# Patient Record
Sex: FEMALE | Race: BLACK OR AFRICAN AMERICAN | NOT HISPANIC OR LATINO | Employment: OTHER | URBAN - METROPOLITAN AREA
[De-identification: names, ages, dates, MRNs, and addresses within clinical notes are randomized per-mention and may not be internally consistent; named-entity substitution may affect disease eponyms.]

---

## 2017-01-19 DIAGNOSIS — E89.0 HYPOTHYROIDISM ASSOCIATED WITH SURGICAL PROCEDURE: Primary | ICD-10-CM

## 2017-01-19 RX ORDER — LIOTHYRONINE SODIUM 5 UG/1
5 TABLET ORAL 2 TIMES DAILY
Qty: 60 TABLET | Refills: 11 | Status: SHIPPED | OUTPATIENT
Start: 2017-01-19 | End: 2018-03-12 | Stop reason: SDUPTHER

## 2017-02-03 ENCOUNTER — CLINICAL SUPPORT (OUTPATIENT)
Dept: SPEECH THERAPY | Facility: HOSPITAL | Age: 62
End: 2017-02-03
Attending: INTERNAL MEDICINE
Payer: COMMERCIAL

## 2017-02-03 ENCOUNTER — OFFICE VISIT (OUTPATIENT)
Dept: OTOLARYNGOLOGY | Facility: CLINIC | Age: 62
End: 2017-02-03
Payer: COMMERCIAL

## 2017-02-03 VITALS
DIASTOLIC BLOOD PRESSURE: 87 MMHG | TEMPERATURE: 96 F | BODY MASS INDEX: 35.65 KG/M2 | HEART RATE: 66 BPM | WEIGHT: 220.88 LBS | SYSTOLIC BLOOD PRESSURE: 134 MMHG

## 2017-02-03 DIAGNOSIS — F44.4 HYPERFUNCTIONAL DYSPHONIA: ICD-10-CM

## 2017-02-03 DIAGNOSIS — R49.0 DYSPHONIA: ICD-10-CM

## 2017-02-03 DIAGNOSIS — R49.9 VOICE DISTURBANCE: Primary | ICD-10-CM

## 2017-02-03 DIAGNOSIS — R49.0 HOARSENESS: Primary | ICD-10-CM

## 2017-02-03 PROCEDURE — 92520 LARYNGEAL FUNCTION STUDIES: CPT | Mod: GN,,, | Performed by: SPEECH-LANGUAGE PATHOLOGIST

## 2017-02-03 PROCEDURE — 31579 LARYNGOSCOPY TELESCOPIC: CPT | Mod: S$GLB,,, | Performed by: OTOLARYNGOLOGY

## 2017-02-03 PROCEDURE — 92524 BEHAVRAL QUALIT ANALYS VOICE: CPT | Mod: GN | Performed by: SPEECH-LANGUAGE PATHOLOGIST

## 2017-02-03 PROCEDURE — G9173 VOICE D/C STATUS: HCPCS | Mod: GN,CK | Performed by: SPEECH-LANGUAGE PATHOLOGIST

## 2017-02-03 PROCEDURE — G9171 VOICE CURRENT STATUS: HCPCS | Mod: GN,CK | Performed by: SPEECH-LANGUAGE PATHOLOGIST

## 2017-02-03 PROCEDURE — 99999 PR PBB SHADOW E&M-EST. PATIENT-LVL III: CPT | Mod: PBBFAC,,, | Performed by: OTOLARYNGOLOGY

## 2017-02-03 PROCEDURE — G9172 VOICE GOAL STATUS: HCPCS | Mod: GN,CI | Performed by: SPEECH-LANGUAGE PATHOLOGIST

## 2017-02-03 PROCEDURE — 99243 OFF/OP CNSLTJ NEW/EST LOW 30: CPT | Mod: 25,S$GLB,, | Performed by: OTOLARYNGOLOGY

## 2017-02-03 NOTE — PATIENT INSTRUCTIONS
MUSCLE TENSION DYSPHONIA     What is MTD?     Muscle tension dysphonia (MTD) is a condition in which laryngeal muscles are overactive, causing voice fatigue and discomfort with use. Sometimes MTD accompanies another organic condition, but it can also occur on its own. The overactivity of the muscles can result in incomplete closure of the vocal folds. Sometimes, the false vocal folds are overactive as well.     MTD is caused by over-activity in some laryngeal muscles, which is sometimes caused by the underactivity of other muscles. If there is an underlying condition, like nodules or an upper respiratory infection, the overcompensation to produce voice becomes a learned pattern.     How is MTD treated?     Voice therapy is the first line of treatment for MTD. There are a wide variety of treatment approaches, including laryngeal massage.                           WHAT TO EXPECT FROM VOICE THERAPY    Purpose  The purpose of voice therapy is to help you find a better, more efficient way to use your voice or to reduce symptoms such as coughing, throat clearing, or difficulty breathing.  Depending on your symptoms, you may learn how to produce clearer voice quality, how to reduce fatigue or pain associated with speaking, how to take care of your voice, and how to eliminate chronic coughing or throat clearing.      Process: Evaluation  First, you may go through some initial testing.  In most cases, a videostroboscopy will be performed in order to allow your speech pathologist and your physician to look at your vocal cords to aid in deciding if you would benefit from voice therapy.  Next, you may work with the speech pathologist to assess the current capabilities of your voice.  Following evaluation, your speech pathologist will design a therapeutic plan to improve your voice as well as other symptoms that may bother you.  At the time of evaluation, your speech pathologist may provide you with exercises to try at home.       Process: Therapy  Most patients benefit from 2-8 sessions over 1-3 months.  Voice therapy involves changing the behavior of your vocal cords and speaking habits, so it is very important that you attend your appointments and do home practices as instructed by your speech pathologist.  Home practice and participation in therapy are critical to meeting your desired voice goals, so of course, we are able to work with you to schedule appointments that are convenient for you.

## 2017-02-03 NOTE — PATIENT INSTRUCTIONS
Home Voice Exercises   Practice each of these exercises, 3-5 minutes each, 3-5 times per day with:   ?  Cup bubble   ?  /m/ phonation    -Sustained pitch x10    -Pitch glide down (high pitch to low pitch) x10   -Pitch glide up (low pitch to high pitch) x10     -Reading aloud, using exercises if needed

## 2017-02-03 NOTE — MR AVS SNAPSHOT
[facility]  [address]  [facility] [address]  Phone: [phone]                  Dain Phelps   2/3/2017 8:00 AM   Clinical Support    Description:  Female : 1955   Provider:  Louise Craven CCC-SLP   Department:  [facility]           Reason for Visit     SLP Initial Evaluation           Diagnoses this Visit        Comments    Hoarseness    -  Primary     Dysphonia                To Do List           Future Appointments        Provider Department Dept Phone    2/15/2017 9:30 AM [facility], [place] [facility] [phone]      Goals (5 Years of Data)     None      [facility]     [facility] Nurse Care Line -  Assistance  Registered nurses in the [facility] provide clinical advisement, health education, appointment booking, and other advisory services.  Call for this free service at [phone].             Medications           Message regarding Medications     Verify the changes and/or additions to your medication regime listed below are the same as discussed with your clinician today.  If any of these changes or additions are incorrect, please notify your healthcare provider.             Verify that the below list of medications is an accurate representation of the medications you are currently taking.  If none reported, the list may be blank. If incorrect, please contact your healthcare provider. Carry this list with you in case of emergency.           Current Medications     ergocalciferol, vitamin D2, 2,000 unit Tab Take 2,000 Units by mouth 2 (two) times daily.    hydrochlorothiazide (HYDRODIURIL) 25 MG tablet TAKE 1 TABLET BY MOUTH EVERY DAY    hydroquinone 4 % Crea APPLY ON DARK SPOTS AT BEDTIME    levothyroxine (SYNTHROID) 100 MCG tablet Take 1 tablet (100 mcg total) by mouth before breakfast.    liothyronine (CYTOMEL) 5 MCG Tab Take 1 tablet (5 mcg total) by mouth 2 (two) times daily.     metoprolol succinate (TOPROL-XL) 25 MG 24 hr tablet TAKE 1 TABLET BY MOUTH EVERY DAY    verapamil (VERELAN) 360 MG C24P TAKE 1 CAPSULE(360 MG) BY MOUTH EVERY DAY           Clinical Reference Information           Allergies as of 2/3/2017     Ace Inhibitors      Immunizations Administered on Date of Encounter - 2/3/2017     None      Instructions    Home Voice Exercises   Practice each of these exercises, 3-5 minutes each, 3-5 times per day with:   ?  Cup bubble   ?  /m/ phonation    -Sustained pitch x10    -Pitch glide down (high pitch to low pitch) x10   -Pitch glide up (low pitch to high pitch) x10     -Reading aloud, using exercises if needed        Language Assistance Services     ATTENTION: Language assistance services are available, free of charge. Please call [phone].      ATENCIÓN: Si anu bishop, tiene a hand disposición servicios gratuitos de asistencia lingüística. Llame al [phone].     [facility] Ý: N?u b?n nói Ti?ng Vi?t, có các d?ch v? h? tr? ngôn ng? mi?n phí dành cho b?n. G?i s? [phone].         [facility] complies with applicable Federal civil rights laws and does not discriminate on the basis of race, color, national origin, age, disability, or sex.

## 2017-02-03 NOTE — PROGRESS NOTES
Referring provider: Dr. Erika Mcnalyl  Reason for visit:    Behavioral and qualitative analysis of voice and resonance (CPT 95916)  Laryngeal function studies (CPT 21228)    Subjective / History    Mrs. Dain Phelps is a 61 y.o. female referred for voice evaluation (CPT 36379, 84717) by Dr. Mcnally.  She presents with complaints of hoarseness which began in  following thyroid surgery (goiter).  The patient also endorses: strain with use, voice worse in morning, fatigue with use and reduced volume.  She is retired.  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  She is unable to sing since her surgery.  No high range or low range.  She used to sing in [employer] and to her kids/grandkids.    Swallowing: no c/o   Breathing: no c/o    Smokin packs/day   Reflux: yes - does not take meds regularly, hasn't had problems recently  Water: 8-16 oz/day     Stroboscopy findings (per Dr. Hardy on 2/3/17): no lesions of the supraglottic or glottic larynx; vocal fold mobility is normal with complete closure    Past Medical History   Diagnosis Date    Chronic low back pain     Fatigue     GERD (gastroesophageal reflux disease)     Hypertension     Hypothyroidism     Impaired glucose tolerance 1/15/2014    Leukopenia     Obesity (BMI 30-39.9) 1/15/2014    Primary hypothyroidism 2015    Snoring 2015    Vitamin D deficiency 1/15/2014     Current Outpatient Prescriptions on File Prior to Visit   Medication Sig Dispense Refill    ergocalciferol, vitamin D2, 2,000 unit Tab Take 2,000 Units by mouth 2 (two) times daily.      hydrochlorothiazide (HYDRODIURIL) 25 MG tablet TAKE 1 TABLET BY MOUTH EVERY DAY 30 tablet 2    hydroquinone 4 % Crea APPLY ON DARK SPOTS AT BEDTIME 28.35 g 0    levothyroxine (SYNTHROID) 100 MCG tablet Take 1 tablet (100 mcg total) by mouth before breakfast. 30 tablet 11    liothyronine (CYTOMEL) 5 MCG  "Tab Take 1 tablet (5 mcg total) by mouth 2 (two) times daily. 60 tablet 11    metoprolol succinate (TOPROL-XL) 25 MG 24 hr tablet TAKE 1 TABLET BY MOUTH EVERY DAY 30 tablet 4    verapamil (VERELAN) 360 MG C24P TAKE 1 CAPSULE(360 MG) BY MOUTH EVERY DAY 90 capsule 0     No current facility-administered medications on file prior to visit.        Objective    Perceptual/behavioral assessment  -CAPE-V Overall Score: 36  -Quality: rough, strained, low breath support  -Volume: decreased projection  -Pitch: low F0  -Flexibility: diminished  -Habitual respiratory pattern: chest/clavicular.    Acoustic/aerodynamic assessment  Multi-Dimensional Voice Program (MDVP) Analysis (sustained "ah")   Baseline Gender-matched norms (F)   Average fundamental frequency (Fo) 152.907 Hz Norm/SD: 243.97 / 27.46   Relative average perturbation 0.713% Norm/SD: 0.378 / 0.21   Shimmer percent 8.338% Norm/SD: 1.997 / 0.79   Noise to harmonic ratio 0.264 Norm/SD: 0.112 / 0.01   Voice turbulence index 0.054 Norm/SD: 0.046 / 0.012     Phonatory Aerodynamic System (PAS) Analysis (running speech)  Maximum SPL   81.28  dB  Mean Pitch   133.31 Hz  Pitch Range   229.43 Hz  Phonation Time  12.97  Sec  Expiratory Airflow Duration 13.35  Sec  Inspiratory Airflow Duration 7.11  Sec  Peak Expiratory Airflow 0.27  Lit/Sec  Expiratory Volume  0.28  Liters  Peak Inspiratory Airflow -0.66  Lit/Sec  Inspiratory Volume  -0.47  Liters    Patient self-perception scales  -Voice Handicap Index-10 (completed to assess self-perceived handicap associated with dysphonia; >11 considered abnormal): 35  -Reflux Symptom Index (completed to assess the possible presence and/or severity of LPR symptoms and any relationship between this condition and the pt's dysphagia; score of ~15 may indicate LPR): 13    Education / Stimulability Trials   Discussed importance of vocal hygiene including: hydration. Patient was stimulable for improved voice using SOVT exercises and resonant " "targets.  She was able to improve voice quality with cup bubble phonation and training with resonant humming.  She was able to achieve carryover to connected speech in reading tasks with 80% accuracy for improved voice and oral resonance.  Also able to improve consistency with cues to "breathe" as well as cues to "project and talk from the front".  She was able to identify moments of "back" voice with about 75% accuracy.  She was encouraged to continue using these exercises and practice speech tasks daily.  She was amenable to all suggestions.      Functional goals  Length Status Goal   Long term Initiated  Patient and clinician will facilitate changes in vocal function in order to restore functional use of voice for daily occupational, social, and emotional demands.    Long term Initiated  Patient will re-establish phonation with adequate balance of airflow and resonance with decreased muscle tension.    Long term Initiated   Patient will improve coordination of respiration and phonation for efficient vocal production at a conversational level.    Short term Initiated  Patient will complete SOVT exercises and/or resonant-focused exercises 3-5x daily to strengthen and balance the intrinsic laryngeal musculature and maximize glottic closure without medial hyperfunction.   Short term Initiated  Patient will improve the quality, efficiency, and ease of phonation through resonant and/or airflow exercises from the syllable to conversation level with 80% accuracy.   Short term Initiated  Patient will discriminate between easy and strained phonation with 80% accuracy.    Short term Initiated   Patient will identify the sensations associated with muscle relaxation in the abdominal, thoracic, neck and facial areas during efficient phonation with minimal clinician cue.        Assessment     Mrs. 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 Hardy.  Prognosis for " continued improvement is good.     G-Codes for Voice  Current status:   - CK   Projected status:  - CI   Discharge status:  - CK     Recommendations / POC    Recommend 2-4 sessions of voice/speech therapy over 4-6 weeks with a speech-language pathologist to optimize glottal postures for improved vocal function, vocal efficiency, and ease of phonation.  She should continue the exercises as discussed in session and should contact me with any further questions.

## 2017-02-03 NOTE — LETTER
February 6, 2017      Erika Mcnally MD  [address]  [facility] [address]           [facility]  [address]  [facility] [address]  Phone: [phone]  Fax: [phone]          Patient: Dain Phelps   MR Number: [id number]   YOB: 1955   Date of Visit: 2/3/2017       Dear Dr. Erika Mcnally:    Thank you for referring Dain Phelps to me for evaluation. Attached you will find relevant portions of my assessment and plan of care.    If you have questions, please do not hesitate to call me. I look forward to following Dain Phelps along with you.    Sincerely,    Baudilio Hardy MD    Enclosure  CC:  Eliecer Chacon MD    If you would like to receive this communication electronically, please contact [email or web address] or [phone] to request more information on [employer] access.    For providers and/or their staff who would like to refer a patient to [facility], please contact us through our one-stop-shop provider referral line, [facility], at [phone].    If you feel you have received this communication in error or would no longer like to receive these types of communications, please e-mail [email or web address]

## 2017-02-03 NOTE — MR AVS SNAPSHOT
[facility]  [address]  [facility] [address]  Phone: [phone]  Fax: [phone]                  Dain Phelps   2/3/2017 8:20 AM   Office Visit    Description:  Female : 1955   Provider:  Baudilio Hardy MD   Department:  [facility]           Reason for Visit     Hoarse           Diagnoses this Visit        Comments    Voice disturbance    -  Primary     Hyperfunctional dysphonia                To Do List           Future Appointments        Provider Department Dept Phone    2/15/2017 9:30 AM LAB, [place] [facility] [phone]      Goals (5 Years of Data)     None      Follow-Up and Disposition     Return if symptoms worsen or fail to improve.      [facility]     [facility] Nurse Care Line -  Assistance  Registered nurses in the [facility] provide clinical advisement, health education, appointment booking, and other advisory services.  Call for this free service at [phone].             Medications           Message regarding Medications     Verify the changes and/or additions to your medication regime listed below are the same as discussed with your clinician today.  If any of these changes or additions are incorrect, please notify your healthcare provider.             Verify that the below list of medications is an accurate representation of the medications you are currently taking.  If none reported, the list may be blank. If incorrect, please contact your healthcare provider. Carry this list with you in case of emergency.           Current Medications     ergocalciferol, vitamin D2, 2,000 unit Tab Take 2,000 Units by mouth 2 (two) times daily.    hydrochlorothiazide (HYDRODIURIL) 25 MG tablet TAKE 1 TABLET BY MOUTH EVERY DAY    hydroquinone 4 % Crea APPLY ON DARK SPOTS AT BEDTIME    levothyroxine (SYNTHROID) 100 MCG tablet Take 1 tablet (100 mcg total) by mouth before  breakfast.    liothyronine (CYTOMEL) 5 MCG Tab Take 1 tablet (5 mcg total) by mouth 2 (two) times daily.    metoprolol succinate (TOPROL-XL) 25 MG 24 hr tablet TAKE 1 TABLET BY MOUTH EVERY DAY    verapamil (VERELAN) 360 MG C24P TAKE 1 CAPSULE(360 MG) BY MOUTH EVERY DAY           Clinical Reference Information           Your Vitals Were     BP Pulse Temp Weight BMI    134/87 66 96 °F (35.6 °C) 100.2 kg (220 lb 14.4 oz) 35.65 kg/m2      Blood Pressure          Most Recent Value    BP  134/87      Allergies as of 2/3/2017     Ace Inhibitors      Immunizations Administered on Date of Encounter - 2/3/2017     None      Instructions      MUSCLE TENSION DYSPHONIA     What is MTD?     Muscle tension dysphonia (MTD) is a condition in which laryngeal muscles are overactive, causing voice fatigue and discomfort with use. Sometimes MTD accompanies another organic condition, but it can also occur on its own. The overactivity of the muscles can result in incomplete closure of the vocal folds. Sometimes, the false vocal folds are overactive as well.     MTD is caused by over-activity in some laryngeal muscles, which is sometimes caused by the underactivity of other muscles. If there is an underlying condition, like nodules or an upper respiratory infection, the overcompensation to produce voice becomes a learned pattern.     How is MTD treated?     Voice therapy is the first line of treatment for MTD. There are a wide variety of treatment approaches, including laryngeal massage.                           WHAT TO EXPECT FROM VOICE THERAPY    Purpose  The purpose of voice therapy is to help you find a better, more efficient way to use your voice or to reduce symptoms such as coughing, throat clearing, or difficulty breathing.  Depending on your symptoms, you may learn how to produce clearer voice quality, how to reduce fatigue or pain associated with speaking, how to take care of your voice, and how to eliminate chronic coughing or  throat clearing.      Process: Evaluation  First, you may go through some initial testing.  In most cases, a videostroboscopy will be performed in order to allow your speech pathologist and your physician to look at your vocal cords to aid in deciding if you would benefit from voice therapy.  Next, you may work with the speech pathologist to assess the current capabilities of your voice.  Following evaluation, your speech pathologist will design a therapeutic plan to improve your voice as well as other symptoms that may bother you.  At the time of evaluation, your speech pathologist may provide you with exercises to try at home.      Process: Therapy  Most patients benefit from 2-8 sessions over 1-3 months.  Voice therapy involves changing the behavior of your vocal cords and speaking habits, so it is very important that you attend your appointments and do home practices as instructed by your speech pathologist.  Home practice and participation in therapy are critical to meeting your desired voice goals, so of course, we are able to work with you to schedule appointments that are convenient for you.             Language Assistance Services     ATTENTION: Language assistance services are available, free of charge. Please call [phone].      ATENCIÓN: Si habla dario, tiene a hand disposición servicios gratuitos de asistencia lingüística. Llame al [phone].     ABDIFATAH Ý: N?u b?n nói Ti?ng Vi?t, có các d?ch v? h? tr? ngôn ng? mi?n phí dành cho b?n. G?i s? [phone].         [facility] complies with applicable Federal civil rights laws and does not discriminate on the basis of race, color, national origin, age, disability, or sex.

## 2017-02-03 NOTE — PROGRESS NOTES
[facility]  Department of Otorhinolaryngology and Communication Sciences    Dain Phelps is a 61 y.o. female who presents to the [facility] for consultation at the kind request of Dr. Erika Mcnally for further management of hoarseness.     She complains of vocal strain, a lower pitch to her voice, inability to project her voice. Onset was sudden. Duration is about 8 years, beginning soon after undergoing thyroid surgery. I personally reviewed the operative report. Bilateral RLN's were identified and preserved  Time course is constant, with some degree of variability. Symptoms are worsening. Exacerbating factors include trying to project her voice and trying to sing to higher pitches. She denies any alleviating factors. Associated symptoms include vocal fatigue, vocal effort, and vocal strain.     Voice Handicap Index-10 (VHI-10) score is 35.   Reflux Symptom Index (RSI) score is 13.   Eating Assessment Tool-10 (EAT-10) score is 0.   Dyspnea Index (DI) score is 0.  Cough Severity Index (CSI) score is 0.    Past Medical History  She has a past medical history of Chronic low back pain; Fatigue; GERD (gastroesophageal reflux disease); Hypertension; Hypothyroidism; Impaired glucose tolerance; Leukopenia; Obesity (BMI 30-39.9); Primary hypothyroidism; Snoring; and Vitamin D deficiency.    Past Surgical History  She has a past surgical history that includes  section, classic; thyroid surgey (); Hysterectomy; and Cataract extraction.    Family History  Her family history includes Diabetes in her maternal aunt and maternal grandmother; Hypertension in her maternal aunt and mother; Thyroid disease in her maternal aunt.    Social History  She reports that she has never smoked. She has never used smokeless tobacco. She reports that she does not drink alcohol or use illicit drugs.    Allergies  She is allergic to ace inhibitors.    Medications  She has a current medication list which includes the  following prescription(s): ergocalciferol (vitamin d2), hydrochlorothiazide, hydroquinone, levothyroxine, liothyronine, metoprolol succinate, and verapamil.    Review of Systems   Constitutional: Negative for fever.   HENT: Negative for sore throat.    Eyes: Negative for visual disturbance.   Respiratory: Negative for wheezing.    Cardiovascular: Negative for chest pain.   Gastrointestinal: Negative for nausea.   Musculoskeletal: Negative for arthralgias.   Skin: Negative for rash.   Neurological: Negative for tremors.   Hematological: Does not bruise/bleed easily.   Psychiatric/Behavioral: The patient is not nervous/anxious.           Objective:     Visit Vitals    /87    Pulse 66    Temp 96 °F (35.6 °C)    Wt 100.2 kg (220 lb 14.4 oz)    BMI 35.65 kg/m2      Physical Exam    Constitutional: comfortable, well dressed  Psychiatric: appropriate affect  Respiratory: comfortably breathing, symmetric chest rise, no stridor  Voice: low pitch, rough, strained; poor breath support  Cardiovascular: upper extremities non-edematous  Lymphatic: no cervical lymphadenopathy  Neurologic: alert and oriented to time, place, person, and situation; cranial nerves 3-12 grossly intact  Head: normocephalic  Eyes: conjunctivae and sclerae clear  Ears: normal pinnae, normal external auditory canals, tympanic membranes intact  Nose: mucosa pink and noncongested, no masses, no mucopurulence, no polyps  Oral cavity / oropharynx: no mucosal lesions  Neck: soft, full range of motion, laryngotracheal complex palpable with appropriate landmarks, larynx elevates on swallowing  Indirect laryngoscopy: limited due to gag    Procedure  Flexible Laryngeal Videostroboscopy (37314): Laryngeal videostroboscopy is indicated to assess laryngeal vibratory biomechanics and vocal fold oscillation, which cannot be assessed with a plain light examination. This was carried out transnasally with a distal chip videoendoscope. After verbal consent was  obtained, the patient was positioned and the nose was topically decongested with 1% phenylephrine and topically anesthetized with 4% lidocaine. The endoscope was passed through the most patent nasal cavity and positioned to image the nasopharynx, larynx, and hypopharynx in detail. The following featured were examined: nasopharyngeal, laryngeal, hypopharyngeal masses; velopharyngeal strength, closure, and symmetry of motion; vocal fold range and symmetry of motion; laryngeal mucosal edema, erythema, inflammation, and hydration; salivary pooling; and gross laryngeal sensation. During phonation, the vocal folds were assesses for glottal closure; mucosal wave; vocal fold lesions; vibratory periodicity, amplitude, and phase symmetry; and vertical height match. The equipment was removed. The patient tolerated the procedure well without complication. All findings were normal except:  - low breath support; short phonatory bursts  - primarily aperiodic vibration due to pulse-register phonation  - concentric laryngeal hyperfunction    Data Reviewed    see HPI      Assessment:     Dain Phelps is a 61 y.o. female with chronic hyperfunctional dypshonia which is negatively impacting all aspects of her life.       Plan:        I had a discussion with the patient regarding her condition and the further workup and management options.      I educated the patient on the physiology of voice production, as well as the pathophysiology and treatment of muscle tension dysphonia and hyperfunctional voice disorders. SLP voice evaluation and subsequent therapy sessions are medically necessary for restoration of laryngeal function. We will arrange for this to occur here at the [facility] in the coming weeks.    She will follow up with me in the future on an as-needed basis.    All questions were answered, and the patient is in agreement with the above.     Baudilio Hardy M.D.  [facility]  Department of Otorhinolaryngology  and Communication Sciences

## 2017-02-10 ENCOUNTER — CLINICAL SUPPORT (OUTPATIENT)
Dept: SPEECH THERAPY | Facility: HOSPITAL | Age: 62
End: 2017-02-10
Attending: OTOLARYNGOLOGY
Payer: COMMERCIAL

## 2017-02-10 DIAGNOSIS — R49.0 DYSPHONIA: ICD-10-CM

## 2017-02-10 DIAGNOSIS — R49.0 HOARSENESS: ICD-10-CM

## 2017-02-10 PROCEDURE — G9172 VOICE GOAL STATUS: HCPCS | Mod: GN,CI | Performed by: SPEECH-LANGUAGE PATHOLOGIST

## 2017-02-10 PROCEDURE — 92507 TX SP LANG VOICE COMM INDIV: CPT | Mod: GN | Performed by: SPEECH-LANGUAGE PATHOLOGIST

## 2017-02-10 PROCEDURE — G9171 VOICE CURRENT STATUS: HCPCS | Mod: GN,CK | Performed by: SPEECH-LANGUAGE PATHOLOGIST

## 2017-02-10 NOTE — PROGRESS NOTES
"Referring provider: Dr. Baudilio Hardy  Reason for visit:  Voice treatment (CPT 94588)  Session #1    History / Subjective   I had the pleasure of seeing Mrs. Dain Phelps for her first treatment session following complete voice evaluation on 2/3/17.  During that time, improvements were noted on cup bubble phonation and resonant voice exercises.  Since evaluation, she reports she has been doing the exercises and been trying to use her "good voice".  She does report it sounds okay but causes her vocal strain/effort.     Objective   The primary goal of todays session was to re-elicit clearer voice with reduced strain.  This was targeted using SOVT treatment modalities.    Perceptual assessment:    -Quality: rough  -Volume: decreased projection  -Pitch: appropriate for age and gender  -Flexibility: appropriate for age and gender  Habitual respiratory pattern: breath holding    Data  Patient completed the following voice exercises.     Cup bubble with 60% accuracy for reduced strain/improved quality; noted continued breath holding   Tongue trill with 90% accuracy for " "; pt was consistently stimulable in isolation and carryover  Patient also provided further education re: improved voice should not feel effortful or strained.  She acknowledged understanding.  For home practice, clinician reviewed home exercises as practiced during the session with the patient. These are to include tongue trill into carryover phrases and in isolation.  She was amenable to all suggestions.     Acoustic/aerodynamic assessment  Multi-Dimensional Voice Program (MDVP) Analysis (sustained "ah")   Baseline  Tx 1 Gender-matched norms (F)   Average fundamental frequency (Fo) 152.907 Hz 199.853 Hz Norm/SD: 243.97 / 27.46   Relative average perturbation 0.713% 0.192% Norm/SD: 0.378 / 0.21   Shimmer percent 8.338% 3.816% Norm/SD: 1.997 / 0.79   Noise to harmonic ratio 0.264 0.127 Norm/SD: 0.112 / 0.01   Voice turbulence index 0.054 0.059 " Norm/SD: 0.046 / 0.012       Functional goals  Length Status Goal   Long term Ongoing Patient and clinician will facilitate changes in vocal function in order to restore functional use of voice for daily occupational, social, and emotional demands.    Long term Ongoing Patient will re-establish phonation with adequate balance of airflow and resonance with decreased muscle tension.    Long term Ongoing Patient will improve coordination of respiration and phonation for efficient vocal production at a conversational level.    Short term Ongoing Patient will complete SOVT exercises and/or resonant-focused exercises 3-5x daily to strengthen and balance the intrinsic laryngeal musculature and maximize glottic closure without medial hyperfunction.   Short term Ongoing Patient will improve the quality, efficiency, and ease of phonation through resonant and/or airflow exercises from the syllable to conversation level with 80% accuracy.   Short term Ongoing Patient will discriminate between easy and strained phonation with 80% accuracy.    Short term Ongoing Patient will identify the sensations associated with muscle relaxation in the abdominal, thoracic, neck and facial areas during efficient phonation with minimal clinician cue.        Assessment     Mrs. 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 Hardy.  Prognosis for continued improvement is good.     G-Codes for Voice  Current status:   - CK   Projected status:  - CI     Recommendations / POC    Continue POC: 2-4 sessions of voice/speech therapy over 4-6 weeks with a speech-language pathologist to optimize glottal postures for improved vocal function, vocal efficiency, and ease of phonation.  She should continue the exercises as discussed in session and should contact me with any further questions.

## 2017-02-15 ENCOUNTER — LAB VISIT (OUTPATIENT)
Dept: LAB | Facility: HOSPITAL | Age: 62
End: 2017-02-15
Attending: INTERNAL MEDICINE
Payer: COMMERCIAL

## 2017-02-15 DIAGNOSIS — E89.0 POSTOPERATIVE HYPOTHYROIDISM: ICD-10-CM

## 2017-02-15 LAB — TSH SERPL DL<=0.005 MIU/L-ACNC: 0.7 UIU/ML

## 2017-02-15 PROCEDURE — 84443 ASSAY THYROID STIM HORMONE: CPT

## 2017-02-15 PROCEDURE — 36415 COLL VENOUS BLD VENIPUNCTURE: CPT | Mod: PO

## 2017-02-17 ENCOUNTER — PATIENT MESSAGE (OUTPATIENT)
Dept: ENDOCRINOLOGY | Facility: CLINIC | Age: 62
End: 2017-02-17

## 2017-02-17 DIAGNOSIS — E89.0 POSTOPERATIVE HYPOTHYROIDISM: Primary | ICD-10-CM

## 2017-02-20 ENCOUNTER — TELEPHONE (OUTPATIENT)
Dept: ENDOCRINOLOGY | Facility: CLINIC | Age: 62
End: 2017-02-20

## 2017-02-20 DIAGNOSIS — E89.0 POSTOPERATIVE HYPOTHYROIDISM: Primary | ICD-10-CM

## 2017-02-27 ENCOUNTER — TELEPHONE (OUTPATIENT)
Dept: SPEECH THERAPY | Facility: HOSPITAL | Age: 62
End: 2017-02-27

## 2017-04-07 ENCOUNTER — OFFICE VISIT (OUTPATIENT)
Dept: FAMILY MEDICINE | Facility: CLINIC | Age: 62
End: 2017-04-07
Payer: COMMERCIAL

## 2017-04-07 VITALS
WEIGHT: 211.19 LBS | SYSTOLIC BLOOD PRESSURE: 134 MMHG | RESPIRATION RATE: 16 BRPM | BODY MASS INDEX: 33.94 KG/M2 | HEIGHT: 66 IN | DIASTOLIC BLOOD PRESSURE: 82 MMHG | TEMPERATURE: 98 F | HEART RATE: 62 BPM | OXYGEN SATURATION: 99 %

## 2017-04-07 DIAGNOSIS — G47.33 OSA ON CPAP: ICD-10-CM

## 2017-04-07 DIAGNOSIS — I10 ESSENTIAL HYPERTENSION: Primary | ICD-10-CM

## 2017-04-07 PROCEDURE — 3080F DIAST BP >= 90 MM HG: CPT | Mod: S$GLB,,, | Performed by: FAMILY MEDICINE

## 2017-04-07 PROCEDURE — 99999 PR PBB SHADOW E&M-EST. PATIENT-LVL III: CPT | Mod: PBBFAC,,, | Performed by: FAMILY MEDICINE

## 2017-04-07 PROCEDURE — 3077F SYST BP >= 140 MM HG: CPT | Mod: S$GLB,,, | Performed by: FAMILY MEDICINE

## 2017-04-07 PROCEDURE — 99214 OFFICE O/P EST MOD 30 MIN: CPT | Mod: S$GLB,,, | Performed by: FAMILY MEDICINE

## 2017-04-07 PROCEDURE — 1160F RVW MEDS BY RX/DR IN RCRD: CPT | Mod: S$GLB,,, | Performed by: FAMILY MEDICINE

## 2017-04-07 NOTE — PROGRESS NOTES
Chief Complaint   Patient presents with    Hypertension       HPI  Dain Phelps is a 61 y.o. female with multiple medical diagnoses as listed in the medical history and problem list that presents for evaluation for hypertension.     She has noted for the past two weeks her blood pressure has been higher than normal and she has had light headedness but no chest pains or TIA symptoms. She has been taking three medications for 5 years. She is surprised by this because she is doing a low carb, Whole 30 diet and has lost weight, she is avoiding sodium, and she has tried to exercise but even walking for 10 min is difficult for her. She does have sleep apnea and she has not been using her machine for the past 3 months.    PAST MEDICAL HISTORY:  Past Medical History:   Diagnosis Date    Chronic low back pain     Fatigue     GERD (gastroesophageal reflux disease)     History of colon polyps 2015    Hypertension     Hypothyroidism     Impaired glucose tolerance 1/15/2014    Leukopenia     Obesity (BMI 30-39.9) 1/15/2014    Primary hypothyroidism 2015    Snoring 2015    Vitamin D deficiency 1/15/2014       PAST SURGICAL HISTORY:  Past Surgical History:   Procedure Laterality Date    CATARACT EXTRACTION       SECTION, CLASSIC      x3    HYSTERECTOMY      total    thyroid surgey         SOCIAL HISTORY:  Social History     Social History    Marital status:      Spouse name: N/A    Number of children: N/A    Years of education: N/A     Occupational History    Not on file.     Social History Main Topics    Smoking status: Never Smoker    Smokeless tobacco: Never Used    Alcohol use No    Drug use: No    Sexual activity: Yes     Partners: Male     Other Topics Concern    Not on file     Social History Narrative       FAMILY HISTORY:  Family History   Problem Relation Age of Onset    Hypertension Mother     Hypertension Maternal Aunt     Diabetes Maternal Aunt      Thyroid disease Maternal Aunt     Diabetes Maternal Grandmother        ALLERGIES AND MEDICATIONS: updated and reviewed.  Review of patient's allergies indicates:   Allergen Reactions    Ace inhibitors Edema     Angioedema     Current Outpatient Prescriptions   Medication Sig Dispense Refill    ergocalciferol, vitamin D2, 2,000 unit Tab Take 2,000 Units by mouth 2 (two) times daily.      hydrochlorothiazide (HYDRODIURIL) 25 MG tablet TAKE 1 TABLET BY MOUTH EVERY DAY 30 tablet 2    levothyroxine (SYNTHROID) 100 MCG tablet Take 1 tablet (100 mcg total) by mouth before breakfast. 30 tablet 11    liothyronine (CYTOMEL) 5 MCG Tab Take 1 tablet (5 mcg total) by mouth 2 (two) times daily. 60 tablet 11    metoprolol succinate (TOPROL-XL) 25 MG 24 hr tablet TAKE 1 TABLET BY MOUTH EVERY DAY 30 tablet 4    verapamil (VERELAN) 360 MG C24P TAKE 1 CAPSULE(360 MG) BY MOUTH EVERY DAY 90 capsule 0     No current facility-administered medications for this visit.        ROS  Review of Systems   Constitutional: Negative for chills, diaphoresis, fatigue, fever and unexpected weight change.   HENT: Negative for rhinorrhea, sinus pressure, sore throat and tinnitus.    Eyes: Negative for photophobia and visual disturbance.   Respiratory: Negative for cough, shortness of breath and wheezing.    Cardiovascular: Negative for chest pain and palpitations.   Gastrointestinal: Negative for abdominal pain, blood in stool, constipation, diarrhea, nausea and vomiting.   Genitourinary: Negative for dysuria, flank pain, frequency and vaginal discharge.   Musculoskeletal: Negative for arthralgias and joint swelling.   Skin: Negative for rash.   Neurological: Negative for speech difficulty, weakness, light-headedness and headaches.   Psychiatric/Behavioral: Negative for behavioral problems and dysphoric mood.       Physical Exam  Vitals:    04/07/17 1329   BP: (!) 140/90   Pulse: 62   Resp: 16   Temp: 97.7 °F (36.5 °C)   TempSrc: Oral   SpO2: 99%  "  Weight: 95.8 kg (211 lb 3.2 oz)   Height: 5' 6" (1.676 m)    Body mass index is 34.09 kg/(m^2).  Weight: 95.8 kg (211 lb 3.2 oz)   Height: 5' 6" (167.6 cm)     Physical Exam   Constitutional: She is oriented to person, place, and time. She appears well-developed and well-nourished. No distress.   HENT:   Head: Normocephalic and atraumatic.   Eyes: EOM are normal.   Neck: Neck supple.   Cardiovascular: Normal rate and regular rhythm.  Exam reveals no gallop and no friction rub.    No murmur heard.  No lower ext edema   Pulmonary/Chest: Effort normal and breath sounds normal. No respiratory distress. She has no wheezes. She has no rales.   Neurological: She is alert and oriented to person, place, and time.   Skin: Skin is warm and dry. No rash noted.   Psychiatric: She has a normal mood and affect. Her behavior is normal.   Nursing note and vitals reviewed.      Health Maintenance       Date Due Completion Date    Mammogram 10/7/2018 10/7/2016    Colonoscopy 8/4/2020 8/4/2015    Lipid Panel 10/7/2021 10/7/2016    TETANUS VACCINE 4/7/2027 4/7/2017 (Declined)    Override on 4/7/2017: Declined            ASSESSMENT     1. Essential hypertension    2. LORRAINE on CPAP        PLAN:     Essential hypertension    LORRAINE on CPAP    BP recheck, home monitoring for 2 wks  Resume CPAP as this is likely worsening her hypertension  Will add fourth med if we need to, but she should begin exercising and continue salt avoidance to lower her pressure along with wearing her CPAP    Yary Valadez MD  04/07/2017 2:02 PM        Return in about 2 weeks (around 4/21/2017) for Follow up.              "

## 2017-04-07 NOTE — MR AVS SNAPSHOT
[facility]  [address]  [place] [address]  Phone: [phone]  Fax: [phone]                  Dain Phelps   2017 1:20 PM   Office Visit    Description:  Female : 1955   Provider:  Yary Valadez MD   Department:  [facility]           Reason for Visit     Hypertension           Diagnoses this Visit        Comments    Essential hypertension    -  Primary     LORRAINE on CPAP                To Do List           Future Appointments        Provider Department Dept Phone    2017 8:15 AM LAB, [place] [facility] [phone]      Goals (5 Years of Data)     None      Follow-Up and Disposition     Return in about 2 weeks (around 2017) for Follow up.      [facility]     [facility] -  Assistance  Unless otherwise directed by your provider, please contact [facility], our nurse care line that is available for  assistance.     Registered nurses in the [facility] provide: appointment scheduling, clinical advisement, health education, and other advisory services.  Call: [phone] (toll free)               Medications           Message regarding Medications     Verify the changes and/or additions to your medication regime listed below are the same as discussed with your clinician today.  If any of these changes or additions are incorrect, please notify your healthcare provider.        STOP taking these medications     hydroquinone 4 % Crea APPLY ON DARK SPOTS AT BEDTIME           Verify that the below list of medications is an accurate representation of the medications you are currently taking.  If none reported, the list may be blank. If incorrect, please contact your healthcare provider. Carry this list with you in case of emergency.           Current Medications     ergocalciferol, vitamin D2, 2,000 unit Tab Take 2,000 Units by mouth 2 (two) times daily.    hydrochlorothiazide  "(HYDRODIURIL) 25 MG tablet TAKE 1 TABLET BY MOUTH EVERY DAY    levothyroxine (SYNTHROID) 100 MCG tablet Take 1 tablet (100 mcg total) by mouth before breakfast.    liothyronine (CYTOMEL) 5 MCG Tab Take 1 tablet (5 mcg total) by mouth 2 (two) times daily.    metoprolol succinate (TOPROL-XL) 25 MG 24 hr tablet TAKE 1 TABLET BY MOUTH EVERY DAY    verapamil (VERELAN) 360 MG C24P TAKE 1 CAPSULE(360 MG) BY MOUTH EVERY DAY           Clinical Reference Information           Your Vitals Were     BP Pulse Temp Resp Height Weight    140/90 62 97.7 °F (36.5 °C) (Oral) 16 5' 6" (1.676 m) 95.8 kg (211 lb 3.2 oz)    SpO2 BMI             99% 34.09 kg/m2         Blood Pressure          Most Recent Value    BP  (!)  140/90      Allergies as of 4/7/2017     Ace Inhibitors      Immunizations Administered on Date of Encounter - 4/7/2017     None      Language Assistance Services     ATTENTION: Language assistance services are available, free of charge. Please call [phone].      ATENCIÓN: Si habla leahañol, tiene a hand disposición servicios gratuitos de asistencia lingüística. Llame al [phone].     ABDIFATAH Ý: N?u b?n nói Ti?ng Vi?t, có các d?ch v? h? tr? ngôn ng? mi?n phí dành cho b?n. G?i s? [phone].         [facility] complies with applicable Federal civil rights laws and does not discriminate on the basis of race, color, national origin, age, disability, or sex.        "

## 2017-04-14 DIAGNOSIS — I10 ESSENTIAL HYPERTENSION: ICD-10-CM

## 2017-04-16 RX ORDER — VERAPAMIL HYDROCHLORIDE 360 MG/1
CAPSULE, DELAYED RELEASE PELLETS ORAL
Qty: 90 CAPSULE | Refills: 0 | Status: SHIPPED | OUTPATIENT
Start: 2017-04-16 | End: 2017-04-25 | Stop reason: SDUPTHER

## 2017-04-17 RX ORDER — HYDROCHLOROTHIAZIDE 25 MG/1
25 TABLET ORAL DAILY
Qty: 90 TABLET | Refills: 1 | Status: SHIPPED | OUTPATIENT
Start: 2017-04-17 | End: 2017-11-19 | Stop reason: SDUPTHER

## 2017-04-25 ENCOUNTER — OFFICE VISIT (OUTPATIENT)
Dept: FAMILY MEDICINE | Facility: CLINIC | Age: 62
End: 2017-04-25
Payer: COMMERCIAL

## 2017-04-25 VITALS
WEIGHT: 212.31 LBS | OXYGEN SATURATION: 99 % | SYSTOLIC BLOOD PRESSURE: 114 MMHG | BODY MASS INDEX: 34.12 KG/M2 | HEIGHT: 66 IN | RESPIRATION RATE: 14 BRPM | TEMPERATURE: 98 F | DIASTOLIC BLOOD PRESSURE: 70 MMHG | HEART RATE: 60 BPM

## 2017-04-25 DIAGNOSIS — I10 ESSENTIAL HYPERTENSION: Primary | ICD-10-CM

## 2017-04-25 DIAGNOSIS — N95.1 HOT FLASHES DUE TO MENOPAUSE: ICD-10-CM

## 2017-04-25 DIAGNOSIS — G47.33 OSA ON CPAP: ICD-10-CM

## 2017-04-25 PROCEDURE — 3078F DIAST BP <80 MM HG: CPT | Mod: S$GLB,,, | Performed by: FAMILY MEDICINE

## 2017-04-25 PROCEDURE — 99214 OFFICE O/P EST MOD 30 MIN: CPT | Mod: S$GLB,,, | Performed by: FAMILY MEDICINE

## 2017-04-25 PROCEDURE — 1160F RVW MEDS BY RX/DR IN RCRD: CPT | Mod: S$GLB,,, | Performed by: FAMILY MEDICINE

## 2017-04-25 PROCEDURE — 3074F SYST BP LT 130 MM HG: CPT | Mod: S$GLB,,, | Performed by: FAMILY MEDICINE

## 2017-04-25 PROCEDURE — 99999 PR PBB SHADOW E&M-EST. PATIENT-LVL III: CPT | Mod: PBBFAC,,, | Performed by: FAMILY MEDICINE

## 2017-04-25 NOTE — PATIENT INSTRUCTIONS
Take verapamil in the morning and metoprolol at night  Continue checking pressure  Bring cuff to next visit

## 2017-04-25 NOTE — PROGRESS NOTES
Chief Complaint   Patient presents with    Hypertension    Follow-up       HPI  Dain Phelps is a 61 y.o. female with multiple medical diagnoses as listed in the medical history and problem list that presents for follow-up for hypertension. She has been checking her pressure one to two hours after her medication and it is still elevated. She notices that it goes down after she takes her medicine. She has an arm cuff also. She feels well. She has been using her CPAP machine but she has nighttime hot flashes and feels uncomfortable with the mask on her face.    PAST MEDICAL HISTORY:  Past Medical History:   Diagnosis Date    Chronic low back pain     Fatigue     GERD (gastroesophageal reflux disease)     History of colon polyps 2015    Hypertension     Hypothyroidism     Impaired glucose tolerance 1/15/2014    Leukopenia     Obesity (BMI 30-39.9) 1/15/2014    Primary hypothyroidism 2015    Snoring 2015    Vitamin D deficiency 1/15/2014       PAST SURGICAL HISTORY:  Past Surgical History:   Procedure Laterality Date    CATARACT EXTRACTION       SECTION, CLASSIC      x3    HYSTERECTOMY      total    thyroid surgey         SOCIAL HISTORY:  Social History     Social History    Marital status:      Spouse name: N/A    Number of children: N/A    Years of education: N/A     Occupational History    Not on file.     Social History Main Topics    Smoking status: Never Smoker    Smokeless tobacco: Never Used    Alcohol use No    Drug use: No    Sexual activity: Yes     Partners: Male     Other Topics Concern    Not on file     Social History Narrative       FAMILY HISTORY:  Family History   Problem Relation Age of Onset    Hypertension Mother     Hypertension Maternal Aunt     Diabetes Maternal Aunt     Thyroid disease Maternal Aunt     Diabetes Maternal Grandmother        ALLERGIES AND MEDICATIONS: updated and reviewed.  Review of patient's allergies  "indicates:   Allergen Reactions    Ace inhibitors Edema     Angioedema     Current Outpatient Prescriptions   Medication Sig Dispense Refill    ergocalciferol, vitamin D2, 2,000 unit Tab Take 2,000 Units by mouth 2 (two) times daily.      hydrochlorothiazide (HYDRODIURIL) 25 MG tablet Take 1 tablet (25 mg total) by mouth once daily. 90 tablet 1    levothyroxine (SYNTHROID) 100 MCG tablet Take 1 tablet (100 mcg total) by mouth before breakfast. 30 tablet 11    liothyronine (CYTOMEL) 5 MCG Tab Take 1 tablet (5 mcg total) by mouth 2 (two) times daily. 60 tablet 11    metoprolol succinate (TOPROL-XL) 25 MG 24 hr tablet TAKE 1 TABLET BY MOUTH EVERY DAY 30 tablet 4    verapamil (VERELAN) 360 MG C24P TAKE 1 CAPSULE(360 MG) BY MOUTH EVERY DAY 90 capsule 0     No current facility-administered medications for this visit.        ROS  Review of Systems   Constitutional: Negative for chills, diaphoresis, fatigue, fever and unexpected weight change.   HENT: Negative for rhinorrhea, sinus pressure, sore throat and tinnitus.    Eyes: Negative for photophobia and visual disturbance.   Respiratory: Positive for shortness of breath. Negative for cough and wheezing.    Cardiovascular: Negative for chest pain and palpitations.   Gastrointestinal: Negative for abdominal pain, blood in stool, constipation, diarrhea, nausea and vomiting.   Genitourinary: Negative for dysuria, flank pain, frequency and vaginal discharge.   Musculoskeletal: Negative for arthralgias, joint swelling and neck pain.   Skin: Negative for rash.   Neurological: Positive for headaches. Negative for speech difficulty, weakness and light-headedness.   Psychiatric/Behavioral: Negative for behavioral problems and dysphoric mood.       Physical Exam  Vitals:    04/25/17 0850   BP: 114/70   Pulse: 60   Resp: 14   Temp: 97.9 °F (36.6 °C)   TempSrc: Oral   SpO2: 99%   Weight: 96.3 kg (212 lb 4.9 oz)   Height: 5' 6" (1.676 m)    Body mass index is 34.27 " "kg/(m^2).  Weight: 96.3 kg (212 lb 4.9 oz)   Height: 5' 6" (167.6 cm)     Physical Exam   Constitutional: She is oriented to person, place, and time. She appears well-developed and well-nourished.   Eyes: EOM are normal.   Neurological: She is alert and oriented to person, place, and time.   Skin: Skin is warm and dry. No rash noted. No erythema.   Psychiatric: She has a normal mood and affect. Her behavior is normal.   Nursing note and vitals reviewed.      Health Maintenance       Date Due Completion Date    Mammogram 10/7/2018 10/7/2016    Colonoscopy 8/4/2020 8/4/2015    Lipid Panel 10/7/2021 10/7/2016    TETANUS VACCINE 4/7/2027 4/7/2017 (Declined)    Override on 4/7/2017: Declined            ASSESSMENT     1. Essential hypertension    2. LORRAINE on CPAP    3. Hot flashes due to menopause        PLAN:     Essential hypertension    LORRAINE on CPAP    Hot flashes due to menopause    continue BP monitoring, bring cuff to next visit  She has been having shortness of breath, will get PFTs if this does not improve, continue CPAP use, refrigerate her distilled water so that the CPAP air is cool  May begin nature complete menopause supplement for hot flashes  Begin taking verapamil in the AM and metoprolol at night        Yary Valadez MD  04/25/2017 9:51 AM        Return in about 1 month (around 5/25/2017) for Follow up.              "

## 2017-04-25 NOTE — MR AVS SNAPSHOT
[facility]  [address]  [place] [address]  Phone: [phone]  Fax: [phone]                  Dain Phelps   2017 8:40 AM   Office Visit    Description:  Female : 1955   Provider:  Yary Valadez MD   Department:  [facility]           Reason for Visit     Hypertension     Follow-up           Diagnoses this Visit        Comments    Essential hypertension    -  Primary     LORRAINE on CPAP                To Do List           Future Appointments        Provider Department Dept Phone    2017 8:15 AM LAB, [place] [facility] [phone]      Goals (5 Years of Data)     None      Follow-Up and Disposition     Return in about 1 month (around 2017) for Follow up.      [facility]     [facility] Nurse Care Line -  Assistance  Unless otherwise directed by your provider, please contact [facility], our nurse care line that is available for  assistance.     Registered nurses in the [facility] provide: appointment scheduling, clinical advisement, health education, and other advisory services.  Call: [phone] (toll free)               Medications           Message regarding Medications     Verify the changes and/or additions to your medication regime listed below are the same as discussed with your clinician today.  If any of these changes or additions are incorrect, please notify your healthcare provider.             Verify that the below list of medications is an accurate representation of the medications you are currently taking.  If none reported, the list may be blank. If incorrect, please contact your healthcare provider. Carry this list with you in case of emergency.           Current Medications     ergocalciferol, vitamin D2, 2,000 unit Tab Take 2,000 Units by mouth 2 (two) times daily.    hydrochlorothiazide (HYDRODIURIL) 25 MG tablet Take 1 tablet (25 mg total) by mouth once daily.     "levothyroxine (SYNTHROID) 100 MCG tablet Take 1 tablet (100 mcg total) by mouth before breakfast.    liothyronine (CYTOMEL) 5 MCG Tab Take 1 tablet (5 mcg total) by mouth 2 (two) times daily.    metoprolol succinate (TOPROL-XL) 25 MG 24 hr tablet TAKE 1 TABLET BY MOUTH EVERY DAY    verapamil (VERELAN) 360 MG C24P TAKE 1 CAPSULE(360 MG) BY MOUTH EVERY DAY           Clinical Reference Information           Your Vitals Were     BP Pulse Temp Resp Height Weight    114/70 60 97.9 °F (36.6 °C) (Oral) 14 5' 6" (1.676 m) 96.3 kg (212 lb 4.9 oz)    SpO2 BMI             99% 34.27 kg/m2         Blood Pressure          Most Recent Value    BP  114/70      Allergies as of 4/25/2017     Ace Inhibitors      Immunizations Administered on Date of Encounter - 4/25/2017     None      Instructions    Take verapamil in the morning and metoprolol at night  Continue checking pressure  Bring cuff to next visit       Language Assistance Services     ATTENTION: Language assistance services are available, free of charge. Please call [phone].      ATENCIÓN: Si habla dario, tiene a hand disposición servicios gratuitos de asistencia lingüística. Llame al [phone].     ABDIFATAH Ý: N?u b?n nói Ti?ng Vi?t, có các d?ch v? h? tr? ngôn ng? mi?n phí dành cho b?n. G?i s? [phone].         [facility] complies with applicable Federal civil rights laws and does not discriminate on the basis of race, color, national origin, age, disability, or sex.        "

## 2017-05-09 ENCOUNTER — LAB VISIT (OUTPATIENT)
Dept: LAB | Facility: HOSPITAL | Age: 62
End: 2017-05-09
Attending: INTERNAL MEDICINE
Payer: COMMERCIAL

## 2017-05-09 DIAGNOSIS — E89.0 POSTOPERATIVE HYPOTHYROIDISM: ICD-10-CM

## 2017-05-09 LAB
T4 FREE SERPL-MCNC: 1.33 NG/DL
TSH SERPL DL<=0.005 MIU/L-ACNC: 0.28 UIU/ML

## 2017-05-09 PROCEDURE — 84439 ASSAY OF FREE THYROXINE: CPT

## 2017-05-09 PROCEDURE — 36415 COLL VENOUS BLD VENIPUNCTURE: CPT | Mod: PO

## 2017-05-09 PROCEDURE — 84443 ASSAY THYROID STIM HORMONE: CPT

## 2017-05-15 ENCOUNTER — PATIENT MESSAGE (OUTPATIENT)
Dept: ENDOCRINOLOGY | Facility: CLINIC | Age: 62
End: 2017-05-15

## 2017-05-15 DIAGNOSIS — E89.0 POSTOPERATIVE HYPOTHYROIDISM: Primary | ICD-10-CM

## 2017-05-16 ENCOUNTER — PATIENT MESSAGE (OUTPATIENT)
Dept: ENDOCRINOLOGY | Facility: CLINIC | Age: 62
End: 2017-05-16

## 2017-05-17 ENCOUNTER — PATIENT MESSAGE (OUTPATIENT)
Dept: SLEEP MEDICINE | Facility: CLINIC | Age: 62
End: 2017-05-17

## 2017-06-08 ENCOUNTER — OFFICE VISIT (OUTPATIENT)
Dept: SLEEP MEDICINE | Facility: CLINIC | Age: 62
End: 2017-06-08
Payer: COMMERCIAL

## 2017-06-08 VITALS
WEIGHT: 208.56 LBS | HEART RATE: 67 BPM | BODY MASS INDEX: 33.66 KG/M2 | SYSTOLIC BLOOD PRESSURE: 141 MMHG | DIASTOLIC BLOOD PRESSURE: 87 MMHG | OXYGEN SATURATION: 97 %

## 2017-06-08 DIAGNOSIS — G47.33 OSA (OBSTRUCTIVE SLEEP APNEA): Primary | ICD-10-CM

## 2017-06-08 DIAGNOSIS — R09.81 NASAL CONGESTION: ICD-10-CM

## 2017-06-08 PROCEDURE — 99214 OFFICE O/P EST MOD 30 MIN: CPT | Mod: S$GLB,,, | Performed by: INTERNAL MEDICINE

## 2017-06-08 PROCEDURE — 99999 PR PBB SHADOW E&M-EST. PATIENT-LVL III: CPT | Mod: PBBFAC,,, | Performed by: INTERNAL MEDICINE

## 2017-06-08 NOTE — PROGRESS NOTES
Dain Phelps  was seen as a follow up.    CHIEF COMPLAINT:    Chief Complaint   Patient presents with    Sleep Apnea       HISTORY OF PRESENT ILLNESS: Dain Phelps is a 61 y.o. female is here for sleep evaluation.   Our first encounter was 4/28/15.  At that time, patient with loud snoring.  On occasion, patient wake up from sleep due to snoring.  +awakenig with choking sensation a few weeks ago.  +fatigue upon awake 3-4 times per week.  No parasomnia.  No cataplexy.      Occasional restless sensation in legs.  Worse at night with supine position.  Improve with stretching.  Has not recurred since 3/15.     [place] Sleepiness Scale score during initial sleep evaluation was 4.  Patient underwent HST on 5/13/15 with ahi of 20.  Patient was set up with apap 6/15.  Sleep improved with apap.  Snoring resolved.  Sleeping thru the night.  Feeling rested upon awake.  Patient stopped using apap 10/16 until now due to mother's illness.  Mother health improved.  However, patient is having difficulty resuming apap.  +dry mouth upon awake.  +occasional nasal congestion.      SLEEP ROUTINE:  Activity the hour prior to sleep: watch tv in bed  Bed partner:     Time to bed:  9 pm   Lights off:  tv is on  Sleep onset latency:  30 minutes        Disruptions or awakenings:    3-4 times per night    Wakeup time:      8 am   Perceived sleep quality:  Tire on most days       Daytime naps:      Occasional 30 minutes nap (feel refresh upon awake)  Weekend sleep routine:      10 pm till 8 am  Caffeine use: Occasional coffee  exercise habit:   none      PAST MEDICAL HISTORY:    Active Ambulatory Problems     Diagnosis Date Noted    Hypothyroidism 11/24/2012    HTN (hypertension) 11/24/2012    GERD (gastroesophageal reflux disease) 11/24/2012    Proteinuria 11/24/2012    Obesity (BMI 30-39.9) 01/15/2014    Vitamin D deficiency 01/15/2014    LORRAINE on CPAP 09/14/2015     Resolved Ambulatory Problems     Diagnosis Date Noted     Impaired glucose tolerance 01/15/2014    Fatigue 01/15/2014    Snoring 2015    Primary hypothyroidism 2015    Preop examination 07/15/2015    History of colon polyps 2015    Dysuria 2016    Acute cystitis without hematuria 2016     Past Medical History:   Diagnosis Date    Chronic low back pain     Fatigue     GERD (gastroesophageal reflux disease)     History of colon polyps 2015    Hypertension     Hypothyroidism     Impaired glucose tolerance 1/15/2014    Leukopenia     Obesity (BMI 30-39.9) 1/15/2014    Primary hypothyroidism 2015    Snoring 2015    Vitamin D deficiency 1/15/2014                PAST SURGICAL HISTORY:    Past Surgical History:   Procedure Laterality Date    CATARACT EXTRACTION       SECTION, CLASSIC      x3    HYSTERECTOMY      total    thyroid surgey           FAMILY HISTORY:                Family History   Problem Relation Age of Onset    Hypertension Mother     Hypertension Maternal Aunt     Diabetes Maternal Aunt     Thyroid disease Maternal Aunt     Diabetes Maternal Grandmother        SOCIAL HISTORY:          Tobacco:   History   Smoking Status    Never Smoker   Smokeless Tobacco    Never Used       alcohol use:    History   Alcohol Use No                 Occupation:  Retire; former  in [email or web address] system    ALLERGIES:    Allergies   Allergen Reactions    Ace Inhibitors Edema     Angioedema       CURRENT MEDICATIONS:    Current Outpatient Prescriptions   Medication Sig Dispense Refill    ergocalciferol, vitamin D2, 2,000 unit Tab Take 2,000 Units by mouth 2 (two) times daily.      hydrochlorothiazide (HYDRODIURIL) 25 MG tablet Take 1 tablet (25 mg total) by mouth once daily. 90 tablet 1    levothyroxine (SYNTHROID) 100 MCG tablet Take 1 tablet (100 mcg total) by mouth before breakfast. 30 tablet 11    liothyronine (CYTOMEL) 5 MCG Tab Take 1 tablet (5 mcg total) by mouth 2  (two) times daily. 60 tablet 11    metoprolol succinate (TOPROL-XL) 25 MG 24 hr tablet TAKE 1 TABLET BY MOUTH EVERY DAY 30 tablet 4    verapamil (VERELAN) 360 MG C24P TAKE 1 CAPSULE(360 MG) BY MOUTH EVERY DAY 90 capsule 0     No current facility-administered medications for this visit.                   REVIEW OF SYSTEMS:     Sleep related symptoms as per HPI.  CONST:Denies weight gain    HEENT: Denies sinus congestion  PULM: Denies dyspnea  CARD:  + occasional palpitations mainly during the day  GI:  Denies acid reflux  : Denies polyuria  NEURO: Denies headaches  PSYCH: Denies mood disturbance  HEME: Denies anemia   Otherwise, a balance of systems reviewed is negative.          PHYSICAL EXAM:  Vitals:    06/08/17 1337   BP: (!) 141/87   Pulse: 67   SpO2: 97%   Weight: 94.6 kg (208 lb 8.9 oz)   PainSc: 0-No pain     Body mass index is 33.66 kg/m².     GENERAL: Normal development, well groomed  HEENT:  Conjunctivae are non-erythematous; Pupils equal, round, and reactive to light; Nose is symmetrical; Nasal mucosa is pink and moist; Septum is midline; Inferior turbinates are normal; Nasal airflow is mildly congested. ; Posterior pharynx is pink; Modified Mallampati: 4; Posterior palate is normal; Tonsils +1; Uvula is normal and pink;Tongue is normal; Dentition is fair; No TMJ tenderness; Jaw opening and protrusion without click and without discomfort.  NECK: Supple. Neck circumference is 13.5 inches. No thyromegaly. No palpable nodes.     SKIN: On face and neck: No abrasions, no rashes, no lesions.  No subcutaneous nodules are palpable.  RESPIRATORY: Chest is clear to auscultation.  Normal chest expansion and non-labored breathing at rest.  CARDIOVASCULAR: Normal S1, S2.  No murmurs, gallops or rubs. No carotid bruits bilaterally.  EXTREMITIES: No edema. No clubbing. No cyanosis. Station normal. Gait normal.        NEURO/PSYCH: Oriented to time, place and person. Normal attention span and concentration. Affect is  full. Mood is normal.                                              DATA   2/25/14 echo  1 - Normal left ventricular systolic function (EF 55-60%).   2 - Mild mitral regurgitation.   3 - Moderate tricuspid regurgitation.   4 - Trivial pulmonic regurgitation.     HST 5/13/15 ahi of 20    Lab Results   Component Value Date    TSH 0.275 (L) 05/09/2017     ASSESSMENT  No diagnosis found.    PLAN:    Sleep Apnea - interrogation of apap confirmed pressure of 10-15.  apap reset to 7-15 in hope of improving compliance.  Encourage chin strap to prevent dry mouth.  In addition, will also optimize nasal congestion.      Obesity - aware of need for drastic weight loss.  Patient has been exercising with .      Nasal congestion - sinus irrigation and nasal steroid.    Education: During our discussion today, we talked about the etiology of obstructive sleep apnea as well as the potential ramifications of untreated sleep apnea, which could include daytime sleepiness, hypertension, heart disease and/or stroke.      Precautions: The patient was advised to abstain from driving should they feel sleepy or drowsy.       Patient will Return in about 3 months (around 9/8/2017).       This is 25 minutes visit, over 50% of time spent in direct consultation with patient.

## 2017-06-08 NOTE — PATIENT INSTRUCTIONS
1.  NeilMed Sinus Rinse Regular Kit    Recipe 1/4 teaspoon of salt and 1/4 teaspoon of baking soda in distilled water.  Be sure to tilt head forward as shown in picture.            flonase or nasonex over the counter.  2 sprays per nostril daily. Be sure to tilt head forward when dispensing medication.

## 2017-07-10 ENCOUNTER — LAB VISIT (OUTPATIENT)
Dept: LAB | Facility: HOSPITAL | Age: 62
End: 2017-07-10
Attending: INTERNAL MEDICINE
Payer: COMMERCIAL

## 2017-07-10 DIAGNOSIS — E89.0 POSTOPERATIVE HYPOTHYROIDISM: ICD-10-CM

## 2017-07-10 LAB — TSH SERPL DL<=0.005 MIU/L-ACNC: 1.9 UIU/ML

## 2017-07-10 PROCEDURE — 84443 ASSAY THYROID STIM HORMONE: CPT

## 2017-07-10 PROCEDURE — 36415 COLL VENOUS BLD VENIPUNCTURE: CPT | Mod: PO

## 2017-07-10 RX ORDER — METOPROLOL SUCCINATE 25 MG/1
25 TABLET, EXTENDED RELEASE ORAL DAILY
Qty: 30 TABLET | Refills: 0 | Status: SHIPPED | OUTPATIENT
Start: 2017-07-10 | End: 2017-08-24 | Stop reason: SDUPTHER

## 2017-07-10 NOTE — TELEPHONE ENCOUNTER
Last office visit 4/25/17 with blood pressure reading of 114/70. Patient cancel last office visit 5/25/17.  Spoke with patient and she reschedule appointment for blood pressure follow up for 7/18/17. Appointment slip in the mail.

## 2017-07-18 ENCOUNTER — OFFICE VISIT (OUTPATIENT)
Dept: FAMILY MEDICINE | Facility: CLINIC | Age: 62
End: 2017-07-18
Payer: COMMERCIAL

## 2017-07-18 VITALS
BODY MASS INDEX: 33.39 KG/M2 | SYSTOLIC BLOOD PRESSURE: 124 MMHG | TEMPERATURE: 98 F | DIASTOLIC BLOOD PRESSURE: 70 MMHG | RESPIRATION RATE: 16 BRPM | WEIGHT: 212.75 LBS | HEART RATE: 52 BPM | OXYGEN SATURATION: 97 % | HEIGHT: 67 IN

## 2017-07-18 DIAGNOSIS — R06.02 SHORTNESS OF BREATH: Primary | ICD-10-CM

## 2017-07-18 DIAGNOSIS — R07.89 CHEST TIGHTNESS: ICD-10-CM

## 2017-07-18 DIAGNOSIS — G47.33 OSA ON CPAP: ICD-10-CM

## 2017-07-18 PROCEDURE — 99214 OFFICE O/P EST MOD 30 MIN: CPT | Mod: S$GLB,,, | Performed by: FAMILY MEDICINE

## 2017-07-18 PROCEDURE — 99999 PR PBB SHADOW E&M-EST. PATIENT-LVL III: CPT | Mod: PBBFAC,,, | Performed by: FAMILY MEDICINE

## 2017-07-19 ENCOUNTER — PATIENT MESSAGE (OUTPATIENT)
Dept: FAMILY MEDICINE | Facility: CLINIC | Age: 62
End: 2017-07-19

## 2017-07-19 ENCOUNTER — HOSPITAL ENCOUNTER (OUTPATIENT)
Dept: RADIOLOGY | Facility: HOSPITAL | Age: 62
Discharge: HOME | End: 2017-07-19
Attending: FAMILY MEDICINE
Payer: COMMERCIAL

## 2017-07-19 DIAGNOSIS — R07.89 CHEST TIGHTNESS: ICD-10-CM

## 2017-07-19 DIAGNOSIS — R06.02 SHORTNESS OF BREATH: ICD-10-CM

## 2017-07-19 PROCEDURE — 71020 XR CHEST PA AND LATERAL: CPT | Mod: 26,,, | Performed by: RADIOLOGY

## 2017-07-19 PROCEDURE — 71020 XR CHEST PA AND LATERAL: CPT | Mod: TC,PO

## 2017-07-20 NOTE — PROGRESS NOTES
Chief Complaint   Patient presents with    Hypertension    Follow-up       HPI  Dain Phelps is a 61 y.o. female with multiple medical diagnoses as listed in the medical history and problem list that presents for follow-up for hypertension.    She has been doing well, her blood pressure is controlled. She is still having the tightness in her chest and does not describe pain or palpitations. She does not have wheezing or a cough.    PAST MEDICAL HISTORY:  Past Medical History:   Diagnosis Date    Chronic low back pain     Fatigue     GERD (gastroesophageal reflux disease)     History of colon polyps 2015    Hypertension     Hypothyroidism     Impaired glucose tolerance 1/15/2014    Leukopenia     Obesity (BMI 30-39.9) 1/15/2014    Primary hypothyroidism 2015    Snoring 2015    Vitamin D deficiency 1/15/2014       PAST SURGICAL HISTORY:  Past Surgical History:   Procedure Laterality Date    CATARACT EXTRACTION       SECTION, CLASSIC      x3    HYSTERECTOMY      total    thyroid surgey         SOCIAL HISTORY:  Social History     Social History    Marital status:      Spouse name: N/A    Number of children: N/A    Years of education: N/A     Occupational History    Not on file.     Social History Main Topics    Smoking status: Never Smoker    Smokeless tobacco: Never Used    Alcohol use No    Drug use: No    Sexual activity: Yes     Partners: Male     Other Topics Concern    Not on file     Social History Narrative    No narrative on file       FAMILY HISTORY:  Family History   Problem Relation Age of Onset    Hypertension Mother     Hypertension Maternal Aunt     Diabetes Maternal Aunt     Thyroid disease Maternal Aunt     Diabetes Maternal Grandmother        ALLERGIES AND MEDICATIONS: updated and reviewed.  Review of patient's allergies indicates:   Allergen Reactions    Ace inhibitors Edema     Angioedema     Current Outpatient Prescriptions  "  Medication Sig Dispense Refill    ergocalciferol, vitamin D2, 2,000 unit Tab Take 2,000 Units by mouth 2 (two) times daily.      hydrochlorothiazide (HYDRODIURIL) 25 MG tablet Take 1 tablet (25 mg total) by mouth once daily. 90 tablet 1    levothyroxine (SYNTHROID) 100 MCG tablet Take 1 tablet (100 mcg total) by mouth before breakfast. 30 tablet 11    liothyronine (CYTOMEL) 5 MCG Tab Take 1 tablet (5 mcg total) by mouth 2 (two) times daily. 60 tablet 11    metoprolol succinate (TOPROL-XL) 25 MG 24 hr tablet Take 1 tablet (25 mg total) by mouth once daily. 30 tablet 0    verapamil (VERELAN) 360 MG C24P TAKE 1 CAPSULE(360 MG) BY MOUTH EVERY DAY 90 capsule 0     No current facility-administered medications for this visit.        ROS  Review of Systems   Constitutional: Negative for chills, diaphoresis, fatigue, fever and unexpected weight change.   HENT: Negative for rhinorrhea, sinus pressure, sore throat and tinnitus.    Eyes: Negative for photophobia and visual disturbance.   Respiratory: Negative for cough, shortness of breath and wheezing.    Cardiovascular: Negative for chest pain and palpitations.   Gastrointestinal: Negative for abdominal pain, blood in stool, constipation, diarrhea, nausea and vomiting.   Genitourinary: Negative for dysuria, flank pain, frequency and vaginal discharge.   Musculoskeletal: Negative for arthralgias, joint swelling and neck pain.   Skin: Negative for rash.   Neurological: Negative for speech difficulty, weakness, light-headedness and headaches.   Psychiatric/Behavioral: Negative for behavioral problems and dysphoric mood.       Physical Exam  Vitals:    07/18/17 1321   BP: 124/70   Pulse: (!) 52   Resp: 16   Temp: 97.9 °F (36.6 °C)   TempSrc: Oral   SpO2: 97%   Weight: 96.5 kg (212 lb 11.9 oz)   Height: 5' 7" (1.702 m)    Body mass index is 33.32 kg/m².  Weight: 96.5 kg (212 lb 11.9 oz)   Height: 5' 7" (170.2 cm)     Physical Exam   Constitutional: She is oriented to " person, place, and time. She appears well-developed and well-nourished. No distress.   HENT:   Head: Normocephalic and atraumatic.   Eyes: EOM are normal.   Neck: Neck supple.   Cardiovascular: Normal rate and regular rhythm.  Exam reveals no gallop and no friction rub.    No murmur heard.  Pulmonary/Chest: Effort normal and breath sounds normal. No respiratory distress. She has no wheezes. She has no rales.   Lymphadenopathy:     She has no cervical adenopathy.   Neurological: She is alert and oriented to person, place, and time.   Skin: Skin is warm and dry. No rash noted.   Psychiatric: She has a normal mood and affect. Her behavior is normal.   Nursing note and vitals reviewed.      Health Maintenance       Date Due Completion Date    Influenza Vaccine 08/01/2017 9/26/2016    Override on 10/9/2012: Done (pt had it done at work )    Mammogram 10/07/2018 10/7/2016    Colonoscopy 08/04/2020 8/4/2015    Lipid Panel 10/07/2021 10/7/2016    TETANUS VACCINE 04/07/2027 4/7/2017 (Declined)    Override on 4/7/2017: Declined            ASSESSMENT     1. Shortness of breath    2. LORRAINE on CPAP    3. Chest tightness        PLAN:     Shortness of breath  -     Complete PFT with bronchodilator; Future  -     X-Ray Chest PA And Lateral; Future; Expected date: 07/18/2017    LORRAINE on CPAP    Chest tightness  -     Complete PFT with bronchodilator; Future  -     X-Ray Chest PA And Lateral; Future; Expected date: 07/18/2017      X ray and PFTs, she had a negative cardiac workup in the past  Consider anxiety      Yary Valadez MD  07/20/2017 12:00 PM        Return in about 6 months (around 1/18/2018) for Follow up.

## 2017-07-24 ENCOUNTER — PATIENT MESSAGE (OUTPATIENT)
Dept: FAMILY MEDICINE | Facility: CLINIC | Age: 62
End: 2017-07-24

## 2017-08-06 DIAGNOSIS — I10 ESSENTIAL HYPERTENSION: ICD-10-CM

## 2017-08-07 RX ORDER — VERAPAMIL HYDROCHLORIDE 360 MG/1
CAPSULE, DELAYED RELEASE PELLETS ORAL
Qty: 90 CAPSULE | Refills: 0 | Status: SHIPPED | OUTPATIENT
Start: 2017-08-07 | End: 2017-11-20

## 2017-08-24 ENCOUNTER — TELEPHONE (OUTPATIENT)
Dept: FAMILY MEDICINE | Facility: CLINIC | Age: 62
End: 2017-08-24

## 2017-08-24 RX ORDER — METOPROLOL SUCCINATE 25 MG/1
TABLET, EXTENDED RELEASE ORAL
Qty: 30 TABLET | Refills: 5 | Status: SHIPPED | OUTPATIENT
Start: 2017-08-24 | End: 2018-05-02 | Stop reason: SDUPTHER

## 2017-09-11 ENCOUNTER — OFFICE VISIT (OUTPATIENT)
Dept: FAMILY MEDICINE | Facility: CLINIC | Age: 62
End: 2017-09-11
Payer: COMMERCIAL

## 2017-09-11 VITALS
TEMPERATURE: 98 F | HEIGHT: 66 IN | BODY MASS INDEX: 33.78 KG/M2 | SYSTOLIC BLOOD PRESSURE: 108 MMHG | OXYGEN SATURATION: 97 % | DIASTOLIC BLOOD PRESSURE: 70 MMHG | WEIGHT: 210.19 LBS | HEART RATE: 56 BPM | RESPIRATION RATE: 16 BRPM

## 2017-09-11 DIAGNOSIS — S49.92XA INJURY OF LEFT SHOULDER, INITIAL ENCOUNTER: Primary | ICD-10-CM

## 2017-09-11 DIAGNOSIS — M25.361 KNEE INSTABILITY, RIGHT: ICD-10-CM

## 2017-09-11 DIAGNOSIS — M23.52 CHRONIC KNEE INSTABILITY, LEFT: ICD-10-CM

## 2017-09-11 PROCEDURE — 3074F SYST BP LT 130 MM HG: CPT | Mod: S$GLB,,, | Performed by: FAMILY MEDICINE

## 2017-09-11 PROCEDURE — 99214 OFFICE O/P EST MOD 30 MIN: CPT | Mod: S$GLB,,, | Performed by: FAMILY MEDICINE

## 2017-09-11 PROCEDURE — 3078F DIAST BP <80 MM HG: CPT | Mod: S$GLB,,, | Performed by: FAMILY MEDICINE

## 2017-09-11 PROCEDURE — 3008F BODY MASS INDEX DOCD: CPT | Mod: S$GLB,,, | Performed by: FAMILY MEDICINE

## 2017-09-11 PROCEDURE — 99999 PR PBB SHADOW E&M-EST. PATIENT-LVL III: CPT | Mod: PBBFAC,,, | Performed by: FAMILY MEDICINE

## 2017-09-11 NOTE — PROGRESS NOTES
Chief Complaint   Patient presents with    Shoulder Injury     L shoulder    Knee Pain    Fall    Extremity Weakness     knees       HPI  Dain Phelps is a 61 y.o. female with multiple medical diagnoses as listed in the medical history and problem list that presents for evaluation for shoulder injury that occurred two weeks ago when she woke up to respond to her mother having a bad dream and she fell on her knees and hit her shoulder on a piece of furniture. She has been having some swollen firm areas of her left shoulder, but it is non tender. She has had pain in both knees for two years. No known injury but she has an intermittent sense of weakness along with creaking and the feeling that her knees are going to give out on her. At times they do pop. She has worn a brace in the past. She has not seen ortho.    PAST MEDICAL HISTORY:  Past Medical History:   Diagnosis Date    Chronic low back pain     Fatigue     GERD (gastroesophageal reflux disease)     History of colon polyps 2015    Hypertension     Hypothyroidism     Impaired glucose tolerance 1/15/2014    Leukopenia     Obesity (BMI 30-39.9) 1/15/2014    Primary hypothyroidism 2015    Snoring 2015    Vitamin D deficiency 1/15/2014       PAST SURGICAL HISTORY:  Past Surgical History:   Procedure Laterality Date    CATARACT EXTRACTION       SECTION, CLASSIC      x3    HYSTERECTOMY      total    thyroid surgey         SOCIAL HISTORY:  Social History     Social History    Marital status:      Spouse name: N/A    Number of children: N/A    Years of education: N/A     Occupational History    Not on file.     Social History Main Topics    Smoking status: Never Smoker    Smokeless tobacco: Never Used    Alcohol use No    Drug use: No    Sexual activity: Yes     Partners: Male     Other Topics Concern    Not on file     Social History Narrative    No narrative on file       FAMILY HISTORY:  Family  History   Problem Relation Age of Onset    Hypertension Mother     Hypertension Maternal Aunt     Diabetes Maternal Aunt     Thyroid disease Maternal Aunt     Diabetes Maternal Grandmother        ALLERGIES AND MEDICATIONS: updated and reviewed.  Review of patient's allergies indicates:   Allergen Reactions    Ace inhibitors Edema     Angioedema     Current Outpatient Prescriptions   Medication Sig Dispense Refill    ergocalciferol, vitamin D2, 2,000 unit Tab Take 2,000 Units by mouth 2 (two) times daily.      hydrochlorothiazide (HYDRODIURIL) 25 MG tablet Take 1 tablet (25 mg total) by mouth once daily. 90 tablet 1    levothyroxine (SYNTHROID) 100 MCG tablet Take 1 tablet (100 mcg total) by mouth before breakfast. 30 tablet 11    liothyronine (CYTOMEL) 5 MCG Tab Take 1 tablet (5 mcg total) by mouth 2 (two) times daily. 60 tablet 11    metoprolol succinate (TOPROL-XL) 25 MG 24 hr tablet TAKE 1 TABLET BY MOUTH DAILY 30 tablet 5    verapamil (VERELAN) 360 MG C24P TAKE 1 CAPSULE(360 MG) BY MOUTH EVERY DAY 90 capsule 0     No current facility-administered medications for this visit.        ROS  Review of Systems   Constitutional: Negative for chills, diaphoresis, fatigue, fever and unexpected weight change.   HENT: Negative for rhinorrhea, sinus pressure, sore throat and tinnitus.    Eyes: Negative for photophobia and visual disturbance.   Respiratory: Negative for cough, shortness of breath and wheezing.    Cardiovascular: Negative for chest pain and palpitations.   Gastrointestinal: Negative for abdominal pain, blood in stool, constipation, diarrhea, nausea and vomiting.   Genitourinary: Negative for dysuria, flank pain, frequency and vaginal discharge.   Musculoskeletal: Positive for arthralgias and myalgias. Negative for joint swelling.   Skin: Negative for rash.   Neurological: Negative for speech difficulty, weakness, light-headedness and headaches.   Psychiatric/Behavioral: Negative for behavioral  "problems and dysphoric mood.       Physical Exam  Vitals:    09/11/17 1302   BP: 108/70   Pulse: (!) 56   Resp: 16   Temp: 98.1 °F (36.7 °C)   TempSrc: Oral   SpO2: 97%   Weight: 95.4 kg (210 lb 3.3 oz)   Height: 5' 6" (1.676 m)    Body mass index is 33.93 kg/m².  Weight: 95.4 kg (210 lb 3.3 oz)   Height: 5' 6" (167.6 cm)     Physical Exam   Constitutional: She is oriented to person, place, and time. She appears well-developed and well-nourished.   HENT:   Head: Normocephalic and atraumatic.   Eyes: EOM are normal.   Musculoskeletal:   Left shoulder-Bruising below the AC joint with firm, nodular areas of tissue palpated beneath the bruising  Full ROM    Bilateral knees-crepitus with flexion, neg drawer, palpable popliteal pulse   Neurological: She is alert and oriented to person, place, and time.   Skin: Skin is warm and dry. No rash noted. No erythema.   Psychiatric: She has a normal mood and affect. Her behavior is normal.   Nursing note and vitals reviewed.      Health Maintenance       Date Due Completion Date    Influenza Vaccine 08/01/2017 9/26/2016    Override on 10/9/2012: Done (pt had it done at work )    Mammogram 10/07/2018 10/7/2016    Colonoscopy 08/04/2020 8/4/2015    Lipid Panel 10/07/2021 10/7/2016    TETANUS VACCINE 04/07/2027 4/7/2017 (Declined)    Override on 4/7/2017: Declined            ASSESSMENT     1. Injury of left shoulder, initial encounter    2. Chronic knee instability, left    3. Knee instability, right        PLAN:     Injury of left shoulder, initial encounter    Chronic knee instability, left  -     Ambulatory referral to Orthopedics    Knee instability, right  -     Ambulatory referral to Orthopedics      Firm areas likely due to contusion, she will let me know if they do not heal over the coming weeks  Begin exercise program recommend she wear knee brace for stablility, exercise handout given  Will consult ortho    Yary Valadez MD  09/11/2017 1:25 PM        Return if symptoms " worsen or fail to improve.

## 2017-09-18 ENCOUNTER — TELEPHONE (OUTPATIENT)
Dept: FAMILY MEDICINE | Facility: CLINIC | Age: 62
End: 2017-09-18

## 2017-11-19 DIAGNOSIS — I10 ESSENTIAL HYPERTENSION: ICD-10-CM

## 2017-11-20 RX ORDER — VERAPAMIL HYDROCHLORIDE 360 MG/1
CAPSULE, DELAYED RELEASE PELLETS ORAL
Qty: 90 CAPSULE | Refills: 0 | Status: SHIPPED | OUTPATIENT
Start: 2017-11-20 | End: 2017-11-29 | Stop reason: SDUPTHER

## 2017-11-20 RX ORDER — HYDROCHLOROTHIAZIDE 25 MG/1
TABLET ORAL
Qty: 90 TABLET | Refills: 0 | Status: SHIPPED | OUTPATIENT
Start: 2017-11-20 | End: 2018-03-23 | Stop reason: SDUPTHER

## 2017-11-29 DIAGNOSIS — I10 ESSENTIAL HYPERTENSION: ICD-10-CM

## 2017-11-29 RX ORDER — VERAPAMIL HYDROCHLORIDE 360 MG/1
CAPSULE, DELAYED RELEASE PELLETS ORAL
Qty: 90 CAPSULE | Refills: 1 | Status: SHIPPED | OUTPATIENT
Start: 2017-11-29 | End: 2018-10-30 | Stop reason: SDUPTHER

## 2017-11-29 NOTE — TELEPHONE ENCOUNTER
----- Message from Fernie Bernardo sent at 11/29/2017  2:20 PM CST -----  Contact: Self/[phone]  Refill:  verapamil (VERELAN) 360 MG C24P    [id number] - [place], [place] - [address] AT [facility].  She states that she's completely out. Thank you.

## 2017-12-18 ENCOUNTER — OFFICE VISIT (OUTPATIENT)
Dept: FAMILY MEDICINE | Facility: CLINIC | Age: 62
End: 2017-12-18
Payer: COMMERCIAL

## 2017-12-18 VITALS
HEIGHT: 66 IN | HEART RATE: 59 BPM | TEMPERATURE: 99 F | OXYGEN SATURATION: 95 % | WEIGHT: 202.06 LBS | BODY MASS INDEX: 32.47 KG/M2 | DIASTOLIC BLOOD PRESSURE: 80 MMHG | SYSTOLIC BLOOD PRESSURE: 132 MMHG

## 2017-12-18 DIAGNOSIS — J06.9 VIRAL URI WITH COUGH: Primary | ICD-10-CM

## 2017-12-18 PROCEDURE — 99999 PR PBB SHADOW E&M-EST. PATIENT-LVL IV: CPT | Mod: PBBFAC,,, | Performed by: NURSE PRACTITIONER

## 2017-12-18 PROCEDURE — 99214 OFFICE O/P EST MOD 30 MIN: CPT | Mod: S$GLB,,, | Performed by: NURSE PRACTITIONER

## 2017-12-18 RX ORDER — LEVOCETIRIZINE DIHYDROCHLORIDE 5 MG/1
5 TABLET, FILM COATED ORAL NIGHTLY
Qty: 30 TABLET | Refills: 0 | Status: SHIPPED | OUTPATIENT
Start: 2017-12-18 | End: 2021-08-12 | Stop reason: SDUPTHER

## 2017-12-18 RX ORDER — BENZONATATE 200 MG/1
200 CAPSULE ORAL 3 TIMES DAILY PRN
Qty: 30 CAPSULE | Refills: 0 | Status: SHIPPED | OUTPATIENT
Start: 2017-12-18 | End: 2017-12-28

## 2017-12-18 NOTE — PATIENT INSTRUCTIONS
Viral Upper Respiratory Illness (Adult)  You have a viral upper respiratory illness (URI), which is another term for the common cold. This illness is contagious during the first few days. It is spread through the air by coughing and sneezing. It may also be spread by direct contact (touching the sick person and then touching your own eyes, nose, or mouth). Frequent handwashing will decrease risk of spread. Most viral illnesses go away within 7 to 10 days with rest and simple home remedies. Sometimes the illness may last for several weeks. Antibiotics will not kill a virus, and they are generally not prescribed for this condition.    Home care  · If symptoms are severe, rest at home for the first 2 to 3 days. When you resume activity, don't let yourself get too tired.  · Avoid being exposed to cigarette smoke (yours or others).  · You may use acetaminophen or ibuprofen to control pain and fever, unless another medicine was prescribed. (Note: If you have chronic liver or kidney disease, have ever had a stomach ulcer or gastrointestinal bleeding, or are taking blood-thinning medicines, talk with your healthcare provider before using these medicines.) Aspirin should never be given to anyone under 18 years of age who is ill with a viral infection or fever. It may cause severe liver or brain damage.  · Your appetite may be poor, so a light diet is fine. Avoid dehydration by drinking 6 to 8 glasses of fluids per day (water, soft drinks, juices, tea, or soup). Extra fluids will help loosen secretions in the nose and lungs.  · Over-the-counter cold medicines will not shorten the length of time youre sick, but they may be helpful for the following symptoms: cough, sore throat, and nasal and sinus congestion. (Note: Do not use decongestants if you have high blood pressure.)  Follow-up care  Follow up with your healthcare provider, or as advised.  When to seek medical advice  Call your healthcare provider right away if any  of these occur:  · Cough with lots of colored sputum (mucus)  · Severe headache; face, neck, or ear pain  · Difficulty swallowing due to throat pain  · Fever of 100.4°F (38°C)  Call 911, or get immediate medical care  Call emergency services right away if any of these occur:  · Chest pain, shortness of breath, wheezing, or difficulty breathing  · Coughing up blood  · Inability to swallow due to throat pain  Date Last Reviewed: 9/13/2015  © 6805-6659 [employer]. [address] [place] [address]. All rights reserved. This information is not intended as a substitute for professional medical care. Always follow your healthcare professional's instructions.

## 2017-12-18 NOTE — PROGRESS NOTES
History of Present Illness   Dain Phelps is a 62 y.o. woman with medical history as listed below who presents today for evaluation of URI symptoms for about five days. She complains of a productive cough with fatigue, body aches, and low grade fever. She has had a somewhat scratchy throat and headaches. She has had sick contacts. She has tried Robitussin-DM which does seem to be helping. She has no additional complaints and is otherwise healthy on today's visit.    Past Medical History:   Diagnosis Date    Chronic low back pain     Fatigue     GERD (gastroesophageal reflux disease)     History of colon polyps 2015    Hypertension     Hypothyroidism     Impaired glucose tolerance 1/15/2014    Leukopenia     Obesity (BMI 30-39.9) 1/15/2014    Primary hypothyroidism 2015    Snoring 2015    Vitamin D deficiency 1/15/2014       Past Surgical History:   Procedure Laterality Date    CATARACT EXTRACTION       SECTION, CLASSIC      x3    HYSTERECTOMY      total    thyroid surgey         Social History     Social History    Marital status:      Spouse name: N/A    Number of children: N/A    Years of education: N/A     Social History Main Topics    Smoking status: Never Smoker    Smokeless tobacco: Never Used    Alcohol use No    Drug use: No    Sexual activity: Yes     Partners: Male     Other Topics Concern    None     Social History Narrative    None       Family History   Problem Relation Age of Onset    Hypertension Mother     Hypertension Maternal Aunt     Diabetes Maternal Aunt     Thyroid disease Maternal Aunt     Diabetes Maternal Grandmother        Review of Systems  Review of Systems   Constitutional: Positive for chills, fever and malaise/fatigue.   HENT: Positive for congestion and sore throat. Negative for ear discharge, ear pain and sinus pain.    Eyes: Negative for discharge and redness.   Respiratory: Positive for cough and sputum production.  "Negative for shortness of breath and wheezing.    Cardiovascular: Negative for chest pain.   Gastrointestinal: Negative for nausea and vomiting.   Neurological: Positive for headaches.     A complete review of systems was otherwise negative.    Physical Exam  /80 (BP Location: Right arm, Patient Position: Sitting, BP Method: X-Large (Manual))   Pulse (!) 59   Temp 99 °F (37.2 °C) (Oral)   Ht 5' 6" (1.676 m)   Wt 91.7 kg (202 lb 0.8 oz)   SpO2 95%   BMI 32.61 kg/m²   General appearance: alert, appears stated age, cooperative, no distress and ill appearing  Eyes: negative findings: lids and lashes normal and conjunctivae and sclerae normal  Ears: normal TM's and external ear canals both ears  Nose: clear discharge, mild congestion, turbinates red, swollen, no sinus tenderness  Throat: lips, mucosa, and tongue normal; teeth and gums normal and moderate oropharyngeal erythema with clear post nasal drainage  Lungs: clear to auscultation bilaterally  Heart: regular rate and rhythm, S1, S2 normal, no murmur, click, rub or gallop  Lymph nodes: Cervical, supraclavicular, and axillary nodes normal.  Neurologic: Grossly normal    Assessment/Plan  Viral URI with cough  Viral, antibiotics not indicated.  Tessalon for cough. Xyzal for congestion.  Tylenol for fever.  Rest and increase fluid intake.  RTC PRN for persistent or worsening symptoms.  -     benzonatate (TESSALON) 200 MG capsule; Take 1 capsule (200 mg total) by mouth 3 (three) times daily as needed for Cough.  Dispense: 30 capsule; Refill: 0  -     levocetirizine (XYZAL) 5 MG tablet; Take 1 tablet (5 mg total) by mouth every evening.  Dispense: 30 tablet; Refill: 0    She has verbalized understanding and is in agreement with plan of care.    Return if symptoms worsen or fail to improve.  "

## 2017-12-30 RX ORDER — LEVOTHYROXINE SODIUM 100 UG/1
TABLET ORAL
Qty: 30 TABLET | Refills: 0 | Status: SHIPPED | OUTPATIENT
Start: 2017-12-30 | End: 2018-02-01 | Stop reason: SDUPTHER

## 2018-02-01 RX ORDER — LEVOTHYROXINE SODIUM 100 UG/1
TABLET ORAL
Qty: 30 TABLET | Refills: 0 | Status: SHIPPED | OUTPATIENT
Start: 2018-02-01 | End: 2018-03-06 | Stop reason: SDUPTHER

## 2018-02-08 ENCOUNTER — TELEPHONE (OUTPATIENT)
Dept: FAMILY MEDICINE | Facility: CLINIC | Age: 63
End: 2018-02-08

## 2018-02-08 DIAGNOSIS — Z12.31 ENCOUNTER FOR SCREENING MAMMOGRAM FOR BREAST CANCER: Primary | ICD-10-CM

## 2018-02-08 NOTE — TELEPHONE ENCOUNTER
----- Message from Hitesh Strong sent at 2/8/2018  8:26 AM CST -----  Contact: Self  Called to request mammo order. Pt can be reached @ [phone].

## 2018-02-19 ENCOUNTER — HOSPITAL ENCOUNTER (OUTPATIENT)
Dept: RADIOLOGY | Facility: HOSPITAL | Age: 63
Discharge: HOME | End: 2018-02-19
Attending: FAMILY MEDICINE
Payer: COMMERCIAL

## 2018-02-19 DIAGNOSIS — Z12.31 ENCOUNTER FOR SCREENING MAMMOGRAM FOR BREAST CANCER: ICD-10-CM

## 2018-02-19 PROCEDURE — 77063 BREAST TOMOSYNTHESIS BI: CPT | Mod: 26,,, | Performed by: RADIOLOGY

## 2018-02-19 PROCEDURE — 77067 SCR MAMMO BI INCL CAD: CPT | Mod: 26,,, | Performed by: RADIOLOGY

## 2018-02-19 PROCEDURE — 77067 SCR MAMMO BI INCL CAD: CPT | Mod: TC

## 2018-02-20 ENCOUNTER — TELEPHONE (OUTPATIENT)
Dept: ENDOCRINOLOGY | Facility: CLINIC | Age: 63
End: 2018-02-20

## 2018-02-20 NOTE — TELEPHONE ENCOUNTER
----- Message from Leesa Mistry sent at 2/20/2018 10:03 AM CST -----  Contact: Self [phone]  [facility]     Scheduled PT to see Reina on 3/28.     If labs are needed, pls schedule at [facility] on 3/21.

## 2018-03-06 RX ORDER — LEVOTHYROXINE SODIUM 100 UG/1
TABLET ORAL
Qty: 30 TABLET | Refills: 0 | Status: SHIPPED | OUTPATIENT
Start: 2018-03-06 | End: 2018-03-28 | Stop reason: SDUPTHER

## 2018-03-09 ENCOUNTER — TELEPHONE (OUTPATIENT)
Dept: FAMILY MEDICINE | Facility: CLINIC | Age: 63
End: 2018-03-09

## 2018-03-09 NOTE — TELEPHONE ENCOUNTER
----- Message from Shania Wiggins sent at 3/9/2018 10:30 AM CST -----  Contact: Self   Patient says she has hot flashes and heart palpations and would like to see the doctor her appointment is on 3/21. Patient denied wanted to speak with on call because she says she has had this problem going on for awhile  And does not think it is heart related. She also mention she has a Stress test since she had this problem and her results didn't show any issues.  Please call patient to advise [phone].

## 2018-03-12 DIAGNOSIS — E89.0 HYPOTHYROIDISM ASSOCIATED WITH SURGICAL PROCEDURE: ICD-10-CM

## 2018-03-12 RX ORDER — LIOTHYRONINE SODIUM 5 UG/1
TABLET ORAL
Qty: 60 TABLET | Refills: 0 | Status: SHIPPED | OUTPATIENT
Start: 2018-03-12 | End: 2018-03-28 | Stop reason: SDUPTHER

## 2018-03-21 ENCOUNTER — OFFICE VISIT (OUTPATIENT)
Dept: FAMILY MEDICINE | Facility: CLINIC | Age: 63
End: 2018-03-21
Payer: COMMERCIAL

## 2018-03-21 ENCOUNTER — LAB VISIT (OUTPATIENT)
Dept: LAB | Facility: HOSPITAL | Age: 63
End: 2018-03-21
Attending: FAMILY MEDICINE
Payer: COMMERCIAL

## 2018-03-21 VITALS
WEIGHT: 211 LBS | DIASTOLIC BLOOD PRESSURE: 86 MMHG | OXYGEN SATURATION: 96 % | TEMPERATURE: 98 F | SYSTOLIC BLOOD PRESSURE: 120 MMHG | BODY MASS INDEX: 33.12 KG/M2 | HEART RATE: 61 BPM | RESPIRATION RATE: 18 BRPM | HEIGHT: 67 IN

## 2018-03-21 DIAGNOSIS — E55.9 VITAMIN D DEFICIENCY: ICD-10-CM

## 2018-03-21 DIAGNOSIS — F43.21 GRIEF REACTION: ICD-10-CM

## 2018-03-21 DIAGNOSIS — G47.33 OSA ON CPAP: ICD-10-CM

## 2018-03-21 DIAGNOSIS — R00.2 PALPITATIONS: ICD-10-CM

## 2018-03-21 DIAGNOSIS — E89.0 POSTOPERATIVE HYPOTHYROIDISM: ICD-10-CM

## 2018-03-21 DIAGNOSIS — E89.41 HOT FLASHES DUE TO SURGICAL MENOPAUSE: ICD-10-CM

## 2018-03-21 DIAGNOSIS — E89.0 POSTOPERATIVE HYPOTHYROIDISM: Primary | ICD-10-CM

## 2018-03-21 LAB
25(OH)D3+25(OH)D2 SERPL-MCNC: 37 NG/ML
T3 SERPL-MCNC: 126 NG/DL
T4 FREE SERPL-MCNC: 1.29 NG/DL
TSH SERPL DL<=0.005 MIU/L-ACNC: 0.64 UIU/ML

## 2018-03-21 PROCEDURE — 36415 COLL VENOUS BLD VENIPUNCTURE: CPT | Mod: PO

## 2018-03-21 PROCEDURE — 84439 ASSAY OF FREE THYROXINE: CPT

## 2018-03-21 PROCEDURE — 99999 PR PBB SHADOW E&M-EST. PATIENT-LVL III: CPT | Mod: PBBFAC,,, | Performed by: FAMILY MEDICINE

## 2018-03-21 PROCEDURE — 84443 ASSAY THYROID STIM HORMONE: CPT

## 2018-03-21 PROCEDURE — 99214 OFFICE O/P EST MOD 30 MIN: CPT | Mod: S$GLB,,, | Performed by: FAMILY MEDICINE

## 2018-03-21 PROCEDURE — 84480 ASSAY TRIIODOTHYRONINE (T3): CPT

## 2018-03-21 PROCEDURE — 3074F SYST BP LT 130 MM HG: CPT | Mod: CPTII,S$GLB,, | Performed by: FAMILY MEDICINE

## 2018-03-21 PROCEDURE — 82306 VITAMIN D 25 HYDROXY: CPT

## 2018-03-21 PROCEDURE — 3079F DIAST BP 80-89 MM HG: CPT | Mod: CPTII,S$GLB,, | Performed by: FAMILY MEDICINE

## 2018-03-21 NOTE — PROGRESS NOTES
Chief Complaint   Patient presents with    Hot Flashes    Palpitations       HPI  Dain Phelps is a 62 y.o. female with multiple medical diagnoses as listed in the medical history and problem list that presents for evaluation for hot flashes that she began having several years ago but has gotten worse. Occur in the daytime and at night, associated with sweating that at times requires her to change her bed clothes. In the daytime when it occurs she is also moving around. She has not noted any triggers but has not been paying attention.     She does not always have palpitations with hot flashes, but this can occur when she takes a deep breath, or while walking. She thinks they may be stress related since her mother passed. At times it does occur while she is grieving and at others it is normal. She does not have pain in her chest. At times associated with trouble breathing. She is not really doing any activities to deal with her grief.    She has started resuming her CPAP machine. She also drinks one cup of coffee every other day.    PAST MEDICAL HISTORY:  Past Medical History:   Diagnosis Date    Chronic low back pain     Fatigue     GERD (gastroesophageal reflux disease)     History of colon polyps 2015    Hypertension     Hypothyroidism     Impaired glucose tolerance 1/15/2014    Leukopenia     Obesity (BMI 30-39.9) 1/15/2014    Primary hypothyroidism 2015    Snoring 2015    Vitamin D deficiency 1/15/2014       PAST SURGICAL HISTORY:  Past Surgical History:   Procedure Laterality Date    BREAST BIOPSY      CATARACT EXTRACTION       SECTION, CLASSIC      x3    HYSTERECTOMY      total    thyroid surgey         SOCIAL HISTORY:  Social History     Social History    Marital status:      Spouse name: N/A    Number of children: N/A    Years of education: N/A     Occupational History    Not on file.     Social History Main Topics    Smoking status: Never Smoker     Smokeless tobacco: Never Used    Alcohol use No    Drug use: No    Sexual activity: Yes     Partners: Male     Other Topics Concern    Not on file     Social History Narrative    No narrative on file       FAMILY HISTORY:  Family History   Problem Relation Age of Onset    Hypertension Mother     Hypertension Maternal Aunt     Diabetes Maternal Aunt     Thyroid disease Maternal Aunt     Breast cancer Maternal Aunt     Diabetes Maternal Grandmother        ALLERGIES AND MEDICATIONS: updated and reviewed.  Review of patient's allergies indicates:   Allergen Reactions    Ace inhibitors Edema     Angioedema     Current Outpatient Prescriptions   Medication Sig Dispense Refill    ergocalciferol, vitamin D2, 2,000 unit Tab Take 2,000 Units by mouth 2 (two) times daily.      hydroCHLOROthiazide (HYDRODIURIL) 25 MG tablet TAKE 1 TABLET(25 MG) BY MOUTH EVERY DAY 90 tablet 0    levocetirizine (XYZAL) 5 MG tablet Take 1 tablet (5 mg total) by mouth every evening. 30 tablet 0    levothyroxine (SYNTHROID) 100 MCG tablet TAKE 1 TABLET(100 MCG) BY MOUTH BEFORE BREAKFAST 30 tablet 0    liothyronine (CYTOMEL) 5 MCG Tab TAKE 1 TABLET(5 MCG) BY MOUTH TWICE DAILY 60 tablet 0    metoprolol succinate (TOPROL-XL) 25 MG 24 hr tablet TAKE 1 TABLET BY MOUTH DAILY 30 tablet 5    verapamil (VERELAN) 360 MG C24P TAKE 1 CAPSULE(360 MG) BY MOUTH EVERY DAY 90 capsule 1     No current facility-administered medications for this visit.        ROS  Review of Systems   Constitutional: Negative for chills, diaphoresis, fatigue, fever and unexpected weight change.   HENT: Negative for rhinorrhea, sinus pressure, sore throat and tinnitus.    Eyes: Negative for photophobia and visual disturbance.   Respiratory: Negative for cough, shortness of breath and wheezing.    Cardiovascular: Positive for palpitations. Negative for chest pain.   Gastrointestinal: Negative for abdominal pain, blood in stool, constipation, diarrhea, nausea and  "vomiting.   Genitourinary: Negative for dysuria, flank pain, frequency and vaginal discharge.   Musculoskeletal: Negative for arthralgias and joint swelling.   Skin: Negative for rash.   Neurological: Negative for speech difficulty, weakness, light-headedness and headaches.   Psychiatric/Behavioral: Positive for dysphoric mood and sleep disturbance. Negative for behavioral problems.       Physical Exam  Vitals:    03/21/18 0857   BP: 120/86   Pulse: 61   Resp: 18   Temp: 97.9 °F (36.6 °C)   TempSrc: Oral   SpO2: 96%   Weight: 95.7 kg (211 lb)   Height: 5' 7" (1.702 m)    Body mass index is 33.05 kg/m².  Weight: 95.7 kg (211 lb)   Height: 5' 7" (170.2 cm)     Physical Exam   Constitutional: She is oriented to person, place, and time. She appears well-developed and well-nourished. No distress.   HENT:   Head: Normocephalic and atraumatic.   Eyes: EOM are normal.   Neck: Neck supple. No thyromegaly present.   Cardiovascular: Normal rate and regular rhythm.  Exam reveals no gallop and no friction rub.    No murmur heard.  Pulmonary/Chest: Effort normal and breath sounds normal. No respiratory distress. She has no wheezes. She has no rales.   Neurological: She is alert and oriented to person, place, and time.   Skin: Skin is warm and dry. No rash noted.   Psychiatric: She has a normal mood and affect. Her behavior is normal.   Nursing note and vitals reviewed.      Health Maintenance       Date Due Completion Date    Mammogram 02/19/2020 2/19/2018    Colonoscopy 08/04/2020 8/4/2015    Lipid Panel 10/07/2021 10/7/2016    TETANUS VACCINE 04/07/2027 4/7/2017 (Declined)    Override on 4/7/2017: Declined            ASSESSMENT     1. Postoperative hypothyroidism    2. Hot flashes due to surgical menopause    3. Palpitations    4. Grief reaction    5. Vitamin D deficiency    6. LORRAINE on CPAP        PLAN:     Problem List Items Addressed This Visit        Endocrine    Hypothyroidism - Primary    Relevant Orders    T3    TSH    T4, " free    Vitamin D deficiency    Relevant Orders    Vitamin D       Other    LORRAINE on CPAP      Other Visit Diagnoses     Hot flashes due to surgical menopause      -she would like to discuss hormone replacement due to the severity of her hot flahses    Relevant Orders    Ambulatory referral to Obstetrics / Gynecology    Palpitations      -I suspect that this is being exacerbated by her grief, as she is on verapamil and toprol  -resume CPAP, avoid caffeine, if no better will reconsult cards    Grief reaction      -discussed reading books on the stages of grief            Yary Valadez MD  03/21/2018 9:29 AM        Follow-up in about 1 month (around 4/21/2018) for Follow up.

## 2018-03-23 RX ORDER — HYDROCHLOROTHIAZIDE 25 MG/1
TABLET ORAL
Qty: 90 TABLET | Refills: 0 | Status: SHIPPED | OUTPATIENT
Start: 2018-03-23 | End: 2018-07-31 | Stop reason: SDUPTHER

## 2018-03-26 NOTE — PROGRESS NOTES
Subjective:      Patient ID: Dain Phelps is a 62 y.o. female.    Chief Complaint:  Thyroid Nodule      History of Present Illness  here for postsurgical hypothyroidism f/u Previous patient of Dr. Mcnally. Last seen in 12/2016. This is her first visit with me.     initially seen her for MNG-- we did a FNA which was indeterminate and then she had a total thyroidectomy 2009 -- final path was benign    She is on levothyroxine 100 mcg daily  Cytomel 5 qd AM    Takes separately from other meds    Denies weight gain  Denies fatigue   does snore and she was dx with christine and is using the CPAP - feels better in the am when she uses this      no heat or cold intolerance- + hot flashes consulting with her obgyn & pcp   no skin or nail changes  + hair loss   Occasional palpitations    Vit D deficiency  has not been taking d/t running out      IGT/Obesity  Denies polyuria, polydipsia, nocturia, unexplained weight loss or blurred vision    +FH type 2 diabetes    She saw a speech therapist for c/o hoarseness at last visit and she states that it is improving     Review of Systems   Constitutional: Negative for unexpected weight change.   Eyes: Negative for visual disturbance.   Respiratory: Negative for shortness of breath.    Cardiovascular: Negative for chest pain.   Gastrointestinal: Negative for abdominal pain.   Musculoskeletal: Negative for arthralgias.   Skin: Negative for wound.   Neurological: Negative for headaches.   Hematological: Does not bruise/bleed easily.   Psychiatric/Behavioral: Negative for sleep disturbance.     Objective:   Physical Exam   Neck:   No palpable thyroid tissue    Cardiovascular: Normal rate.    Pulmonary/Chest: Effort normal.   Abdominal: Soft.   Musculoskeletal: She exhibits no edema.   Lymphadenopathy:     She has no cervical adenopathy.   Vitals reviewed.    Lab Review:   Results for orders placed or performed in visit on 03/21/18   T3   Result Value Ref Range    T3, Total 126 60 - 180  ng/dL   TSH   Result Value Ref Range    TSH 0.639 0.400 - 4.000 uIU/mL   T4, free   Result Value Ref Range    Free T4 1.29 0.71 - 1.51 ng/dL   Vitamin D   Result Value Ref Range    Vit D, 25-Hydroxy 37 30 - 96 ng/mL       Assessment:     1. Postoperative hypothyroidism  TSH    TSH   2. Essential hypertension     3. LORRAINE on CPAP     4. Vitamin D deficiency  Vitamin D   5. Hypothyroidism associated with surgical procedure  liothyronine (CYTOMEL) 5 MCG Tab   6. Obesity (BMI 30-39.9)       Plan:     postsurgical hypothyroidism   -- Clinically and biochemically euthyroid  -- Goal is a normal TSH  -- Continue lt4 100 mcg & cytomel 5 mg daily  -- Avoid exogenous hyperthyroidism as this can accelerate bone loss and increase risk of CV complications.  -- Advised to take LT4 on an empty stomach with water and to wait 30-45 minutes before eating or taking other medications   -- Reviewed that symptoms of hypothyroidism may not correlate with tsh, and a normal TSH is the goal of therapy.....  symptoms are not a justification for over treatment     HTN   -- slightly elevated today  -- patient attributes this to long fast walk from parking garage.  -- continue current medications.  -- Notify PCP is sustaining >130/80.    LORRAINE  -- continue to use cpap     Vit D deficiency  -- restart otc vit d supplement     Obesity  Periodic HgbA1c levels (pcp monitors)   Recommended 5-7% wt loss or 10-15 lbs  Encouraged healthy eating and wt loss    Follow-up in about 1 year (around 3/28/2019).  tsh in 6 months  tsh & vit d in 1 year prior to appt

## 2018-03-28 ENCOUNTER — OFFICE VISIT (OUTPATIENT)
Dept: ENDOCRINOLOGY | Facility: CLINIC | Age: 63
End: 2018-03-28
Payer: COMMERCIAL

## 2018-03-28 VITALS
BODY MASS INDEX: 33.74 KG/M2 | HEART RATE: 64 BPM | DIASTOLIC BLOOD PRESSURE: 82 MMHG | SYSTOLIC BLOOD PRESSURE: 140 MMHG | WEIGHT: 214.94 LBS | HEIGHT: 67 IN

## 2018-03-28 DIAGNOSIS — E55.9 VITAMIN D DEFICIENCY: ICD-10-CM

## 2018-03-28 DIAGNOSIS — I10 ESSENTIAL HYPERTENSION: ICD-10-CM

## 2018-03-28 DIAGNOSIS — E89.0 HYPOTHYROIDISM ASSOCIATED WITH SURGICAL PROCEDURE: ICD-10-CM

## 2018-03-28 DIAGNOSIS — E89.0 POSTOPERATIVE HYPOTHYROIDISM: Primary | ICD-10-CM

## 2018-03-28 DIAGNOSIS — E66.9 OBESITY (BMI 30-39.9): ICD-10-CM

## 2018-03-28 DIAGNOSIS — G47.33 OSA ON CPAP: ICD-10-CM

## 2018-03-28 PROCEDURE — 99999 PR PBB SHADOW E&M-EST. PATIENT-LVL III: CPT | Mod: PBBFAC,,, | Performed by: NURSE PRACTITIONER

## 2018-03-28 PROCEDURE — 3077F SYST BP >= 140 MM HG: CPT | Mod: CPTII,S$GLB,, | Performed by: NURSE PRACTITIONER

## 2018-03-28 PROCEDURE — 3079F DIAST BP 80-89 MM HG: CPT | Mod: CPTII,S$GLB,, | Performed by: NURSE PRACTITIONER

## 2018-03-28 PROCEDURE — 99214 OFFICE O/P EST MOD 30 MIN: CPT | Mod: S$GLB,,, | Performed by: NURSE PRACTITIONER

## 2018-03-28 RX ORDER — LEVOTHYROXINE SODIUM 100 UG/1
TABLET ORAL
Qty: 30 TABLET | Refills: 11 | Status: SHIPPED | OUTPATIENT
Start: 2018-03-28 | End: 2019-04-10 | Stop reason: SDUPTHER

## 2018-03-28 RX ORDER — LIOTHYRONINE SODIUM 5 UG/1
TABLET ORAL
Qty: 30 TABLET | Refills: 11 | Status: SHIPPED | OUTPATIENT
Start: 2018-03-28 | End: 2019-04-10 | Stop reason: SDUPTHER

## 2018-03-28 RX ORDER — CHOLECALCIFEROL (VITAMIN D3) 125 MCG
2000 TABLET ORAL 2 TIMES DAILY
Status: CANCELLED | COMMUNITY
Start: 2018-03-28

## 2018-04-09 ENCOUNTER — TELEPHONE (OUTPATIENT)
Dept: FAMILY MEDICINE | Facility: CLINIC | Age: 63
End: 2018-04-09

## 2018-04-09 DIAGNOSIS — T75.3XXA MOTION SICKNESS, INITIAL ENCOUNTER: Primary | ICD-10-CM

## 2018-04-09 RX ORDER — SCOLOPAMINE TRANSDERMAL SYSTEM 1 MG/1
1 PATCH, EXTENDED RELEASE TRANSDERMAL
Qty: 4 PATCH | Refills: 0 | Status: SHIPPED | OUTPATIENT
Start: 2018-04-09 | End: 2018-05-08

## 2018-04-09 NOTE — TELEPHONE ENCOUNTER
----- Message from Hitesh Strong sent at 4/9/2018  9:41 AM CDT -----  Contact: Self  Pt called to request Rx for motion sickness. Pt uses [facility]. Pt can be reached # [phone].

## 2018-05-03 RX ORDER — METOPROLOL SUCCINATE 25 MG/1
TABLET, EXTENDED RELEASE ORAL
Qty: 30 TABLET | Refills: 5 | Status: SHIPPED | OUTPATIENT
Start: 2018-05-03 | End: 2018-05-08 | Stop reason: SDUPTHER

## 2018-05-08 ENCOUNTER — OFFICE VISIT (OUTPATIENT)
Dept: FAMILY MEDICINE | Facility: CLINIC | Age: 63
End: 2018-05-08
Payer: COMMERCIAL

## 2018-05-08 VITALS
BODY MASS INDEX: 33.49 KG/M2 | HEIGHT: 67 IN | WEIGHT: 213.38 LBS | OXYGEN SATURATION: 98 % | HEART RATE: 58 BPM | DIASTOLIC BLOOD PRESSURE: 80 MMHG | SYSTOLIC BLOOD PRESSURE: 132 MMHG | TEMPERATURE: 98 F

## 2018-05-08 DIAGNOSIS — E89.0 POSTOPERATIVE HYPOTHYROIDISM: ICD-10-CM

## 2018-05-08 DIAGNOSIS — F43.21 GRIEF REACTION: ICD-10-CM

## 2018-05-08 DIAGNOSIS — R00.2 PALPITATIONS: Primary | ICD-10-CM

## 2018-05-08 DIAGNOSIS — R23.2 HOT FLASHES: ICD-10-CM

## 2018-05-08 DIAGNOSIS — I10 ESSENTIAL HYPERTENSION: ICD-10-CM

## 2018-05-08 PROCEDURE — 3075F SYST BP GE 130 - 139MM HG: CPT | Mod: CPTII,S$GLB,, | Performed by: FAMILY MEDICINE

## 2018-05-08 PROCEDURE — 3008F BODY MASS INDEX DOCD: CPT | Mod: CPTII,S$GLB,, | Performed by: FAMILY MEDICINE

## 2018-05-08 PROCEDURE — 99214 OFFICE O/P EST MOD 30 MIN: CPT | Mod: S$GLB,,, | Performed by: FAMILY MEDICINE

## 2018-05-08 PROCEDURE — 3079F DIAST BP 80-89 MM HG: CPT | Mod: CPTII,S$GLB,, | Performed by: FAMILY MEDICINE

## 2018-05-08 PROCEDURE — 99999 PR PBB SHADOW E&M-EST. PATIENT-LVL III: CPT | Mod: PBBFAC,,, | Performed by: FAMILY MEDICINE

## 2018-05-08 RX ORDER — METOPROLOL SUCCINATE 25 MG/1
25 TABLET, EXTENDED RELEASE ORAL DAILY
Qty: 30 TABLET | Refills: 5 | Status: SHIPPED | OUTPATIENT
Start: 2018-05-08 | End: 2019-06-20 | Stop reason: SDUPTHER

## 2018-05-08 NOTE — PROGRESS NOTES
"Chief Complaint   Patient presents with    Hot Flashes    Follow-up     palpitations       HPI  Dain Phelps is a 62 y.o. female with multiple medical diagnoses as listed in the medical history and problem list that presents for {Blank single:53712::"evaluation","follow-up","annual exam","establishment of care"}***    PAST MEDICAL HISTORY:  Past Medical History:   Diagnosis Date    Chronic low back pain     Fatigue     GERD (gastroesophageal reflux disease)     History of colon polyps 2015    Hypertension     Hypothyroidism     Impaired glucose tolerance 1/15/2014    Leukopenia     Obesity (BMI 30-39.9) 1/15/2014    Primary hypothyroidism 2015    Snoring 2015    Vitamin D deficiency 1/15/2014       PAST SURGICAL HISTORY:  Past Surgical History:   Procedure Laterality Date    BREAST BIOPSY      CATARACT EXTRACTION       SECTION, CLASSIC      x3    HYSTERECTOMY      total    thyroid surgey         SOCIAL HISTORY:  Social History     Social History    Marital status:      Spouse name: N/A    Number of children: N/A    Years of education: N/A     Occupational History    Not on file.     Social History Main Topics    Smoking status: Never Smoker    Smokeless tobacco: Never Used    Alcohol use No    Drug use: No    Sexual activity: Yes     Partners: Male     Other Topics Concern    Not on file     Social History Narrative    No narrative on file       FAMILY HISTORY:  Family History   Problem Relation Age of Onset    Hypertension Mother     Hypertension Maternal Aunt     Diabetes Maternal Aunt     Thyroid disease Maternal Aunt     Breast cancer Maternal Aunt     Diabetes Maternal Grandmother        ALLERGIES AND MEDICATIONS: updated and reviewed.  Review of patient's allergies indicates:   Allergen Reactions    Ace inhibitors Edema     Angioedema     Current Outpatient Prescriptions   Medication Sig Dispense Refill    ergocalciferol, vitamin D2, " "2,000 unit Tab Take 2,000 Units by mouth 2 (two) times daily.      hydroCHLOROthiazide (HYDRODIURIL) 25 MG tablet TAKE 1 TABLET(25 MG) BY MOUTH EVERY DAY 90 tablet 0    levothyroxine (SYNTHROID) 100 MCG tablet TAKE 1 TABLET(100 MCG) BY MOUTH BEFORE BREAKFAST 30 tablet 11    liothyronine (CYTOMEL) 5 MCG Tab TAKE 1 TABLET(5 MCG) BY MOUTH once DAILY 30 tablet 11    metoprolol succinate (TOPROL-XL) 25 MG 24 hr tablet Take 1 tablet (25 mg total) by mouth once daily. 30 tablet 5    verapamil (VERELAN) 360 MG C24P TAKE 1 CAPSULE(360 MG) BY MOUTH EVERY DAY 90 capsule 1    levocetirizine (XYZAL) 5 MG tablet Take 1 tablet (5 mg total) by mouth every evening. 30 tablet 0     No current facility-administered medications for this visit.        ROS  Review of Systems    Physical Exam  Vitals:    05/08/18 1209   BP: 132/80   BP Location: Left arm   Patient Position: Sitting   BP Method: Large (Manual)   Pulse: (!) 58   Temp: 97.9 °F (36.6 °C)   TempSrc: Oral   SpO2: 98%   Weight: 96.8 kg (213 lb 6.4 oz)   Height: 5' 7" (1.702 m)    Body mass index is 33.42 kg/m².  Weight: 96.8 kg (213 lb 6.4 oz)   Height: 5' 7" (170.2 cm)     Physical Exam    Health Maintenance       Date Due Completion Date    Influenza Vaccine 08/01/2018 3/21/2018 (Declined)    Override on 3/21/2018: Declined    Override on 10/9/2012: Done (pt had it done at work )    Mammogram 02/19/2020 2/19/2018    Colonoscopy 08/04/2020 8/4/2015    Lipid Panel 10/07/2021 10/7/2016    TETANUS VACCINE 04/07/2027 4/7/2017 (Declined)    Override on 4/7/2017: Declined            ASSESSMENT     1. Palpitations    2. Postoperative hypothyroidism    3. Essential hypertension    4. Grief reaction    5. Hot flashes        PLAN:     Problem List Items Addressed This Visit        Cardiac/Vascular    HTN (hypertension)    Relevant Medications    metoprolol succinate (TOPROL-XL) 25 MG 24 hr tablet       Endocrine    Hypothyroidism      Other Visit Diagnoses     Palpitations    -  " Primary    Grief reaction        Hot flashes                Yary Valadez MD  05/08/2018 12:31 PM        Follow-up in about 3 months (around 8/8/2018) for Follow up.

## 2018-05-08 NOTE — PROGRESS NOTES
Chief Complaint   Patient presents with    Hot Flashes    Follow-up     palpitations       HPI  Dain Phelps is a 62 y.o. female with multiple medical diagnoses as listed in the medical history and problem list that presents for follow-up for hot flashes and palpitations. She did notice some improvement in palpitations with resuming use of CPAP machine but she is still having them. She does still notice them with taking a deep breath or when she starts walking. She has noted that they are not always associated with shortness of breath. Has not started any activities to address her grief.    PAST MEDICAL HISTORY:  Past Medical History:   Diagnosis Date    Chronic low back pain     Fatigue     GERD (gastroesophageal reflux disease)     History of colon polyps 2015    Hypertension     Hypothyroidism     Impaired glucose tolerance 1/15/2014    Leukopenia     Obesity (BMI 30-39.9) 1/15/2014    Primary hypothyroidism 2015    Snoring 2015    Vitamin D deficiency 1/15/2014       PAST SURGICAL HISTORY:  Past Surgical History:   Procedure Laterality Date    BREAST BIOPSY      CATARACT EXTRACTION       SECTION, CLASSIC      x3    HYSTERECTOMY      total    thyroid surgey         SOCIAL HISTORY:  Social History     Social History    Marital status:      Spouse name: N/A    Number of children: N/A    Years of education: N/A     Occupational History    Not on file.     Social History Main Topics    Smoking status: Never Smoker    Smokeless tobacco: Never Used    Alcohol use No    Drug use: No    Sexual activity: Yes     Partners: Male     Other Topics Concern    Not on file     Social History Narrative    No narrative on file       FAMILY HISTORY:  Family History   Problem Relation Age of Onset    Hypertension Mother     Hypertension Maternal Aunt     Diabetes Maternal Aunt     Thyroid disease Maternal Aunt     Breast cancer Maternal Aunt     Diabetes  Maternal Grandmother        ALLERGIES AND MEDICATIONS: updated and reviewed.  Review of patient's allergies indicates:   Allergen Reactions    Ace inhibitors Edema     Angioedema     Current Outpatient Prescriptions   Medication Sig Dispense Refill    ergocalciferol, vitamin D2, 2,000 unit Tab Take 2,000 Units by mouth 2 (two) times daily.      hydroCHLOROthiazide (HYDRODIURIL) 25 MG tablet TAKE 1 TABLET(25 MG) BY MOUTH EVERY DAY 90 tablet 0    levothyroxine (SYNTHROID) 100 MCG tablet TAKE 1 TABLET(100 MCG) BY MOUTH BEFORE BREAKFAST 30 tablet 11    liothyronine (CYTOMEL) 5 MCG Tab TAKE 1 TABLET(5 MCG) BY MOUTH once DAILY 30 tablet 11    metoprolol succinate (TOPROL-XL) 25 MG 24 hr tablet Take 1 tablet (25 mg total) by mouth once daily. 30 tablet 5    verapamil (VERELAN) 360 MG C24P TAKE 1 CAPSULE(360 MG) BY MOUTH EVERY DAY 90 capsule 1    levocetirizine (XYZAL) 5 MG tablet Take 1 tablet (5 mg total) by mouth every evening. 30 tablet 0     No current facility-administered medications for this visit.        ROS  Review of Systems   Constitutional: Negative for chills, diaphoresis, fatigue, fever and unexpected weight change.   HENT: Negative for rhinorrhea, sinus pressure, sore throat and tinnitus.    Eyes: Negative for photophobia and visual disturbance.   Respiratory: Negative for cough, shortness of breath and wheezing.    Cardiovascular: Positive for palpitations. Negative for chest pain.   Gastrointestinal: Negative for abdominal pain, blood in stool, constipation, diarrhea, nausea and vomiting.   Genitourinary: Negative for dysuria, flank pain, frequency and vaginal discharge.   Musculoskeletal: Negative for arthralgias and joint swelling.   Skin: Negative for rash.   Neurological: Negative for speech difficulty, weakness, light-headedness and headaches.   Psychiatric/Behavioral: Positive for dysphoric mood. Negative for behavioral problems.       Physical Exam  Vitals:    05/08/18 1209   BP: 132/80   BP  "Location: Left arm   Patient Position: Sitting   BP Method: Large (Manual)   Pulse: (!) 58   Temp: 97.9 °F (36.6 °C)   TempSrc: Oral   SpO2: 98%   Weight: 96.8 kg (213 lb 6.4 oz)   Height: 5' 7" (1.702 m)    Body mass index is 33.42 kg/m².  Weight: 96.8 kg (213 lb 6.4 oz)   Height: 5' 7" (170.2 cm)     Physical Exam   Constitutional: She is oriented to person, place, and time. She appears well-developed and well-nourished.   Eyes: EOM are normal.   Neurological: She is alert and oriented to person, place, and time.   Skin: Skin is warm and dry. No rash noted. No erythema.   Psychiatric: She has a normal mood and affect. Her behavior is normal.   Nursing note and vitals reviewed.      Health Maintenance       Date Due Completion Date    Influenza Vaccine 08/01/2018 3/21/2018 (Declined)    Override on 3/21/2018: Declined    Override on 10/9/2012: Done (pt had it done at work )    Mammogram 02/19/2020 2/19/2018    Colonoscopy 08/04/2020 8/4/2015    Lipid Panel 10/07/2021 10/7/2016    TETANUS VACCINE 04/07/2027 4/7/2017 (Declined)    Override on 4/7/2017: Declined            ASSESSMENT     1. Palpitations    2. Postoperative hypothyroidism    3. Essential hypertension    4. Grief reaction    5. Hot flashes        PLAN:     Problem List Items Addressed This Visit        Cardiac/Vascular    HTN (hypertension)  -stable on current dose    Relevant Medications    metoprolol succinate (TOPROL-XL) 25 MG 24 hr tablet       Endocrine    Hypothyroidism  -TSH WNL      Other Visit Diagnoses     Palpitations    -  Primary  -may have an anxiety component, but I recommend she follow up with her cardiologist for evaluation as she is already on a beta blocker    Grief reaction      -discussed her researching grief, beginning counseling or support with her [employer]      Hot flashes      -follow up with GYN             Yary Valadez MD  05/09/2018 12:33 PM        Follow-up in about 3 months (around 8/8/2018) for Follow up.              "

## 2018-05-11 ENCOUNTER — OFFICE VISIT (OUTPATIENT)
Dept: OBSTETRICS AND GYNECOLOGY | Facility: CLINIC | Age: 63
End: 2018-05-11
Payer: COMMERCIAL

## 2018-05-11 VITALS
BODY MASS INDEX: 33.71 KG/M2 | HEIGHT: 67 IN | SYSTOLIC BLOOD PRESSURE: 144 MMHG | DIASTOLIC BLOOD PRESSURE: 81 MMHG | WEIGHT: 214.75 LBS

## 2018-05-11 DIAGNOSIS — N95.1 VASOMOTOR SYMPTOMS DUE TO MENOPAUSE: Primary | ICD-10-CM

## 2018-05-11 PROCEDURE — 3008F BODY MASS INDEX DOCD: CPT | Mod: CPTII,S$GLB,, | Performed by: OBSTETRICS & GYNECOLOGY

## 2018-05-11 PROCEDURE — 3077F SYST BP >= 140 MM HG: CPT | Mod: CPTII,S$GLB,, | Performed by: OBSTETRICS & GYNECOLOGY

## 2018-05-11 PROCEDURE — 99203 OFFICE O/P NEW LOW 30 MIN: CPT | Mod: S$GLB,,, | Performed by: OBSTETRICS & GYNECOLOGY

## 2018-05-11 PROCEDURE — 3079F DIAST BP 80-89 MM HG: CPT | Mod: CPTII,S$GLB,, | Performed by: OBSTETRICS & GYNECOLOGY

## 2018-05-11 PROCEDURE — 99999 PR PBB SHADOW E&M-EST. PATIENT-LVL III: CPT | Mod: PBBFAC,,, | Performed by: OBSTETRICS & GYNECOLOGY

## 2018-05-11 RX ORDER — PAROXETINE 10 MG/1
10 TABLET, FILM COATED ORAL EVERY MORNING
Qty: 30 TABLET | Refills: 3 | Status: SHIPPED | OUTPATIENT
Start: 2018-05-11 | End: 2022-05-11

## 2018-05-11 NOTE — LETTER
May 11, 2018      Yary Valadez MD  [address] [facility] [address]           [facility] - OB/ GYN  [address] [facility] [address]  [facility] [address]  Phone: [phone]          Patient: Dain Phelps   MR Number: [id number]   YOB: 1955   Date of Visit: 5/11/2018       Dear Dr. Yary Valadez:    Thank you for referring Dain Phelps to me for evaluation. Attached you will find relevant portions of my assessment and plan of care.    If you have questions, please do not hesitate to call me. I look forward to following Dain Phelps along with you.    Sincerely,    Rebecca Rosas MD    Enclosure  CC:  No Recipients    If you would like to receive this communication electronically, please contact [email or web address] or [phone] to request more information on [facility] access.    For providers and/or their staff who would like to refer a patient to [facility], please contact us through our one-stop-shop provider referral line, [facility] , at [phone].    If you feel you have received this communication in error or would no longer like to receive these types of communications, please e-mail [email or web address]

## 2018-05-11 NOTE — PROGRESS NOTES
"SUBJECTIVE:   62 y.o. female   for hot flashes    No LMP recorded. Patient has had a hysterectomy..    Pt s/p GENOVEVA/BSO for AUB/fibroids appx 10 years ago     Pt started to have vasomotor symptoms onset 3 years ago - it was not bothersome  However starting 3 years ago, hot flashes worsen. As if "someone stood in front of me with a heat lamp.  + vaginal dryness, making intercourse difficult  Denies h/o HRT.     OB History    Para Term  AB Living   3   0     3   SAB TAB Ectopic Multiple Live Births           3      # Outcome Date GA Lbr Silvestre/2nd Weight Sex Delivery Anes PTL Lv   3      M CS-Classical  N PING   2      F CS-Classical  N PING   1      M CS-Classical  N PING      Obstetric Comments   S/p GENOVEVA/BSO in  for AUB/Fibroids   Denies abnl pap     Past Medical History:   Diagnosis Date    Chronic low back pain     Fatigue     GERD (gastroesophageal reflux disease)     History of colon polyps 2015    Hypertension     Hypothyroidism     Impaired glucose tolerance 1/15/2014    Leukopenia     Obesity (BMI 30-39.9) 1/15/2014    Primary hypothyroidism 2015    Snoring 2015    Vitamin D deficiency 1/15/2014     Past Surgical History:   Procedure Laterality Date    BREAST BIOPSY      CATARACT EXTRACTION       SECTION, CLASSIC      x3    HYSTERECTOMY      total    thyroid surgey       Social History     Social History    Marital status:      Spouse name: N/A    Number of children: N/A    Years of education: N/A     Occupational History    Not on file.     Social History Main Topics    Smoking status: Never Smoker    Smokeless tobacco: Never Used    Alcohol use No    Drug use: No    Sexual activity: Yes     Partners: Male     Other Topics Concern    Not on file     Social History Narrative    No narrative on file     Family History   Problem Relation Age of Onset    Hypertension Mother     Hypertension Maternal Aunt     " "Diabetes Maternal Aunt     Thyroid disease Maternal Aunt     Breast cancer Maternal Aunt 70    Diabetes Maternal Grandmother          Current Outpatient Prescriptions   Medication Sig Dispense Refill    ergocalciferol, vitamin D2, 2,000 unit Tab Take 2,000 Units by mouth 2 (two) times daily.      hydroCHLOROthiazide (HYDRODIURIL) 25 MG tablet TAKE 1 TABLET(25 MG) BY MOUTH EVERY DAY 90 tablet 0    levothyroxine (SYNTHROID) 100 MCG tablet TAKE 1 TABLET(100 MCG) BY MOUTH BEFORE BREAKFAST 30 tablet 11    liothyronine (CYTOMEL) 5 MCG Tab TAKE 1 TABLET(5 MCG) BY MOUTH once DAILY 30 tablet 11    metoprolol succinate (TOPROL-XL) 25 MG 24 hr tablet Take 1 tablet (25 mg total) by mouth once daily. 30 tablet 5    verapamil (VERELAN) 360 MG C24P TAKE 1 CAPSULE(360 MG) BY MOUTH EVERY DAY 90 capsule 1    levocetirizine (XYZAL) 5 MG tablet Take 1 tablet (5 mg total) by mouth every evening. 30 tablet 0     No current facility-administered medications for this visit.      Allergies: Ace inhibitors       ROS:  GENERAL: Denies weight gain or weight loss. Feeling well overall.  ++ hot flashes  SKIN: Denies rash or lesions.   HEAD: Denies head injury or headache.   NODES: Denies enlarged lymph nodes.   CHEST: Denies chest pain or shortness of breath.   CARDIOVASCULAR: Denies palpitations or left sided chest pain.   ABDOMEN: No abdominal pain, constipation, diarrhea, nausea, vomiting or rectal bleeding.   URINARY: No frequency, dysuria, hematuria, or burning on urination.  REPRODUCTIVE: vaginal dryness  BREASTS: The patient performs breast self-examination and denies pain, lumps, or nipple discharge.   HEMATOLOGIC: No easy bruisability or excessive bleeding.  MUSCULOSKELETAL: Denies joint pain or swelling.   NEUROLOGIC: Denies syncope or weakness.   PSYCHIATRIC: Denies depression, anxiety or mood swings.    OBJECTIVE:   BP (!) 144/81   Ht 5' 7" (1.702 m)   Wt 97.4 kg (214 lb 11.7 oz)   BMI 33.63 kg/m²   The patient appears " well, alert, oriented x 3, in no distress.  SKIN: normal, good color, good turgor and no acne, striae, hirsutism  Pelvic exam: pt declined    Counseling appt only    ASSESSMENT:   1.  Vasomotor symptoms:   Discussed lifestyle modifications, HRT vs. Non-HRT.  Pt elects non-hormonal option. rx for paxil. Discussed side effects  2.  Vaginal dryness:  Recommend vaginal lubrication  3. S/p hysterectomy: pt stated she has not had exam in a while, declined exam today.  4.  RTC in 3 months for wwe and medication f/u.    At least 30 minutes were spent today with the patient in the office, which more than half the time was spent in counseling regarding vasomotor symptoms and medications. Pt voices understanding of what was discussed and has no other questions at this time.

## 2018-05-31 ENCOUNTER — OFFICE VISIT (OUTPATIENT)
Dept: CARDIOLOGY | Facility: CLINIC | Age: 63
End: 2018-05-31
Payer: COMMERCIAL

## 2018-05-31 VITALS
BODY MASS INDEX: 33.49 KG/M2 | HEART RATE: 68 BPM | WEIGHT: 213.88 LBS | SYSTOLIC BLOOD PRESSURE: 126 MMHG | RESPIRATION RATE: 20 BRPM | OXYGEN SATURATION: 95 % | DIASTOLIC BLOOD PRESSURE: 78 MMHG

## 2018-05-31 DIAGNOSIS — I10 ESSENTIAL HYPERTENSION: ICD-10-CM

## 2018-05-31 DIAGNOSIS — R07.9 CHEST PAIN, UNSPECIFIED TYPE: Primary | ICD-10-CM

## 2018-05-31 DIAGNOSIS — R94.31 ABNORMAL EKG: ICD-10-CM

## 2018-05-31 DIAGNOSIS — R00.2 PALPITATIONS: ICD-10-CM

## 2018-05-31 DIAGNOSIS — R06.02 SHORTNESS OF BREATH: ICD-10-CM

## 2018-05-31 DIAGNOSIS — E66.9 OBESITY (BMI 30-39.9): ICD-10-CM

## 2018-05-31 DIAGNOSIS — R07.9 ACUTE CHEST PAIN: ICD-10-CM

## 2018-05-31 PROCEDURE — 3078F DIAST BP <80 MM HG: CPT | Mod: CPTII,S$GLB,, | Performed by: INTERNAL MEDICINE

## 2018-05-31 PROCEDURE — 3008F BODY MASS INDEX DOCD: CPT | Mod: CPTII,S$GLB,, | Performed by: INTERNAL MEDICINE

## 2018-05-31 PROCEDURE — 99999 PR PBB SHADOW E&M-EST. PATIENT-LVL III: CPT | Mod: PBBFAC,,, | Performed by: INTERNAL MEDICINE

## 2018-05-31 PROCEDURE — 93000 ELECTROCARDIOGRAM COMPLETE: CPT | Mod: S$GLB,,, | Performed by: INTERNAL MEDICINE

## 2018-05-31 PROCEDURE — 99204 OFFICE O/P NEW MOD 45 MIN: CPT | Mod: S$GLB,,, | Performed by: INTERNAL MEDICINE

## 2018-05-31 PROCEDURE — 3074F SYST BP LT 130 MM HG: CPT | Mod: CPTII,S$GLB,, | Performed by: INTERNAL MEDICINE

## 2018-05-31 NOTE — PROGRESS NOTES
Subjective:    Patient ID:  Dain Phelps is a 62 y.o. female who presents for follow-up of Palpitations      HPI  Patient is here for follow-up of chest pain and palpitations.  She was last seen in 2015 underwent testing which was somewhat equivocal in terms of ischemic workup at that time but overall normal EF.  Her symptoms did improve intermittently at that time.  She says they've recurred with worsening exertional symptoms recently.  She's been having palpitations on and off which was started a few months back.  She says these of worsened and now she says exertional chest pain or shortness of breath walking from the parking lot to the grocery store.  She denies any PND, orthopnea or lower edema.  She's not expressing dizziness, presyncope or syncope.    Review of Systems   Constitution: Negative.   HENT: Negative.    Eyes: Negative.    Cardiovascular: Positive for chest pain, dyspnea on exertion, irregular heartbeat and palpitations. Negative for leg swelling, near-syncope, orthopnea, paroxysmal nocturnal dyspnea and syncope.   Respiratory: Negative for shortness of breath.    Skin: Negative.    Musculoskeletal: Negative.    Gastrointestinal: Negative for abdominal pain, constipation and diarrhea.   Genitourinary: Negative for dysuria.   Neurological: Negative.    Psychiatric/Behavioral: Negative.         Objective:    Physical Exam   Constitutional: She is oriented to person, place, and time. She appears well-developed and well-nourished.   HENT:   Head: Normocephalic and atraumatic.   Eyes: Conjunctivae and EOM are normal. Pupils are equal, round, and reactive to light.   Neck: Normal range of motion. Neck supple. No thyromegaly present.   Cardiovascular: Normal rate and regular rhythm.    No murmur heard.  Pulmonary/Chest: Effort normal and breath sounds normal. No respiratory distress.   Abdominal: Soft. Bowel sounds are normal.   Musculoskeletal: She exhibits no edema.   Neurological: She is alert and  oriented to person, place, and time.   Skin: Skin is warm and dry.   Psychiatric: She has a normal mood and affect. Her behavior is normal.        Ekg NSR nonspecific ST-T changes    Assessment:       1. Chest pain, unspecified type    2. Palpitations    3. Shortness of breath    4. Abnormal EKG    5. Essential hypertension    6. Obesity (BMI 30-39.9)    7. Acute chest pain         Plan:       -Multiple risk factors and exertional symptoms with baseline abnormal EKG, plan for testing including stress and echo  *Difficulty exercise secondary back pain and knee pain    Return to clinic in one month with testing ASAP

## 2018-06-14 ENCOUNTER — HOSPITAL ENCOUNTER (OUTPATIENT)
Dept: RADIOLOGY | Facility: HOSPITAL | Age: 63
Discharge: HOME | End: 2018-06-14
Attending: INTERNAL MEDICINE
Payer: COMMERCIAL

## 2018-06-14 ENCOUNTER — HOSPITAL ENCOUNTER (OUTPATIENT)
Dept: CARDIOLOGY | Facility: HOSPITAL | Age: 63
Discharge: HOME | End: 2018-06-14
Attending: INTERNAL MEDICINE
Payer: COMMERCIAL

## 2018-06-14 DIAGNOSIS — R07.9 ACUTE CHEST PAIN: ICD-10-CM

## 2018-06-14 DIAGNOSIS — R00.2 PALPITATIONS: ICD-10-CM

## 2018-06-14 DIAGNOSIS — R94.31 ABNORMAL EKG: ICD-10-CM

## 2018-06-14 DIAGNOSIS — R07.9 CHEST PAIN, UNSPECIFIED TYPE: ICD-10-CM

## 2018-06-14 LAB
DIASTOLIC DYSFUNCTION: NO
DIASTOLIC DYSFUNCTION: NO
ESTIMATED PA SYSTOLIC PRESSURE: 34.81
GLOBAL PERICARDIAL EFFUSION: NORMAL
RETIRED EF AND QEF - SEE NOTES: 55 (ref 55–65)
TRICUSPID VALVE REGURGITATION: NORMAL

## 2018-06-14 PROCEDURE — 63600175 PHARM REV CODE 636 W HCPCS

## 2018-06-14 PROCEDURE — 93017 CV STRESS TEST TRACING ONLY: CPT

## 2018-06-14 PROCEDURE — A9502 TC99M TETROFOSMIN: HCPCS

## 2018-06-14 PROCEDURE — 93018 CV STRESS TEST I&R ONLY: CPT | Mod: ,,, | Performed by: INTERNAL MEDICINE

## 2018-06-14 PROCEDURE — 93306 TTE W/DOPPLER COMPLETE: CPT | Mod: 26,,, | Performed by: INTERNAL MEDICINE

## 2018-06-14 PROCEDURE — 78452 HT MUSCLE IMAGE SPECT MULT: CPT | Mod: 26,,, | Performed by: INTERNAL MEDICINE

## 2018-06-14 PROCEDURE — 93306 TTE W/DOPPLER COMPLETE: CPT

## 2018-06-14 PROCEDURE — 93016 CV STRESS TEST SUPVJ ONLY: CPT | Mod: ,,, | Performed by: INTERNAL MEDICINE

## 2018-06-14 RX ORDER — REGADENOSON 0.08 MG/ML
INJECTION, SOLUTION INTRAVENOUS
Status: DISPENSED
Start: 2018-06-14 | End: 2018-06-14

## 2018-06-27 ENCOUNTER — OFFICE VISIT (OUTPATIENT)
Dept: CARDIOLOGY | Facility: CLINIC | Age: 63
End: 2018-06-27
Payer: COMMERCIAL

## 2018-06-27 VITALS
OXYGEN SATURATION: 96 % | BODY MASS INDEX: 33.37 KG/M2 | HEART RATE: 80 BPM | RESPIRATION RATE: 20 BRPM | DIASTOLIC BLOOD PRESSURE: 68 MMHG | SYSTOLIC BLOOD PRESSURE: 124 MMHG | WEIGHT: 213.06 LBS

## 2018-06-27 DIAGNOSIS — R94.31 ABNORMAL EKG: ICD-10-CM

## 2018-06-27 DIAGNOSIS — R07.9 CHEST PAIN, UNSPECIFIED TYPE: Primary | ICD-10-CM

## 2018-06-27 DIAGNOSIS — E66.9 OBESITY (BMI 30-39.9): ICD-10-CM

## 2018-06-27 DIAGNOSIS — I10 ESSENTIAL HYPERTENSION: ICD-10-CM

## 2018-06-27 DIAGNOSIS — R06.02 SHORTNESS OF BREATH: ICD-10-CM

## 2018-06-27 DIAGNOSIS — R00.2 PALPITATIONS: ICD-10-CM

## 2018-06-27 PROCEDURE — 99999 PR PBB SHADOW E&M-EST. PATIENT-LVL III: CPT | Mod: PBBFAC,,, | Performed by: INTERNAL MEDICINE

## 2018-06-27 PROCEDURE — 99214 OFFICE O/P EST MOD 30 MIN: CPT | Mod: S$GLB,,, | Performed by: INTERNAL MEDICINE

## 2018-06-27 PROCEDURE — 3008F BODY MASS INDEX DOCD: CPT | Mod: CPTII,S$GLB,, | Performed by: INTERNAL MEDICINE

## 2018-06-27 PROCEDURE — 3078F DIAST BP <80 MM HG: CPT | Mod: CPTII,S$GLB,, | Performed by: INTERNAL MEDICINE

## 2018-06-27 PROCEDURE — 3074F SYST BP LT 130 MM HG: CPT | Mod: CPTII,S$GLB,, | Performed by: INTERNAL MEDICINE

## 2018-06-27 NOTE — PROGRESS NOTES
Subjective:    Patient ID:  Dain Phelps is a 62 y.o. female who presents for follow-up of Results      HPI  Here for follow-up of chest pain and palpitations.  She still has occasional palpitations but no chest pain or shortness of breath.  She denies any PND, orthopnea or lower edema.  She has expressing dizziness, presyncope or syncope.  She underwent diagnostic testing as below did not get her Holter monitor done.  Otherwise echo and stress were within normal limits.  He says the palpitations are not lifestyle limiting.  We mainly emphasized diet and exercise currently.      Review of Systems   Constitution: Negative.   HENT: Negative.    Eyes: Negative.    Cardiovascular: Positive for palpitations. Negative for chest pain, dyspnea on exertion, irregular heartbeat, leg swelling, near-syncope, orthopnea, paroxysmal nocturnal dyspnea and syncope.   Respiratory: Negative for shortness of breath.    Skin: Negative.    Musculoskeletal: Negative.    Gastrointestinal: Negative for abdominal pain, constipation and diarrhea.   Genitourinary: Negative for dysuria.   Neurological: Negative.    Psychiatric/Behavioral: Negative.         Objective:    Physical Exam   Constitutional: She is oriented to person, place, and time. She appears well-developed and well-nourished.   HENT:   Head: Normocephalic and atraumatic.   Eyes: Conjunctivae and EOM are normal. Pupils are equal, round, and reactive to light.   Neck: Normal range of motion. Neck supple. No thyromegaly present.   Cardiovascular: Normal rate and regular rhythm.    No murmur heard.  Pulmonary/Chest: Effort normal and breath sounds normal. No respiratory distress.   Abdominal: Soft. Bowel sounds are normal.   Musculoskeletal: She exhibits no edema.   Neurological: She is alert and oriented to person, place, and time.   Skin: Skin is warm and dry.   Psychiatric: She has a normal mood and affect. Her behavior is normal.       NST:  6-18  Impression: PROBABLY NORMAL  MYOCARDIAL PERFUSION  1. There is a mild to moderate mostly reversible anterior wall defect of uncertain significance.   2. The perfusion scan is free of evidence for myocardial ischemia.   3. Resting wall motion is physiologic.   4. Resting LV function is normal.   5. The ventricular volumes are normal at rest and stress.   6. The extracardiac distribution of radioactivity is normal.   7. When compared to the previous study from 05/19/2015, no significant change    Echo:  CONCLUSIONS     1 - Normal left ventricular systolic function (EF 55-60%).     Assessment:       1. Chest pain, unspecified type    2. Palpitations    3. Abnormal EKG    4. Shortness of breath    5. Essential hypertension    6. Obesity (BMI 30-39.9)         Plan:       -Mainly reassurance from CV standpoint  -Reschedule Holter monitor  -Encouraged diet and exercise as tolerated    Return to clinic in 6 months

## 2018-06-28 ENCOUNTER — HOSPITAL ENCOUNTER (OUTPATIENT)
Dept: CARDIOLOGY | Facility: HOSPITAL | Age: 63
Discharge: HOME | End: 2018-06-28
Attending: INTERNAL MEDICINE
Payer: COMMERCIAL

## 2018-06-28 PROCEDURE — 93226 XTRNL ECG REC<48 HR SCAN A/R: CPT

## 2018-06-28 PROCEDURE — 93227 XTRNL ECG REC<48 HR R&I: CPT | Mod: ,,, | Performed by: INTERNAL MEDICINE

## 2018-07-31 RX ORDER — HYDROCHLOROTHIAZIDE 25 MG/1
TABLET ORAL
Qty: 90 TABLET | Refills: 0 | Status: SHIPPED | OUTPATIENT
Start: 2018-07-31 | End: 2018-11-20 | Stop reason: SDUPTHER

## 2018-07-31 NOTE — TELEPHONE ENCOUNTER
----- Message from Denisse Pope sent at 7/31/2018  8:56 AM CDT -----  Contact: self  Refill: hydroCHLOROthiazide (HYDRODIURIL) 25 MG tablet. [facility].

## 2018-09-28 ENCOUNTER — LAB VISIT (OUTPATIENT)
Dept: LAB | Facility: HOSPITAL | Age: 63
End: 2018-09-28
Attending: NURSE PRACTITIONER
Payer: COMMERCIAL

## 2018-09-28 DIAGNOSIS — E89.0 POSTOPERATIVE HYPOTHYROIDISM: ICD-10-CM

## 2018-09-28 LAB
T4 FREE SERPL-MCNC: 1.25 NG/DL
TSH SERPL DL<=0.005 MIU/L-ACNC: 0.29 UIU/ML

## 2018-09-28 PROCEDURE — 84439 ASSAY OF FREE THYROXINE: CPT

## 2018-09-28 PROCEDURE — 36415 COLL VENOUS BLD VENIPUNCTURE: CPT | Mod: PO

## 2018-09-28 PROCEDURE — 84443 ASSAY THYROID STIM HORMONE: CPT

## 2018-10-01 ENCOUNTER — PATIENT MESSAGE (OUTPATIENT)
Dept: ENDOCRINOLOGY | Facility: CLINIC | Age: 63
End: 2018-10-01

## 2018-10-01 DIAGNOSIS — E89.0 POSTOPERATIVE HYPOTHYROIDISM: Primary | ICD-10-CM

## 2018-10-30 DIAGNOSIS — I10 ESSENTIAL HYPERTENSION: ICD-10-CM

## 2018-10-30 RX ORDER — VERAPAMIL HYDROCHLORIDE 360 MG/1
CAPSULE, DELAYED RELEASE PELLETS ORAL
Qty: 90 CAPSULE | Refills: 1 | Status: SHIPPED | OUTPATIENT
Start: 2018-10-30 | End: 2019-06-12 | Stop reason: SDUPTHER

## 2018-11-12 ENCOUNTER — LAB VISIT (OUTPATIENT)
Dept: LAB | Facility: HOSPITAL | Age: 63
End: 2018-11-12
Attending: NURSE PRACTITIONER
Payer: COMMERCIAL

## 2018-11-12 DIAGNOSIS — E89.0 POSTOPERATIVE HYPOTHYROIDISM: ICD-10-CM

## 2018-11-12 LAB — TSH SERPL DL<=0.005 MIU/L-ACNC: 0.77 UIU/ML

## 2018-11-12 PROCEDURE — 84443 ASSAY THYROID STIM HORMONE: CPT

## 2018-11-12 PROCEDURE — 36415 COLL VENOUS BLD VENIPUNCTURE: CPT | Mod: PO

## 2018-11-20 RX ORDER — HYDROCHLOROTHIAZIDE 25 MG/1
TABLET ORAL
Qty: 90 TABLET | Refills: 0 | Status: SHIPPED | OUTPATIENT
Start: 2018-11-20 | End: 2019-03-29 | Stop reason: SDUPTHER

## 2018-11-21 NOTE — TELEPHONE ENCOUNTER
Returned call to patient at her mobile number. Patient states that she is feeling healthy and well and has not experienced any symptoms of food poisoning at this time. Informed patient that if she starts to experience any food poisoning symptoms (vomitting/diarrhea) to call our office or seek evaluation. Patient verbalized understanding. No further questions or concerns.

## 2018-11-21 NOTE — TELEPHONE ENCOUNTER
----- Message from Sarah Beth Billingsley sent at 11/21/2018  8:36 AM CST -----  Contact: Self/[phone]  Patient ate tc manriquez yesterday and would like to know if there's anything she should be looking for.  Thank you

## 2018-12-14 ENCOUNTER — CLINICAL SUPPORT (OUTPATIENT)
Dept: FAMILY MEDICINE | Facility: CLINIC | Age: 63
End: 2018-12-14
Payer: COMMERCIAL

## 2018-12-14 DIAGNOSIS — Z23 NEED FOR PROPHYLACTIC VACCINATION AND INOCULATION AGAINST INFLUENZA: Primary | ICD-10-CM

## 2018-12-14 PROCEDURE — 99499 UNLISTED E&M SERVICE: CPT | Mod: S$GLB,,, | Performed by: FAMILY MEDICINE

## 2018-12-14 PROCEDURE — 90471 IMMUNIZATION ADMIN: CPT | Mod: S$GLB,,, | Performed by: FAMILY MEDICINE

## 2018-12-14 PROCEDURE — 90686 IIV4 VACC NO PRSV 0.5 ML IM: CPT | Mod: S$GLB,,, | Performed by: FAMILY MEDICINE

## 2019-03-15 ENCOUNTER — TELEPHONE (OUTPATIENT)
Dept: FAMILY MEDICINE | Facility: CLINIC | Age: 64
End: 2019-03-15

## 2019-03-15 DIAGNOSIS — Z12.31 ENCOUNTER FOR SCREENING MAMMOGRAM FOR BREAST CANCER: Primary | ICD-10-CM

## 2019-03-15 NOTE — TELEPHONE ENCOUNTER
----- Message from Silvina Buckley sent at 3/15/2019 12:05 PM CDT -----  Contact: self  ..Type:  Mammogram    Caller is requesting to schedule their annual mammogram appointment.  Order is not listed in EPIC.  Please enter order and contact patient to schedule.      Where would they like the mammogram performed? [facility]    Best Call Back Number: [phone]

## 2019-03-28 ENCOUNTER — HOSPITAL ENCOUNTER (OUTPATIENT)
Dept: RADIOLOGY | Facility: HOSPITAL | Age: 64
Discharge: HOME | End: 2019-03-28
Attending: FAMILY MEDICINE
Payer: COMMERCIAL

## 2019-03-28 ENCOUNTER — TELEPHONE (OUTPATIENT)
Dept: FAMILY MEDICINE | Facility: CLINIC | Age: 64
End: 2019-03-28

## 2019-03-28 DIAGNOSIS — Z12.31 ENCOUNTER FOR SCREENING MAMMOGRAM FOR BREAST CANCER: ICD-10-CM

## 2019-03-28 PROCEDURE — 77067 MAMMO DIGITAL SCREENING BILAT WITH TOMOSYNTHESIS_CAD: ICD-10-PCS | Mod: 26,,, | Performed by: RADIOLOGY

## 2019-03-28 PROCEDURE — 77067 SCR MAMMO BI INCL CAD: CPT | Mod: TC,PO

## 2019-03-28 PROCEDURE — 77063 MAMMO DIGITAL SCREENING BILAT WITH TOMOSYNTHESIS_CAD: ICD-10-PCS | Mod: 26,,, | Performed by: RADIOLOGY

## 2019-03-28 PROCEDURE — 77063 BREAST TOMOSYNTHESIS BI: CPT | Mod: 26,,, | Performed by: RADIOLOGY

## 2019-03-28 PROCEDURE — 77067 SCR MAMMO BI INCL CAD: CPT | Mod: 26,,, | Performed by: RADIOLOGY

## 2019-03-28 NOTE — TELEPHONE ENCOUNTER
----- Message from Chelsie González sent at 3/28/2019  9:28 AM CDT -----  Contact: pt  Name of Who is Calling: pt      What is the request in detail: pt needs to discuss prescription called [facility] she states. Call pt      Can the clinic reply by [facility]: no      [phone]

## 2019-03-29 RX ORDER — HYDROCHLOROTHIAZIDE 25 MG/1
25 TABLET ORAL DAILY
Qty: 90 TABLET | Refills: 0 | Status: SHIPPED | OUTPATIENT
Start: 2019-03-29 | End: 2019-07-28 | Stop reason: SDUPTHER

## 2019-04-10 DIAGNOSIS — E89.0 HYPOTHYROIDISM ASSOCIATED WITH SURGICAL PROCEDURE: ICD-10-CM

## 2019-04-10 RX ORDER — LEVOTHYROXINE SODIUM 100 UG/1
TABLET ORAL
Qty: 30 TABLET | Refills: 0 | Status: SHIPPED | OUTPATIENT
Start: 2019-04-10 | End: 2019-05-15 | Stop reason: SDUPTHER

## 2019-04-10 RX ORDER — LIOTHYRONINE SODIUM 5 UG/1
TABLET ORAL
Qty: 30 TABLET | Refills: 0 | Status: SHIPPED | OUTPATIENT
Start: 2019-04-10 | End: 2019-05-15 | Stop reason: SDUPTHER

## 2019-04-12 ENCOUNTER — OFFICE VISIT (OUTPATIENT)
Dept: OBSTETRICS AND GYNECOLOGY | Facility: CLINIC | Age: 64
End: 2019-04-12
Payer: COMMERCIAL

## 2019-04-12 VITALS
HEIGHT: 67 IN | WEIGHT: 221.13 LBS | BODY MASS INDEX: 34.71 KG/M2 | DIASTOLIC BLOOD PRESSURE: 94 MMHG | SYSTOLIC BLOOD PRESSURE: 132 MMHG

## 2019-04-12 DIAGNOSIS — N89.8 VAGINAL DISCHARGE: ICD-10-CM

## 2019-04-12 DIAGNOSIS — Z01.419 ENCOUNTER FOR WELL WOMAN EXAM WITH ROUTINE GYNECOLOGICAL EXAM: Primary | ICD-10-CM

## 2019-04-12 DIAGNOSIS — Z13.820 ENCOUNTER FOR SCREENING FOR OSTEOPOROSIS: ICD-10-CM

## 2019-04-12 DIAGNOSIS — N95.2 VAGINAL ATROPHY: ICD-10-CM

## 2019-04-12 DIAGNOSIS — N95.2 ATROPHIC VAGINITIS: ICD-10-CM

## 2019-04-12 PROCEDURE — 87510 GARDNER VAG DNA DIR PROBE: CPT

## 2019-04-12 PROCEDURE — 99999 PR PBB SHADOW E&M-EST. PATIENT-LVL III: ICD-10-PCS | Mod: PBBFAC,,, | Performed by: OBSTETRICS & GYNECOLOGY

## 2019-04-12 PROCEDURE — 99396 PR PREVENTIVE VISIT,EST,40-64: ICD-10-PCS | Mod: S$GLB,,, | Performed by: OBSTETRICS & GYNECOLOGY

## 2019-04-12 PROCEDURE — 99396 PREV VISIT EST AGE 40-64: CPT | Mod: S$GLB,,, | Performed by: OBSTETRICS & GYNECOLOGY

## 2019-04-12 PROCEDURE — 87480 CANDIDA DNA DIR PROBE: CPT

## 2019-04-12 PROCEDURE — 99999 PR PBB SHADOW E&M-EST. PATIENT-LVL III: CPT | Mod: PBBFAC,,, | Performed by: OBSTETRICS & GYNECOLOGY

## 2019-04-12 RX ORDER — ESTRADIOL 0.1 MG/G
CREAM VAGINAL
Qty: 42.5 G | Refills: 5 | Status: SHIPPED | OUTPATIENT
Start: 2019-04-12 | End: 2021-06-10 | Stop reason: SDUPTHER

## 2019-04-12 NOTE — PROGRESS NOTES
SUBJECTIVE:   Dain Phelps is a 63 y.o. female   for annual well woman exam. No LMP recorded. Patient has had a hysterectomy..  S/p hayden/bso in  for aub/fibroids  Denies vaginal bleeding  Was on paroxetine for vasomotor symptoms, with relief  No longer taking meds, vasomotor symptoms are rare now  Still has  Vaginal dryness, with and without intercourse  C/o vaginal discharge x 2 months, - clear with mild odor.  Denies itching or burning  No new partners    Past Medical History:   Diagnosis Date    Chronic low back pain     Fatigue     GERD (gastroesophageal reflux disease)     History of colon polyps 2015    Hypertension     Hypothyroidism     Impaired glucose tolerance 1/15/2014    Leukopenia     Obesity (BMI 30-39.9) 1/15/2014    Primary hypothyroidism 2015    Snoring 2015    Vitamin D deficiency 1/15/2014     Past Surgical History:   Procedure Laterality Date    BREAST BIOPSY Left     CATARACT EXTRACTION       SECTION, CLASSIC      x3    COLONOSCOPY N/A 2015    Performed by [facility]ges [facility] at [facility] ([facility] F[facility])    HYSTERECTOMY      total    OOPHORECTOMY      thyroid surgey       Social History     Socioeconomic History    Marital status:      Spouse name: Not on file    Number of children: Not on file    Years of education: Not on file    Highest education level: Not on file   Occupational History    Not on file   Social Needs    Financial resource strain: Not on file    Food insecurity:     Worry: Not on file     Inability: Not on file    Transportation needs:     Medical: Not on file     Non-medical: Not on file   Tobacco Use    Smoking status: Never Smoker    Smokeless tobacco: Never Used   Substance and Sexual Activity    Alcohol use: No    Drug use: No    Sexual activity: Yes     Partners: Male   Lifestyle    Physical activity:     Days per week: Not on file     Minutes per session: Not on file    Stress: Not on file    Relationships    Social connections:     Talks on phone: Not on file     Gets together: Not on file     Attends Bahai service: Not on file     Active member of club or organization: Not on file     Attends meetings of clubs or organizations: Not on file     Relationship status: Not on file   Other Topics Concern    Not on file   Social History Narrative    Not on file     Family History   Problem Relation Age of Onset    Hypertension Mother     Hypertension Maternal Aunt     Diabetes Maternal Aunt     Thyroid disease Maternal Aunt     Breast cancer Maternal Aunt 70    Diabetes Maternal Grandmother      OB History    Para Term  AB Living   6 3 3     3   SAB TAB Ectopic Multiple Live Births           3      # Outcome Date GA Lbr Silvestre/2nd Weight Sex Delivery Anes PTL Lv   6 Term            5 Term            4 Term            3      M CS-Classical  N PING   2      F CS-Classical  N PING   1      M CS-Classical  N PING      Obstetric Comments   S/p GENOVEVA/BSO in  for AUB/Fibroids   Denies abnl pap   MMG 2019 neg   cscope , repeat in 5 years         Current Outpatient Medications   Medication Sig Dispense Refill    ergocalciferol, vitamin D2, 2,000 unit Tab Take 2,000 Units by mouth 2 (two) times daily.      hydroCHLOROthiazide (HYDRODIURIL) 25 MG tablet Take 1 tablet (25 mg total) by mouth once daily. 90 tablet 0    levothyroxine (SYNTHROID) 100 MCG tablet TAKE 1 TABLET(100 MCG) BY MOUTH BEFORE BREAKFAST 30 tablet 0    liothyronine (CYTOMEL) 5 MCG Tab TAKE 1 TABLET(5 MCG) BY MOUTH EVERY DAY 30 tablet 0    metoprolol succinate (TOPROL-XL) 25 MG 24 hr tablet Take 1 tablet (25 mg total) by mouth once daily. 30 tablet 5    verapamil (VERELAN) 360 MG C24P TAKE 1 CAPSULE(360 MG) BY MOUTH EVERY DAY 90 capsule 1    estradiol (ESTRACE) 0.01 % (0.1 mg/gram) vaginal cream Insert 2 g daily intravaginally for 2 weeks, then 1 g twice weekly 42.5 g 5    levocetirizine (XYZAL) 5  "MG tablet Take 1 tablet (5 mg total) by mouth every evening. 30 tablet 0    paroxetine (PAXIL) 10 MG tablet Take 1 tablet (10 mg total) by mouth every morning. For hotflashes 30 tablet 3     No current facility-administered medications for this visit.      Allergies: Ace inhibitors       ROS:  GENERAL: Denies weight gain or weight loss. Feeling well overall.   SKIN: Denies rash or lesions.   HEAD: Denies head injury or headache.   NODES: Denies enlarged lymph nodes.   CHEST: Denies chest pain or shortness of breath.   CARDIOVASCULAR: Denies palpitations or left sided chest pain.   ABDOMEN: No abdominal pain, constipation, diarrhea, nausea, vomiting or rectal bleeding.   URINARY: No frequency, dysuria, hematuria, or burning on urination.  REPRODUCTIVE: + vaginal discharge  BREASTS: The patient performs breast self-examination and denies pain, lumps, or nipple discharge.   HEMATOLOGIC: No easy bruisability or excessive bleeding.  MUSCULOSKELETAL: Denies joint pain or swelling.   NEUROLOGIC: Denies syncope or weakness.   PSYCHIATRIC: Denies depression, anxiety or mood swings.      OBJECTIVE:   BP (!) 132/94 (BP Location: Left arm, Patient Position: Sitting, BP Method: Medium (Manual))   Ht 5' 7" (1.702 m)   Wt 100.3 kg (221 lb 1.9 oz)   BMI 34.63 kg/m²   The patient appears well, alert, oriented x 3, in no distress.  PSYCH:  Normal, full range of affect  NECK: negative, no thyromegaly, trachea midline  SKIN: normal, good color, good turgor and no acne, striae, hirsutism  BREAST EXAM: breasts appear normal, no suspicious masses, no skin or nipple changes or axillary nodes  ABDOMEN: soft, non-tender; bowel sounds normal; no masses,  no organomegaly and no hernias, masses, or hepatosplenomegaly  GENITALIA: normal external genitalia, no erythema, no discharge  BLADDER: soft  URETHRA: normal appearing urethra with no masses, tenderness or lesions and normal urethra, normal urethral meatus  VAGINA: mucosal atrophy, light " yellow scant discharge noted  CERVIX: absent  UTERUS: uterus absent  ADNEXA: no mass, fullness, tenderness      ASSESSMENT:   1. Health maintenance  -discontinue pap due to h/o hysterectomy  -screening MMG UTD  -colonoscopy UTD, due next year  -counseled on exercise and healthy diet, weight loss  -bone health:  Discussed Vitamin D and Calcium supplementation, weight bearing exercises, dexa ordered  2.  Discussed safety at home/school/work, healthy and balanced diet, sleep hygiene, as well as violence/weapons exposure.   3.  Vaginal atrophy:  rx for estrace.  Discussed side effects.  Pt may use lubrication prn intercourse  4.  Vaginal discharge:  Check affirm, likely atrophic vaginitis.

## 2019-04-22 LAB
BACTERIAL VAGINOSIS DNA: NEGATIVE
CANDIDA GLABRATA DNA: NEGATIVE
CANDIDA KRUSEI DNA: NEGATIVE
CANDIDA RRNA VAG QL PROBE: NEGATIVE
T VAGINALIS RRNA GENITAL QL PROBE: NEGATIVE

## 2019-05-13 ENCOUNTER — HOSPITAL ENCOUNTER (OUTPATIENT)
Dept: RADIOLOGY | Facility: HOSPITAL | Age: 64
Discharge: HOME | End: 2019-05-13
Attending: OBSTETRICS & GYNECOLOGY
Payer: COMMERCIAL

## 2019-05-13 DIAGNOSIS — Z13.820 ENCOUNTER FOR SCREENING FOR OSTEOPOROSIS: ICD-10-CM

## 2019-05-13 PROCEDURE — 77080 DXA BONE DENSITY AXIAL: CPT | Mod: TC

## 2019-05-13 PROCEDURE — 77080 DEXA BONE DENSITY SPINE HIP: ICD-10-PCS | Mod: 26,,, | Performed by: RADIOLOGY

## 2019-05-13 PROCEDURE — 77080 DXA BONE DENSITY AXIAL: CPT | Mod: 26,,, | Performed by: RADIOLOGY

## 2019-05-15 DIAGNOSIS — E89.0 HYPOTHYROIDISM ASSOCIATED WITH SURGICAL PROCEDURE: ICD-10-CM

## 2019-05-15 RX ORDER — LIOTHYRONINE SODIUM 5 UG/1
TABLET ORAL
Qty: 30 TABLET | Refills: 0 | Status: SHIPPED | OUTPATIENT
Start: 2019-05-15 | End: 2019-06-20 | Stop reason: SDUPTHER

## 2019-05-15 RX ORDER — LEVOTHYROXINE SODIUM 100 UG/1
TABLET ORAL
Qty: 30 TABLET | Refills: 0 | Status: SHIPPED | OUTPATIENT
Start: 2019-05-15 | End: 2019-06-20 | Stop reason: SDUPTHER

## 2019-05-20 NOTE — PROGRESS NOTES
Subjective:      Patient ID: Dain Phelps is a 63 y.o. female.    Chief Complaint:  Thyroid Problem and Follow-up    History of Present Illness  Dain Phelps presents today for follow up of postoperative hypothyroidism . Last seen 3/28/18.     With regards to her hypothyroidism:  initially seen her for MNG-- we did a FNA which was indeterminate and then she had a total thyroidectomy 2009 -- final path was benign      Ref. Range 11/12/2018 10:15   TSH Latest Ref Range: 0.400 - 4.000 uIU/mL 0.7749     Current medication:  generic lt4 100 mcg daily   Cytomel 5 mcg daily    Takes thyroid medication properly without food first thing in the morning     current symptoms:   + weight gain  - Fatigue   + Constipation- occ   + Hair loss  - Brittle nails  - Mental fog   No cp, palpations or sob  Denies heat/cold intolerance    With regards to the vitamin d deficiency:     Ref. Range 3/21/2018 10:00   Vit D, 25-Hydroxy Latest Ref Range: 30 - 96 ng/mL 37     Takes otc vitamin d3 2000 iu daily     BMD 5/13/19  Normal bone mineral density.    Review of Systems   Constitutional: Positive for unexpected weight change (gain).   Eyes: Negative for visual disturbance.   Respiratory: Negative for shortness of breath.    Cardiovascular: Negative for chest pain.   Gastrointestinal: Positive for constipation. Negative for abdominal pain.   Endocrine: Negative for cold intolerance, heat intolerance, polydipsia, polyphagia and polyuria.   Musculoskeletal: Negative for arthralgias.   Skin: Negative for wound.   Neurological: Negative for headaches.   Hematological: Does not bruise/bleed easily.   Psychiatric/Behavioral: Negative for sleep disturbance.     Objective:   Physical Exam   Neck: No thyromegaly present.   Cardiovascular: Normal rate.   No edema present   Pulmonary/Chest: Effort normal.   Abdominal: Soft.   Vitals reviewed.    Body mass index is 36.42 kg/m².    Lab Review:   Lab Results   Component Value Date    HGBA1C 5.5  12/19/2016     Lab Results   Component Value Date    CHOL 174 10/07/2016    HDL 52 10/07/2016    LDLCALC 109.2 10/07/2016    TRIG 64 10/07/2016    CHOLHDL 29.9 10/07/2016     Lab Results   Component Value Date     12/19/2016    K 3.9 12/19/2016     12/19/2016    CO2 24 12/19/2016    GLU 84 12/19/2016    BUN 16 12/19/2016    CREATININE 0.8 12/19/2016    CALCIUM 8.8 12/19/2016    PROT 7.1 12/19/2016    ALBUMIN 3.1 (L) 12/19/2016    BILITOT 0.2 12/19/2016    ALKPHOS 84 12/19/2016    AST 15 12/19/2016    ALT 13 12/19/2016    ANIONGAP 10 12/19/2016    ESTGFRAFRICA >60.0 12/19/2016    EGFRNONAA >60.0 12/19/2016    TSH 0.774 11/12/2018     Assessment and Plan     1. Postoperative hypothyroidism  Comprehensive metabolic panel    TSH   2. Vitamin D deficiency  Vitamin D   3. Obesity (BMI 30-39.9)     4. LORRAINE on CPAP     5. Essential hypertension       Hypothyroidism  -- Clinically euthyroid  -- Goal is a normal TSH  -- Check TFTs today and adjust dosage accordingly   -- Avoid exogenous hyperthyroidism as this can accelerate bone loss and increase risk of CV complications.  -- Advised to take LT4 on an empty stomach with water and to wait 30-45 minutes before eating or taking other medications   -- Reviewed that symptoms of hypothyroidism may not correlate with tsh, and a normal TSH is the goal of therapy.....  symptoms are not a justification for over treatment     Vitamin D deficiency  Continue Vit D supplement daily  Check Vit D today    Obesity (BMI 30-39.9)  -- alerted as to the increased risk of T2DM   -- encouraged dietary and lifestyle modifications- she walks daily with her    -- emphasized weight loss goals  -- recommend periodic A1c     LORRAINE on CPAP  She has a Cpap machine that she does not use.  She states that she will make an appt with sleep medicine to receive a new Cpap machine     HTN (hypertension)  -- Controlled on current medications   -- Blood pressure goals discussed with  patient    Follow up in about 1 year (around 5/22/2020).  Labs today

## 2019-05-22 ENCOUNTER — OFFICE VISIT (OUTPATIENT)
Dept: ENDOCRINOLOGY | Facility: CLINIC | Age: 64
End: 2019-05-22
Payer: COMMERCIAL

## 2019-05-22 VITALS
BODY MASS INDEX: 36.26 KG/M2 | HEART RATE: 66 BPM | WEIGHT: 225.63 LBS | SYSTOLIC BLOOD PRESSURE: 130 MMHG | HEIGHT: 66 IN | DIASTOLIC BLOOD PRESSURE: 84 MMHG

## 2019-05-22 DIAGNOSIS — G47.33 OSA ON CPAP: ICD-10-CM

## 2019-05-22 DIAGNOSIS — E89.0 POSTOPERATIVE HYPOTHYROIDISM: Primary | ICD-10-CM

## 2019-05-22 DIAGNOSIS — E55.9 VITAMIN D DEFICIENCY: ICD-10-CM

## 2019-05-22 DIAGNOSIS — I10 ESSENTIAL HYPERTENSION: ICD-10-CM

## 2019-05-22 DIAGNOSIS — E66.9 OBESITY (BMI 30-39.9): ICD-10-CM

## 2019-05-22 PROCEDURE — 3075F PR MOST RECENT SYSTOLIC BLOOD PRESS GE 130-139MM HG: ICD-10-PCS | Mod: CPTII,S$GLB,, | Performed by: NURSE PRACTITIONER

## 2019-05-22 PROCEDURE — 99214 PR OFFICE/OUTPT VISIT, EST, LEVL IV, 30-39 MIN: ICD-10-PCS | Mod: S$GLB,,, | Performed by: NURSE PRACTITIONER

## 2019-05-22 PROCEDURE — 3079F PR MOST RECENT DIASTOLIC BLOOD PRESSURE 80-89 MM HG: ICD-10-PCS | Mod: CPTII,S$GLB,, | Performed by: NURSE PRACTITIONER

## 2019-05-22 PROCEDURE — 99999 PR PBB SHADOW E&M-EST. PATIENT-LVL III: CPT | Mod: PBBFAC,,, | Performed by: NURSE PRACTITIONER

## 2019-05-22 PROCEDURE — 3079F DIAST BP 80-89 MM HG: CPT | Mod: CPTII,S$GLB,, | Performed by: NURSE PRACTITIONER

## 2019-05-22 PROCEDURE — 3008F BODY MASS INDEX DOCD: CPT | Mod: CPTII,S$GLB,, | Performed by: NURSE PRACTITIONER

## 2019-05-22 PROCEDURE — 3008F PR BODY MASS INDEX (BMI) DOCUMENTED: ICD-10-PCS | Mod: CPTII,S$GLB,, | Performed by: NURSE PRACTITIONER

## 2019-05-22 PROCEDURE — 99214 OFFICE O/P EST MOD 30 MIN: CPT | Mod: S$GLB,,, | Performed by: NURSE PRACTITIONER

## 2019-05-22 PROCEDURE — 99999 PR PBB SHADOW E&M-EST. PATIENT-LVL III: ICD-10-PCS | Mod: PBBFAC,,, | Performed by: NURSE PRACTITIONER

## 2019-05-22 PROCEDURE — 3075F SYST BP GE 130 - 139MM HG: CPT | Mod: CPTII,S$GLB,, | Performed by: NURSE PRACTITIONER

## 2019-05-22 NOTE — ASSESSMENT & PLAN NOTE
-- alerted as to the increased risk of T2DM   -- encouraged dietary and lifestyle modifications- she walks daily with her    -- emphasized weight loss goals  -- recommend periodic A1c

## 2019-05-22 NOTE — ASSESSMENT & PLAN NOTE
She has a Cpap machine that she does not use.  She states that she will make an appt with sleep medicine to receive a new Cpap machine

## 2019-05-22 NOTE — ASSESSMENT & PLAN NOTE
-- Clinically euthyroid  -- Goal is a normal TSH  -- Check TFTs today and adjust dosage accordingly   -- Avoid exogenous hyperthyroidism as this can accelerate bone loss and increase risk of CV complications.  -- Advised to take LT4 on an empty stomach with water and to wait 30-45 minutes before eating or taking other medications   -- Reviewed that symptoms of hypothyroidism may not correlate with tsh, and a normal TSH is the goal of therapy.....  symptoms are not a justification for over treatment

## 2019-05-23 ENCOUNTER — PATIENT MESSAGE (OUTPATIENT)
Dept: ENDOCRINOLOGY | Facility: CLINIC | Age: 64
End: 2019-05-23

## 2019-06-05 ENCOUNTER — TELEPHONE (OUTPATIENT)
Dept: FAMILY MEDICINE | Facility: CLINIC | Age: 64
End: 2019-06-05

## 2019-06-11 ENCOUNTER — LAB VISIT (OUTPATIENT)
Dept: LAB | Facility: HOSPITAL | Age: 64
End: 2019-06-11
Attending: FAMILY MEDICINE
Payer: COMMERCIAL

## 2019-06-11 ENCOUNTER — OFFICE VISIT (OUTPATIENT)
Dept: FAMILY MEDICINE | Facility: CLINIC | Age: 64
End: 2019-06-11
Payer: COMMERCIAL

## 2019-06-11 VITALS
RESPIRATION RATE: 18 BRPM | TEMPERATURE: 98 F | BODY MASS INDEX: 35.59 KG/M2 | HEIGHT: 66 IN | OXYGEN SATURATION: 95 % | HEART RATE: 62 BPM | WEIGHT: 221.44 LBS | DIASTOLIC BLOOD PRESSURE: 80 MMHG | SYSTOLIC BLOOD PRESSURE: 135 MMHG

## 2019-06-11 DIAGNOSIS — N30.00 ACUTE CYSTITIS WITHOUT HEMATURIA: ICD-10-CM

## 2019-06-11 DIAGNOSIS — I10 ESSENTIAL HYPERTENSION: ICD-10-CM

## 2019-06-11 DIAGNOSIS — G47.33 OSA ON CPAP: ICD-10-CM

## 2019-06-11 DIAGNOSIS — N30.00 ACUTE CYSTITIS WITHOUT HEMATURIA: Primary | ICD-10-CM

## 2019-06-11 LAB
BACTERIA #/AREA URNS AUTO: ABNORMAL /HPF
BILIRUB UR QL STRIP: NEGATIVE
CLARITY UR REFRACT.AUTO: CLEAR
COLOR UR AUTO: YELLOW
GLUCOSE UR QL STRIP: NEGATIVE
HGB UR QL STRIP: ABNORMAL
KETONES UR QL STRIP: NEGATIVE
LEUKOCYTE ESTERASE UR QL STRIP: ABNORMAL
MICROSCOPIC COMMENT: ABNORMAL
NITRITE UR QL STRIP: NEGATIVE
PH UR STRIP: 6 [PH] (ref 5–8)
PROT UR QL STRIP: NEGATIVE
RBC #/AREA URNS AUTO: 3 /HPF (ref 0–4)
SP GR UR STRIP: 1.01 (ref 1–1.03)
SQUAMOUS #/AREA URNS AUTO: 4 /HPF
URN SPEC COLLECT METH UR: ABNORMAL
WBC #/AREA URNS AUTO: 29 /HPF (ref 0–5)

## 2019-06-11 PROCEDURE — 99999 PR PBB SHADOW E&M-EST. PATIENT-LVL III: ICD-10-PCS | Mod: PBBFAC,,, | Performed by: FAMILY MEDICINE

## 2019-06-11 PROCEDURE — 3079F PR MOST RECENT DIASTOLIC BLOOD PRESSURE 80-89 MM HG: ICD-10-PCS | Mod: CPTII,S$GLB,, | Performed by: FAMILY MEDICINE

## 2019-06-11 PROCEDURE — 81001 URINALYSIS AUTO W/SCOPE: CPT

## 2019-06-11 PROCEDURE — 87147 CULTURE TYPE IMMUNOLOGIC: CPT

## 2019-06-11 PROCEDURE — 3075F SYST BP GE 130 - 139MM HG: CPT | Mod: CPTII,S$GLB,, | Performed by: FAMILY MEDICINE

## 2019-06-11 PROCEDURE — 3075F PR MOST RECENT SYSTOLIC BLOOD PRESS GE 130-139MM HG: ICD-10-PCS | Mod: CPTII,S$GLB,, | Performed by: FAMILY MEDICINE

## 2019-06-11 PROCEDURE — 3079F DIAST BP 80-89 MM HG: CPT | Mod: CPTII,S$GLB,, | Performed by: FAMILY MEDICINE

## 2019-06-11 PROCEDURE — 87186 SC STD MICRODIL/AGAR DIL: CPT

## 2019-06-11 PROCEDURE — 3008F PR BODY MASS INDEX (BMI) DOCUMENTED: ICD-10-PCS | Mod: CPTII,S$GLB,, | Performed by: FAMILY MEDICINE

## 2019-06-11 PROCEDURE — 87077 CULTURE AEROBIC IDENTIFY: CPT

## 2019-06-11 PROCEDURE — 3008F BODY MASS INDEX DOCD: CPT | Mod: CPTII,S$GLB,, | Performed by: FAMILY MEDICINE

## 2019-06-11 PROCEDURE — 99999 PR PBB SHADOW E&M-EST. PATIENT-LVL III: CPT | Mod: PBBFAC,,, | Performed by: FAMILY MEDICINE

## 2019-06-11 PROCEDURE — 99214 OFFICE O/P EST MOD 30 MIN: CPT | Mod: S$GLB,,, | Performed by: FAMILY MEDICINE

## 2019-06-11 PROCEDURE — 87088 URINE BACTERIA CULTURE: CPT

## 2019-06-11 PROCEDURE — 87086 URINE CULTURE/COLONY COUNT: CPT

## 2019-06-11 PROCEDURE — 99214 PR OFFICE/OUTPT VISIT, EST, LEVL IV, 30-39 MIN: ICD-10-PCS | Mod: S$GLB,,, | Performed by: FAMILY MEDICINE

## 2019-06-11 RX ORDER — CEPHALEXIN 250 MG/1
250 CAPSULE ORAL EVERY 12 HOURS
Qty: 14 CAPSULE | Refills: 0 | Status: SHIPPED | OUTPATIENT
Start: 2019-06-11 | End: 2019-06-18

## 2019-06-11 NOTE — PROGRESS NOTES
Chief Complaint   Patient presents with    Urinary Tract Infection    Foot Swelling       HPI  Dain Phelps is a 63 y.o. female with multiple medical diagnoses as listed in the medical history and problem list that presents for evaluation for UTI sx and foot swelling    Foot swelling- this occurred while she was on a cruise, has improved since after remaining for several days    UTI- she has been having some discomfort at the end of urination and a change in the odor of her urine. Sx started two days ago. No fever or blood in her urine.    PAST MEDICAL HISTORY:  Past Medical History:   Diagnosis Date    Chronic low back pain     Fatigue     GERD (gastroesophageal reflux disease)     History of colon polyps 2015    Hypertension     Hypothyroidism     Impaired glucose tolerance 1/15/2014    Leukopenia     Obesity (BMI 30-39.9) 1/15/2014    Primary hypothyroidism 2015    Snoring 2015    Vitamin D deficiency 1/15/2014       PAST SURGICAL HISTORY:  Past Surgical History:   Procedure Laterality Date    BREAST BIOPSY Left     CATARACT EXTRACTION       SECTION, CLASSIC      x3    COLONOSCOPY N/A 2015    [facility] ([facility])    HYSTERECTOMY      total    OOPHORECTOMY      thyroid surgey         SOCIAL HISTORY:  Social History     Socioeconomic History    Marital status:      Spouse name: Not on file    Number of children: Not on file    Years of education: Not on file    Highest education level: Not on file   Occupational History    Not on file   Social Needs    Financial resource strain: Not on file    Food insecurity:     Worry: Not on file     Inability: Not on file    Transportation needs:     Medical: Not on file     Non-medical: Not on file   Tobacco Use    Smoking status: Never Smoker    Smokeless tobacco: Never Used   Substance and Sexual Activity    Alcohol use: No    Drug use: No    Sexual activity: Yes      Partners: Male   Lifestyle    Physical activity:     Days per week: Not on file     Minutes per session: Not on file    Stress: Not on file   Relationships    Social connections:     Talks on phone: Not on file     Gets together: Not on file     Attends Synagogue service: Not on file     Active member of club or organization: Not on file     Attends meetings of clubs or organizations: Not on file     Relationship status: Not on file   Other Topics Concern    Not on file   Social History Narrative    Not on file       FAMILY HISTORY:  Family History   Problem Relation Age of Onset    Hypertension Mother     Hypertension Maternal Aunt     Diabetes Maternal Aunt     Thyroid disease Maternal Aunt     Breast cancer Maternal Aunt 70    Diabetes Maternal Grandmother        ALLERGIES AND MEDICATIONS: updated and reviewed.  Review of patient's allergies indicates:   Allergen Reactions    Ace inhibitors Edema     Angioedema     Current Outpatient Medications   Medication Sig Dispense Refill    ergocalciferol, vitamin D2, 2,000 unit Tab Take 2,000 Units by mouth 2 (two) times daily.      estradiol (ESTRACE) 0.01 % (0.1 mg/gram) vaginal cream Insert 2 g daily intravaginally for 2 weeks, then 1 g twice weekly 42.5 g 5    hydroCHLOROthiazide (HYDRODIURIL) 25 MG tablet Take 1 tablet (25 mg total) by mouth once daily. 90 tablet 0    levocetirizine (XYZAL) 5 MG tablet Take 1 tablet (5 mg total) by mouth every evening. 30 tablet 0    levothyroxine (SYNTHROID) 100 MCG tablet TAKE 1 TABLET(100 MCG) BY MOUTH BEFORE BREAKFAST 30 tablet 0    liothyronine (CYTOMEL) 5 MCG Tab TAKE 1 TABLET(5 MCG) BY MOUTH EVERY DAY 30 tablet 0    metoprolol succinate (TOPROL-XL) 25 MG 24 hr tablet Take 1 tablet (25 mg total) by mouth once daily. 30 tablet 5    paroxetine (PAXIL) 10 MG tablet Take 1 tablet (10 mg total) by mouth every morning. For hotflashes 30 tablet 3    verapamil (VERELAN) 360 MG C24P TAKE 1 CAPSULE(360 MG) BY MOUTH  "EVERY DAY 90 capsule 1    cephALEXin (KEFLEX) 250 MG capsule Take 1 capsule (250 mg total) by mouth every 12 (twelve) hours. for 7 days 14 capsule 0     No current facility-administered medications for this visit.        ROS  Review of Systems   Constitutional: Negative for chills, diaphoresis, fatigue, fever and unexpected weight change.   HENT: Negative for rhinorrhea, sinus pressure, sore throat and tinnitus.    Eyes: Negative for photophobia and visual disturbance.   Respiratory: Negative for cough, shortness of breath and wheezing.    Cardiovascular: Positive for leg swelling. Negative for chest pain and palpitations.   Gastrointestinal: Negative for abdominal pain, blood in stool, constipation, diarrhea, nausea and vomiting.   Genitourinary: Positive for dysuria and frequency. Negative for flank pain and vaginal discharge.   Musculoskeletal: Negative for arthralgias and joint swelling.   Skin: Negative for rash.   Neurological: Negative for speech difficulty, weakness, light-headedness and headaches.   Psychiatric/Behavioral: Negative for behavioral problems and dysphoric mood.       Physical Exam  Vitals:    06/11/19 1052   BP: 135/80   Pulse: 62   Resp: 18   Temp: 97.8 °F (36.6 °C)   TempSrc: Oral   SpO2: 95%   Weight: 100.4 kg (221 lb 7.2 oz)   Height: 5' 6" (1.676 m)    Body mass index is 35.74 kg/m².  Weight: 100.4 kg (221 lb 7.2 oz)   Height: 5' 6" (167.6 cm)     Physical Exam   Constitutional: She is oriented to person, place, and time. She appears well-developed and well-nourished. No distress.   HENT:   Head: Normocephalic and atraumatic.   Eyes: EOM are normal.   Neck: Neck supple.   Cardiovascular: Normal rate and regular rhythm. Exam reveals no gallop and no friction rub.   No murmur heard.  Minimal LE ankle swelling   Pulmonary/Chest: Effort normal and breath sounds normal. No respiratory distress. She has no wheezes. She has no rales.   Lymphadenopathy:     She has no cervical adenopathy. "   Neurological: She is alert and oriented to person, place, and time.   Skin: Skin is warm and dry. No rash noted.   Psychiatric: She has a normal mood and affect. Her behavior is normal.   Nursing note and vitals reviewed.      Health Maintenance       Date Due Completion Date    Shingles Vaccine (1 of 2) 09/28/2005 ---    Influenza Vaccine 08/01/2019 12/14/2018    Override on 3/21/2018: Declined    Override on 10/9/2012: Done (pt had it done at work )    Colonoscopy 08/04/2020 8/4/2015    Mammogram 03/28/2021 3/28/2019    Lipid Panel 10/07/2021 10/7/2016    TETANUS VACCINE 04/07/2027 4/7/2017 (Declined)    Override on 4/7/2017: Declined          Health maintenance reviewed and addressed as ordered      ASSESSMENT     1. Acute cystitis without hematuria    2. Essential hypertension    3. LORRAINE on CPAP        PLAN:     Problem List Items Addressed This Visit        Cardiac/Vascular    HTN (hypertension)  -controlled current regimen       Other    LORRAINE on CPAP  -she will f/u w/ sleep medicine to begin using her CPAP machine      Other Visit Diagnoses     Acute cystitis without hematuria    -  Primary  -will order UA and culture to r/o UTI    Relevant Medications    cephALEXin (KEFLEX) 250 MG capsule    Other Relevant Orders    Urinalysis    Urine culture            Yary Valadez MD  06/11/2019 10:59 AM        Follow up in about 6 months (around 12/11/2019) for Follow up.

## 2019-06-12 DIAGNOSIS — I10 ESSENTIAL HYPERTENSION: ICD-10-CM

## 2019-06-13 RX ORDER — VERAPAMIL HYDROCHLORIDE 360 MG/1
CAPSULE, DELAYED RELEASE PELLETS ORAL
Qty: 90 CAPSULE | Refills: 1 | Status: SHIPPED | OUTPATIENT
Start: 2019-06-13 | End: 2020-01-13

## 2019-06-14 LAB
BACTERIA UR CULT: NORMAL
BACTERIA UR CULT: NORMAL

## 2019-06-20 DIAGNOSIS — I10 ESSENTIAL HYPERTENSION: ICD-10-CM

## 2019-06-20 DIAGNOSIS — E89.0 HYPOTHYROIDISM ASSOCIATED WITH SURGICAL PROCEDURE: ICD-10-CM

## 2019-06-21 RX ORDER — LEVOTHYROXINE SODIUM 100 UG/1
TABLET ORAL
Qty: 30 TABLET | Refills: 0 | Status: SHIPPED | OUTPATIENT
Start: 2019-06-21 | End: 2019-07-28 | Stop reason: SDUPTHER

## 2019-06-21 RX ORDER — LIOTHYRONINE SODIUM 5 UG/1
TABLET ORAL
Qty: 30 TABLET | Refills: 0 | Status: SHIPPED | OUTPATIENT
Start: 2019-06-21 | End: 2019-07-28 | Stop reason: SDUPTHER

## 2019-06-21 RX ORDER — METOPROLOL SUCCINATE 25 MG/1
TABLET, EXTENDED RELEASE ORAL
Qty: 30 TABLET | Refills: 0 | Status: SHIPPED | OUTPATIENT
Start: 2019-06-21 | End: 2019-08-05 | Stop reason: SDUPTHER

## 2019-07-28 ENCOUNTER — PATIENT MESSAGE (OUTPATIENT)
Dept: FAMILY MEDICINE | Facility: CLINIC | Age: 64
End: 2019-07-28

## 2019-07-28 DIAGNOSIS — E89.0 HYPOTHYROIDISM ASSOCIATED WITH SURGICAL PROCEDURE: ICD-10-CM

## 2019-07-29 RX ORDER — HYDROCHLOROTHIAZIDE 25 MG/1
TABLET ORAL
Qty: 90 TABLET | Refills: 1 | Status: SHIPPED | OUTPATIENT
Start: 2019-07-29 | End: 2020-03-13

## 2019-07-29 RX ORDER — LIOTHYRONINE SODIUM 5 UG/1
TABLET ORAL
Qty: 30 TABLET | Refills: 0 | Status: SHIPPED | OUTPATIENT
Start: 2019-07-29 | End: 2019-08-28 | Stop reason: SDUPTHER

## 2019-07-29 RX ORDER — LEVOTHYROXINE SODIUM 100 UG/1
TABLET ORAL
Qty: 30 TABLET | Refills: 0 | Status: SHIPPED | OUTPATIENT
Start: 2019-07-29 | End: 2019-08-28 | Stop reason: SDUPTHER

## 2019-08-05 DIAGNOSIS — I10 ESSENTIAL HYPERTENSION: ICD-10-CM

## 2019-08-06 RX ORDER — METOPROLOL SUCCINATE 25 MG/1
TABLET, EXTENDED RELEASE ORAL
Qty: 30 TABLET | Refills: 0 | Status: SHIPPED | OUTPATIENT
Start: 2019-08-06 | End: 2019-09-12 | Stop reason: SDUPTHER

## 2019-08-28 DIAGNOSIS — E89.0 HYPOTHYROIDISM ASSOCIATED WITH SURGICAL PROCEDURE: ICD-10-CM

## 2019-08-28 RX ORDER — LEVOTHYROXINE SODIUM 100 UG/1
TABLET ORAL
Qty: 30 TABLET | Refills: 0 | Status: SHIPPED | OUTPATIENT
Start: 2019-08-28 | End: 2019-09-27 | Stop reason: SDUPTHER

## 2019-08-28 RX ORDER — LIOTHYRONINE SODIUM 5 UG/1
TABLET ORAL
Qty: 30 TABLET | Refills: 0 | Status: SHIPPED | OUTPATIENT
Start: 2019-08-28 | End: 2019-09-27 | Stop reason: SDUPTHER

## 2019-09-12 DIAGNOSIS — I10 ESSENTIAL HYPERTENSION: ICD-10-CM

## 2019-09-12 RX ORDER — METOPROLOL SUCCINATE 25 MG/1
TABLET, EXTENDED RELEASE ORAL
Qty: 30 TABLET | Refills: 0 | Status: SHIPPED | OUTPATIENT
Start: 2019-09-12 | End: 2019-10-21 | Stop reason: SDUPTHER

## 2019-09-27 DIAGNOSIS — E89.0 HYPOTHYROIDISM ASSOCIATED WITH SURGICAL PROCEDURE: ICD-10-CM

## 2019-09-27 RX ORDER — LIOTHYRONINE SODIUM 5 UG/1
TABLET ORAL
Qty: 30 TABLET | Refills: 0 | Status: SHIPPED | OUTPATIENT
Start: 2019-09-27 | End: 2019-11-01 | Stop reason: SDUPTHER

## 2019-09-27 RX ORDER — LEVOTHYROXINE SODIUM 100 UG/1
TABLET ORAL
Qty: 30 TABLET | Refills: 0 | Status: SHIPPED | OUTPATIENT
Start: 2019-09-27 | End: 2019-11-01 | Stop reason: SDUPTHER

## 2019-10-21 DIAGNOSIS — I10 ESSENTIAL HYPERTENSION: ICD-10-CM

## 2019-10-22 RX ORDER — METOPROLOL SUCCINATE 25 MG/1
TABLET, EXTENDED RELEASE ORAL
Qty: 30 TABLET | Refills: 0 | Status: SHIPPED | OUTPATIENT
Start: 2019-10-22 | End: 2019-12-17 | Stop reason: SDUPTHER

## 2019-11-01 DIAGNOSIS — E89.0 HYPOTHYROIDISM ASSOCIATED WITH SURGICAL PROCEDURE: ICD-10-CM

## 2019-11-01 RX ORDER — LIOTHYRONINE SODIUM 5 UG/1
TABLET ORAL
Qty: 30 TABLET | Refills: 8 | Status: SHIPPED | OUTPATIENT
Start: 2019-11-01 | End: 2020-07-14 | Stop reason: SDUPTHER

## 2019-11-01 RX ORDER — LEVOTHYROXINE SODIUM 100 UG/1
TABLET ORAL
Qty: 30 TABLET | Refills: 8 | Status: SHIPPED | OUTPATIENT
Start: 2019-11-01 | End: 2020-07-14 | Stop reason: SDUPTHER

## 2019-12-11 ENCOUNTER — OFFICE VISIT (OUTPATIENT)
Dept: FAMILY MEDICINE | Facility: CLINIC | Age: 64
End: 2019-12-11
Payer: COMMERCIAL

## 2019-12-11 VITALS
SYSTOLIC BLOOD PRESSURE: 130 MMHG | HEART RATE: 53 BPM | DIASTOLIC BLOOD PRESSURE: 70 MMHG | HEIGHT: 67 IN | OXYGEN SATURATION: 98 % | TEMPERATURE: 98 F | WEIGHT: 226.31 LBS | BODY MASS INDEX: 35.52 KG/M2
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
## 2019-12-11 DIAGNOSIS — M25.561 ACUTE PAIN OF RIGHT KNEE: ICD-10-CM

## 2019-12-11 DIAGNOSIS — I10 ESSENTIAL HYPERTENSION: ICD-10-CM

## 2019-12-11 DIAGNOSIS — Z23 NEED FOR DIPHTHERIA, TETANUS, PERTUSSIS, AND HIB VACCINATION: ICD-10-CM

## 2019-12-11 DIAGNOSIS — Z23 NEED FOR IMMUNIZATION AGAINST INFLUENZA: ICD-10-CM

## 2019-12-11 PROCEDURE — 90471 FLU VACCINE (QUAD) GREATER THAN OR EQUAL TO 3YO PRESERVATIVE FREE IM: ICD-10-PCS | Mod: S$GLB,,, | Performed by: FAMILY MEDICINE

## 2019-12-11 PROCEDURE — 99999 PR PBB SHADOW E&M-EST. PATIENT-LVL III: CPT | Mod: PBBFAC,,, | Performed by: FAMILY MEDICINE

## 2019-12-11 PROCEDURE — 99214 PR OFFICE/OUTPT VISIT, EST, LEVL IV, 30-39 MIN: ICD-10-PCS | Mod: 25,S$GLB,, | Performed by: FAMILY MEDICINE

## 2019-12-11 PROCEDURE — 90715 TDAP VACCINE 7 YRS/> IM: CPT | Mod: S$GLB,,, | Performed by: FAMILY MEDICINE

## 2019-12-11 PROCEDURE — 3008F PR BODY MASS INDEX (BMI) DOCUMENTED: ICD-10-PCS | Mod: CPTII,S$GLB,, | Performed by: FAMILY MEDICINE

## 2019-12-11 PROCEDURE — 3078F PR MOST RECENT DIASTOLIC BLOOD PRESSURE < 80 MM HG: ICD-10-PCS | Mod: CPTII,S$GLB,, | Performed by: FAMILY MEDICINE

## 2019-12-11 PROCEDURE — 90471 IMMUNIZATION ADMIN: CPT | Mod: S$GLB,,, | Performed by: FAMILY MEDICINE

## 2019-12-11 PROCEDURE — 90686 FLU VACCINE (QUAD) GREATER THAN OR EQUAL TO 3YO PRESERVATIVE FREE IM: ICD-10-PCS | Mod: S$GLB,,, | Performed by: FAMILY MEDICINE

## 2019-12-11 PROCEDURE — 3075F SYST BP GE 130 - 139MM HG: CPT | Mod: CPTII,S$GLB,, | Performed by: FAMILY MEDICINE

## 2019-12-11 PROCEDURE — 3075F PR MOST RECENT SYSTOLIC BLOOD PRESS GE 130-139MM HG: ICD-10-PCS | Mod: CPTII,S$GLB,, | Performed by: FAMILY MEDICINE

## 2019-12-11 PROCEDURE — 90715 TDAP VACCINE GREATER THAN OR EQUAL TO 7YO IM: ICD-10-PCS | Mod: S$GLB,,, | Performed by: FAMILY MEDICINE

## 2019-12-11 PROCEDURE — 90472 IMMUNIZATION ADMIN EACH ADD: CPT | Mod: S$GLB,,, | Performed by: FAMILY MEDICINE

## 2019-12-11 PROCEDURE — 90472 TDAP VACCINE GREATER THAN OR EQUAL TO 7YO IM: ICD-10-PCS | Mod: S$GLB,,, | Performed by: FAMILY MEDICINE

## 2019-12-11 PROCEDURE — 99214 OFFICE O/P EST MOD 30 MIN: CPT | Mod: 25,S$GLB,, | Performed by: FAMILY MEDICINE

## 2019-12-11 PROCEDURE — 99999 PR PBB SHADOW E&M-EST. PATIENT-LVL III: ICD-10-PCS | Mod: PBBFAC,,, | Performed by: FAMILY MEDICINE

## 2019-12-11 PROCEDURE — 90686 IIV4 VACC NO PRSV 0.5 ML IM: CPT | Mod: S$GLB,,, | Performed by: FAMILY MEDICINE

## 2019-12-11 PROCEDURE — 3008F BODY MASS INDEX DOCD: CPT | Mod: CPTII,S$GLB,, | Performed by: FAMILY MEDICINE

## 2019-12-11 PROCEDURE — 3078F DIAST BP <80 MM HG: CPT | Mod: CPTII,S$GLB,, | Performed by: FAMILY MEDICINE

## 2019-12-11 RX ORDER — METOPROLOL SUCCINATE 25 MG/1
TABLET, EXTENDED RELEASE ORAL
Refills: 0 | COMMUNITY
Start: 2019-10-22 | End: 2020-11-24

## 2019-12-11 NOTE — PROGRESS NOTES
Chief Complaint   Patient presents with    Pain     right heel and joint pain       HPI  Dain Phelps is a 64 y.o. female with multiple medical diagnoses as listed in the medical history and problem list that presents for evaluation for right heel pain    Right heel pain- she has had this to the point that she was limping, has also had pain in her wrists and her right knee; heel pain started in October and has been worsening, pain is worse with a lot of walking in the afternoon, however it is worse after periods of sitting and getting up during the night; wrist pain started around the same time as her heel pain    She reports a fall that occurred one week ago when she slipped forward while walking in high heeled shoes    PAST MEDICAL HISTORY:  Past Medical History:   Diagnosis Date    Chronic low back pain     Fatigue     GERD (gastroesophageal reflux disease)     History of colon polyps 2015    Hypertension     Hypothyroidism     Impaired glucose tolerance 1/15/2014    Leukopenia     Obesity (BMI 30-39.9) 1/15/2014    Primary hypothyroidism 2015    Snoring 2015    Vitamin D deficiency 1/15/2014       PAST SURGICAL HISTORY:  Past Surgical History:   Procedure Laterality Date    BREAST BIOPSY Left     CATARACT EXTRACTION       SECTION, CLASSIC      x3    HYSTERECTOMY      total    OOPHORECTOMY      thyroid surgey         SOCIAL HISTORY:  Social History     Socioeconomic History    Marital status:      Spouse name: Not on file    Number of children: Not on file    Years of education: Not on file    Highest education level: Not on file   Occupational History     Comment: retired   Social Needs    Financial resource strain: Not on file    Food insecurity:     Worry: Not on file     Inability: Not on file    Transportation needs:     Medical: Not on file     Non-medical: Not on file   Tobacco Use    Smoking status: Never Smoker    Smokeless tobacco: Never  Used   Substance and Sexual Activity    Alcohol use: No    Drug use: No    Sexual activity: Yes     Partners: Male   Lifestyle    Physical activity:     Days per week: Not on file     Minutes per session: Not on file    Stress: Not on file   Relationships    Social connections:     Talks on phone: Not on file     Gets together: Not on file     Attends Orthodoxy service: Not on file     Active member of club or organization: Not on file     Attends meetings of clubs or organizations: Not on file     Relationship status: Not on file   Other Topics Concern    Not on file   Social History Narrative    Not on file       FAMILY HISTORY:  Family History   Problem Relation Age of Onset    Hypertension Mother     Hypertension Maternal Aunt     Diabetes Maternal Aunt     Thyroid disease Maternal Aunt     Breast cancer Maternal Aunt 70    Diabetes Maternal Grandmother        ALLERGIES AND MEDICATIONS: updated and reviewed.  Review of patient's allergies indicates:   Allergen Reactions    Ace inhibitors Edema     Angioedema     Current Outpatient Medications   Medication Sig Dispense Refill    ergocalciferol, vitamin D2, 2,000 unit Tab Take 2,000 Units by mouth 2 (two) times daily.      estradiol (ESTRACE) 0.01 % (0.1 mg/gram) vaginal cream Insert 2 g daily intravaginally for 2 weeks, then 1 g twice weekly 42.5 g 5    hydroCHLOROthiazide (HYDRODIURIL) 25 MG tablet TAKE 1 TABLET(25 MG) BY MOUTH EVERY DAY 90 tablet 1    levothyroxine (SYNTHROID) 100 MCG tablet TAKE 1 TABLET(100 MCG) BY MOUTH BEFORE BREAKFAST 30 tablet 8    liothyronine (CYTOMEL) 5 MCG Tab TAKE 1 TABLET(5 MCG) BY MOUTH EVERY DAY 30 tablet 8    metoprolol succinate (TOPROL-XL) 25 MG 24 hr tablet TAKE 1 TABLET(25 MG) BY MOUTH EVERY DAY 30 tablet 0    verapamil (VERELAN) 360 MG C24P TAKE 1 CAPSULE(360 MG) BY MOUTH EVERY DAY 90 capsule 1    levocetirizine (XYZAL) 5 MG tablet Take 1 tablet (5 mg total) by mouth every evening. 30 tablet 0     "metoprolol succinate (TOPROL-XL) 25 MG 24 hr tablet   0    paroxetine (PAXIL) 10 MG tablet Take 1 tablet (10 mg total) by mouth every morning. For hotflashes 30 tablet 3     No current facility-administered medications for this visit.        ROS  Review of Systems   Constitutional: Negative for chills, diaphoresis, fatigue, fever and unexpected weight change.   HENT: Negative for rhinorrhea, sinus pressure, sore throat and tinnitus.    Eyes: Negative for photophobia and visual disturbance.   Respiratory: Negative for cough, shortness of breath and wheezing.    Cardiovascular: Negative for chest pain and palpitations.   Gastrointestinal: Negative for abdominal pain, blood in stool, constipation, diarrhea, nausea and vomiting.   Genitourinary: Negative for dysuria, flank pain, frequency and vaginal discharge.   Musculoskeletal: Positive for arthralgias. Negative for joint swelling.   Skin: Negative for rash.   Neurological: Negative for speech difficulty, weakness, light-headedness and headaches.   Psychiatric/Behavioral: Negative for behavioral problems and dysphoric mood.       Physical Exam  Vitals:    12/11/19 1025   BP: 130/70   BP Location: Left arm   Patient Position: Sitting   BP Method: Large (Manual)   Pulse: (!) 53   Temp: 97.6 °F (36.4 °C)   TempSrc: Oral   SpO2: 98%   Weight: 102.7 kg (226 lb 4.8 oz)   Height: 5' 7" (1.702 m)    Body mass index is 35.44 kg/m².  Weight: 102.7 kg (226 lb 4.8 oz)   Height: 5' 7" (170.2 cm)     Physical Exam   Constitutional: She is oriented to person, place, and time. She appears well-developed and well-nourished.   Eyes: EOM are normal.   Musculoskeletal:   Both wrists w/o swelling  Right knee- TTP below anterior patella, no crepitus with flexion, neg lachmann    Left knee- no ligamentous laxity or TTP    Right foot- no TTP over heel or plantar fascia   Neurological: She is alert and oriented to person, place, and time.   Skin: Skin is warm and dry. No rash noted. No " erythema.   Psychiatric: She has a normal mood and affect. Her behavior is normal.   Nursing note and vitals reviewed.      Health Maintenance       Date Due Completion Date    Shingles Vaccine (1 of 2) 09/28/2005 ---    Influenza Vaccine (1) 09/01/2019 12/14/2018    Colonoscopy 08/04/2020 8/4/2015    Mammogram 03/28/2021 3/28/2019    Lipid Panel 10/07/2021 10/7/2016    TETANUS VACCINE 04/07/2027 4/7/2017 (Declined)    Override on 4/7/2017: Declined          Health maintenance reviewed and addressed as ordered      ASSESSMENT     1. Fall, initial encounter    2. Acute pain of right knee    3. Need for immunization against influenza    4. Essential hypertension    5. Need for diphtheria, tetanus, pertussis, and Hib vaccination        PLAN:     Problem List Items Addressed This Visit        Cardiac/Vascular    HTN (hypertension)  -stable      Other Visit Diagnoses     Fall, initial encounter    -  Primary  -eval for trauma  -may need ortho consult    Relevant Orders    X-Ray Knee 1 or 2 View Right    Acute pain of right knee      -x ray due to trauma, may need ortho consult     Relevant Orders    X-Ray Knee 1 or 2 View Right    Need for immunization against influenza      -as ordered       Relevant Orders    Influenza - Quadrivalent (6 months+) (PF) (Completed)    Need for diphtheria, tetanus, pertussis, and Hib vaccination      -as ordered       Relevant Orders    Tdap Vaccine Greater than or Eqaul to 7 y.o. (Completed)            Yary Valadez MD  12/11/2019 11:14 AM        Follow up if symptoms worsen or fail to improve.

## 2019-12-11 NOTE — PROGRESS NOTES
Pt tolerated injections well, pt instructed to wait inside this facility for 15 min prior to departing to monitor for allergic/adverse reaction. Pt verbalized understanding.

## 2019-12-13 ENCOUNTER — HOSPITAL ENCOUNTER (OUTPATIENT)
Dept: RADIOLOGY | Facility: HOSPITAL | Age: 64
Discharge: HOME | End: 2019-12-13
Attending: FAMILY MEDICINE
Payer: COMMERCIAL
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
## 2019-12-13 DIAGNOSIS — M25.561 ACUTE PAIN OF RIGHT KNEE: ICD-10-CM

## 2019-12-13 DIAGNOSIS — M25.461 EFFUSION OF RIGHT KNEE: Primary | ICD-10-CM

## 2019-12-13 PROCEDURE — 73560 XR KNEE 1 OR 2 VIEW RIGHT: ICD-10-PCS | Mod: 26,RT,, | Performed by: RADIOLOGY

## 2019-12-13 PROCEDURE — 73560 X-RAY EXAM OF KNEE 1 OR 2: CPT | Mod: TC,FY,PO,RT

## 2019-12-13 PROCEDURE — 73560 X-RAY EXAM OF KNEE 1 OR 2: CPT | Mod: 26,RT,, | Performed by: RADIOLOGY

## 2019-12-13 NOTE — PROGRESS NOTES
There is some arthritis in your knee but you also have fluid on the knee, I am going to refer you to orthopedics as they can remove the fluid if needed    Yary Valadez MD

## 2019-12-17 DIAGNOSIS — I10 ESSENTIAL HYPERTENSION: ICD-10-CM

## 2019-12-18 RX ORDER — METOPROLOL SUCCINATE 25 MG/1
TABLET, EXTENDED RELEASE ORAL
Qty: 30 TABLET | Refills: 0 | Status: SHIPPED | OUTPATIENT
Start: 2019-12-18 | End: 2020-02-03

## 2020-01-06 ENCOUNTER — TELEPHONE (OUTPATIENT)
Dept: FAMILY MEDICINE | Facility: CLINIC | Age: 65
End: 2020-01-06

## 2020-01-06 NOTE — TELEPHONE ENCOUNTER
----- Message from Silvina Buckley sent at 1/6/2020  9:46 AM CST -----  Type: Patient Call Back    Who called: pt     What is the request in detail: pt would like to know when she is due for next colonoscopy     Can the clinic reply by [facility]? No     Would the patient rather a call back or a response via [facility]? Call back     Best call back number: [phone]    Additional Information:

## 2020-01-10 DIAGNOSIS — I10 ESSENTIAL HYPERTENSION: ICD-10-CM

## 2020-01-13 RX ORDER — VERAPAMIL HYDROCHLORIDE 360 MG/1
CAPSULE, DELAYED RELEASE PELLETS ORAL
Qty: 90 CAPSULE | Refills: 1 | Status: SHIPPED | OUTPATIENT
Start: 2020-01-13 | End: 2020-08-03

## 2020-02-02 DIAGNOSIS — I10 ESSENTIAL HYPERTENSION: ICD-10-CM

## 2020-02-03 RX ORDER — METOPROLOL SUCCINATE 25 MG/1
TABLET, EXTENDED RELEASE ORAL
Qty: 30 TABLET | Refills: 0 | Status: SHIPPED | OUTPATIENT
Start: 2020-02-03 | End: 2020-03-13

## 2020-03-12 DIAGNOSIS — I10 ESSENTIAL HYPERTENSION: ICD-10-CM

## 2020-03-13 RX ORDER — METOPROLOL SUCCINATE 25 MG/1
TABLET, EXTENDED RELEASE ORAL
Qty: 30 TABLET | Refills: 0 | Status: SHIPPED | OUTPATIENT
Start: 2020-03-13 | End: 2020-04-15

## 2020-03-13 RX ORDER — HYDROCHLOROTHIAZIDE 25 MG/1
TABLET ORAL
Qty: 90 TABLET | Refills: 1 | Status: SHIPPED | OUTPATIENT
Start: 2020-03-13 | End: 2020-10-23

## 2020-04-15 DIAGNOSIS — I10 ESSENTIAL HYPERTENSION: ICD-10-CM

## 2020-04-15 RX ORDER — METOPROLOL SUCCINATE 25 MG/1
TABLET, EXTENDED RELEASE ORAL
Qty: 30 TABLET | Refills: 0 | Status: SHIPPED | OUTPATIENT
Start: 2020-04-15 | End: 2020-05-26

## 2020-05-21 DIAGNOSIS — I10 ESSENTIAL HYPERTENSION: ICD-10-CM

## 2020-05-26 RX ORDER — METOPROLOL SUCCINATE 25 MG/1
TABLET, EXTENDED RELEASE ORAL
Qty: 30 TABLET | Refills: 0 | Status: SHIPPED | OUTPATIENT
Start: 2020-05-26 | End: 2020-06-25

## 2020-05-29 DIAGNOSIS — I10 HTN (HYPERTENSION): ICD-10-CM

## 2020-06-17 ENCOUNTER — TELEPHONE (OUTPATIENT)
Dept: ENDOCRINOLOGY | Facility: CLINIC | Age: 65
End: 2020-06-17

## 2020-06-17 ENCOUNTER — TELEPHONE (OUTPATIENT)
Dept: FAMILY MEDICINE | Facility: CLINIC | Age: 65
End: 2020-06-17

## 2020-06-17 DIAGNOSIS — E89.0 POSTOPERATIVE HYPOTHYROIDISM: ICD-10-CM

## 2020-06-17 DIAGNOSIS — E55.9 VITAMIN D DEFICIENCY: Primary | ICD-10-CM

## 2020-06-17 DIAGNOSIS — Z12.31 BREAST CANCER SCREENING BY MAMMOGRAM: Primary | ICD-10-CM

## 2020-06-17 NOTE — TELEPHONE ENCOUNTER
----- Message from Lilly Jerry sent at 6/17/2020  8:39 AM CDT -----  Contact: Self  [phone]  Type:  Mammogram    Caller is requesting to schedule their annual mammogram appointment.  Order is not listed in EPIC.  Please enter order and contact patient to schedule.  Name of Caller: Self    Where would they like the mammogram performed? [place]    Would the patient rather a call back or a response via [facility]? Call back    Best Call Back Number: [phone]

## 2020-06-17 NOTE — TELEPHONE ENCOUNTER
----- Message from Lilly Jerry sent at 6/17/2020  8:39 AM CDT -----  Contact: Self  [phone]  Type:  Mammogram    Caller is requesting to schedule their annual mammogram appointment.  Order is not listed in EPIC.  Please enter order and contact patient to schedule.  Name of Caller: Self    Where would they like the mammogram performed? [place]    Would the patient rather a call back or a response via [facility]? Call back    Best Call Back Number: [phone]

## 2020-06-17 NOTE — TELEPHONE ENCOUNTER
----- Message from Hillary Berman sent at 6/17/2020  8:50 AM CDT -----  Regarding: LABS  Contact: Self  Pt need her labs schedule prior to her appointment which is on 7/14/2020 Pt ask for a call    Contact info  [phone]

## 2020-06-17 NOTE — TELEPHONE ENCOUNTER
----- Message from Reina Ovalle NP sent at 6/17/2020  9:33 AM CDT -----  DoneBarb guajardo, Reina

## 2020-06-23 ENCOUNTER — HOSPITAL ENCOUNTER (OUTPATIENT)
Dept: RADIOLOGY | Facility: HOSPITAL | Age: 65
Discharge: HOME | End: 2020-06-23
Attending: FAMILY MEDICINE
Payer: COMMERCIAL

## 2020-06-23 DIAGNOSIS — Z12.31 BREAST CANCER SCREENING BY MAMMOGRAM: ICD-10-CM

## 2020-06-23 PROCEDURE — 77063 BREAST TOMOSYNTHESIS BI: CPT | Mod: 26,,, | Performed by: RADIOLOGY

## 2020-06-23 PROCEDURE — 77063 MAMMO DIGITAL SCREENING BILAT WITH TOMOSYNTHESIS_CAD: ICD-10-PCS | Mod: 26,,, | Performed by: RADIOLOGY

## 2020-06-23 PROCEDURE — 77067 SCR MAMMO BI INCL CAD: CPT | Mod: TC,PO

## 2020-06-23 PROCEDURE — 77067 SCR MAMMO BI INCL CAD: CPT | Mod: 26,,, | Performed by: RADIOLOGY

## 2020-06-23 PROCEDURE — 77067 MAMMO DIGITAL SCREENING BILAT WITH TOMOSYNTHESIS_CAD: ICD-10-PCS | Mod: 26,,, | Performed by: RADIOLOGY

## 2020-07-07 ENCOUNTER — LAB VISIT (OUTPATIENT)
Dept: LAB | Facility: HOSPITAL | Age: 65
End: 2020-07-07
Attending: NURSE PRACTITIONER
Payer: COMMERCIAL

## 2020-07-07 DIAGNOSIS — E89.0 POSTOPERATIVE HYPOTHYROIDISM: ICD-10-CM

## 2020-07-07 DIAGNOSIS — E55.9 VITAMIN D DEFICIENCY: ICD-10-CM

## 2020-07-07 PROCEDURE — 36415 COLL VENOUS BLD VENIPUNCTURE: CPT | Mod: PO

## 2020-07-07 PROCEDURE — 84443 ASSAY THYROID STIM HORMONE: CPT

## 2020-07-07 PROCEDURE — 82306 VITAMIN D 25 HYDROXY: CPT

## 2020-07-08 LAB
25(OH)D3+25(OH)D2 SERPL-MCNC: 33 NG/ML (ref 30–96)
TSH SERPL DL<=0.005 MIU/L-ACNC: 2.05 UIU/ML (ref 0.4–4)

## 2020-07-10 NOTE — PROGRESS NOTES
Subjective:      Patient ID: Dain Phelps is a 64 y.o. female.    Chief Complaint:  No chief complaint on file.    History of Present Illness  Dain Phelps presents today for follow up of postoperative hypothyroidism . Last seen 3/28/18.     With regards to her hypothyroidism:  initially seen her for MNG-- we did a FNA which was indeterminate and then she had a total thyroidectomy 2009 -- final path was benign      Ref. Range 11/12/2018 10:15   TSH Latest Ref Range: 0.400 - 4.000 uIU/mL 0.7749     Current medication:  generic lt4 100 mcg daily   Cytomel 5 mcg daily    Takes thyroid medication properly without food first thing in the morning     current symptoms:   - weight gain  - Fatigue   - Constipation- occ   - Hair loss  - Brittle nails  - Mental fog   No cp, palpations or sob  Denies heat/cold intolerance    With regards to the vitamin d deficiency:     Ref. Range 3/21/2018 10:00   Vit D, 25-Hydroxy Latest Ref Range: 30 - 96 ng/mL 37     Takes otc vitamin d3 2000 iu daily     BMD 5/13/19  Normal bone mineral density.    Review of Systems   Constitutional: Negative for unexpected weight change.   Eyes: Negative for visual disturbance.   Respiratory: Negative for shortness of breath.    Cardiovascular: Negative for chest pain.   Gastrointestinal: Negative for abdominal pain and constipation.   Endocrine: Negative for cold intolerance, heat intolerance, polydipsia, polyphagia and polyuria.   Musculoskeletal: Negative for arthralgias.   Skin: Negative for wound.   Neurological: Negative for headaches.   Hematological: Does not bruise/bleed easily.   Psychiatric/Behavioral: Negative for sleep disturbance.     Lab Review:   Lab Results   Component Value Date    HGBA1C 5.5 12/19/2016     Lab Results   Component Value Date    CHOL 174 10/07/2016    HDL 52 10/07/2016    LDLCALC 109.2 10/07/2016    TRIG 64 10/07/2016    CHOLHDL 29.9 10/07/2016     Lab Results   Component Value Date     05/22/2019    K 3.2  (L) 05/22/2019     05/22/2019    CO2 27 05/22/2019    GLU 91 05/22/2019    BUN 12 05/22/2019    CREATININE 0.8 05/22/2019    CALCIUM 9.0 05/22/2019    PROT 6.7 05/22/2019    ALBUMIN 2.9 (L) 05/22/2019    BILITOT 0.3 05/22/2019    ALKPHOS 100 05/22/2019    AST 12 05/22/2019    ALT 14 05/22/2019    ANIONGAP 7 (L) 05/22/2019    ESTGFRAFRICA >60.0 05/22/2019    EGFRNONAA >60.0 05/22/2019    TSH 2.049 07/07/2020     Assessment and Plan     1. Postoperative hypothyroidism  TSH    levothyroxine (SYNTHROID) 100 MCG tablet    liothyronine (CYTOMEL) 5 MCG Tab   2. Vitamin D deficiency     3. Obesity (BMI 30-39.9)  Hemoglobin A1C   4. LORRAINE on CPAP     5. Essential hypertension     6. Hypothyroidism associated with surgical procedure       Hypothyroidism  -- Clinically & biochemically euthyroid  -- Goal is a normal TSH  -- Continue lt4 & cytomel  -- Avoid exogenous hyperthyroidism as this can accelerate bone loss and increase risk of CV complications.  -- Advised to take LT4 on an empty stomach with water and to wait 30-45 minutes before eating or taking other medications   -- Reviewed that symptoms of hypothyroidism may not correlate with tsh, and a normal TSH is the goal of therapy.....  symptoms are not a justification for over treatment     Vitamin D deficiency  Continue Vit D supplement daily    Obesity (BMI 30-39.9)  -- alerted as to the increased risk of T2DM   -- encouraged dietary and lifestyle modifications- she walks daily with her    -- emphasized weight loss goals  -- recommend periodic A1c     LORRAINE on CPAP  She has a Cpap machine that she does not use.  She states that she will make an appt with sleep medicine to receive a new Cpap machine     HTN (hypertension)  -- Controlled on current medications   -- Blood pressure goals discussed with patient    Follow up in about 1 year (around 7/14/2021).  Labs prior to next appointment

## 2020-07-14 ENCOUNTER — OFFICE VISIT (OUTPATIENT)
Dept: ENDOCRINOLOGY | Facility: CLINIC | Age: 65
End: 2020-07-14
Payer: COMMERCIAL

## 2020-07-14 DIAGNOSIS — I10 ESSENTIAL HYPERTENSION: ICD-10-CM

## 2020-07-14 DIAGNOSIS — G47.33 OSA ON CPAP: ICD-10-CM

## 2020-07-14 DIAGNOSIS — E89.0 HYPOTHYROIDISM ASSOCIATED WITH SURGICAL PROCEDURE: ICD-10-CM

## 2020-07-14 DIAGNOSIS — E55.9 VITAMIN D DEFICIENCY: ICD-10-CM

## 2020-07-14 DIAGNOSIS — E89.0 POSTOPERATIVE HYPOTHYROIDISM: ICD-10-CM

## 2020-07-14 DIAGNOSIS — E66.9 OBESITY (BMI 30-39.9): ICD-10-CM

## 2020-07-14 PROCEDURE — 99214 OFFICE O/P EST MOD 30 MIN: CPT | Mod: 95,,, | Performed by: NURSE PRACTITIONER

## 2020-07-14 PROCEDURE — 99214 PR OFFICE/OUTPT VISIT, EST, LEVL IV, 30-39 MIN: ICD-10-PCS | Mod: 95,,, | Performed by: NURSE PRACTITIONER

## 2020-07-14 RX ORDER — LEVOTHYROXINE SODIUM 100 UG/1
100 TABLET ORAL
Qty: 30 TABLET | Refills: 15 | Status: SHIPPED | OUTPATIENT
Start: 2020-07-14 | End: 2021-08-27 | Stop reason: SDUPTHER

## 2020-07-14 RX ORDER — LIOTHYRONINE SODIUM 5 UG/1
5 TABLET ORAL DAILY
Qty: 30 TABLET | Refills: 15 | Status: SHIPPED | OUTPATIENT
Start: 2020-07-14 | End: 2021-09-09 | Stop reason: SDUPTHER

## 2020-07-14 NOTE — ASSESSMENT & PLAN NOTE
-- Clinically & biochemically euthyroid  -- Goal is a normal TSH  -- Continue lt4 & cytomel  -- Avoid exogenous hyperthyroidism as this can accelerate bone loss and increase risk of CV complications.  -- Advised to take LT4 on an empty stomach with water and to wait 30-45 minutes before eating or taking other medications   -- Reviewed that symptoms of hypothyroidism may not correlate with tsh, and a normal TSH is the goal of therapy.....  symptoms are not a justification for over treatment

## 2020-08-01 DIAGNOSIS — I10 ESSENTIAL HYPERTENSION: ICD-10-CM

## 2020-08-03 RX ORDER — METOPROLOL SUCCINATE 25 MG/1
25 TABLET, EXTENDED RELEASE ORAL DAILY
Qty: 90 TABLET | Refills: 0 | Status: SHIPPED | OUTPATIENT
Start: 2020-08-03 | End: 2020-11-01

## 2020-09-30 ENCOUNTER — TELEPHONE (OUTPATIENT)
Dept: FAMILY MEDICINE | Facility: CLINIC | Age: 65
End: 2020-09-30

## 2020-09-30 NOTE — TELEPHONE ENCOUNTER
----- Message from Oliver Pan sent at 9/30/2020  2:08 PM CDT -----  Regarding: Orders  Contact: NEY SHETH [[id number]]  Name of Who is Calling: NEY SHETH [[id number]]      What is the request in detail: Would like to speak with staff in regards to scheduling a pneumonia and flu vaccine. Please advise      Can the clinic reply by [facility]: yes      What Number to Call Back if not in [facility]: [phone]

## 2020-11-02 ENCOUNTER — CLINICAL SUPPORT (OUTPATIENT)
Dept: FAMILY MEDICINE | Facility: CLINIC | Age: 65
End: 2020-11-02
Payer: MEDICARE

## 2020-11-02 DIAGNOSIS — Z23 NEED FOR IMMUNIZATION AGAINST INFLUENZA: Primary | ICD-10-CM

## 2020-11-02 PROCEDURE — 90694 VACC AIIV4 NO PRSRV 0.5ML IM: CPT | Mod: PBBFAC,PO

## 2020-11-02 PROCEDURE — G0008 ADMIN INFLUENZA VIRUS VAC: HCPCS | Mod: PBBFAC,PO

## 2020-11-02 NOTE — PROGRESS NOTES
FLU VACCINE GIVEN  IM IN LEFT DELTOID,pt. Tolerated well.,instructed to wait 15 mins.VIS form was given.

## 2020-12-07 ENCOUNTER — LAB VISIT (OUTPATIENT)
Dept: LAB | Facility: HOSPITAL | Age: 65
End: 2020-12-07
Attending: FAMILY MEDICINE
Payer: MEDICARE

## 2020-12-07 ENCOUNTER — OFFICE VISIT (OUTPATIENT)
Dept: FAMILY MEDICINE | Facility: CLINIC | Age: 65
End: 2020-12-07
Payer: MEDICARE

## 2020-12-07 VITALS
OXYGEN SATURATION: 98 % | WEIGHT: 229.63 LBS | HEIGHT: 67 IN | HEART RATE: 56 BPM | BODY MASS INDEX: 36.04 KG/M2 | TEMPERATURE: 98 F | SYSTOLIC BLOOD PRESSURE: 116 MMHG | DIASTOLIC BLOOD PRESSURE: 72 MMHG

## 2020-12-07 DIAGNOSIS — Z12.11 SCREENING FOR COLON CANCER: ICD-10-CM

## 2020-12-07 DIAGNOSIS — Z00.00 ROUTINE GENERAL MEDICAL EXAMINATION AT A HEALTH CARE FACILITY: ICD-10-CM

## 2020-12-07 DIAGNOSIS — Z23 NEED FOR PNEUMOCOCCAL VACCINATION: ICD-10-CM

## 2020-12-07 DIAGNOSIS — E89.0 POSTOPERATIVE HYPOTHYROIDISM: ICD-10-CM

## 2020-12-07 DIAGNOSIS — Z00.00 ROUTINE GENERAL MEDICAL EXAMINATION AT A HEALTH CARE FACILITY: Primary | ICD-10-CM

## 2020-12-07 DIAGNOSIS — I10 ESSENTIAL HYPERTENSION: ICD-10-CM

## 2020-12-07 DIAGNOSIS — L60.9 NAIL PROBLEM: ICD-10-CM

## 2020-12-07 DIAGNOSIS — R09.82 POST-NASAL DRIP: ICD-10-CM

## 2020-12-07 LAB
ALBUMIN SERPL BCP-MCNC: 3.4 G/DL (ref 3.5–5.2)
ALP SERPL-CCNC: 102 U/L (ref 55–135)
ALT SERPL W/O P-5'-P-CCNC: 15 U/L (ref 10–44)
ANION GAP SERPL CALC-SCNC: 10 MMOL/L (ref 8–16)
AST SERPL-CCNC: 15 U/L (ref 10–40)
BASOPHILS # BLD AUTO: 0.01 K/UL (ref 0–0.2)
BASOPHILS NFR BLD: 0.2 % (ref 0–1.9)
BILIRUB SERPL-MCNC: 0.5 MG/DL (ref 0.1–1)
BUN SERPL-MCNC: 18 MG/DL (ref 8–23)
CALCIUM SERPL-MCNC: 8.9 MG/DL (ref 8.7–10.5)
CHLORIDE SERPL-SCNC: 107 MMOL/L (ref 95–110)
CHOLEST SERPL-MCNC: 165 MG/DL (ref 120–199)
CHOLEST/HDLC SERPL: 3.4 {RATIO} (ref 2–5)
CO2 SERPL-SCNC: 24 MMOL/L (ref 23–29)
CREAT SERPL-MCNC: 0.8 MG/DL (ref 0.5–1.4)
DIFFERENTIAL METHOD: ABNORMAL
EOSINOPHIL # BLD AUTO: 0.1 K/UL (ref 0–0.5)
EOSINOPHIL NFR BLD: 1.9 % (ref 0–8)
ERYTHROCYTE [DISTWIDTH] IN BLOOD BY AUTOMATED COUNT: 13.9 % (ref 11.5–14.5)
EST. GFR  (AFRICAN AMERICAN): >60 ML/MIN/1.73 M^2
EST. GFR  (NON AFRICAN AMERICAN): >60 ML/MIN/1.73 M^2
ESTIMATED AVG GLUCOSE: 114 MG/DL (ref 68–131)
GLUCOSE SERPL-MCNC: 90 MG/DL (ref 70–110)
HBA1C MFR BLD HPLC: 5.6 % (ref 4–5.6)
HCT VFR BLD AUTO: 41.2 % (ref 37–48.5)
HDLC SERPL-MCNC: 49 MG/DL (ref 40–75)
HDLC SERPL: 29.7 % (ref 20–50)
HGB BLD-MCNC: 13 G/DL (ref 12–16)
IMM GRANULOCYTES # BLD AUTO: 0 K/UL (ref 0–0.04)
IMM GRANULOCYTES NFR BLD AUTO: 0 % (ref 0–0.5)
LDLC SERPL CALC-MCNC: 105.4 MG/DL (ref 63–159)
LYMPHOCYTES # BLD AUTO: 1.6 K/UL (ref 1–4.8)
LYMPHOCYTES NFR BLD: 36.4 % (ref 18–48)
MCH RBC QN AUTO: 28.7 PG (ref 27–31)
MCHC RBC AUTO-ENTMCNC: 31.6 G/DL (ref 32–36)
MCV RBC AUTO: 91 FL (ref 82–98)
MONOCYTES # BLD AUTO: 0.3 K/UL (ref 0.3–1)
MONOCYTES NFR BLD: 7.7 % (ref 4–15)
NEUTROPHILS # BLD AUTO: 2.3 K/UL (ref 1.8–7.7)
NEUTROPHILS NFR BLD: 53.8 % (ref 38–73)
NONHDLC SERPL-MCNC: 116 MG/DL
NRBC BLD-RTO: 0 /100 WBC
PLATELET # BLD AUTO: 222 K/UL (ref 150–350)
PMV BLD AUTO: 11.4 FL (ref 9.2–12.9)
POTASSIUM SERPL-SCNC: 3.6 MMOL/L (ref 3.5–5.1)
PROT SERPL-MCNC: 7.3 G/DL (ref 6–8.4)
RBC # BLD AUTO: 4.53 M/UL (ref 4–5.4)
SODIUM SERPL-SCNC: 141 MMOL/L (ref 136–145)
TRIGL SERPL-MCNC: 53 MG/DL (ref 30–150)
TSH SERPL DL<=0.005 MIU/L-ACNC: 2.6 UIU/ML (ref 0.4–4)
WBC # BLD AUTO: 4.31 K/UL (ref 3.9–12.7)

## 2020-12-07 PROCEDURE — 99397 PR PREVENTIVE VISIT,EST,65 & OVER: ICD-10-PCS | Mod: S$PBB,,, | Performed by: FAMILY MEDICINE

## 2020-12-07 PROCEDURE — 99999 PR PBB SHADOW E&M-EST. PATIENT-LVL IV: CPT | Mod: PBBFAC,,, | Performed by: FAMILY MEDICINE

## 2020-12-07 PROCEDURE — 84443 ASSAY THYROID STIM HORMONE: CPT

## 2020-12-07 PROCEDURE — 83036 HEMOGLOBIN GLYCOSYLATED A1C: CPT

## 2020-12-07 PROCEDURE — 80061 LIPID PANEL: CPT

## 2020-12-07 PROCEDURE — 36415 COLL VENOUS BLD VENIPUNCTURE: CPT | Mod: PO

## 2020-12-07 PROCEDURE — 99397 PER PM REEVAL EST PAT 65+ YR: CPT | Mod: S$PBB,,, | Performed by: FAMILY MEDICINE

## 2020-12-07 PROCEDURE — 86703 HIV-1/HIV-2 1 RESULT ANTBDY: CPT

## 2020-12-07 PROCEDURE — 80053 COMPREHEN METABOLIC PANEL: CPT

## 2020-12-07 PROCEDURE — 99999 PR PBB SHADOW E&M-EST. PATIENT-LVL IV: ICD-10-PCS | Mod: PBBFAC,,, | Performed by: FAMILY MEDICINE

## 2020-12-07 PROCEDURE — 99214 OFFICE O/P EST MOD 30 MIN: CPT | Mod: PBBFAC,PO | Performed by: FAMILY MEDICINE

## 2020-12-07 PROCEDURE — 85025 COMPLETE CBC W/AUTO DIFF WBC: CPT

## 2020-12-07 PROCEDURE — G0009 ADMIN PNEUMOCOCCAL VACCINE: HCPCS | Mod: PBBFAC,PO

## 2020-12-07 NOTE — PROGRESS NOTES
"Chief Complaint   Patient presents with    Annual Exam       HPI  Dain Phelps is a 65 y.o. female with multiple medical diagnoses as listed in the medical history and problem list that presents for annual exam .    She has been waking up in the AM with phlegm that at times is yellow or green. She has had congestion, no fever or chills. No sinus pressure    She has also had a problem with her right great toenail as she has had nail thickening and discoloration since March when she removed a false nail from it. She then has not had any growth form it    HPI    Patient Active Problem List   Diagnosis    Hypothyroidism    HTN (hypertension)    GERD (gastroesophageal reflux disease)    Proteinuria    Obesity (BMI 30-39.9)    Vitamin D deficiency    LORRAINE on CPAP    Vaginal atrophy         ROS  Review of Systems   Constitutional: Negative for chills, diaphoresis, fatigue, fever and unexpected weight change.   HENT: Positive for postnasal drip. Negative for rhinorrhea, sinus pressure, sore throat and tinnitus.    Eyes: Negative for photophobia and visual disturbance.   Respiratory: Negative for cough, shortness of breath and wheezing.    Cardiovascular: Negative for chest pain and palpitations.   Gastrointestinal: Negative for abdominal pain, blood in stool, constipation, diarrhea, nausea and vomiting.   Genitourinary: Negative for dysuria, flank pain, frequency and vaginal discharge.   Musculoskeletal: Negative for arthralgias and joint swelling.   Skin: Negative for rash.   Neurological: Negative for speech difficulty, weakness, light-headedness and headaches.   Psychiatric/Behavioral: Negative for behavioral problems and dysphoric mood.       Physical Exam  Vitals:    12/07/20 0942   BP: 116/72   BP Location: Right arm   Patient Position: Sitting   BP Method: Large (Manual)   Pulse: (!) 56   Temp: 98.3 °F (36.8 °C)   TempSrc: Oral   SpO2: 98%   Weight: 104.2 kg (229 lb 9.8 oz)   Height: 5' 7" (1.702 m)    " "Body mass index is 35.96 kg/m².  Weight: 104.2 kg (229 lb 9.8 oz)   Height: 5' 7" (170.2 cm)     Physical Exam  Vitals signs and nursing note reviewed.   Constitutional:       General: She is not in acute distress.     Appearance: She is well-developed.   HENT:      Head: Normocephalic and atraumatic.      Right Ear: Tympanic membrane normal.      Left Ear: Tympanic membrane normal.      Nose: Nose normal.      Right Turbinates: Enlarged and swollen.      Left Turbinates: Enlarged and swollen.      Mouth/Throat:      Pharynx: No oropharyngeal exudate.   Neck:      Musculoskeletal: Neck supple.      Thyroid: No thyromegaly.   Cardiovascular:      Rate and Rhythm: Normal rate and regular rhythm.      Heart sounds: No murmur. No friction rub. No gallop.    Pulmonary:      Effort: Pulmonary effort is normal. No respiratory distress.      Breath sounds: Normal breath sounds. No wheezing or rales.   Abdominal:      General: Bowel sounds are normal. There is no distension.      Palpations: Abdomen is soft. There is no mass.      Tenderness: There is no abdominal tenderness. There is no guarding or rebound.   Musculoskeletal:        Feet:    Lymphadenopathy:      Cervical: No cervical adenopathy.   Skin:     General: Skin is warm and dry.      Findings: No rash.   Neurological:      Mental Status: She is alert and oriented to person, place, and time.   Psychiatric:         Behavior: Behavior normal.         ALLERGIES AND MEDICATIONS: updated and reviewed.  Review of patient's allergies indicates:   Allergen Reactions    Ace inhibitors Edema     Angioedema     Medication List with Changes/Refills   Current Medications    ERGOCALCIFEROL, VITAMIN D2, 2,000 UNIT TAB    Take 2,000 Units by mouth 2 (two) times daily.    ESTRADIOL (ESTRACE) 0.01 % (0.1 MG/GRAM) VAGINAL CREAM    Insert 2 g daily intravaginally for 2 weeks, then 1 g twice weekly    HYDROCHLOROTHIAZIDE (HYDRODIURIL) 25 MG TABLET    TAKE 1 TABLET(25 MG) BY MOUTH " EVERY DAY    LEVOCETIRIZINE (XYZAL) 5 MG TABLET    Take 1 tablet (5 mg total) by mouth every evening.    LEVOTHYROXINE (SYNTHROID) 100 MCG TABLET    Take 1 tablet (100 mcg total) by mouth before breakfast.    LIOTHYRONINE (CYTOMEL) 5 MCG TAB    Take 1 tablet (5 mcg total) by mouth once daily.    METOPROLOL SUCCINATE (TOPROL-XL) 25 MG 24 HR TABLET    TAKE 1 TABLET(25 MG) BY MOUTH EVERY DAY    PAROXETINE (PAXIL) 10 MG TABLET    Take 1 tablet (10 mg total) by mouth every morning. For hotflashes    VERAPAMIL (VERELAN) 360 MG C24P    TAKE 1 CAPSULE(360 MG) BY MOUTH EVERY DAY       Assessment & Plan  1. Essential hypertension    2. Postoperative hypothyroidism    3. Routine general medical examination at a health care facility    4. Post-nasal drip    5. Nail problem    6. Screening for colon cancer    7. Need for pneumococcal vaccination        Problem List Items Addressed This Visit        Cardiac/Vascular    HTN (hypertension)   -controlled on current regimen       Endocrine    Hypothyroidism  -continue to monitor with routine lab checks      Other Visit Diagnoses     Routine general medical examination at a health care facility      --discussed healthy lifestyle modification with exercise and healthy diet, reviewed age appropriate screening and healthy maintenance      Relevant Orders    CBC Auto Differential    Comprehensive Metabolic Panel    Lipid Panel    Hemoglobin A1C    TSH    HIV 1/2 Ag/Ab (4th Gen)    Case Request Endoscopy: COLONOSCOPY (Completed)    Post-nasal drip  -begin nasal spray for nighttime use    Nail problem      -will start oral medication once LFTs return; podiatry consult if no improvement    Screening for colon cancer        Relevant Orders    Case Request Endoscopy: COLONOSCOPY (Completed)    Need for pneumococcal vaccination        Relevant Orders    Pneumococcal Conjugate Vaccine (13 Valent) (IM)          Follow up in about 6 months (around 6/7/2021) for Follow up.    Other Orders Placed  This Visit:  Orders Placed This Encounter   Procedures    Pneumococcal Conjugate Vaccine (13 Valent) (IM)    CBC Auto Differential    Comprehensive Metabolic Panel    Lipid Panel    Hemoglobin A1C    TSH    HIV 1/2 Ag/Ab (4th Gen)

## 2020-12-08 LAB — HIV 1+2 AB+HIV1 P24 AG SERPL QL IA: NEGATIVE

## 2020-12-15 ENCOUNTER — TELEPHONE (OUTPATIENT)
Dept: FAMILY MEDICINE | Facility: CLINIC | Age: 65
End: 2020-12-15

## 2020-12-15 DIAGNOSIS — B35.1 NAIL FUNGUS: Primary | ICD-10-CM

## 2020-12-15 RX ORDER — TERBINAFINE HYDROCHLORIDE 250 MG/1
250 TABLET ORAL DAILY
Qty: 30 TABLET | Refills: 2 | Status: SHIPPED | OUTPATIENT
Start: 2020-12-15 | End: 2021-01-14

## 2020-12-15 NOTE — PROGRESS NOTES
Your blood work looks normal. And your diabetes test is negative for diabetes or prediabetes    Yary Valadez MD

## 2021-01-02 ENCOUNTER — PATIENT MESSAGE (OUTPATIENT)
Dept: FAMILY MEDICINE | Facility: CLINIC | Age: 66
End: 2021-01-02

## 2021-01-27 ENCOUNTER — PATIENT OUTREACH (OUTPATIENT)
Dept: ADMINISTRATIVE | Facility: HOSPITAL | Age: 66
End: 2021-01-27

## 2021-01-27 ENCOUNTER — PATIENT MESSAGE (OUTPATIENT)
Dept: ENDOSCOPY | Facility: HOSPITAL | Age: 66
End: 2021-01-27

## 2021-01-27 DIAGNOSIS — Z12.11 SPECIAL SCREENING FOR MALIGNANT NEOPLASMS, COLON: Primary | ICD-10-CM

## 2021-01-27 RX ORDER — SODIUM, POTASSIUM,MAG SULFATES 17.5-3.13G
1 SOLUTION, RECONSTITUTED, ORAL ORAL DAILY
Qty: 1 KIT | Refills: 0 | Status: SHIPPED | OUTPATIENT
Start: 2021-01-27 | End: 2021-01-29

## 2021-02-22 ENCOUNTER — PATIENT OUTREACH (OUTPATIENT)
Dept: ADMINISTRATIVE | Facility: HOSPITAL | Age: 66
End: 2021-02-22

## 2021-02-22 ENCOUNTER — PATIENT MESSAGE (OUTPATIENT)
Dept: FAMILY MEDICINE | Facility: CLINIC | Age: 66
End: 2021-02-22

## 2021-02-23 ENCOUNTER — LAB VISIT (OUTPATIENT)
Dept: FAMILY MEDICINE | Facility: CLINIC | Age: 66
End: 2021-02-23
Payer: MEDICARE

## 2021-02-23 DIAGNOSIS — Z01.818 PRE-OP TESTING: ICD-10-CM

## 2021-02-23 PROCEDURE — U0005 INFEC AGEN DETEC AMPLI PROBE: HCPCS

## 2021-02-23 PROCEDURE — U0003 INFECTIOUS AGENT DETECTION BY NUCLEIC ACID (DNA OR RNA); SEVERE ACUTE RESPIRATORY SYNDROME CORONAVIRUS 2 (SARS-COV-2) (CORONAVIRUS DISEASE [COVID-19]), AMPLIFIED PROBE TECHNIQUE, MAKING USE OF HIGH THROUGHPUT TECHNOLOGIES AS DESCRIBED BY CMS-2020-01-R: HCPCS

## 2021-02-24 LAB — SARS-COV-2 RNA RESP QL NAA+PROBE: NOT DETECTED

## 2021-02-25 ENCOUNTER — ANESTHESIA EVENT (OUTPATIENT)
Dept: ENDOSCOPY | Facility: HOSPITAL | Age: 66
End: 2021-02-25
Payer: MEDICARE

## 2021-02-26 ENCOUNTER — ANESTHESIA (OUTPATIENT)
Dept: ENDOSCOPY | Facility: HOSPITAL | Age: 66
End: 2021-02-26
Payer: MEDICARE

## 2021-02-26 ENCOUNTER — HOSPITAL ENCOUNTER (OUTPATIENT)
Facility: HOSPITAL | Age: 66
Discharge: HOME | End: 2021-02-26
Attending: STUDENT IN AN ORGANIZED HEALTH CARE EDUCATION/TRAINING PROGRAM | Admitting: STUDENT IN AN ORGANIZED HEALTH CARE EDUCATION/TRAINING PROGRAM
Payer: MEDICARE

## 2021-02-26 VITALS
SYSTOLIC BLOOD PRESSURE: 147 MMHG | HEART RATE: 60 BPM | RESPIRATION RATE: 18 BRPM | DIASTOLIC BLOOD PRESSURE: 76 MMHG | OXYGEN SATURATION: 97 % | TEMPERATURE: 98 F

## 2021-02-26 DIAGNOSIS — Z12.11 COLON CANCER SCREENING: ICD-10-CM

## 2021-02-26 PROCEDURE — D9220A PRA ANESTHESIA: Mod: PT,ANES,, | Performed by: ANESTHESIOLOGY

## 2021-02-26 PROCEDURE — 88305 TISSUE EXAM BY PATHOLOGIST: ICD-10-PCS | Mod: 26,,, | Performed by: PATHOLOGY

## 2021-02-26 PROCEDURE — D9220A PRA ANESTHESIA: Mod: PT,CRNA,, | Performed by: STUDENT IN AN ORGANIZED HEALTH CARE EDUCATION/TRAINING PROGRAM

## 2021-02-26 PROCEDURE — 25000003 PHARM REV CODE 250: Performed by: ANESTHESIOLOGY

## 2021-02-26 PROCEDURE — 45380 COLONOSCOPY AND BIOPSY: CPT | Mod: PT,,, | Performed by: STUDENT IN AN ORGANIZED HEALTH CARE EDUCATION/TRAINING PROGRAM

## 2021-02-26 PROCEDURE — 88305 TISSUE EXAM BY PATHOLOGIST: CPT | Mod: 26,,, | Performed by: PATHOLOGY

## 2021-02-26 PROCEDURE — D9220A PRA ANESTHESIA: ICD-10-PCS | Mod: PT,ANES,, | Performed by: ANESTHESIOLOGY

## 2021-02-26 PROCEDURE — 45380 COLONOSCOPY AND BIOPSY: CPT | Performed by: STUDENT IN AN ORGANIZED HEALTH CARE EDUCATION/TRAINING PROGRAM

## 2021-02-26 PROCEDURE — 27201012 HC FORCEPS, HOT/COLD, DISP: Performed by: STUDENT IN AN ORGANIZED HEALTH CARE EDUCATION/TRAINING PROGRAM

## 2021-02-26 PROCEDURE — 37000008 HC ANESTHESIA 1ST 15 MINUTES: Performed by: STUDENT IN AN ORGANIZED HEALTH CARE EDUCATION/TRAINING PROGRAM

## 2021-02-26 PROCEDURE — 37000009 HC ANESTHESIA EA ADD 15 MINS: Performed by: STUDENT IN AN ORGANIZED HEALTH CARE EDUCATION/TRAINING PROGRAM

## 2021-02-26 PROCEDURE — D9220A PRA ANESTHESIA: ICD-10-PCS | Mod: PT,CRNA,, | Performed by: STUDENT IN AN ORGANIZED HEALTH CARE EDUCATION/TRAINING PROGRAM

## 2021-02-26 PROCEDURE — 88305 TISSUE EXAM BY PATHOLOGIST: CPT | Performed by: PATHOLOGY

## 2021-02-26 PROCEDURE — 25000003 PHARM REV CODE 250: Performed by: STUDENT IN AN ORGANIZED HEALTH CARE EDUCATION/TRAINING PROGRAM

## 2021-02-26 PROCEDURE — 63600175 PHARM REV CODE 636 W HCPCS: Performed by: STUDENT IN AN ORGANIZED HEALTH CARE EDUCATION/TRAINING PROGRAM

## 2021-02-26 PROCEDURE — 45380 PR COLONOSCOPY,BIOPSY: ICD-10-PCS | Mod: PT,,, | Performed by: STUDENT IN AN ORGANIZED HEALTH CARE EDUCATION/TRAINING PROGRAM

## 2021-02-26 RX ORDER — LIDOCAINE HYDROCHLORIDE 10 MG/ML
1 INJECTION, SOLUTION EPIDURAL; INFILTRATION; INTRACAUDAL; PERINEURAL ONCE
Status: DISCONTINUED | OUTPATIENT
Start: 2021-02-26 | End: 2021-02-26 | Stop reason: HOSPADM

## 2021-02-26 RX ORDER — SODIUM CHLORIDE 9 MG/ML
INJECTION, SOLUTION INTRAVENOUS CONTINUOUS
Status: DISCONTINUED | OUTPATIENT
Start: 2021-02-26 | End: 2021-02-26 | Stop reason: HOSPADM

## 2021-02-26 RX ORDER — PROPOFOL 10 MG/ML
VIAL (ML) INTRAVENOUS
Status: DISCONTINUED | OUTPATIENT
Start: 2021-02-26 | End: 2021-02-26

## 2021-02-26 RX ORDER — LIDOCAINE HYDROCHLORIDE 20 MG/ML
INJECTION INTRAVENOUS
Status: DISCONTINUED | OUTPATIENT
Start: 2021-02-26 | End: 2021-02-26

## 2021-02-26 RX ORDER — PROPOFOL 10 MG/ML
INJECTION, EMULSION INTRAVENOUS
Status: DISCONTINUED
Start: 2021-02-26 | End: 2021-02-26 | Stop reason: HOSPADM

## 2021-02-26 RX ORDER — LIDOCAINE HYDROCHLORIDE 20 MG/ML
INJECTION, SOLUTION EPIDURAL; INFILTRATION; INTRACAUDAL; PERINEURAL
Status: DISCONTINUED
Start: 2021-02-26 | End: 2021-02-26 | Stop reason: HOSPADM

## 2021-02-26 RX ORDER — SODIUM CHLORIDE 0.9 % (FLUSH) 0.9 %
10 SYRINGE (ML) INJECTION
Status: DISCONTINUED | OUTPATIENT
Start: 2021-02-26 | End: 2021-02-26 | Stop reason: HOSPADM

## 2021-02-26 RX ADMIN — PROPOFOL 30 MG: 10 INJECTION, EMULSION INTRAVENOUS at 12:02

## 2021-02-26 RX ADMIN — PROPOFOL 80 MG: 10 INJECTION, EMULSION INTRAVENOUS at 11:02

## 2021-02-26 RX ADMIN — SODIUM CHLORIDE: 0.9 INJECTION, SOLUTION INTRAVENOUS at 11:02

## 2021-02-26 RX ADMIN — PROPOFOL 30 MG: 10 INJECTION, EMULSION INTRAVENOUS at 11:02

## 2021-02-26 RX ADMIN — Medication 100 MG: at 11:02

## 2021-03-02 LAB
FINAL PATHOLOGIC DIAGNOSIS: NORMAL
GROSS: NORMAL
Lab: NORMAL

## 2021-03-22 RX ORDER — METOPROLOL SUCCINATE 25 MG/1
TABLET, EXTENDED RELEASE ORAL
Qty: 90 TABLET | Refills: 1 | Status: SHIPPED | OUTPATIENT
Start: 2021-03-22 | End: 2022-01-19

## 2021-05-13 NOTE — TELEPHONE ENCOUNTER
----- Message from Mauro Riley sent at 12/15/2020  9:06 AM CST -----  Type: Patient Call Back    Who called :   Pt     What is the request in detail: Pt calling to remind the Dr to put in a prescription for fungus on her toe, she is also asking for a referral for a coloscopy.     Can the clinic reply by [facility]? Yes     Would the patient rather a call back or a response via [facility]? [facility]     Best call back number: [phone]    Additional Information:      Thank You        
Colonoscopy can be ordered through [facility]    Nail fungus medication ordered    Yary Valadez MD    
Informed Pt. Verbal understanding  
L hip, mid spine, neck

## 2021-05-21 ENCOUNTER — OFFICE VISIT (OUTPATIENT)
Dept: FAMILY MEDICINE | Facility: CLINIC | Age: 66
End: 2021-05-21
Payer: MEDICARE

## 2021-05-21 VITALS
BODY MASS INDEX: 36.2 KG/M2 | HEIGHT: 67 IN | DIASTOLIC BLOOD PRESSURE: 82 MMHG | HEART RATE: 64 BPM | WEIGHT: 230.63 LBS | TEMPERATURE: 99 F | SYSTOLIC BLOOD PRESSURE: 126 MMHG | OXYGEN SATURATION: 96 %

## 2021-05-21 DIAGNOSIS — Z13.220 ENCOUNTER FOR LIPID SCREENING FOR CARDIOVASCULAR DISEASE: ICD-10-CM

## 2021-05-21 DIAGNOSIS — Z13.6 ENCOUNTER FOR LIPID SCREENING FOR CARDIOVASCULAR DISEASE: ICD-10-CM

## 2021-05-21 DIAGNOSIS — E89.0 POSTOPERATIVE HYPOTHYROIDISM: ICD-10-CM

## 2021-05-21 DIAGNOSIS — R52 GENERALIZED BODY ACHES: Primary | ICD-10-CM

## 2021-05-21 DIAGNOSIS — I10 ESSENTIAL HYPERTENSION: ICD-10-CM

## 2021-05-21 DIAGNOSIS — R42 DIZZINESS: ICD-10-CM

## 2021-05-21 PROCEDURE — 99999 PR PBB SHADOW E&M-EST. PATIENT-LVL III: ICD-10-PCS | Mod: PBBFAC,,, | Performed by: FAMILY MEDICINE

## 2021-05-21 PROCEDURE — 99999 PR PBB SHADOW E&M-EST. PATIENT-LVL III: CPT | Mod: PBBFAC,,, | Performed by: FAMILY MEDICINE

## 2021-05-21 PROCEDURE — 99214 PR OFFICE/OUTPT VISIT, EST, LEVL IV, 30-39 MIN: ICD-10-PCS | Mod: S$PBB,,, | Performed by: FAMILY MEDICINE

## 2021-05-21 PROCEDURE — 99214 OFFICE O/P EST MOD 30 MIN: CPT | Mod: S$PBB,,, | Performed by: FAMILY MEDICINE

## 2021-05-21 PROCEDURE — 99213 OFFICE O/P EST LOW 20 MIN: CPT | Mod: PBBFAC,PO | Performed by: FAMILY MEDICINE

## 2021-05-21 RX ORDER — HYDROCHLOROTHIAZIDE 12.5 MG/1
12.5 TABLET ORAL DAILY
Qty: 30 TABLET | Refills: 5 | Status: SHIPPED | OUTPATIENT
Start: 2021-05-21 | End: 2022-04-05

## 2021-05-25 ENCOUNTER — LAB VISIT (OUTPATIENT)
Dept: LAB | Facility: HOSPITAL | Age: 66
End: 2021-05-25
Attending: FAMILY MEDICINE
Payer: MEDICARE

## 2021-05-25 DIAGNOSIS — R52 GENERALIZED BODY ACHES: ICD-10-CM

## 2021-05-25 DIAGNOSIS — Z13.220 ENCOUNTER FOR LIPID SCREENING FOR CARDIOVASCULAR DISEASE: ICD-10-CM

## 2021-05-25 DIAGNOSIS — I10 ESSENTIAL HYPERTENSION: ICD-10-CM

## 2021-05-25 DIAGNOSIS — E89.0 POSTOPERATIVE HYPOTHYROIDISM: ICD-10-CM

## 2021-05-25 DIAGNOSIS — Z13.6 ENCOUNTER FOR LIPID SCREENING FOR CARDIOVASCULAR DISEASE: ICD-10-CM

## 2021-05-25 LAB
ALBUMIN SERPL BCP-MCNC: 3.1 G/DL (ref 3.5–5.2)
ALP SERPL-CCNC: 95 U/L (ref 55–135)
ALT SERPL W/O P-5'-P-CCNC: 15 U/L (ref 10–44)
ANION GAP SERPL CALC-SCNC: 6 MMOL/L (ref 8–16)
AST SERPL-CCNC: 14 U/L (ref 10–40)
BASOPHILS # BLD AUTO: 0.01 K/UL (ref 0–0.2)
BASOPHILS NFR BLD: 0.3 % (ref 0–1.9)
BILIRUB SERPL-MCNC: 0.3 MG/DL (ref 0.1–1)
BUN SERPL-MCNC: 18 MG/DL (ref 8–23)
CALCIUM SERPL-MCNC: 9.1 MG/DL (ref 8.7–10.5)
CCP AB SER IA-ACNC: <0.5 U/ML
CHLORIDE SERPL-SCNC: 107 MMOL/L (ref 95–110)
CHOLEST SERPL-MCNC: 155 MG/DL (ref 120–199)
CHOLEST/HDLC SERPL: 3.6 {RATIO} (ref 2–5)
CO2 SERPL-SCNC: 26 MMOL/L (ref 23–29)
CREAT SERPL-MCNC: 0.9 MG/DL (ref 0.5–1.4)
CRP SERPL-MCNC: 26.3 MG/L (ref 0–8.2)
DIFFERENTIAL METHOD: ABNORMAL
EOSINOPHIL # BLD AUTO: 0.1 K/UL (ref 0–0.5)
EOSINOPHIL NFR BLD: 2.4 % (ref 0–8)
ERYTHROCYTE [DISTWIDTH] IN BLOOD BY AUTOMATED COUNT: 13.8 % (ref 11.5–14.5)
ERYTHROCYTE [SEDIMENTATION RATE] IN BLOOD BY WESTERGREN METHOD: 50 MM/HR (ref 0–36)
EST. GFR  (AFRICAN AMERICAN): >60 ML/MIN/1.73 M^2
EST. GFR  (NON AFRICAN AMERICAN): >60 ML/MIN/1.73 M^2
GLUCOSE SERPL-MCNC: 93 MG/DL (ref 70–110)
HCT VFR BLD AUTO: 38.3 % (ref 37–48.5)
HDLC SERPL-MCNC: 43 MG/DL (ref 40–75)
HDLC SERPL: 27.7 % (ref 20–50)
HGB BLD-MCNC: 12.2 G/DL (ref 12–16)
IMM GRANULOCYTES # BLD AUTO: 0.01 K/UL (ref 0–0.04)
IMM GRANULOCYTES NFR BLD AUTO: 0.3 % (ref 0–0.5)
LDLC SERPL CALC-MCNC: 95.2 MG/DL (ref 63–159)
LYMPHOCYTES # BLD AUTO: 1.3 K/UL (ref 1–4.8)
LYMPHOCYTES NFR BLD: 34.7 % (ref 18–48)
MCH RBC QN AUTO: 28.2 PG (ref 27–31)
MCHC RBC AUTO-ENTMCNC: 31.9 G/DL (ref 32–36)
MCV RBC AUTO: 89 FL (ref 82–98)
MONOCYTES # BLD AUTO: 0.3 K/UL (ref 0.3–1)
MONOCYTES NFR BLD: 7.7 % (ref 4–15)
NEUTROPHILS # BLD AUTO: 2.1 K/UL (ref 1.8–7.7)
NEUTROPHILS NFR BLD: 54.6 % (ref 38–73)
NONHDLC SERPL-MCNC: 112 MG/DL
NRBC BLD-RTO: 0 /100 WBC
PLATELET # BLD AUTO: 199 K/UL (ref 150–450)
PMV BLD AUTO: 11.3 FL (ref 9.2–12.9)
POTASSIUM SERPL-SCNC: 4.1 MMOL/L (ref 3.5–5.1)
PROT SERPL-MCNC: 6.9 G/DL (ref 6–8.4)
RBC # BLD AUTO: 4.33 M/UL (ref 4–5.4)
RHEUMATOID FACT SERPL-ACNC: <10 IU/ML (ref 0–15)
SODIUM SERPL-SCNC: 139 MMOL/L (ref 136–145)
TRIGL SERPL-MCNC: 84 MG/DL (ref 30–150)
TSH SERPL DL<=0.005 MIU/L-ACNC: 1.16 UIU/ML (ref 0.4–4)
WBC # BLD AUTO: 3.78 K/UL (ref 3.9–12.7)

## 2021-05-25 PROCEDURE — 84443 ASSAY THYROID STIM HORMONE: CPT | Performed by: FAMILY MEDICINE

## 2021-05-25 PROCEDURE — 80053 COMPREHEN METABOLIC PANEL: CPT | Performed by: FAMILY MEDICINE

## 2021-05-25 PROCEDURE — 86200 CCP ANTIBODY: CPT | Performed by: FAMILY MEDICINE

## 2021-05-25 PROCEDURE — 86140 C-REACTIVE PROTEIN: CPT | Performed by: FAMILY MEDICINE

## 2021-05-25 PROCEDURE — 85025 COMPLETE CBC W/AUTO DIFF WBC: CPT | Performed by: FAMILY MEDICINE

## 2021-05-25 PROCEDURE — 86235 NUCLEAR ANTIGEN ANTIBODY: CPT | Mod: 59 | Performed by: FAMILY MEDICINE

## 2021-05-25 PROCEDURE — 80061 LIPID PANEL: CPT | Performed by: FAMILY MEDICINE

## 2021-05-25 PROCEDURE — 86431 RHEUMATOID FACTOR QUANT: CPT | Performed by: FAMILY MEDICINE

## 2021-05-25 PROCEDURE — 85652 RBC SED RATE AUTOMATED: CPT | Performed by: FAMILY MEDICINE

## 2021-05-25 PROCEDURE — 86039 ANTINUCLEAR ANTIBODIES (ANA): CPT | Performed by: FAMILY MEDICINE

## 2021-05-25 PROCEDURE — 86038 ANTINUCLEAR ANTIBODIES: CPT | Performed by: FAMILY MEDICINE

## 2021-05-25 PROCEDURE — 36415 COLL VENOUS BLD VENIPUNCTURE: CPT | Mod: PO | Performed by: FAMILY MEDICINE

## 2021-05-26 LAB
ANA PATTERN 1: NORMAL
ANA SER QL IF: POSITIVE
ANA TITR SER IF: NORMAL {TITER}

## 2021-05-27 LAB
ANTI SM ANTIBODY: 0.1 RATIO (ref 0–0.99)
ANTI SM/RNP ANTIBODY: 0.93 RATIO (ref 0–0.99)
ANTI-SM INTERPRETATION: NEGATIVE
ANTI-SM/RNP INTERPRETATION: NEGATIVE
ANTI-SSA ANTIBODY: 0.07 RATIO (ref 0–0.99)
ANTI-SSA INTERPRETATION: NEGATIVE
ANTI-SSB ANTIBODY: 0.09 RATIO (ref 0–0.99)
ANTI-SSB INTERPRETATION: NEGATIVE
DSDNA AB SER-ACNC: NORMAL [IU]/ML

## 2021-06-07 ENCOUNTER — PATIENT MESSAGE (OUTPATIENT)
Dept: FAMILY MEDICINE | Facility: CLINIC | Age: 66
End: 2021-06-07

## 2021-06-07 ENCOUNTER — PATIENT MESSAGE (OUTPATIENT)
Dept: OBSTETRICS AND GYNECOLOGY | Facility: CLINIC | Age: 66
End: 2021-06-07

## 2021-06-07 DIAGNOSIS — R52 GENERALIZED BODY ACHES: Primary | ICD-10-CM

## 2021-06-09 ENCOUNTER — PATIENT MESSAGE (OUTPATIENT)
Dept: FAMILY MEDICINE | Facility: CLINIC | Age: 66
End: 2021-06-09

## 2021-06-10 ENCOUNTER — OFFICE VISIT (OUTPATIENT)
Dept: OBSTETRICS AND GYNECOLOGY | Facility: CLINIC | Age: 66
End: 2021-06-10
Payer: MEDICARE

## 2021-06-10 VITALS
SYSTOLIC BLOOD PRESSURE: 134 MMHG | BODY MASS INDEX: 36.43 KG/M2 | DIASTOLIC BLOOD PRESSURE: 80 MMHG | WEIGHT: 232.56 LBS

## 2021-06-10 DIAGNOSIS — Z01.419 WELL WOMAN EXAM WITH ROUTINE GYNECOLOGICAL EXAM: Primary | ICD-10-CM

## 2021-06-10 DIAGNOSIS — Z12.31 BREAST CANCER SCREENING BY MAMMOGRAM: ICD-10-CM

## 2021-06-10 DIAGNOSIS — N75.0 CYST OF LEFT BARTHOLIN'S GLAND: ICD-10-CM

## 2021-06-10 PROCEDURE — 99999 PR PBB SHADOW E&M-EST. PATIENT-LVL III: ICD-10-PCS | Mod: PBBFAC,,, | Performed by: OBSTETRICS & GYNECOLOGY

## 2021-06-10 PROCEDURE — 99213 OFFICE O/P EST LOW 20 MIN: CPT | Mod: PBBFAC,25 | Performed by: OBSTETRICS & GYNECOLOGY

## 2021-06-10 PROCEDURE — G0101 CA SCREEN;PELVIC/BREAST EXAM: HCPCS | Mod: S$PBB,,, | Performed by: OBSTETRICS & GYNECOLOGY

## 2021-06-10 PROCEDURE — G0101 CA SCREEN;PELVIC/BREAST EXAM: HCPCS | Mod: 25,PBBFAC | Performed by: OBSTETRICS & GYNECOLOGY

## 2021-06-10 PROCEDURE — 99212 OFFICE O/P EST SF 10 MIN: CPT | Mod: S$PBB,25,, | Performed by: OBSTETRICS & GYNECOLOGY

## 2021-06-10 PROCEDURE — 99999 PR PBB SHADOW E&M-EST. PATIENT-LVL III: CPT | Mod: PBBFAC,,, | Performed by: OBSTETRICS & GYNECOLOGY

## 2021-06-10 PROCEDURE — G0101 PR CA SCREEN;PELVIC/BREAST EXAM: ICD-10-PCS | Mod: S$PBB,,, | Performed by: OBSTETRICS & GYNECOLOGY

## 2021-06-10 PROCEDURE — 99212 PR OFFICE/OUTPT VISIT, EST, LEVL II, 10-19 MIN: ICD-10-PCS | Mod: S$PBB,25,, | Performed by: OBSTETRICS & GYNECOLOGY

## 2021-06-10 RX ORDER — ESTRADIOL 0.1 MG/G
CREAM VAGINAL
Qty: 42.5 G | Refills: 5 | Status: SHIPPED | OUTPATIENT
Start: 2021-06-10 | End: 2023-05-09

## 2021-06-11 ENCOUNTER — NURSE TRIAGE (OUTPATIENT)
Dept: ADMINISTRATIVE | Facility: CLINIC | Age: 66
End: 2021-06-11

## 2021-06-14 ENCOUNTER — TELEPHONE (OUTPATIENT)
Dept: OBSTETRICS AND GYNECOLOGY | Facility: CLINIC | Age: 66
End: 2021-06-14

## 2021-06-18 ENCOUNTER — OFFICE VISIT (OUTPATIENT)
Dept: OBSTETRICS AND GYNECOLOGY | Facility: CLINIC | Age: 66
End: 2021-06-18
Payer: MEDICARE

## 2021-06-18 VITALS
WEIGHT: 231.06 LBS | DIASTOLIC BLOOD PRESSURE: 68 MMHG | SYSTOLIC BLOOD PRESSURE: 130 MMHG | BODY MASS INDEX: 36.19 KG/M2

## 2021-06-18 DIAGNOSIS — N75.0 CYST OF LEFT BARTHOLIN'S GLAND: Primary | ICD-10-CM

## 2021-06-18 PROCEDURE — 99212 OFFICE O/P EST SF 10 MIN: CPT | Mod: S$PBB,,, | Performed by: OBSTETRICS & GYNECOLOGY

## 2021-06-18 PROCEDURE — 99212 OFFICE O/P EST SF 10 MIN: CPT | Mod: PBBFAC | Performed by: OBSTETRICS & GYNECOLOGY

## 2021-06-18 PROCEDURE — 99999 PR PBB SHADOW E&M-EST. PATIENT-LVL II: ICD-10-PCS | Mod: PBBFAC,,, | Performed by: OBSTETRICS & GYNECOLOGY

## 2021-06-18 PROCEDURE — 99212 PR OFFICE/OUTPT VISIT, EST, LEVL II, 10-19 MIN: ICD-10-PCS | Mod: S$PBB,,, | Performed by: OBSTETRICS & GYNECOLOGY

## 2021-06-18 PROCEDURE — 99999 PR PBB SHADOW E&M-EST. PATIENT-LVL II: CPT | Mod: PBBFAC,,, | Performed by: OBSTETRICS & GYNECOLOGY

## 2021-07-06 ENCOUNTER — PATIENT MESSAGE (OUTPATIENT)
Dept: ADMINISTRATIVE | Facility: HOSPITAL | Age: 66
End: 2021-07-06

## 2021-07-12 ENCOUNTER — PATIENT MESSAGE (OUTPATIENT)
Dept: OBSTETRICS AND GYNECOLOGY | Facility: CLINIC | Age: 66
End: 2021-07-12

## 2021-07-14 ENCOUNTER — HOSPITAL ENCOUNTER (OUTPATIENT)
Dept: RADIOLOGY | Facility: HOSPITAL | Age: 66
Discharge: HOME | End: 2021-07-14
Attending: OBSTETRICS & GYNECOLOGY
Payer: MEDICARE

## 2021-07-14 DIAGNOSIS — Z12.31 BREAST CANCER SCREENING BY MAMMOGRAM: ICD-10-CM

## 2021-07-14 PROCEDURE — 77067 SCR MAMMO BI INCL CAD: CPT | Mod: TC,PO

## 2021-07-14 PROCEDURE — 77067 SCR MAMMO BI INCL CAD: CPT | Mod: 26,,, | Performed by: RADIOLOGY

## 2021-07-14 PROCEDURE — 77063 MAMMO DIGITAL SCREENING BILAT WITH TOMO: ICD-10-PCS | Mod: 26,,, | Performed by: RADIOLOGY

## 2021-07-14 PROCEDURE — 77067 MAMMO DIGITAL SCREENING BILAT WITH TOMO: ICD-10-PCS | Mod: 26,,, | Performed by: RADIOLOGY

## 2021-07-14 PROCEDURE — 77063 BREAST TOMOSYNTHESIS BI: CPT | Mod: 26,,, | Performed by: RADIOLOGY

## 2021-07-20 ENCOUNTER — PATIENT MESSAGE (OUTPATIENT)
Dept: FAMILY MEDICINE | Facility: CLINIC | Age: 66
End: 2021-07-20

## 2021-07-20 DIAGNOSIS — E89.0 POSTOPERATIVE HYPOTHYROIDISM: Primary | ICD-10-CM

## 2021-08-09 ENCOUNTER — OFFICE VISIT (OUTPATIENT)
Dept: URGENT CARE | Facility: CLINIC | Age: 66
End: 2021-08-09
Payer: MEDICARE

## 2021-08-09 VITALS
HEART RATE: 78 BPM | BODY MASS INDEX: 36.1 KG/M2 | WEIGHT: 230 LBS | TEMPERATURE: 99 F | OXYGEN SATURATION: 97 % | RESPIRATION RATE: 18 BRPM | HEIGHT: 67 IN | DIASTOLIC BLOOD PRESSURE: 94 MMHG | SYSTOLIC BLOOD PRESSURE: 138 MMHG

## 2021-08-09 DIAGNOSIS — J06.9 UPPER RESPIRATORY TRACT INFECTION, UNSPECIFIED TYPE: Primary | ICD-10-CM

## 2021-08-09 LAB
CTP QC/QA: YES
SARS-COV-2 RDRP RESP QL NAA+PROBE: NEGATIVE

## 2021-08-09 PROCEDURE — U0002: ICD-10-PCS | Mod: QW,CR,S$GLB, | Performed by: NURSE PRACTITIONER

## 2021-08-09 PROCEDURE — 99213 PR OFFICE/OUTPT VISIT, EST, LEVL III, 20-29 MIN: ICD-10-PCS | Mod: S$GLB,CS,, | Performed by: NURSE PRACTITIONER

## 2021-08-09 PROCEDURE — U0002 COVID-19 LAB TEST NON-CDC: HCPCS | Mod: QW,CR,S$GLB, | Performed by: NURSE PRACTITIONER

## 2021-08-09 PROCEDURE — 99213 OFFICE O/P EST LOW 20 MIN: CPT | Mod: S$GLB,CS,, | Performed by: NURSE PRACTITIONER

## 2021-08-12 DIAGNOSIS — J06.9 VIRAL URI WITH COUGH: ICD-10-CM

## 2021-08-12 RX ORDER — LEVOCETIRIZINE DIHYDROCHLORIDE 5 MG/1
5 TABLET, FILM COATED ORAL NIGHTLY
Qty: 90 TABLET | Refills: 0 | Status: SHIPPED | OUTPATIENT
Start: 2021-08-12 | End: 2021-08-27 | Stop reason: SDUPTHER

## 2021-08-14 ENCOUNTER — CLINICAL SUPPORT (OUTPATIENT)
Dept: URGENT CARE | Facility: CLINIC | Age: 66
End: 2021-08-14
Payer: MEDICARE

## 2021-08-14 DIAGNOSIS — Z20.822 ENCOUNTER FOR LABORATORY TESTING FOR COVID-19 VIRUS: ICD-10-CM

## 2021-08-14 PROCEDURE — 99211 OFF/OP EST MAY X REQ PHY/QHP: CPT | Mod: S$GLB,CS,, | Performed by: PHYSICIAN ASSISTANT

## 2021-08-14 PROCEDURE — 99211 PR OFFICE/OUTPT VISIT, EST, LEVL I: ICD-10-PCS | Mod: S$GLB,CS,, | Performed by: PHYSICIAN ASSISTANT

## 2021-08-14 PROCEDURE — U0005 INFEC AGEN DETEC AMPLI PROBE: HCPCS | Performed by: PHYSICIAN ASSISTANT

## 2021-08-14 PROCEDURE — U0003 INFECTIOUS AGENT DETECTION BY NUCLEIC ACID (DNA OR RNA); SEVERE ACUTE RESPIRATORY SYNDROME CORONAVIRUS 2 (SARS-COV-2) (CORONAVIRUS DISEASE [COVID-19]), AMPLIFIED PROBE TECHNIQUE, MAKING USE OF HIGH THROUGHPUT TECHNOLOGIES AS DESCRIBED BY CMS-2020-01-R: HCPCS | Performed by: PHYSICIAN ASSISTANT

## 2021-08-16 LAB
SARS-COV-2 RNA RESP QL NAA+PROBE: NOT DETECTED
SARS-COV-2- CYCLE NUMBER: -1

## 2021-08-27 ENCOUNTER — PATIENT MESSAGE (OUTPATIENT)
Dept: FAMILY MEDICINE | Facility: CLINIC | Age: 66
End: 2021-08-27

## 2021-08-27 DIAGNOSIS — E89.0 POSTOPERATIVE HYPOTHYROIDISM: ICD-10-CM

## 2021-08-27 DIAGNOSIS — J06.9 VIRAL URI WITH COUGH: ICD-10-CM

## 2021-08-27 RX ORDER — LEVOTHYROXINE SODIUM 100 UG/1
100 TABLET ORAL
Qty: 30 TABLET | Refills: 15 | Status: CANCELLED | OUTPATIENT
Start: 2021-08-27

## 2021-08-27 RX ORDER — LEVOCETIRIZINE DIHYDROCHLORIDE 5 MG/1
5 TABLET, FILM COATED ORAL NIGHTLY
Qty: 90 TABLET | Refills: 0 | Status: SHIPPED | OUTPATIENT
Start: 2021-08-27 | End: 2021-11-17

## 2021-08-27 RX ORDER — LEVOTHYROXINE SODIUM 100 UG/1
100 TABLET ORAL
Qty: 30 TABLET | Refills: 5 | Status: SHIPPED | OUTPATIENT
Start: 2021-08-27 | End: 2021-12-20

## 2021-09-09 ENCOUNTER — PATIENT MESSAGE (OUTPATIENT)
Dept: FAMILY MEDICINE | Facility: CLINIC | Age: 66
End: 2021-09-09

## 2021-09-09 DIAGNOSIS — E89.0 POSTOPERATIVE HYPOTHYROIDISM: ICD-10-CM

## 2021-09-09 RX ORDER — LIOTHYRONINE SODIUM 5 UG/1
5 TABLET ORAL DAILY
Qty: 30 TABLET | Refills: 0 | Status: SHIPPED | OUTPATIENT
Start: 2021-09-09 | End: 2021-10-29

## 2021-11-17 ENCOUNTER — OFFICE VISIT (OUTPATIENT)
Dept: ENDOCRINOLOGY | Facility: CLINIC | Age: 66
End: 2021-11-17
Payer: MEDICARE

## 2021-11-17 VITALS
SYSTOLIC BLOOD PRESSURE: 148 MMHG | HEIGHT: 67 IN | WEIGHT: 237.56 LBS | BODY MASS INDEX: 37.29 KG/M2 | DIASTOLIC BLOOD PRESSURE: 82 MMHG

## 2021-11-17 DIAGNOSIS — E66.9 OBESITY (BMI 30-39.9): ICD-10-CM

## 2021-11-17 DIAGNOSIS — G47.33 OSA ON CPAP: ICD-10-CM

## 2021-11-17 DIAGNOSIS — E89.0 POSTOPERATIVE HYPOTHYROIDISM: Primary | ICD-10-CM

## 2021-11-17 DIAGNOSIS — E55.9 VITAMIN D DEFICIENCY: ICD-10-CM

## 2021-11-17 DIAGNOSIS — R79.9 ABNORMAL FINDING OF BLOOD CHEMISTRY, UNSPECIFIED: ICD-10-CM

## 2021-11-17 PROCEDURE — 99213 OFFICE O/P EST LOW 20 MIN: CPT | Mod: PBBFAC | Performed by: INTERNAL MEDICINE

## 2021-11-17 PROCEDURE — 99999 PR PBB SHADOW E&M-EST. PATIENT-LVL III: ICD-10-PCS | Mod: PBBFAC,,, | Performed by: INTERNAL MEDICINE

## 2021-11-17 PROCEDURE — 99999 PR PBB SHADOW E&M-EST. PATIENT-LVL III: CPT | Mod: PBBFAC,,, | Performed by: INTERNAL MEDICINE

## 2021-11-17 PROCEDURE — 99214 PR OFFICE/OUTPT VISIT, EST, LEVL IV, 30-39 MIN: ICD-10-PCS | Mod: S$PBB,,, | Performed by: INTERNAL MEDICINE

## 2021-11-17 PROCEDURE — 99214 OFFICE O/P EST MOD 30 MIN: CPT | Mod: S$PBB,,, | Performed by: INTERNAL MEDICINE

## 2021-12-15 ENCOUNTER — PATIENT MESSAGE (OUTPATIENT)
Dept: ADMINISTRATIVE | Facility: HOSPITAL | Age: 66
End: 2021-12-15
Payer: MEDICARE

## 2021-12-17 ENCOUNTER — LAB VISIT (OUTPATIENT)
Dept: LAB | Facility: HOSPITAL | Age: 66
End: 2021-12-17
Attending: INTERNAL MEDICINE
Payer: MEDICARE

## 2021-12-17 DIAGNOSIS — E89.0 POSTOPERATIVE HYPOTHYROIDISM: ICD-10-CM

## 2021-12-17 DIAGNOSIS — E66.9 OBESITY (BMI 30-39.9): ICD-10-CM

## 2021-12-17 DIAGNOSIS — R79.9 ABNORMAL FINDING OF BLOOD CHEMISTRY, UNSPECIFIED: ICD-10-CM

## 2021-12-17 LAB
ESTIMATED AVG GLUCOSE: 111 MG/DL (ref 68–131)
HBA1C MFR BLD: 5.5 % (ref 4–5.6)
TSH SERPL DL<=0.005 MIU/L-ACNC: 23.01 UIU/ML (ref 0.4–4)

## 2021-12-17 PROCEDURE — 83036 HEMOGLOBIN GLYCOSYLATED A1C: CPT | Performed by: INTERNAL MEDICINE

## 2021-12-17 PROCEDURE — 84439 ASSAY OF FREE THYROXINE: CPT | Performed by: INTERNAL MEDICINE

## 2021-12-17 PROCEDURE — 36415 COLL VENOUS BLD VENIPUNCTURE: CPT | Mod: PO | Performed by: INTERNAL MEDICINE

## 2021-12-17 PROCEDURE — 84443 ASSAY THYROID STIM HORMONE: CPT | Performed by: INTERNAL MEDICINE

## 2021-12-20 ENCOUNTER — PATIENT MESSAGE (OUTPATIENT)
Dept: ENDOCRINOLOGY | Facility: CLINIC | Age: 66
End: 2021-12-20
Payer: MEDICARE

## 2021-12-20 DIAGNOSIS — E89.0 POSTOPERATIVE HYPOTHYROIDISM: ICD-10-CM

## 2021-12-20 RX ORDER — LEVOTHYROXINE SODIUM 125 UG/1
125 TABLET ORAL
Qty: 90 TABLET | Refills: 3 | Status: SHIPPED | OUTPATIENT
Start: 2021-12-20 | End: 2023-01-23 | Stop reason: SDUPTHER

## 2021-12-21 ENCOUNTER — TELEPHONE (OUTPATIENT)
Dept: ENDOCRINOLOGY | Facility: CLINIC | Age: 66
End: 2021-12-21
Payer: MEDICARE

## 2022-01-07 ENCOUNTER — CLINICAL SUPPORT (OUTPATIENT)
Dept: FAMILY MEDICINE | Facility: CLINIC | Age: 67
End: 2022-01-07
Payer: MEDICARE

## 2022-01-07 DIAGNOSIS — Z23 FLU VACCINE NEED: Primary | ICD-10-CM

## 2022-01-07 PROCEDURE — G0008 ADMIN INFLUENZA VIRUS VAC: HCPCS | Mod: PBBFAC,PO

## 2022-01-19 RX ORDER — METOPROLOL SUCCINATE 25 MG/1
TABLET, EXTENDED RELEASE ORAL
Qty: 90 TABLET | Refills: 1 | Status: SHIPPED | OUTPATIENT
Start: 2022-01-19 | End: 2022-12-09

## 2022-01-19 NOTE — TELEPHONE ENCOUNTER
No new care gaps identified.  Powered by [employer]. Reference number: [id number].   1/19/2022 9:04:48 AM CST

## 2022-01-27 ENCOUNTER — PATIENT OUTREACH (OUTPATIENT)
Dept: ADMINISTRATIVE | Facility: OTHER | Age: 67
End: 2022-01-27
Payer: MEDICARE

## 2022-01-27 NOTE — PROGRESS NOTES
Health Maintenance Due   Topic Date Due    Shingles Vaccine (1 of 2) Never done    Pneumococcal Vaccines (Age 65+) (2 of 2 - PPSV23) 12/07/2021     Updates were requested from care everywhere.  Chart was reviewed for overdue [facility]active [facility] Encounters ([facility]) topics (CRS, Breast Cancer Screening, Eye exam)  Health Maintenance has been updated.  LINKS immunization registry triggered.  Immunizations were reconciled.

## 2022-02-01 ENCOUNTER — OFFICE VISIT (OUTPATIENT)
Dept: PULMONOLOGY | Facility: CLINIC | Age: 67
End: 2022-02-01
Payer: MEDICARE

## 2022-02-01 DIAGNOSIS — G47.33 OSA ON CPAP: ICD-10-CM

## 2022-02-01 DIAGNOSIS — R09.81 NASAL CONGESTION: ICD-10-CM

## 2022-02-01 PROCEDURE — 99202 PR OFFICE/OUTPT VISIT, NEW, LEVL II, 15-29 MIN: ICD-10-PCS | Mod: 95,,, | Performed by: INTERNAL MEDICINE

## 2022-02-01 PROCEDURE — 99202 OFFICE O/P NEW SF 15 MIN: CPT | Mod: 95,,, | Performed by: INTERNAL MEDICINE

## 2022-02-01 NOTE — PROGRESS NOTES
Dain Phelps  was seen as a follow up.  Our last encounter was 2017  The patient location is: home  The chief complaint leading to consultation is: christine    Visit type: audiovisual    Face to Face time with patient: 25  minutes of total time spent on the encounter, which includes face to face time and non-face to face time preparing to see the patient (eg, review of tests), Obtaining and/or reviewing separately obtained history, Documenting clinical information in the electronic or other health record, Independently interpreting results (not separately reported) and communicating results to the patient/family/caregiver, or Care coordination (not separately reported).         Each patient to whom he or she provides medical services by telemedicine is:  (1) informed of the relationship between the physician and patient and the respective role of any other health care provider with respect to management of the patient; and (2) notified that he or she may decline to receive medical services by telemedicine and may withdraw from such care at any time.    Notes:      CHIEF COMPLAINT:    No chief complaint on file.      HISTORY OF PRESENT ILLNESS: Dain Phelps is a 66 y.o. female is here for sleep evaluation.   Our first encounter was 4/28/15.  At that time, patient with loud snoring.  On occasion, patient wake up from sleep due to snoring.  +awakenig with choking sensation a few weeks ago.  +fatigue upon awake 3-4 times per week.  No parasomnia.  No cataplexy.      Occasional restless sensation in legs.  Worse at night with supine position.  Improve with stretching.  Has not recurred since 3/15.     [place] Sleepiness Scale score during initial sleep evaluation was 4.    Patient underwent HST on 5/13/15 with ahi of 20.  Patient was set up with apap 6/15.  Sleep improved with apap.  Snoring resolved.  Sleeping thru the night.  Feeling rested upon awake.  Patient stopped using apap 10/16 until now due to mother's  illness.  Mother moved in with patient.  Patient stopped using apap due to concern of not being able to hear her mom in the middle of the night.  Her mother's health has improved and patient resumed apap in 2018.  However, patient was unable to tolerate apap with resumption.  Unable to breath out with cpap.  +nasal congestion that is worse at night.   +dry mouth upon awake.      SLEEP ROUTINE:  Activity the hour prior to sleep: watch tv in bed  Bed partner:     Time to bed:  9 pm   Lights off:  tv is on  Sleep onset latency:  30 minutes        Disruptions or awakenings:    3-4 times per night    Wakeup time:      8 am   Perceived sleep quality:  Tire on most days       Daytime naps:      Occasional 30 minutes nap (feel refresh upon awake)  Weekend sleep routine:      10 pm till 8 am  Caffeine use: Occasional coffee  exercise habit:   none      PAST MEDICAL HISTORY:    Active Ambulatory Problems     Diagnosis Date Noted    Hypothyroidism 2012    HTN (hypertension) 2012    GERD (gastroesophageal reflux disease) 2012    Proteinuria 2012    Obesity (BMI 30-39.9) 01/15/2014    Vitamin D deficiency 01/15/2014    LORRAINE on CPAP 2015    Vaginal atrophy 2019    Colon cancer screening 2021    Nasal congestion 2022     Resolved Ambulatory Problems     Diagnosis Date Noted    Impaired glucose tolerance 01/15/2014    Fatigue 01/15/2014    Snoring 2015    Primary hypothyroidism 2015    Preop examination 07/15/2015    History of colon polyps 2015    Dysuria 2016    Acute cystitis without hematuria 2016     Past Medical History:   Diagnosis Date    Chronic low back pain     Hypertension     Leukopenia                 PAST SURGICAL HISTORY:    Past Surgical History:   Procedure Laterality Date    BREAST BIOPSY Left     CATARACT EXTRACTION       SECTION, CLASSIC      x3    COLONOSCOPY N/A 2021    Procedure:  COLONOSCOPY;  Surgeon: Parvez Mazariegos MD;  Location: [facility];  Service: Endoscopy;  Laterality: N/A;  covid test 2/23 lapalco, instructions through [facility]    HYSTERECTOMY      total    OOPHORECTOMY      thyroid surgey  2009         FAMILY HISTORY:                Family History   Problem Relation Age of Onset    Hypertension Mother     Hypertension Maternal Aunt     Diabetes Maternal Aunt     Thyroid disease Maternal Aunt     Breast cancer Maternal Aunt 70    Diabetes Maternal Grandmother        SOCIAL HISTORY:          Tobacco:   Social History     Tobacco Use   Smoking Status Never Smoker   Smokeless Tobacco Never Used       alcohol use:    Social History     Substance and Sexual Activity   Alcohol Use No                 Occupation:  Retire; former  in [employer]    ALLERGIES:    Allergies   Allergen Reactions    Ace Inhibitors Edema     Angioedema       CURRENT MEDICATIONS:    Current Outpatient Medications   Medication Sig Dispense Refill    ergocalciferol, vitamin D2, 2,000 unit Tab Take 2,000 Units by mouth 2 (two) times daily.      estradioL (ESTRACE) 0.01 % (0.1 mg/gram) vaginal cream Insert 2 g daily intravaginally for 2 weeks, then 1 g twice weekly 42.5 g 5    hydroCHLOROthiazide (HYDRODIURIL) 12.5 MG Tab Take 1 tablet (12.5 mg total) by mouth once daily. 30 tablet 5    levothyroxine (SYNTHROID) 125 MCG tablet Take 1 tablet (125 mcg total) by mouth before breakfast. 90 tablet 3    metoprolol succinate (TOPROL-XL) 25 MG 24 hr tablet TAKE 1 TABLET(25 MG) BY MOUTH EVERY DAY 90 tablet 1    paroxetine (PAXIL) 10 MG tablet Take 1 tablet (10 mg total) by mouth every morning. For hotflashes 30 tablet 3    verapamiL (VERELAN) 360 MG C24P TAKE 1 CAPSULE(360 MG) BY MOUTH EVERY DAY 90 capsule 1     No current facility-administered medications for this visit.                  REVIEW OF SYSTEMS:     Sleep related symptoms as per HPI.  CONST:Denies weight  gain    HEENT: Denies sinus congestion  PULM: Denies dyspnea  CARD:  + occasional palpitations mainly during the day  GI:  Denies acid reflux  : Denies polyuria  NEURO: Denies headaches  PSYCH: Denies mood disturbance  HEME: Denies anemia   Otherwise, a balance of systems reviewed is negative.          PHYSICAL EXAM:  There were no vitals filed for this visit.  There is no height or weight on file to calculate BMI.     GENERAL: Normal development, well groomed.  No apparent distress.    HEENT:   extra oculomotor is intact  NECK: N/A.  SKIN: On face and neck: No abrasions, no rashes, no lesions.    RESPIRATORY:   Normal chest expansion and non-labored breathing at rest.  CARDIOVASCULAR: n/a    EXTREMITIES: n/a  NEURO/PSYCH: Oriented to time, place and person. Normal attention span and concentration. Affect is full. Mood is normal.                                               DATA   2/25/14 echo  1 - Normal left ventricular systolic function (EF 55-60%).   2 - Mild mitral regurgitation.   3 - Moderate tricuspid regurgitation.   4 - Trivial pulmonic regurgitation.     HST 5/13/15 ahi of 20    Lab Results   Component Value Date    TSH 23.009 (H) 12/17/2021     ASSESSMENT  Problem List Items Addressed This Visit     Nasal congestion    Current Assessment & Plan     Sinus irrigation and nasal steroid         LORRAINE on CPAP    Overview     - ahi of 20  - tolerate apap well initially but having difficulty with machine since 2018.  +nasal congestion per history.  Will optimize nasal congestion and bring back for titration.  - new cpap after titration.    -we discussed at length about Respironics recall.  Patient will register her apap.  Risk and benefits discussed.           Relevant Orders    COVID-19 Routine Screening    CPAP Titration (Must have dx of LORRAINE from previous sleep study.)             Obesity - aware of need for drastic weight loss.  Patient has been exercising with .        Education: During our  discussion today, we talked about the etiology of obstructive sleep apnea as well as the potential ramifications of untreated sleep apnea, which could include daytime sleepiness, hypertension, heart disease and/or stroke.      Precautions: The patient was advised to abstain from driving should they feel sleepy or drowsy.       Patient will No follow-ups on file.       This is 25 minutes visit, over 50% of time spent in direct consultation with patient.

## 2022-02-01 NOTE — PATIENT INSTRUCTIONS
1.  NeilMed Sinus Rinse Regular Kit    Recipe 1/4 teaspoon of salt and 1/4 teaspoon of baking soda in distilled water.  Be sure to tilt head forward as shown in picture.            flonase or nasonex over the counter.  2 sprays per nostril daily. Be sure to tilt head forward when dispensing medication.       Step 1:  Wear the CPAP mask at home while awake for one hour each day. Practice breathing through the mask while watching television, reading, or performing another sedentary activity that keeps your mind off your anxiety.     Step 2: Use the CPAP mask  during scheduled one hour naps at home.     Step 3: Use CPAP mask  during the initial 3-4 hours of nocturnal sleep.     Step 4: Use CPAP mask  through the entire night of sleep.       Call [phone] or [email or web address] to register cpap

## 2022-02-08 ENCOUNTER — TELEPHONE (OUTPATIENT)
Dept: SLEEP MEDICINE | Facility: HOSPITAL | Age: 67
End: 2022-02-08
Payer: MEDICARE

## 2022-02-15 ENCOUNTER — TELEPHONE (OUTPATIENT)
Dept: FAMILY MEDICINE | Facility: CLINIC | Age: 67
End: 2022-02-15
Payer: MEDICARE

## 2022-02-15 NOTE — TELEPHONE ENCOUNTER
Patient refused next available visit, patient refused to see another provider, patient said she was not waiting and disconnected the phone call.

## 2022-02-21 ENCOUNTER — LAB VISIT (OUTPATIENT)
Dept: LAB | Facility: HOSPITAL | Age: 67
End: 2022-02-21
Attending: INTERNAL MEDICINE
Payer: MEDICARE

## 2022-02-21 DIAGNOSIS — E89.0 POSTOPERATIVE HYPOTHYROIDISM: ICD-10-CM

## 2022-02-21 LAB
T4 FREE SERPL-MCNC: 1.3 NG/DL (ref 0.71–1.51)
TSH SERPL DL<=0.005 MIU/L-ACNC: 0.28 UIU/ML (ref 0.4–4)

## 2022-02-21 PROCEDURE — 84439 ASSAY OF FREE THYROXINE: CPT | Performed by: INTERNAL MEDICINE

## 2022-02-21 PROCEDURE — 36415 COLL VENOUS BLD VENIPUNCTURE: CPT | Mod: PO | Performed by: INTERNAL MEDICINE

## 2022-02-21 PROCEDURE — 84443 ASSAY THYROID STIM HORMONE: CPT | Performed by: INTERNAL MEDICINE

## 2022-02-28 ENCOUNTER — PATIENT MESSAGE (OUTPATIENT)
Dept: ENDOCRINOLOGY | Facility: HOSPITAL | Age: 67
End: 2022-02-28
Payer: MEDICARE

## 2022-02-28 DIAGNOSIS — E89.0 POSTOPERATIVE HYPOTHYROIDISM: Primary | ICD-10-CM

## 2022-03-28 ENCOUNTER — PES CALL (OUTPATIENT)
Dept: ADMINISTRATIVE | Facility: CLINIC | Age: 67
End: 2022-03-28
Payer: MEDICARE

## 2022-03-31 ENCOUNTER — PATIENT MESSAGE (OUTPATIENT)
Dept: ADMINISTRATIVE | Facility: OTHER | Age: 67
End: 2022-03-31
Payer: MEDICARE

## 2022-04-03 DIAGNOSIS — I10 ESSENTIAL HYPERTENSION: ICD-10-CM

## 2022-04-03 DIAGNOSIS — R42 DIZZINESS: ICD-10-CM

## 2022-04-03 NOTE — TELEPHONE ENCOUNTER
Care Due:                  Date            Visit Type   Department     Provider  --------------------------------------------------------------------------------                                EP [facility] FAM[facility]                              PRIMARY      MED/ INTERNAL  Last Visit: 05-      CARE ([facility])   MED/ SINA Valadez  Next Visit: None Scheduled  None         None Found                                                            Last  Test          Frequency    Reason                     Performed    Due Date  --------------------------------------------------------------------------------    Office Visit  12 months..  hydroCHLOROthiazide......  05- 05-    CMP.........  12 months..  hydroCHLOROthiazide......  05- 05-    Powered by [employer]. Reference number: [id number].   4/03/2022 3:57:07 PM CDT

## 2022-04-05 RX ORDER — HYDROCHLOROTHIAZIDE 12.5 MG/1
TABLET ORAL
Qty: 30 TABLET | Refills: 5 | Status: SHIPPED | OUTPATIENT
Start: 2022-04-05 | End: 2023-01-09

## 2022-04-05 NOTE — TELEPHONE ENCOUNTER
Refill Routing Note   Medication(s) are not appropriate for processing by [facility] for the following reason(s):      - Required laboratory values are abnormal    [facility] action(s):  Defer Medication-related problems identified:   Requires labs  Requires appointment        Medication reconciliation completed: No     Appointments  past 12m or future 3m with PCP    Date Provider   Last Visit   5/21/2021 Yary Valadez MD   Next Visit   Visit date not found Yary Valadez MD   ED visits in past 90 days: 0        Note composed:10:52 AM 04/05/2022

## 2022-04-06 ENCOUNTER — PATIENT MESSAGE (OUTPATIENT)
Dept: FAMILY MEDICINE | Facility: CLINIC | Age: 67
End: 2022-04-06
Payer: MEDICARE

## 2022-04-20 ENCOUNTER — PATIENT OUTREACH (OUTPATIENT)
Dept: ADMINISTRATIVE | Facility: HOSPITAL | Age: 67
End: 2022-04-20
Payer: MEDICARE

## 2022-04-21 ENCOUNTER — PATIENT MESSAGE (OUTPATIENT)
Dept: PULMONOLOGY | Facility: CLINIC | Age: 67
End: 2022-04-21
Payer: MEDICARE

## 2022-04-28 ENCOUNTER — LAB VISIT (OUTPATIENT)
Dept: LAB | Facility: HOSPITAL | Age: 67
End: 2022-04-28
Attending: INTERNAL MEDICINE
Payer: MEDICARE

## 2022-04-28 DIAGNOSIS — E89.0 POSTOPERATIVE HYPOTHYROIDISM: ICD-10-CM

## 2022-04-28 PROCEDURE — 36415 COLL VENOUS BLD VENIPUNCTURE: CPT | Mod: PO | Performed by: INTERNAL MEDICINE

## 2022-04-28 PROCEDURE — 84443 ASSAY THYROID STIM HORMONE: CPT | Performed by: INTERNAL MEDICINE

## 2022-04-29 LAB — TSH SERPL DL<=0.005 MIU/L-ACNC: 0.86 UIU/ML (ref 0.4–4)

## 2022-05-02 ENCOUNTER — TELEPHONE (OUTPATIENT)
Dept: SLEEP MEDICINE | Facility: HOSPITAL | Age: 67
End: 2022-05-02
Payer: MEDICARE

## 2022-05-02 ENCOUNTER — HOSPITAL ENCOUNTER (OUTPATIENT)
Dept: SLEEP MEDICINE | Facility: HOSPITAL | Age: 67
Discharge: HOME | End: 2022-05-02
Attending: INTERNAL MEDICINE
Payer: MEDICARE

## 2022-05-02 DIAGNOSIS — G47.33 OSA ON CPAP: ICD-10-CM

## 2022-05-02 PROCEDURE — 95811 PR POLYSOMNOGRAPHY W/CPAP: ICD-10-PCS | Mod: 26,,, | Performed by: INTERNAL MEDICINE

## 2022-05-02 PROCEDURE — 95811 POLYSOM 6/>YRS CPAP 4/> PARM: CPT

## 2022-05-02 PROCEDURE — 95811 POLYSOM 6/>YRS CPAP 4/> PARM: CPT | Mod: 26,,, | Performed by: INTERNAL MEDICINE

## 2022-05-03 NOTE — PROGRESS NOTES
End of the night summary    Type of study performed on (Dain Phelps-)  CPAP  ?  Patient education/cpap information prior to study/setup    Pt was informed, of emergency cord in bathroom and given spectra link phone#    EKG Appears to be- NSR    Low spo2 - 93%    Any difficulties recording:  NONE    Optimal pressure# 8 or 9     MASK: nasal pillows MED    Pt reaction to CPAP: pt reports she has use cpap before at home comfortable helps her improve sleep okay to using one for the study    Tech summary Comments:    pt observed titrated on cpap , pt tolerate cpap pressures well, optimal pressure observed, pt slept well no reports of discomfort

## 2022-05-06 ENCOUNTER — PES CALL (OUTPATIENT)
Dept: ADMINISTRATIVE | Facility: CLINIC | Age: 67
End: 2022-05-06
Payer: MEDICARE

## 2022-05-09 ENCOUNTER — PATIENT MESSAGE (OUTPATIENT)
Dept: FAMILY MEDICINE | Facility: CLINIC | Age: 67
End: 2022-05-09
Payer: MEDICARE

## 2022-05-11 ENCOUNTER — OFFICE VISIT (OUTPATIENT)
Dept: FAMILY MEDICINE | Facility: CLINIC | Age: 67
End: 2022-05-11
Payer: MEDICARE

## 2022-05-11 VITALS
RESPIRATION RATE: 16 BRPM | OXYGEN SATURATION: 97 % | WEIGHT: 234.38 LBS | BODY MASS INDEX: 36.79 KG/M2 | TEMPERATURE: 98 F | HEIGHT: 67 IN | SYSTOLIC BLOOD PRESSURE: 136 MMHG | DIASTOLIC BLOOD PRESSURE: 80 MMHG | HEART RATE: 63 BPM

## 2022-05-11 DIAGNOSIS — R06.02 SHORTNESS OF BREATH: ICD-10-CM

## 2022-05-11 DIAGNOSIS — I10 PRIMARY HYPERTENSION: Primary | ICD-10-CM

## 2022-05-11 DIAGNOSIS — E66.01 SEVERE OBESITY (BMI 35.0-39.9) WITH COMORBIDITY: ICD-10-CM

## 2022-05-11 DIAGNOSIS — M25.512 ACUTE PAIN OF LEFT SHOULDER: ICD-10-CM

## 2022-05-11 DIAGNOSIS — Z11.52 ENCOUNTER FOR PREOPERATIVE SCREENING LABORATORY TESTING FOR COVID-19 VIRUS: ICD-10-CM

## 2022-05-11 DIAGNOSIS — Z01.812 ENCOUNTER FOR PREOPERATIVE SCREENING LABORATORY TESTING FOR COVID-19 VIRUS: ICD-10-CM

## 2022-05-11 PROCEDURE — 99214 PR OFFICE/OUTPT VISIT, EST, LEVL IV, 30-39 MIN: ICD-10-PCS | Mod: S$PBB,,, | Performed by: FAMILY MEDICINE

## 2022-05-11 PROCEDURE — 99999 PR PBB SHADOW E&M-EST. PATIENT-LVL III: CPT | Mod: PBBFAC,,, | Performed by: FAMILY MEDICINE

## 2022-05-11 PROCEDURE — 99213 OFFICE O/P EST LOW 20 MIN: CPT | Mod: PBBFAC,PN | Performed by: FAMILY MEDICINE

## 2022-05-11 PROCEDURE — 99214 OFFICE O/P EST MOD 30 MIN: CPT | Mod: S$PBB,,, | Performed by: FAMILY MEDICINE

## 2022-05-11 PROCEDURE — 99999 PR PBB SHADOW E&M-EST. PATIENT-LVL III: ICD-10-PCS | Mod: PBBFAC,,, | Performed by: FAMILY MEDICINE

## 2022-05-11 RX ORDER — ALBUTEROL SULFATE 90 UG/1
2 AEROSOL, METERED RESPIRATORY (INHALATION) EVERY 6 HOURS PRN
Qty: 18 G | Refills: 0 | Status: SHIPPED | OUTPATIENT
Start: 2022-05-11 | End: 2023-05-09

## 2022-05-11 RX ORDER — MELOXICAM 15 MG/1
15 TABLET ORAL DAILY
Qty: 30 EACH | Refills: 0 | Status: SHIPPED | OUTPATIENT
Start: 2022-05-11 | End: 2022-06-10

## 2022-05-11 NOTE — PROGRESS NOTES
Chief Complaint   Patient presents with    Hypertension    Shoulder Pain    Shortness of Breath       HPI  Dain Phelps is a 66 y.o. female with a past medical history in the problem list  below     Patient Active Problem List   Diagnosis    Hypothyroidism    HTN (hypertension)    GERD (gastroesophageal reflux disease)    Proteinuria    Obesity (BMI 30-39.9)    Vitamin D deficiency    LORRAINE on CPAP    Vaginal atrophy    Colon cancer screening    Nasal congestion    Severe obesity (BMI 35.0-39.9) with comorbidity       Presenting for evaluation for elevated blood pressure, shortness of breath and shoulder pain    Shortness of breath  She feels her breathing gets faster when she is walking   She has some dizziness in the morning  She had a negative stress test in 2018  No hx of asthma but it does run in her family  She is walking on the treadmill for 15 min but has just started this  No recent extended air/car travel     Left shoulder pain  She has pain worse with lifting her shoulder  No known injury    ROS  Review of Systems   Constitutional: Negative for chills, diaphoresis, fatigue, fever and unexpected weight change.   HENT: Negative for rhinorrhea, sinus pressure, sore throat and tinnitus.    Eyes: Negative for photophobia and visual disturbance.   Respiratory: Positive for shortness of breath. Negative for cough and wheezing.    Cardiovascular: Negative for chest pain and palpitations.   Gastrointestinal: Negative for abdominal pain, blood in stool, constipation, diarrhea, nausea and vomiting.   Genitourinary: Negative for dysuria, flank pain, frequency and vaginal discharge.   Musculoskeletal: Positive for arthralgias. Negative for joint swelling.   Skin: Negative for rash.   Neurological: Negative for speech difficulty, weakness, light-headedness and headaches.   Psychiatric/Behavioral: Negative for behavioral problems and dysphoric mood.       Physical Exam  Vitals:    05/11/22 1024   BP:  Chief Complaint   Patient presents with   • Establish Care     abnormal EKG, accidental finding at regular checkup, Echo ordered, in chart   • Chest Pain     chest tightness, with exertion, worse with walking, SOB, symptoms resolve after resting. started within the past 6 months.    • Shortness of Breath     with exertion   • Labs     most recent in chart   • Aspirin     does not take a daily aspirin   • Cardiac history     only the EKG and Echo that is in the chart   • Palpitations     2-3 times a week, lasts a few seconds, no other symptoms associated        CARDIAC COMPLAINTS  chest pressure/discomfort, dyspnea, palpitations and Abnormal EKG      Subjective   Jaki Camacho is a 74 y.o. female came in today for her initial cardiac evaluation.  She has history of hypertension, hypercholesterolemia who has been having some problems in the form of chest tightness which occurs mostly with exertion and also recently started noticing some chest tightness at night.  She has to get up and walk around for a few minutes before the symptoms subsides.  She also has been noticing increasing shortness of breath for the last 6 months.  She has been having trouble walking uphill and has to stop in between.  She also has been having some palpitation which occurs 2 or 3 times a week lasting only for few seconds and subsides on its own.  She denies having any dizziness or loss of consciousness.  She denies having any orthopnea.  She was seen at your office recently and the EKG done showed sinus bradycardia.  She is not sure when was the last time she had an EKG done.  She did undergo surgery on her thyroid many years ago.  She also had lab work done at your office which showed blood sugar of 113 normal renal function, normal TSH, cholesterol of 159 and LDL of 84.  She also underwent an echocardiogram which showed normal LV function with questionable septal wall motion abnormality.  She is not a smoker.  Her brother had cancer but  "136/80   Pulse: 63   Resp: 16   Temp: 98.3 °F (36.8 °C)   TempSrc: Oral   SpO2: 97%   Weight: 106.3 kg (234 lb 5.6 oz)   Height: 5' 7" (1.702 m)    Body mass index is 36.7 kg/m².  Weight: 106.3 kg (234 lb 5.6 oz)   Height: 5' 7" (170.2 cm)     Physical Exam  Vitals reviewed.   Constitutional:       General: She is not in acute distress.     Appearance: She is well-developed.   HENT:      Head: Normocephalic and atraumatic.   Cardiovascular:      Rate and Rhythm: Normal rate and regular rhythm.      Heart sounds: No murmur heard.    No friction rub. No gallop.   Pulmonary:      Effort: Pulmonary effort is normal. No respiratory distress.      Breath sounds: Normal breath sounds. No wheezing or rales.   Musculoskeletal:      Left shoulder: Decreased range of motion.      Comments: Left shoulder: no TTP over AC joint  Pain with abduction past 110 degrees, pain with apley maneuver, neg fernandes    Upper ext strength 5/5   Skin:     General: Skin is warm and dry.      Findings: No rash.   Neurological:      Mental Status: She is alert. Mental status is at baseline.   Psychiatric:         Behavior: Behavior normal.         Thought Content: Thought content normal.         ALLERGIES AND MEDICATIONS: updated and reviewed.  Review of patient's allergies indicates:   Allergen Reactions    Ace inhibitors Edema     Angioedema     Medication List with Changes/Refills   New Medications    ALBUTEROL (PROVENTIL/VENTOLIN HFA) 90 MCG/ACTUATION INHALER    Inhale 2 puffs into the lungs every 6 (six) hours as needed for Wheezing.    MELOXICAM (MOBIC) 15 MG TABLET    Take 1 tablet (15 mg total) by mouth once daily.   Current Medications    ERGOCALCIFEROL, VITAMIN D2, 2,000 UNIT TAB    Take 2,000 Units by mouth 2 (two) times daily.    ESTRADIOL (ESTRACE) 0.01 % (0.1 MG/GRAM) VAGINAL CREAM    Insert 2 g daily intravaginally for 2 weeks, then 1 g twice weekly    HYDROCHLOROTHIAZIDE (HYDRODIURIL) 12.5 MG TAB    TAKE 1 TABLET(12.5 MG) BY MOUTH " he also had some kind of heart disease and both her parents  of heart disease and heart failure.    Past Surgical History:   Procedure Laterality Date   • ECHO - CONVERTED  2019    @ [facility]. EF 65%. ? Septal WMA. AO- 3.7 Cm. RVSP- 38 mmHg.       Current Outpatient Medications   Medication Sig Dispense Refill   • esomeprazole (nexIUM) 40 MG capsule Take 40 mg by mouth Every Morning Before Breakfast.     • hydroxychloroquine (PLAQUENIL) 200 MG tablet Take  by mouth 2 (Two) Times a Day.     • lisinopril-hydrochlorothiazide (PRINZIDE,ZESTORETIC) 20-25 MG per tablet Take 1 tablet by mouth Daily.     • meloxicam (MOBIC) 15 MG tablet Take 15 mg by mouth Daily.     • PARoxetine (PAXIL) 20 MG tablet Take 20 mg by mouth Every Morning.     • pravastatin (PRAVACHOL) 10 MG tablet Take 10 mg by mouth Daily.     • vitamin D (ERGOCALCIFEROL) 43472 units capsule capsule Take 50,000 Units by mouth 1 (One) Time Per Week.     • amLODIPine (NORVASC) 5 MG tablet Take 1 tablet by mouth Daily. 30 tablet 11   • dicyclomine (BENTYL) 10 MG capsule Take 1 capsule by mouth every night at bedtime. 30 capsule 8     No current facility-administered medications for this visit.            ALLERGIES:  Patient has no known allergies.    Past Medical History:   Diagnosis Date   • Abnormal ECG    • GERD (gastroesophageal reflux disease)    • History of partial thyroidectomy    • Hyperlipidemia    • Hypertension    • Osteoarthritis    • Vitamin D deficiency        Social History     Tobacco Use   Smoking Status Never Smoker   Smokeless Tobacco Never Used          Family History   Problem Relation Age of Onset   • Heart attack Mother          at 80 with MI   • Heart failure Mother    • Heart attack Father          at 82 with MI   • Heart failure Father    • Valvular heart disease Brother         valve replacement at 67 years old   • Heart disease Brother         CABG    • Cancer Brother    • No Known Problems Daughter        Review of  "Systems   Constitution: Positive for malaise/fatigue. Negative for decreased appetite.   HENT: Negative for congestion and sore throat.    Eyes: Negative for blurred vision.   Cardiovascular: Positive for chest pain, dyspnea on exertion and palpitations.   Respiratory: Positive for shortness of breath. Negative for snoring.    Endocrine: Negative for cold intolerance and heat intolerance.   Hematologic/Lymphatic: Negative for adenopathy. Does not bruise/bleed easily.   Skin: Negative for itching, nail changes and skin cancer.   Musculoskeletal: Negative for arthritis and myalgias.   Gastrointestinal: Negative for abdominal pain, dysphagia and heartburn.   Genitourinary: Negative for bladder incontinence and frequency.   Neurological: Negative for dizziness, light-headedness, seizures and vertigo.   Psychiatric/Behavioral: Negative for altered mental status.   Allergic/Immunologic: Negative for environmental allergies and hives.       Diabetes- No  Thyroid- normal    Objective     /80 (BP Location: Right arm)   Pulse 70   Ht 162.6 cm (64\")   Wt 82.1 kg (181 lb)   BMI 31.07 kg/m²     Physical Exam   Constitutional: She is oriented to person, place, and time. She appears well-developed and well-nourished.   HENT:   Head: Normocephalic.   Nose: Nose normal.   Eyes: EOM are normal. Pupils are equal, round, and reactive to light.   Neck: Normal range of motion. Neck supple.   Cardiovascular: Normal rate, regular rhythm, S1 normal and S2 normal.   Murmur heard.  Pulmonary/Chest: Effort normal and breath sounds normal.   Abdominal: Soft. Bowel sounds are normal.   Musculoskeletal: Normal range of motion. She exhibits no edema.   Neurological: She is alert and oriented to person, place, and time.   Skin: Skin is warm and dry.   Psychiatric: She has a normal mood and affect.         ECG 12 Lead  Date/Time: 5/22/2019 11:50 AM  Performed by: Damari Goyal MD  Authorized by: Damari Goyal MD   Comparison: " EVERY DAY    LEVOTHYROXINE (SYNTHROID) 125 MCG TABLET    Take 1 tablet (125 mcg total) by mouth before breakfast.    METOPROLOL SUCCINATE (TOPROL-XL) 25 MG 24 HR TABLET    TAKE 1 TABLET(25 MG) BY MOUTH EVERY DAY    VERAPAMIL (VERELAN) 360 MG C24P    TAKE 1 CAPSULE(360 MG) BY MOUTH EVERY DAY   Discontinued Medications    PAROXETINE (PAXIL) 10 MG TABLET    Take 1 tablet (10 mg total) by mouth every morning. For hotflashes       Assessment & Plan  1. Primary hypertension    2. Shortness of breath    3. Encounter for preoperative screening laboratory testing for COVID-19 virus    4. Severe obesity (BMI 35.0-39.9) with comorbidity    5. Acute pain of left shoulder        Problem List Items Addressed This Visit        Cardiac/Vascular    HTN (hypertension) - Primary  Stable on current regimen       Endocrine    Severe obesity (BMI 35.0-39.9) with comorbidity  Encourage exercise as tolerated      Other Visit Diagnoses     Shortness of breath      Consider poor exercise tolerance vs reactive airway dz  If workup negative will consider cardiac workup    Relevant Medications    albuterol (PROVENTIL/VENTOLIN HFA) 90 mcg/actuation inhaler    Other Relevant Orders    Complete PFT w/ bronchodilator    Encounter for preoperative screening laboratory testing for COVID-19 virus      For testing pre pft    Relevant Orders    Complete PFT w/ bronchodilator    COVID-19 Routine Screening    Acute pain of left shoulder      Home exercises given  Consider imaging vs PT if no improvement    Relevant Medications    meloxicam (MOBIC) 15 MG tablet          Follow up in about 6 weeks (around 6/22/2022) for management of chronic conditions.    Other Orders Placed This Visit:  Orders Placed This Encounter   Procedures    COVID-19 Routine Screening    Complete PFT w/ bronchodilator                    compared with previous ECG from 4/22/2019  Similar to previous ECG  Rhythm: sinus rhythm  Rate: normal  Conduction: left bundle branch block  QRS axis: normal    Clinical impression: abnormal EKG              Assessment/Plan   Patient's Body mass index is 31.07 kg/m². BMI is above normal parameters. Recommendations include: educational material, exercise counseling and nutrition counseling.     Jaki was seen today for establish care, chest pain, shortness of breath, labs, aspirin, cardiac history and palpitations.    Diagnoses and all orders for this visit:    LBBB (left bundle branch block)  -     Stress Test With Myocardial Perfusion One Day; Future    Chest tightness  -     Stress Test With Myocardial Perfusion One Day; Future    Essential hypertension  -     amLODIPine (NORVASC) 5 MG tablet; Take 1 tablet by mouth Daily.    Hypercholesteremia    Metabolic syndrome    Palpitations    Chest pain due to GERD  -     dicyclomine (BENTYL) 10 MG capsule; Take 1 capsule by mouth every night at bedtime.       At baseline her heart rate is stable but the blood pressure is mildly elevated.  EKG showed sinus rhythm with left bundle branch block, normal [place] interval and normal QTC.  Her clinical examination reveals slightly elevated BMI of 31.  She does have short systolic murmur at the mitral area.  I had a long talk with her and with her daughter.  I explained to them in detail about left bundle branch block.  I explained to them about the possibility of abnormal echo could be related to that.  At this time I cannot rule out ischemic heart disease.  Since her blood pressure is elevated, I started her on amlodipine 5 mg once a day.  Though ideally I would have started her on a beta-blockers, given her underlying left bundle branch block I like to avoid it.  Regarding the chest tightness it could be from the GERD so I started her on Bentyl 10 mg nightly.  I did schedule her to undergo a stress test in the form of Lexiscan to  rule out ischemic heart disease.  Her cholesterol seems to be well controlled on low-dose of Pravachol so we will continue the same.  Regarding the palpitation if it gets worse, we can put a Holter monitor.  Regarding the metabolic syndrome I talked to her about cutting down on the carbohydrate intake and I gave her papers on Mediterranean diet.  Based on the stress test, further recommendations will be made.             Electronically signed by Damari Goyal MD May 22, 2019 11:42 AM

## 2022-05-12 ENCOUNTER — TELEPHONE (OUTPATIENT)
Dept: PULMONOLOGY | Facility: CLINIC | Age: 67
End: 2022-05-12
Payer: MEDICARE

## 2022-05-12 DIAGNOSIS — G47.33 OSA (OBSTRUCTIVE SLEEP APNEA): Primary | ICD-10-CM

## 2022-05-12 NOTE — PROCEDURES
"Dear Provider,     You have ordered sleep LAB services to perform the sleep study for Dain Phelps.  The sleep study that you ordered is complete.      Please find Sleep Study result in "Chart Review" under the "Media tab."      As the ordering provider, you are responsible for reviewing the results and implementing a treatment plan with your patient.    If you need a Sleep Medicine provider to explain the sleep study findings and arrange treatment for the patient, please refer patient for consultation to our Sleep Clinic via [facility] with Ambulatory Consult Sleep.    To do that please place an order for an  "Ambulatory Consult Sleep" - it will go to our clinic work queue for our Medical Assistant to contact the patient for an appointment.     For any questions, please contact our clinic staff at [phone] to talk to clinical staff.   "

## 2022-05-13 ENCOUNTER — TELEPHONE (OUTPATIENT)
Dept: PULMONOLOGY | Facility: CLINIC | Age: 67
End: 2022-05-13
Payer: MEDICARE

## 2022-05-13 NOTE — TELEPHONE ENCOUNTER
Sent message to patients portal.            ----- Message from Ruy Stone MA sent at 5/12/2022  8:14 AM CDT -----  Please let patient know that the titration went well and I would like to set patient up with cpap via dme of choice.  Due nationwide CPAP shortage, advise patient to expect a call from in 2 months.  Clinic follow up with md/np in approximately 8 weeks after cpap usage.

## 2022-05-24 ENCOUNTER — TELEPHONE (OUTPATIENT)
Dept: FAMILY MEDICINE | Facility: CLINIC | Age: 67
End: 2022-05-24
Payer: MEDICARE

## 2022-05-24 NOTE — TELEPHONE ENCOUNTER
----- Message from India Mai sent at 5/24/2022  3:58 PM CDT -----  Regarding: self  .Type: Patient Call Back    Who called self     What is the request in detail:  Pt is requesting to r/s PFT to 06-06    Can the clinic reply by [facility]? Call back     Would the patient rather a call back or a response via [facility]?  Call back     Best call back number: [phone]

## 2022-05-30 ENCOUNTER — PES CALL (OUTPATIENT)
Dept: ADMINISTRATIVE | Facility: CLINIC | Age: 67
End: 2022-05-30
Payer: MEDICARE

## 2022-05-31 ENCOUNTER — NURSE TRIAGE (OUTPATIENT)
Dept: ADMINISTRATIVE | Facility: CLINIC | Age: 67
End: 2022-05-31
Payer: MEDICARE

## 2022-05-31 NOTE — TELEPHONE ENCOUNTER
Patient says on yesterday, 5/30/22, she felt onset of  dizziness, lightheadedness  and nausea. Patient states her blood pressure reading at time of onset of symptoms was 180/98. Patient states that currently her blood pressure reading is now WNL and is reading 127/77, and  pulse 46 (?). Patient c/o lightheadedness and nausea at this time and states she has discussed her bouts of lightheadedness with her PCP, Dr. Valadez approximately 1 week ago. Patient states her PCP has referred her for a Breathing Test (PST) for evaluation for lightheadedness.     Care Advice given per Dizziness (Adult) Guideline. Patient advised to see PCP within 24 hours or visit Urgent Care Center for assessment and treatment. Patient stated understanding of Care Advice provided and cited no additional concerns at this time.    Reason for Disposition   Taking a medicine that could cause dizziness (e.g., blood pressure medications, diuretics)    Additional Information   Negative: SEVERE difficulty breathing (e.g., struggling for each breath, speaks in single words)   Negative: [1] Difficulty breathing or swallowing AND [2] started suddenly after medicine, an allergic food or bee sting   Negative: Shock suspected (e.g., cold/pale/clammy skin, too weak to stand, low BP, rapid pulse)   Negative: Difficult to awaken or acting confused (e.g., disoriented, slurred speech)   Negative: [1] Weakness (i.e., paralysis, loss of muscle strength) of the face, arm or leg on one side of the body AND [2] sudden onset AND [3] present now   Negative: [1] Numbness (i.e., loss of sensation) of the face, arm or leg on one side of the body AND [2] sudden onset AND [3] present now   Negative: [1] Loss of speech or garbled speech AND [2] sudden onset AND [3] present now   Negative: Overdose (accidental or intentional) of medications   Negative: [1] Fainted > 15 minutes ago AND [2] still feels too weak or dizzy to stand   Negative: Heart beating < 50 beats per  "minute OR > 140 beats per minute   Negative: Sounds like a life-threatening emergency to the triager   Negative: Chest pain   Negative: Rectal bleeding, bloody stool, or tarry-black stool   Negative: [1] Vomiting AND [2] contains red blood or black ("coffee ground") material   Negative: Vomiting is main symptom   Negative: Diarrhea is main symptom   Negative: Headache is main symptom   Negative: Patient states that they are having an anxiety or panic attack   Negative: Dizziness from low blood sugar (i.e., < 60 mg/dl or 3.5 mmol/l)   Negative: Dizziness is described as a spinning sensation (i.e., vertigo)   Negative: Heat exhaustion suspected (i.e., dehydration from heat exposure)   Negative: Difficulty breathing   Negative: SEVERE dizziness (e.g., unable to stand, requires support to walk, feels like passing out now)   Negative: Extra heart beats OR irregular heart beating  (i.e., "palpitations")   Negative: [1] Drinking very little AND [2] dehydration suspected (e.g., no urine > 12 hours, very dry mouth, very lightheaded)   Negative: [1] Weakness (i.e., paralysis, loss of muscle strength) of the face, arm / hand, or leg / foot on one side of the body AND [2] sudden onset AND [3] brief (now gone)   Negative: [1] Numbness (i.e., loss of sensation) of the face, arm / hand, or leg / foot on one side of the body AND [2] sudden onset AND [3] brief (now gone)   Negative: [1] Loss of speech or garbled speech AND [2] sudden onset AND [3] brief (now gone)   Negative: Loss of vision or double vision (Exception: similar to previous migraines)   Negative: Patient sounds very sick or weak to the triager   Negative: [1] Dizziness caused by heat exposure, sudden standing, or poor fluid intake AND [2] no improvement after 2 hours of rest and fluids   Negative: [1] Fever > 100.0 F (37.8 C) AND [2] diabetes mellitus or weak immune system (e.g., HIV positive, cancer chemo, splenectomy, organ transplant, chronic " steroids)   Negative: [1] Fever > 103 F (39.4 C) AND [2] not able to get the fever down using Fever Care Advice   Negative: [1] Fever > 101 F (38.3 C) AND [2] age > 60 years   Negative: [1] Fever > 100.0 F (37.8 C) AND [2] bedridden (e.g., nursing home patient, CVA, chronic illness, recovering from surgery)   Negative: [1] MODERATE dizziness (e.g., interferes with normal activities) AND [2] has NOT been evaluated by physician for this  (Exception: dizziness caused by heat exposure, sudden standing, or poor fluid intake)   Negative: Fever present > 3 days (72 hours)    Protocols used: DIZZINESS - [id number]

## 2022-06-01 NOTE — TELEPHONE ENCOUNTER
The patient was scheduled on yesterday to see Yuval on 6/3/22. I only sent the message as an FYI just in cause you wanted to know what was going on with the patient.

## 2022-06-03 ENCOUNTER — TELEPHONE (OUTPATIENT)
Dept: FAMILY MEDICINE | Facility: CLINIC | Age: 67
End: 2022-06-03

## 2022-06-03 ENCOUNTER — OFFICE VISIT (OUTPATIENT)
Dept: FAMILY MEDICINE | Facility: CLINIC | Age: 67
End: 2022-06-03
Payer: MEDICARE

## 2022-06-03 VITALS
OXYGEN SATURATION: 98 % | WEIGHT: 233.44 LBS | TEMPERATURE: 99 F | BODY MASS INDEX: 36.64 KG/M2 | RESPIRATION RATE: 18 BRPM | SYSTOLIC BLOOD PRESSURE: 112 MMHG | DIASTOLIC BLOOD PRESSURE: 69 MMHG | HEIGHT: 67 IN | HEART RATE: 56 BPM

## 2022-06-03 DIAGNOSIS — R01.1 SYSTOLIC MURMUR: ICD-10-CM

## 2022-06-03 DIAGNOSIS — I10 PRIMARY HYPERTENSION: ICD-10-CM

## 2022-06-03 DIAGNOSIS — R06.09 DYSPNEA ON EXERTION: Primary | ICD-10-CM

## 2022-06-03 DIAGNOSIS — R42 DIZZINESS: ICD-10-CM

## 2022-06-03 PROCEDURE — 93005 ELECTROCARDIOGRAM TRACING: CPT | Mod: PBBFAC,PN | Performed by: INTERNAL MEDICINE

## 2022-06-03 PROCEDURE — 99215 OFFICE O/P EST HI 40 MIN: CPT | Mod: PBBFAC,PN | Performed by: NURSE PRACTITIONER

## 2022-06-03 PROCEDURE — 99214 OFFICE O/P EST MOD 30 MIN: CPT | Mod: S$PBB,,, | Performed by: NURSE PRACTITIONER

## 2022-06-03 PROCEDURE — 99999 PR PBB SHADOW E&M-EST. PATIENT-LVL V: ICD-10-PCS | Mod: PBBFAC,,, | Performed by: NURSE PRACTITIONER

## 2022-06-03 PROCEDURE — 93010 ELECTROCARDIOGRAM REPORT: CPT | Mod: S$PBB,,, | Performed by: INTERNAL MEDICINE

## 2022-06-03 PROCEDURE — 99999 PR PBB SHADOW E&M-EST. PATIENT-LVL V: CPT | Mod: PBBFAC,,, | Performed by: NURSE PRACTITIONER

## 2022-06-03 PROCEDURE — 93010 EKG 12-LEAD: ICD-10-PCS | Mod: S$PBB,,, | Performed by: INTERNAL MEDICINE

## 2022-06-03 PROCEDURE — 99214 PR OFFICE/OUTPT VISIT, EST, LEVL IV, 30-39 MIN: ICD-10-PCS | Mod: S$PBB,,, | Performed by: NURSE PRACTITIONER

## 2022-06-03 NOTE — TELEPHONE ENCOUNTER
----- Message from Isma Akhtar sent at 6/3/2022  3:06 PM CDT -----  Regarding: request to reschedule PFT test  Type: Patient Call Back    Who called:Dain     What is the request in detail: the patient is requesting to reschedule her PFT test. She missed it accidentally on 5/25. Please return call to schedule at earliest convenience.    Can the clinic reply by [facility]?no     Would the patient rather a call back or a response via [facility]? Call back     Best call back number:[phone]

## 2022-06-03 NOTE — PROGRESS NOTES
Routine Office Visit    Patient Name: Dain Phelps    : 1955  MRN: [id number]    Chief Complaint:  Dizziness    Subjective:  Dain is a 66 y.o. female who presents today for:    1. Dizziness - patient who is new to me with a history of obesity, hypertension, hypothyroidism reports today for evaluation.  She reports having intermittent dizzy spells and lightheadedness over the last year.  She states normally the dizziness and lightheadedness happen only for less than a minute at a time when she would jerk her head or look up after leaning down.  Earlier this week she had a serious episode of this where the room was spinning.  She denies any exertional chest pain  but does report tachycardia and shortness of breath with exertion.  Denies any orthopnea, leg swelling, or PND.  She did see her PCP for this problem earlier this year and was set up for a PFT which has not been done yet but is scheduled.  She has had grossly negative cardiac workup in the past.    She has a history of a thyroidectomy and recent TSH was stable on her current dose of Synthroid.  She is compliant with her blood pressure medications.    Past Medical History  Past Medical History:   Diagnosis Date    Chronic low back pain     Fatigue     GERD (gastroesophageal reflux disease)     History of colon polyps 2015    Hypertension     Hypothyroidism     Impaired glucose tolerance 1/15/2014    Leukopenia     Obesity (BMI 30-39.9) 1/15/2014    Primary hypothyroidism 2015    Snoring 2015    Vitamin D deficiency 1/15/2014       Past Surgical History  Past Surgical History:   Procedure Laterality Date    BREAST BIOPSY Left     CATARACT EXTRACTION       SECTION, CLASSIC      x3    COLONOSCOPY N/A 2021    Procedure: COLONOSCOPY;  Surgeon: Parvez Mazariegos MD;  Location: [facility];  Service: Endoscopy;  Laterality: N/A;  covid test  [facility]alco, instructions through [facility]    HYSTERECTOMY       total    OOPHORECTOMY      thyroid surgey  2009       Family History  Family History   Problem Relation Age of Onset    Hypertension Mother     Hypertension Maternal Aunt     Diabetes Maternal Aunt     Thyroid disease Maternal Aunt     Breast cancer Maternal Aunt 70    Diabetes Maternal Grandmother        Social History  Social History     Socioeconomic History    Marital status:    Occupational History     Comment: retired   Tobacco Use    Smoking status: Never Smoker    Smokeless tobacco: Never Used   Substance and Sexual Activity    Alcohol use: No    Drug use: No    Sexual activity: Not Currently     Partners: Male       Current Medications  Current Outpatient Medications on File Prior to Visit   Medication Sig Dispense Refill    albuterol (PROVENTIL/VENTOLIN HFA) 90 mcg/actuation inhaler Inhale 2 puffs into the lungs every 6 (six) hours as needed for Wheezing. 18 g 0    ergocalciferol, vitamin D2, 2,000 unit Tab Take 2,000 Units by mouth 2 (two) times daily.      estradioL (ESTRACE) 0.01 % (0.1 mg/gram) vaginal cream Insert 2 g daily intravaginally for 2 weeks, then 1 g twice weekly 42.5 g 5    hydroCHLOROthiazide (HYDRODIURIL) 12.5 MG Tab TAKE 1 TABLET(12.5 MG) BY MOUTH EVERY DAY 30 tablet 5    levothyroxine (SYNTHROID) 125 MCG tablet Take 1 tablet (125 mcg total) by mouth before breakfast. 90 tablet 3    meloxicam (MOBIC) 15 MG tablet Take 1 tablet (15 mg total) by mouth once daily. 30 each 0    metoprolol succinate (TOPROL-XL) 25 MG 24 hr tablet TAKE 1 TABLET(25 MG) BY MOUTH EVERY DAY 90 tablet 1    verapamiL (VERELAN) 360 MG C24P TAKE 1 CAPSULE(360 MG) BY MOUTH EVERY DAY 90 capsule 1     No current facility-administered medications on file prior to visit.       Allergies   Review of patient's allergies indicates:   Allergen Reactions    Ace inhibitors Edema     Angioedema       Review of Systems (Pertinent positives)  Review of Systems   Constitutional: Negative.  Negative for  "chills, fever and weight loss.   HENT: Negative for congestion, ear discharge, ear pain, hearing loss, nosebleeds, sinus pain, sore throat and tinnitus.    Eyes: Negative.    Respiratory: Positive for shortness of breath. Negative for cough, hemoptysis, sputum production, wheezing and stridor.    Cardiovascular: Positive for palpitations. Negative for chest pain, orthopnea, claudication, leg swelling and PND.   Gastrointestinal: Negative.    Genitourinary: Negative.    Musculoskeletal: Negative.    Skin: Negative.    Neurological: Positive for dizziness. Negative for tingling, tremors, sensory change, speech change, focal weakness, seizures, loss of consciousness, weakness and headaches.   Endo/Heme/Allergies: Negative.    Psychiatric/Behavioral: Negative.        /69 (BP Location: Right arm, Patient Position: Sitting, BP Method: Large (Manual))   Pulse (!) 56   Temp 98.5 °F (36.9 °C) (Oral)   Resp 18   Ht 5' 7" (1.702 m)   Wt 105.9 kg (233 lb 7.5 oz)   SpO2 98%   BMI 36.57 kg/m²     Physical Exam  Vitals reviewed.   Constitutional:       General: She is not in acute distress.     Appearance: Normal appearance. She is obese. She is not ill-appearing, toxic-appearing or diaphoretic.   HENT:      Head: Normocephalic and atraumatic.   Eyes:      Extraocular Movements: Extraocular movements intact.      Conjunctiva/sclera: Conjunctivae normal.      Pupils: Pupils are equal, round, and reactive to light.   Cardiovascular:      Rate and Rhythm: Normal rate and regular rhythm.  No extrasystoles are present.     Pulses: Normal pulses.      Heart sounds: Murmur heard.    Systolic murmur is present with a grade of 2/6.   No diastolic murmur is present.    No friction rub. No gallop. No S3 or S4 sounds.   Pulmonary:      Effort: Pulmonary effort is normal.      Breath sounds: Normal breath sounds.   Abdominal:      General: Bowel sounds are normal. There is no distension.      Palpations: Abdomen is soft. There is " no mass.      Tenderness: There is no abdominal tenderness.   Musculoskeletal:      Cervical back: Normal range of motion and neck supple.      Right lower leg: No edema.      Left lower leg: No edema.   Skin:     General: Skin is warm and dry.      Capillary Refill: Capillary refill takes less than 2 seconds.   Neurological:      General: No focal deficit present.      Mental Status: She is alert and oriented to person, place, and time.      Comments: Negative Reginald-Hallpike maneuver bilaterally, although patient did report some dizziness when going from a lying to sitting position in the clinic   Psychiatric:         Mood and Affect: Mood normal.         Behavior: Behavior normal.        Orthostatic VS:  Lying-/70 mmHg, pulse 55  Sitting-/69 mmHg, pulse 58  Standing-/72 mmHg, pulse 64    Assessment/Plan:  Dain Phelps is a 66 y.o. female who presents today for :    Diagnoses and all orders for this visit:    Dyspnea on exertion  -     IN OFFICE EKG 12-LEAD (to [place])  -     Ambulatory referral/consult to Cardiology; Future    Dizziness  -     Orthostatic vital signs    Systolic murmur  -     IN OFFICE EKG 12-LEAD (to [facility])  -     COMPREHENSIVE METABOLIC PANEL; Future  -     Lipid Panel; Future    Primary hypertension  -     COMPREHENSIVE METABOLIC PANEL; Future  -     Lipid Panel; Future  -     CBC W/ AUTO DIFFERENTIAL; Future    Orthostatic vital signs negative.  Negative Reginald-Hallpike testing.  Patient has a soft systolic murmur heard best at right ICS.  She states that she may have been told she had a murmur in the past, however I am not able to see with this was documented before.  EKG done shows sinus bradycardia - maybe this is a cause of the dizziness?  Will check labs including CBC and lipid panel to stratify cardiovascular risk.  Strongly recommended referral to Cardiology which patient is agreeable to.  Will await Cardiology evaluation.  She should go to the emergency room for any  acutely worsening symptoms.  Will have patient follow-up based on lab results.  All questions answered.  Patient verbalized understanding of instructions.        This office note has been dictated.  This dictation has been generated using M-Modal Fluency Direct dictation; some phonetic errors may occur.   My collaborating physician is Dr. Buck Benavidez.

## 2022-06-06 ENCOUNTER — PATIENT MESSAGE (OUTPATIENT)
Dept: FAMILY MEDICINE | Facility: CLINIC | Age: 67
End: 2022-06-06
Payer: MEDICARE

## 2022-06-06 ENCOUNTER — PATIENT MESSAGE (OUTPATIENT)
Dept: PULMONOLOGY | Facility: CLINIC | Age: 67
End: 2022-06-06
Payer: MEDICARE

## 2022-06-07 ENCOUNTER — LAB VISIT (OUTPATIENT)
Dept: LAB | Facility: HOSPITAL | Age: 67
End: 2022-06-07
Attending: NURSE PRACTITIONER
Payer: MEDICARE

## 2022-06-07 DIAGNOSIS — I10 PRIMARY HYPERTENSION: ICD-10-CM

## 2022-06-07 DIAGNOSIS — R01.1 SYSTOLIC MURMUR: ICD-10-CM

## 2022-06-07 LAB
ALBUMIN SERPL BCP-MCNC: 3.4 G/DL (ref 3.5–5.2)
ALP SERPL-CCNC: 96 U/L (ref 55–135)
ALT SERPL W/O P-5'-P-CCNC: 28 U/L (ref 10–44)
ANION GAP SERPL CALC-SCNC: 11 MMOL/L (ref 8–16)
AST SERPL-CCNC: 17 U/L (ref 10–40)
BASOPHILS # BLD AUTO: 0.02 K/UL (ref 0–0.2)
BASOPHILS NFR BLD: 0.4 % (ref 0–1.9)
BILIRUB SERPL-MCNC: 0.5 MG/DL (ref 0.1–1)
BUN SERPL-MCNC: 17 MG/DL (ref 8–23)
CALCIUM SERPL-MCNC: 8.8 MG/DL (ref 8.7–10.5)
CHLORIDE SERPL-SCNC: 107 MMOL/L (ref 95–110)
CHOLEST SERPL-MCNC: 173 MG/DL (ref 120–199)
CHOLEST/HDLC SERPL: 3.7 {RATIO} (ref 2–5)
CO2 SERPL-SCNC: 23 MMOL/L (ref 23–29)
CREAT SERPL-MCNC: 0.7 MG/DL (ref 0.5–1.4)
DIFFERENTIAL METHOD: ABNORMAL
EOSINOPHIL # BLD AUTO: 0.1 K/UL (ref 0–0.5)
EOSINOPHIL NFR BLD: 1.4 % (ref 0–8)
ERYTHROCYTE [DISTWIDTH] IN BLOOD BY AUTOMATED COUNT: 13.7 % (ref 11.5–14.5)
EST. GFR  (AFRICAN AMERICAN): >60 ML/MIN/1.73 M^2
EST. GFR  (NON AFRICAN AMERICAN): >60 ML/MIN/1.73 M^2
GLUCOSE SERPL-MCNC: 82 MG/DL (ref 70–110)
HCT VFR BLD AUTO: 40.2 % (ref 37–48.5)
HDLC SERPL-MCNC: 47 MG/DL (ref 40–75)
HDLC SERPL: 27.2 % (ref 20–50)
HGB BLD-MCNC: 12.7 G/DL (ref 12–16)
IMM GRANULOCYTES # BLD AUTO: 0.01 K/UL (ref 0–0.04)
IMM GRANULOCYTES NFR BLD AUTO: 0.2 % (ref 0–0.5)
LDLC SERPL CALC-MCNC: 111.2 MG/DL (ref 63–159)
LYMPHOCYTES # BLD AUTO: 1.8 K/UL (ref 1–4.8)
LYMPHOCYTES NFR BLD: 37.2 % (ref 18–48)
MCH RBC QN AUTO: 28.9 PG (ref 27–31)
MCHC RBC AUTO-ENTMCNC: 31.6 G/DL (ref 32–36)
MCV RBC AUTO: 91 FL (ref 82–98)
MONOCYTES # BLD AUTO: 0.4 K/UL (ref 0.3–1)
MONOCYTES NFR BLD: 7.5 % (ref 4–15)
NEUTROPHILS # BLD AUTO: 2.6 K/UL (ref 1.8–7.7)
NEUTROPHILS NFR BLD: 53.3 % (ref 38–73)
NONHDLC SERPL-MCNC: 126 MG/DL
NRBC BLD-RTO: 0 /100 WBC
PLATELET # BLD AUTO: 224 K/UL (ref 150–450)
PMV BLD AUTO: 11.5 FL (ref 9.2–12.9)
POTASSIUM SERPL-SCNC: 4.2 MMOL/L (ref 3.5–5.1)
PROT SERPL-MCNC: 7 G/DL (ref 6–8.4)
RBC # BLD AUTO: 4.4 M/UL (ref 4–5.4)
SODIUM SERPL-SCNC: 141 MMOL/L (ref 136–145)
TRIGL SERPL-MCNC: 74 MG/DL (ref 30–150)
WBC # BLD AUTO: 4.92 K/UL (ref 3.9–12.7)

## 2022-06-07 PROCEDURE — 80053 COMPREHEN METABOLIC PANEL: CPT | Performed by: NURSE PRACTITIONER

## 2022-06-07 PROCEDURE — 36415 COLL VENOUS BLD VENIPUNCTURE: CPT | Mod: PN | Performed by: NURSE PRACTITIONER

## 2022-06-07 PROCEDURE — 80061 LIPID PANEL: CPT | Performed by: NURSE PRACTITIONER

## 2022-06-07 PROCEDURE — 85025 COMPLETE CBC W/AUTO DIFF WBC: CPT | Performed by: NURSE PRACTITIONER

## 2022-06-08 ENCOUNTER — TELEPHONE (OUTPATIENT)
Dept: ADMINISTRATIVE | Facility: CLINIC | Age: 67
End: 2022-06-08
Payer: MEDICARE

## 2022-06-08 ENCOUNTER — PATIENT MESSAGE (OUTPATIENT)
Dept: ADMINISTRATIVE | Facility: CLINIC | Age: 67
End: 2022-06-08
Payer: MEDICARE

## 2022-06-10 ENCOUNTER — TELEPHONE (OUTPATIENT)
Dept: ADMINISTRATIVE | Facility: CLINIC | Age: 67
End: 2022-06-10
Payer: MEDICARE

## 2022-06-10 ENCOUNTER — OFFICE VISIT (OUTPATIENT)
Dept: FAMILY MEDICINE | Facility: CLINIC | Age: 67
End: 2022-06-10
Payer: MEDICARE

## 2022-06-10 VITALS — HEIGHT: 67 IN | WEIGHT: 233.44 LBS | BODY MASS INDEX: 36.64 KG/M2

## 2022-06-10 DIAGNOSIS — I10 HYPERTENSION, UNSPECIFIED TYPE: ICD-10-CM

## 2022-06-10 DIAGNOSIS — G47.33 OSA ON CPAP: ICD-10-CM

## 2022-06-10 DIAGNOSIS — Z00.00 ENCOUNTER FOR PREVENTIVE HEALTH EXAMINATION: Primary | ICD-10-CM

## 2022-06-10 DIAGNOSIS — M25.512 ACUTE PAIN OF LEFT SHOULDER: ICD-10-CM

## 2022-06-10 DIAGNOSIS — E66.01 SEVERE OBESITY (BMI 35.0-39.9) WITH COMORBIDITY: ICD-10-CM

## 2022-06-10 DIAGNOSIS — E55.9 VITAMIN D DEFICIENCY: ICD-10-CM

## 2022-06-10 DIAGNOSIS — N95.2 VAGINAL ATROPHY: ICD-10-CM

## 2022-06-10 DIAGNOSIS — E89.0 POSTOPERATIVE HYPOTHYROIDISM: ICD-10-CM

## 2022-06-10 PROBLEM — R09.81 NASAL CONGESTION: Status: RESOLVED | Noted: 2022-02-01 | Resolved: 2022-06-10

## 2022-06-10 PROBLEM — Z12.11 COLON CANCER SCREENING: Status: RESOLVED | Noted: 2021-02-26 | Resolved: 2022-06-10

## 2022-06-10 PROCEDURE — G0439 PR MEDICARE ANNUAL WELLNESS SUBSEQUENT VISIT: ICD-10-PCS | Mod: 95,,, | Performed by: NURSE PRACTITIONER

## 2022-06-10 PROCEDURE — G0439 PPPS, SUBSEQ VISIT: HCPCS | Mod: 95,,, | Performed by: NURSE PRACTITIONER

## 2022-06-10 NOTE — PATIENT INSTRUCTIONS
Counseling and Referral of Other Preventative  (Italic type indicates deductible and co-insurance are waived)    Patient Name: Dain Phelps  Today's Date: 6/10/2022    Health Maintenance       Date Due Completion Date    DEXA Scan 05/13/2022 5/13/2019    Mammogram 07/14/2022 7/14/2021    Shingles Vaccine (1 of 2) 07/01/2022 (Originally 9/28/2005) ---    Pneumococcal Vaccines (Age 65+) (2 - PPSV23 or PCV20) 07/01/2022 (Originally 12/7/2021) 12/7/2020    COVID-19 Vaccine (4 - Booster for Moderna series) 07/08/2022 (Originally 3/5/2022) 11/5/2021    Lipid Panel 06/07/2027 6/7/2022    Colorectal Cancer Screening 02/26/2028 2/26/2021    TETANUS VACCINE 12/11/2029 12/11/2019    Override on 4/7/2017: Declined        No orders of the defined types were placed in this encounter.    The following information is provided to all patients.  This information is to help you find resources for any of the problems found today that may be affecting your health:                Living healthy guide: www.[facility].[place].gov      Understanding Diabetes: www.diabetes.org      Eating healthy: www.cdc.gov/healthyweight      CDC home safety checklist: www.cdc.gov/steadi/patient.html      Agency on Aging: www.goea.[place].[facility]      Alcoholics anonymous (AA): www.aa.org      Physical Activity: www.almaz.nih.gov/jr3erii      Tobacco use: www.quitwithusla.org

## 2022-06-10 NOTE — TELEPHONE ENCOUNTER
Called pt; informed pt I was just making a reminder call for her virtual visit today at 9:00am and to see if she needed any help; pt stated she didn't need any help; pt informed to login 15 minutes prior to appt time

## 2022-06-10 NOTE — PROGRESS NOTES
"The patient location is: [place]  The chief complaint leading to consultation is: Medicare AWV  Visit type: audiovisual  Face to Face time with patient: 39 minutes  60 minutes of total time spent on the encounter, which includes face to face time and non-face to face time preparing to see the patient (eg, review of tests), Obtaining and/or reviewing separately obtained history, Documenting clinical information in the electronic or other health record, Independently interpreting results (not separately reported) and communicating results to the patient/family/caregiver, or Care coordination (not separately reported).     Each patient to whom he or she provides medical services by telemedicine is:  (1) informed of the relationship between the physician and patient and the respective role of any other health care provider with respect to management of the patient; and (2) notified that he or she may decline to receive medical services by telemedicine and may withdraw from such care at any time.    Dain Phelps presented for a  Medicare AWV and comprehensive Health Risk Assessment today. The following components were reviewed and updated:    · Medical history  · Family History  · Social history  · Allergies and Current Medications  · Health Risk Assessment  · Health Maintenance  · Care Team         ** See Completed Assessments for Annual Wellness Visit within the encounter summary.**         The following assessments were completed:  · Living Situation  · CAGE  · Depression Screening  · Fall Risk Assessment (MACH 10)  · Hearing Assessment(HHI)  · Cognitive Function Screening  · Nutrition Screening  · ADL Screening  · PAQ Screening      Vitals:    06/10/22 0907   Weight: 105.9 kg (233 lb 7.5 oz)   Height: 5' 7" (1.702 m)     Body mass index is 36.57 kg/m².     Physical Exam  Constitutional:       General: She is not in acute distress.     Appearance: Normal appearance. She is well-developed and well-groomed.   HENT: "      Head: Normocephalic.   Pulmonary:      Effort: Pulmonary effort is normal. No respiratory distress.   Skin:     Coloration: Skin is not pale.   Neurological:      Mental Status: She is alert and oriented to person, place, and time.   Psychiatric:         Attention and Perception: Attention normal.         Mood and Affect: Mood and affect normal.         Speech: Speech normal.         Behavior: Behavior normal. Behavior is cooperative.     Physical exam limited d/t virtual visit. Patient does not appear to be in any respiratory distress. Able to speak in complete sentences without becoming short of breath.        Diagnoses and health risks identified today and associated recommendations/orders:    1. Encounter for preventive health examination    2. Hypertension, unspecified type  Chronic; stable on medication. Follow up with PCP.    3. Acute pain of left shoulder  Chronic; stable on medication. Follow up with PCP.    4. Postoperative hypothyroidism  Chronic; stable on medication. Follow up with PCP.    5. LORRAINE on CPAP  Chronic; stable. Had issues previously with using CPAP d/t nasal congestion. Followed by Pulmonology.    6. Vitamin D deficiency  Chronic; stable on medication. Follow up with PCP.    7. Vaginal atrophy  Chronic; stable on medication. Follow up with PCP.    8. Severe obesity (BMI 35.0-39.9) with comorbidity  Ccontinue to eat a low salt/low fat diet and discussed importance of engaging in physical activity at least 5x/week for a minimum of 30 min/day.      Provided [place] with a 5-10 year written screening schedule and personal prevention plan. Recommendations were developed using the USPSTF age appropriate recommendations. Education, counseling, and referrals were provided as needed. After Visit Summary printed and given to patient which includes a list of additional screenings/tests needed.    Follow up for your next annual wellness visit.    Maria Isabel Sands NP    Advance Care Planning     I  offered to discuss advanced care planning, including how to pick a person who would make decisions for you if you were unable to make them for yourself, called a health care power of , and what kind of decisions you might make such as use of life sustaining treatments such as ventilators and tube feeding when faced with a life limiting illness recorded on a living will that they will need to know. (How you want to be cared for as you near the end of your natural life)     X Patient is interested in learning more about how to make advanced directives.  I offered to discuss this with them and instructed to follow up with PCP.

## 2022-06-14 ENCOUNTER — TELEPHONE (OUTPATIENT)
Dept: FAMILY MEDICINE | Facility: CLINIC | Age: 67
End: 2022-06-14
Payer: MEDICARE

## 2022-06-14 ENCOUNTER — OFFICE VISIT (OUTPATIENT)
Dept: FAMILY MEDICINE | Facility: CLINIC | Age: 67
End: 2022-06-14
Payer: MEDICARE

## 2022-06-14 VITALS
DIASTOLIC BLOOD PRESSURE: 82 MMHG | WEIGHT: 231.06 LBS | HEART RATE: 77 BPM | TEMPERATURE: 98 F | HEIGHT: 67 IN | SYSTOLIC BLOOD PRESSURE: 128 MMHG | BODY MASS INDEX: 36.27 KG/M2 | OXYGEN SATURATION: 95 %

## 2022-06-14 DIAGNOSIS — Z12.31 BREAST CANCER SCREENING BY MAMMOGRAM: ICD-10-CM

## 2022-06-14 DIAGNOSIS — G47.33 OSA (OBSTRUCTIVE SLEEP APNEA): ICD-10-CM

## 2022-06-14 DIAGNOSIS — Z78.0 MENOPAUSE: Primary | ICD-10-CM

## 2022-06-14 PROCEDURE — 99999 PR PBB SHADOW E&M-EST. PATIENT-LVL III: CPT | Mod: PBBFAC,,, | Performed by: STUDENT IN AN ORGANIZED HEALTH CARE EDUCATION/TRAINING PROGRAM

## 2022-06-14 PROCEDURE — 99213 OFFICE O/P EST LOW 20 MIN: CPT | Mod: PBBFAC,PO | Performed by: STUDENT IN AN ORGANIZED HEALTH CARE EDUCATION/TRAINING PROGRAM

## 2022-06-14 PROCEDURE — 99999 PR PBB SHADOW E&M-EST. PATIENT-LVL III: ICD-10-PCS | Mod: PBBFAC,,, | Performed by: STUDENT IN AN ORGANIZED HEALTH CARE EDUCATION/TRAINING PROGRAM

## 2022-06-14 PROCEDURE — 99213 OFFICE O/P EST LOW 20 MIN: CPT | Mod: S$PBB,,, | Performed by: STUDENT IN AN ORGANIZED HEALTH CARE EDUCATION/TRAINING PROGRAM

## 2022-06-14 PROCEDURE — 99213 PR OFFICE/OUTPT VISIT, EST, LEVL III, 20-29 MIN: ICD-10-PCS | Mod: S$PBB,,, | Performed by: STUDENT IN AN ORGANIZED HEALTH CARE EDUCATION/TRAINING PROGRAM

## 2022-06-14 NOTE — PROGRESS NOTES
06/14/2022    Dain ONEAL Lenin  [id number]    Chief Complaint   Patient presents with    Sore Throat       HPI    For the last couple of nights with sore throat and pain radiating to ears   says that she is snoring louder than usual and breathing through her mouth  Has taken sleep study in May and waiting to get a machine and is wondering if she could get the machine rushed  Says the the snoring is so bad she is really not getting good sleep    Negative 10 point ROS outside of HPI    Social History     Socioeconomic History    Marital status:    Occupational History     Comment: retired   Tobacco Use    Smoking status: Never Smoker    Smokeless tobacco: Never Used   Substance and Sexual Activity    Alcohol use: No    Drug use: No    Sexual activity: Not Currently     Partners: Male     Social Determinants of Health     Financial Resource Strain: Low Risk     Difficulty of Paying Living Expenses: Not hard at all   Food Insecurity: No Food Insecurity    Worried About Running Out of Food in the Last Year: Never true    Ran Out of Food in the Last Year: Never true   Transportation Needs: No Transportation Needs    Lack of Transportation (Medical): No    Lack of Transportation (Non-Medical): No   Physical Activity: Inactive    Days of Exercise per Week: 0 days    Minutes of Exercise per Session: 0 min   Stress: No Stress Concern Present    Feeling of Stress : Not at all   Social Connections: Socially Integrated    Frequency of Communication with Friends and Family: More than three times a week    Frequency of Social Gatherings with Friends and Family: More than three times a week    Attends [employer] Services: More than 4 times per year    Active Member of Clubs or Organizations: Yes    Attends Club or Organization Meetings: More than 4 times per year    Marital Status:    Housing Stability: Low Risk     Unable to Pay for Housing in the Last Year: No    Number of Places Lived in  the Last Year: 1    Unstable Housing in the Last Year: No         Current Outpatient Medications:     albuterol (PROVENTIL/VENTOLIN HFA) 90 mcg/actuation inhaler, Inhale 2 puffs into the lungs every 6 (six) hours as needed for Wheezing., Disp: 18 g, Rfl: 0    ergocalciferol, vitamin D2, 2,000 unit Tab, Take 2,000 Units by mouth 2 (two) times daily., Disp: , Rfl:     estradioL (ESTRACE) 0.01 % (0.1 mg/gram) vaginal cream, Insert 2 g daily intravaginally for 2 weeks, then 1 g twice weekly, Disp: 42.5 g, Rfl: 5    hydroCHLOROthiazide (HYDRODIURIL) 12.5 MG Tab, TAKE 1 TABLET(12.5 MG) BY MOUTH EVERY DAY, Disp: 30 tablet, Rfl: 5    levothyroxine (SYNTHROID) 125 MCG tablet, Take 1 tablet (125 mcg total) by mouth before breakfast., Disp: 90 tablet, Rfl: 3    metoprolol succinate (TOPROL-XL) 25 MG 24 hr tablet, TAKE 1 TABLET(25 MG) BY MOUTH EVERY DAY, Disp: 90 tablet, Rfl: 1    verapamiL (VERELAN) 360 MG C24P, TAKE 1 CAPSULE(360 MG) BY MOUTH EVERY DAY, Disp: 90 capsule, Rfl: 1      Physical Exam  Vitals:    06/14/22 1052   BP: 128/82   Pulse: 77   Temp: 98.1 °F (36.7 °C)       Gen: well appearing, NAD  Resp: non labored breathing, no crackles, no wheezes, CTAB  CV: RRR no murmur, gallops, rubs, no LE edema  Throat: no erythema no exudates    1. Menopause  - DXA Bone Density Spine And Hip; Future    2. Breast cancer screening by mammogram  - Mammo Digital Screening Bilat w/ [facility]; Future    3. LORRAINE (obstructive sleep apnea)  Recommended sleeping with humidifier. Avoid laying supine. Unlikey that will be able to get CPAP sooner, but will contact DME    RTC for routine care    Tori Arnold MD  Family Medicine

## 2022-06-14 NOTE — TELEPHONE ENCOUNTER
Call was placed to [facility] in regards to CPAP machine ordered for pt. Spoke with Jane, staff member there and she infromed this nurse there continues to be a wait for equipment. Assured this nurse that they did have order and was still being processed. Due to recal and shipping issues nationally, company has had deliveries delayed through no fault of their own. Explanation given to pt and understanding, thank you verbalized.

## 2022-06-15 ENCOUNTER — HOSPITAL ENCOUNTER (OUTPATIENT)
Dept: RESPIRATORY THERAPY | Facility: HOSPITAL | Age: 67
Discharge: HOME | End: 2022-06-15
Attending: FAMILY MEDICINE
Payer: MEDICARE

## 2022-06-15 VITALS — RESPIRATION RATE: 18 BRPM | OXYGEN SATURATION: 99 % | HEART RATE: 51 BPM

## 2022-06-15 DIAGNOSIS — Z11.52 ENCOUNTER FOR PREOPERATIVE SCREENING LABORATORY TESTING FOR COVID-19 VIRUS: ICD-10-CM

## 2022-06-15 DIAGNOSIS — Z01.812 ENCOUNTER FOR PREOPERATIVE SCREENING LABORATORY TESTING FOR COVID-19 VIRUS: ICD-10-CM

## 2022-06-15 DIAGNOSIS — R06.02 SHORTNESS OF BREATH: ICD-10-CM

## 2022-06-15 PROCEDURE — 94010 BREATHING CAPACITY TEST: CPT

## 2022-06-15 PROCEDURE — 94727 GAS DIL/WSHOT DETER LNG VOL: CPT | Mod: 26,,, | Performed by: INTERNAL MEDICINE

## 2022-06-15 PROCEDURE — 25000242 PHARM REV CODE 250 ALT 637 W/ HCPCS: Performed by: FAMILY MEDICINE

## 2022-06-15 PROCEDURE — 94727 PR PULM FUNCTION TEST BY GAS: ICD-10-PCS | Mod: 26,,, | Performed by: INTERNAL MEDICINE

## 2022-06-15 PROCEDURE — 94729 PR C02/MEMBANE DIFFUSE CAPACITY: ICD-10-PCS | Mod: 26,,, | Performed by: INTERNAL MEDICINE

## 2022-06-15 PROCEDURE — 94060 EVALUATION OF WHEEZING: CPT | Mod: 26,,, | Performed by: INTERNAL MEDICINE

## 2022-06-15 PROCEDURE — 94060 PR EVAL OF BRONCHOSPASM: ICD-10-PCS | Mod: 26,,, | Performed by: INTERNAL MEDICINE

## 2022-06-15 PROCEDURE — 94727 GAS DIL/WSHOT DETER LNG VOL: CPT

## 2022-06-15 PROCEDURE — 94729 DIFFUSING CAPACITY: CPT | Mod: 26,,, | Performed by: INTERNAL MEDICINE

## 2022-06-15 PROCEDURE — 94729 DIFFUSING CAPACITY: CPT

## 2022-06-15 RX ORDER — ALBUTEROL SULFATE 2.5 MG/.5ML
2.5 SOLUTION RESPIRATORY (INHALATION) ONCE
Status: COMPLETED | OUTPATIENT
Start: 2022-06-15 | End: 2022-06-15

## 2022-06-15 RX ADMIN — ALBUTEROL SULFATE 2.5 MG: 2.5 SOLUTION RESPIRATORY (INHALATION) at 08:06

## 2022-06-16 LAB
BRPFT: NORMAL
DLCO ADJ PRE: 21.38 ML/(MIN*MMHG)
DLCO SINGLE BREATH LLN: 17.95
DLCO SINGLE BREATH PRE REF: 88.3 %
DLCO SINGLE BREATH REF: 23.68
DLCOC SBVA LLN: 3.04
DLCOC SBVA PRE REF: 128 %
DLCOC SBVA REF: 4.36
DLCOC SINGLE BREATH LLN: 17.95
DLCOC SINGLE BREATH PRE REF: 90.3 %
DLCOC SINGLE BREATH REF: 23.68
DLCOVA LLN: 3.04
DLCOVA PRE REF: 125.2 %
DLCOVA PRE: 5.46 ML/(MIN*MMHG*L)
DLCOVA REF: 4.36
DLVAADJ PRE: 5.58 ML/(MIN*MMHG*L)
ERVN2 LLN: -16449.26
ERVN2 PRE REF: 36.4 %
ERVN2 PRE: 0.27 L
ERVN2 REF: 0.74
FEF 25 75 CHG: -5.9 %
FEF 25 75 LLN: 0.74
FEF 25 75 POST REF: 123.6 %
FEF 25 75 PRE REF: 131.3 %
FEF 25 75 REF: 1.88
FET100 CHG: 8 %
FEV1 CHG: 6.1 %
FEV1 FVC CHG: 1.1 %
FEV1 FVC LLN: 66
FEV1 FVC POST REF: 107.7 %
FEV1 FVC PRE REF: 106.6 %
FEV1 FVC REF: 79
FEV1 LLN: 1.55
FEV1 POST REF: 93.3 %
FEV1 PRE REF: 88 %
FEV1 REF: 2.18
FRCN2 LLN: 2.05
FRCN2 PRE REF: 48.4 %
FRCN2 REF: 2.87
FVC CHG: 5 %
FVC LLN: 2.01
FVC POST REF: 86 %
FVC PRE REF: 81.9 %
FVC REF: 2.8
IVC PRE: 2.33 L
IVC SINGLE BREATH LLN: 2.01
IVC SINGLE BREATH PRE REF: 83.1 %
IVC SINGLE BREATH REF: 2.8
PEF CHG: -6.9 %
PEF LLN: 3.47
PEF POST REF: 67.8 %
PEF PRE REF: 72.8 %
PEF REF: 5.66
POST FEF 25 75: 2.32 L/S
POST FET 100: 8.09 SEC
POST FEV1 FVC: 84.68 %
POST FEV1: 2.04 L
POST FVC: 2.41 L
POST PEF: 3.84 L/S
PRE DLCO: 20.91 ML/(MIN*MMHG)
PRE FEF 25 75: 2.46 L/S
PRE FET 100: 7.49 SEC
PRE FEV1 FVC: 83.78 %
PRE FEV1: 1.92 L
PRE FRC N2: 1.39 L
PRE FVC: 2.29 L
PRE PEF: 4.12 L/S
RVN2 LLN: 1.56
RVN2 PRE REF: 52.5 %
RVN2 PRE: 1.12 L
RVN2 REF: 2.13
RVN2TLCN2 LLN: 31.81
RVN2TLCN2 PRE REF: 77.1 %
RVN2TLCN2 PRE: 31.91 %
RVN2TLCN2 REF: 41.4
TLCN2 LLN: 4.44
TLCN2 PRE REF: 64.6 %
TLCN2 PRE: 3.51 L
TLCN2 REF: 5.43
VA PRE: 3.83 L
VA SINGLE BREATH LLN: 5.28
VA SINGLE BREATH PRE REF: 72.4 %
VA SINGLE BREATH REF: 5.28
VCMAXN2 LLN: 2.01
VCMAXN2 PRE REF: 85.4 %
VCMAXN2 PRE: 2.39 L
VCMAXN2 REF: 2.8

## 2022-06-18 DIAGNOSIS — I10 ESSENTIAL HYPERTENSION: ICD-10-CM

## 2022-06-18 RX ORDER — VERAPAMIL HYDROCHLORIDE 360 MG/1
CAPSULE, DELAYED RELEASE PELLETS ORAL
Qty: 90 CAPSULE | Refills: 3 | Status: SHIPPED | OUTPATIENT
Start: 2022-06-18 | End: 2023-01-23 | Stop reason: SDUPTHER

## 2022-06-18 NOTE — TELEPHONE ENCOUNTER
No new care gaps identified.  [facility] Embedded Care Gaps. Reference number: [id number]. 6/18/2022   12:05:19 PM CDT

## 2022-06-19 NOTE — TELEPHONE ENCOUNTER
Refill Decision Note   Dain Phelps  is requesting a refill authorization.  Brief Assessment and Rationale for Refill:  Approve     Medication Therapy Plan:       Medication Reconciliation Completed: No   Comments:     No Care Gaps recommended.     Note composed:8:11 PM 06/18/2022

## 2022-06-22 ENCOUNTER — OFFICE VISIT (OUTPATIENT)
Dept: FAMILY MEDICINE | Facility: CLINIC | Age: 67
End: 2022-06-22
Payer: MEDICARE

## 2022-06-22 VITALS
HEIGHT: 67 IN | DIASTOLIC BLOOD PRESSURE: 98 MMHG | TEMPERATURE: 99 F | OXYGEN SATURATION: 95 % | HEART RATE: 58 BPM | RESPIRATION RATE: 16 BRPM | WEIGHT: 234.13 LBS | BODY MASS INDEX: 36.75 KG/M2 | SYSTOLIC BLOOD PRESSURE: 152 MMHG

## 2022-06-22 DIAGNOSIS — I10 PRIMARY HYPERTENSION: Primary | ICD-10-CM

## 2022-06-22 DIAGNOSIS — R06.09 DYSPNEA ON EXERTION: ICD-10-CM

## 2022-06-22 DIAGNOSIS — G47.33 OSA ON CPAP: ICD-10-CM

## 2022-06-22 PROCEDURE — 99999 PR PBB SHADOW E&M-EST. PATIENT-LVL IV: CPT | Mod: PBBFAC,,, | Performed by: FAMILY MEDICINE

## 2022-06-22 PROCEDURE — 99213 OFFICE O/P EST LOW 20 MIN: CPT | Mod: S$PBB,,, | Performed by: FAMILY MEDICINE

## 2022-06-22 PROCEDURE — 99214 OFFICE O/P EST MOD 30 MIN: CPT | Mod: PBBFAC,PN | Performed by: FAMILY MEDICINE

## 2022-06-22 PROCEDURE — 99999 PR PBB SHADOW E&M-EST. PATIENT-LVL IV: ICD-10-PCS | Mod: PBBFAC,,, | Performed by: FAMILY MEDICINE

## 2022-06-22 PROCEDURE — 99213 PR OFFICE/OUTPT VISIT, EST, LEVL III, 20-29 MIN: ICD-10-PCS | Mod: S$PBB,,, | Performed by: FAMILY MEDICINE

## 2022-06-22 NOTE — PROGRESS NOTES
"Chief Complaint   Patient presents with    Hypertension       HPI  Dain Phelps is a 66 y.o. female with a past medical history in the problem list  below     Patient Active Problem List   Diagnosis    Postoperative hypothyroidism    HTN (hypertension)    GERD (gastroesophageal reflux disease)    Proteinuria    Vitamin D deficiency    LORRAINE on CPAP    Vaginal atrophy    Severe obesity (BMI 35.0-39.9) with comorbidity    Left shoulder pain       Presenting for follow-up for HTN  She had previously been having shortness of breath with exertion and fatigue but this is much improved with her being on her CPAP machine  She has appts with cardiology today and pulmonology  She is concerned about a craving for sugary foods      ROS  Review of Systems   Constitutional: Negative for chills, diaphoresis, fatigue, fever and unexpected weight change.   HENT: Negative for rhinorrhea, sinus pressure, sore throat and tinnitus.    Eyes: Negative for photophobia and visual disturbance.   Respiratory: Positive for shortness of breath. Negative for cough and wheezing.    Cardiovascular: Negative for chest pain and palpitations.   Gastrointestinal: Negative for abdominal pain, blood in stool, constipation, diarrhea, nausea and vomiting.   Genitourinary: Negative for dysuria, flank pain, frequency and vaginal discharge.   Musculoskeletal: Negative for arthralgias and joint swelling.   Skin: Negative for rash.   Neurological: Negative for speech difficulty, weakness, light-headedness and headaches.   Psychiatric/Behavioral: Negative for behavioral problems and dysphoric mood.       Physical Exam  Vitals:    06/22/22 1104 06/22/22 1117   BP: (!) 140/90 (!) 152/98   BP Location:  Right arm   Patient Position:  Sitting   BP Method:  Medium (Manual)   Pulse: (!) 58    Resp: 16    Temp: 98.7 °F (37.1 °C)    TempSrc: Oral    SpO2: 95%    Weight: 106.2 kg (234 lb 2.1 oz)    Height: 5' 7" (1.702 m)     Body mass index is 36.67 " "kg/m².  Weight: 106.2 kg (234 lb 2.1 oz)   Height: 5' 7" (170.2 cm)     Physical Exam  Vitals and nursing note reviewed.   Constitutional:       Appearance: She is well-developed.   Skin:     General: Skin is warm and dry.      Findings: No erythema or rash.   Neurological:      Mental Status: She is alert and oriented to person, place, and time.   Psychiatric:         Behavior: Behavior normal.         ALLERGIES AND MEDICATIONS: updated and reviewed.  Review of patient's allergies indicates:   Allergen Reactions    Ace inhibitors Edema     Angioedema     Medication List with Changes/Refills   Current Medications    ALBUTEROL (PROVENTIL/VENTOLIN HFA) 90 MCG/ACTUATION INHALER    Inhale 2 puffs into the lungs every 6 (six) hours as needed for Wheezing.    ERGOCALCIFEROL, VITAMIN D2, 2,000 UNIT TAB    Take 2,000 Units by mouth 2 (two) times daily.    ESTRADIOL (ESTRACE) 0.01 % (0.1 MG/GRAM) VAGINAL CREAM    Insert 2 g daily intravaginally for 2 weeks, then 1 g twice weekly    HYDROCHLOROTHIAZIDE (HYDRODIURIL) 12.5 MG TAB    TAKE 1 TABLET(12.5 MG) BY MOUTH EVERY DAY    LEVOTHYROXINE (SYNTHROID) 125 MCG TABLET    Take 1 tablet (125 mcg total) by mouth before breakfast.    METOPROLOL SUCCINATE (TOPROL-XL) 25 MG 24 HR TABLET    TAKE 1 TABLET(25 MG) BY MOUTH EVERY DAY    VERAPAMIL (VERELAN) 360 MG C24P    TAKE 1 CAPSULE(360 MG) BY MOUTH EVERY DAY       Assessment & Plan  1. Primary hypertension    2. Dyspnea on exertion    3. LORRAINE on CPAP        Problem List Items Addressed This Visit        Cardiac/Vascular    HTN (hypertension) - Primary  She did not take her verapamil last night  Nurse check in 1 week       Other    LORRAINE on CPAP    Overview     - ahi of 20  - tolerate apap well initially but having difficulty with machine since 2018.  +nasal congestion per history.  Will optimize nasal congestion and bring back for titration.  - new cpap after titration.    -we discussed at length about Respironics recall.  Patient will " register her apap.  Risk and benefits discussed.               Other Visit Diagnoses     Dyspnea on exertion      Improving with pt on CPAP, however she has f/u with cardiology and pulm, fam hx of heart disease and she would like to be checked          Follow up in about 6 months (around 12/22/2022).    Other Orders Placed This Visit:  No orders of the defined types were placed in this encounter.

## 2022-06-24 ENCOUNTER — OFFICE VISIT (OUTPATIENT)
Dept: CARDIOLOGY | Facility: CLINIC | Age: 67
End: 2022-06-24
Payer: MEDICARE

## 2022-06-24 VITALS
WEIGHT: 234.13 LBS | OXYGEN SATURATION: 94 % | BODY MASS INDEX: 36.67 KG/M2 | RESPIRATION RATE: 18 BRPM | HEART RATE: 74 BPM | DIASTOLIC BLOOD PRESSURE: 86 MMHG | SYSTOLIC BLOOD PRESSURE: 142 MMHG

## 2022-06-24 DIAGNOSIS — R06.02 SHORTNESS OF BREATH: ICD-10-CM

## 2022-06-24 DIAGNOSIS — I10 PRIMARY HYPERTENSION: ICD-10-CM

## 2022-06-24 DIAGNOSIS — E66.01 SEVERE OBESITY (BMI 35.0-39.9) WITH COMORBIDITY: ICD-10-CM

## 2022-06-24 DIAGNOSIS — R06.09 DYSPNEA ON EXERTION: Primary | ICD-10-CM

## 2022-06-24 DIAGNOSIS — G47.33 OSA ON CPAP: ICD-10-CM

## 2022-06-24 PROCEDURE — 99213 OFFICE O/P EST LOW 20 MIN: CPT | Mod: PBBFAC | Performed by: INTERNAL MEDICINE

## 2022-06-24 PROCEDURE — 99999 PR PBB SHADOW E&M-EST. PATIENT-LVL III: ICD-10-PCS | Mod: PBBFAC,,, | Performed by: INTERNAL MEDICINE

## 2022-06-24 PROCEDURE — 99204 PR OFFICE/OUTPT VISIT, NEW, LEVL IV, 45-59 MIN: ICD-10-PCS | Mod: S$PBB,,, | Performed by: INTERNAL MEDICINE

## 2022-06-24 PROCEDURE — 99999 PR PBB SHADOW E&M-EST. PATIENT-LVL III: CPT | Mod: PBBFAC,,, | Performed by: INTERNAL MEDICINE

## 2022-06-24 PROCEDURE — 99204 OFFICE O/P NEW MOD 45 MIN: CPT | Mod: S$PBB,,, | Performed by: INTERNAL MEDICINE

## 2022-06-24 NOTE — PROGRESS NOTES
CARDIOLOGY CONSULTATION    REASON FOR CONSULT:   Dain Phelps is a 66 y.o. female who presents for shortness of breath on exertion.      HISTORY OF PRESENT ILLNESS:     Dain Phelps presents for evaluation of shortness of breath on exertion.  Seen by Dr. Dorsey in 2018. Evaluation of chest discomfort.  Palpitations.  She has noted over the past 6 months progressive shortness of breath on exertion.  Now symptoms at rest.  No associated chest discomfort.  Weight has been stable.  Blood pressure controlled.  EKG showed sinus bradycardia.  Denies dizziness.  No syncope/presyncope.  On metoprolol and verapamil.    CARDIOVASCULAR HISTORY:     None    PAST MEDICAL HISTORY:     Past Medical History:   Diagnosis Date    Chronic low back pain     Fatigue     GERD (gastroesophageal reflux disease)     History of colon polyps 2015    Hypertension     Hypothyroidism     Impaired glucose tolerance 1/15/2014    Leukopenia     Obesity (BMI 30-39.9) 1/15/2014    Primary hypothyroidism 2015    Snoring 2015    Vitamin D deficiency 1/15/2014       PAST SURGICAL HISTORY:     Past Surgical History:   Procedure Laterality Date    BREAST BIOPSY Left     CATARACT EXTRACTION       SECTION, CLASSIC      x3    COLONOSCOPY N/A 2021    Procedure: COLONOSCOPY;  Surgeon: Parvez Mazariegos MD;  Location: [facility];  Service: Endoscopy;  Laterality: N/A;  covid test  [facility], instructions through [facility]ml    HYSTERECTOMY      total    OOPHORECTOMY      thyroid surgey         ALLERGIES AND MEDICATION:     Review of patient's allergies indicates:   Allergen Reactions    Ace inhibitors Edema     Angioedema        Medication List          Accurate as of 2022  1:53 PM. If you have any questions, ask your nurse or doctor.            CONTINUE taking these medications    albuterol 90 mcg/actuation inhaler  Commonly known as: PROVENTIL/VENTOLIN HFA  Inhale 2 puffs into the lungs  every 6 (six) hours as needed for Wheezing.     ergocalciferol (vitamin D2) 50 mcg (2,000 unit) Tab     estradioL 0.01 % (0.1 mg/gram) vaginal cream  Commonly known as: ESTRACE  Insert 2 g daily intravaginally for 2 weeks, then 1 g twice weekly     hydroCHLOROthiazide 12.5 MG Tab  Commonly known as: HYDRODIURIL  TAKE 1 TABLET(12.5 MG) BY MOUTH EVERY DAY     levothyroxine 125 MCG tablet  Commonly known as: SYNTHROID  Take 1 tablet (125 mcg total) by mouth before breakfast.     metoprolol succinate 25 MG 24 hr tablet  Commonly known as: TOPROL-XL  TAKE 1 TABLET(25 MG) BY MOUTH EVERY DAY     verapamiL 360 MG C24p  Commonly known as: VERELAN  TAKE 1 CAPSULE(360 MG) BY MOUTH EVERY DAY            SOCIAL HISTORY:     Social History     Socioeconomic History    Marital status:    Occupational History     Comment: retired   Tobacco Use    Smoking status: Never Smoker    Smokeless tobacco: Never Used   Substance and Sexual Activity    Alcohol use: No    Drug use: No    Sexual activity: Not Currently     Partners: Male     Social Determinants of Health     Financial Resource Strain: Low Risk     Difficulty of Paying Living Expenses: Not hard at all   Food Insecurity: No Food Insecurity    Worried About Running Out of Food in the Last Year: Never true    Ran Out of Food in the Last Year: Never true   Transportation Needs: No Transportation Needs    Lack of Transportation (Medical): No    Lack of Transportation (Non-Medical): No   Physical Activity: Inactive    Days of Exercise per Week: 0 days    Minutes of Exercise per Session: 0 min   Stress: No Stress Concern Present    Feeling of Stress : Not at all   Social Connections: Socially Integrated    Frequency of Communication with Friends and Family: More than three times a week    Frequency of Social Gatherings with Friends and Family: More than three times a week    Attends [employer] Services: More than 4 times per year    Active Member of Clubs or  Organizations: Yes    Attends Club or Organization Meetings: More than 4 times per year    Marital Status:    Housing Stability: Low Risk     Unable to Pay for Housing in the Last Year: No    Number of Places Lived in the Last Year: 1    Unstable Housing in the Last Year: No       FAMILY HISTORY:     Family History   Problem Relation Age of Onset    Heart disease Mother     Kidney disease Mother     Hypertension Mother     Cancer Father         throat cancer    No Known Problems Daughter     Hypertension Son     Hypertension Maternal Aunt     Diabetes Maternal Aunt     Thyroid disease Maternal Aunt     Breast cancer Maternal Aunt 70    Diabetes Maternal Grandmother        REVIEW OF SYSTEMS:   Review of Systems   Constitutional: Negative for chills, diaphoresis, fever, malaise/fatigue and weight loss.   Eyes: Negative for blurred vision and pain.   Respiratory: Positive for shortness of breath. Negative for sputum production and wheezing.    Cardiovascular: Negative for chest pain, palpitations, orthopnea, claudication, leg swelling and PND.   Gastrointestinal: Negative for abdominal pain, heartburn, nausea and vomiting.   Musculoskeletal: Negative for back pain, falls, joint pain, myalgias and neck pain.   Neurological: Negative for dizziness, speech change, focal weakness, loss of consciousness, weakness and headaches.   Endo/Heme/Allergies: Does not bruise/bleed easily.   Psychiatric/Behavioral: Negative for depression, memory loss and substance abuse. The patient is not nervous/anxious.        PHYSICAL EXAM:     Vitals:    06/24/22 1336   BP: (!) 142/86   Pulse: 74   Resp: 18    Body mass index is 36.67 kg/m².  Weight: 106.2 kg (234 lb 2.1 oz)         Physical Exam  Constitutional:       General: She is not in acute distress.     Appearance: She is well-developed. She is obese. She is not diaphoretic.   HENT:      Head: Normocephalic.   Neck:      Vascular: No carotid bruit or JVD.    Cardiovascular:      Rate and Rhythm: Normal rate and regular rhythm.      Pulses: Normal pulses.      Heart sounds: Murmur heard.    Crescendo systolic murmur is present with a grade of 2/6.  Pulmonary:      Effort: Pulmonary effort is normal.      Breath sounds: Normal breath sounds.   Abdominal:      General: Bowel sounds are normal.      Palpations: Abdomen is soft.      Tenderness: There is no abdominal tenderness.   Skin:     General: Skin is warm and dry.   Neurological:      Mental Status: She is alert and oriented to person, place, and time.   Psychiatric:         Speech: Speech normal.         Behavior: Behavior normal.         Thought Content: Thought content normal.         DATA:   EKG: (personally reviewed tracing)  06/03/2022-sinus bradycardia  Laboratory:  CBC:  Recent Labs   Lab 12/07/20  1022 05/25/21  0815 06/07/22  1018   WBC 4.31 3.78 L 4.92   Hemoglobin 13.0 12.2 12.7   Hematocrit 41.2 38.3 40.2   Platelets 222 199 224       CHEMISTRIES:  Recent Labs   Lab 12/07/20  1022 05/25/21  0815 06/07/22  1018   Glucose 90 93 82   Sodium 141 139 141   Potassium 3.6 4.1 4.2   BUN 18 18 17   Creatinine 0.8 0.9 0.7   eGFR if African American >60.0 >60.0 >60   eGFR if non African American >60.0 >60.0 >60   Calcium 8.9 9.1 8.8       CARDIAC BIOMARKERS:        COAGS:        LIPIDS/LFTS:  Recent Labs   Lab 12/07/20  1022 05/25/21  0815 06/07/22  1018   Cholesterol 165 155 173   Triglycerides 53 84 74   HDL 49 43 47   LDL Cholesterol 105.4 95.2 111.2   Non-HDL Cholesterol 116 112 126   AST 15 14 17   ALT 15 15 28       Cardiovascular Testing:    NST:  6-18  Impression: PROBABLY NORMAL MYOCARDIAL PERFUSION  1. There is a mild to moderate mostly reversible anterior wall defect of uncertain significance.   2. The perfusion scan is free of evidence for myocardial ischemia.   3. Resting wall motion is physiologic.   4. Resting LV function is normal.   5. The ventricular volumes are normal at rest and stress.   6.  The extracardiac distribution of radioactivity is normal.   7. When compared to the previous study from 05/19/2015, no significant change     Echo:  CONCLUSIONS     1 - Normal left ventricular systolic function (EF 55-60%).     Holter 06/28/2018:    TEST DESCRIPTION   PREDOMINANT RHYTHM   1. Sinus rhythm with heart rates varying between 42 and 90 bpm with an average of 64 bpm.     VENTRICULAR ARRHYTHMIAS   1. There was a single PVC recorded.     2. There were no episodes of ventricular tachycardia.     SUPRA VENTRICULAR ARRHYTHMIAS   1. There were very rare PACs recorded totalling 1 and averaging less than 1 per hour.     2. There were no episodes of sustained supraventricular tachycardia.     SINUS NODE FUNCTION   1. There was no evidence of high grade SA merari block.     AV CONDUCTION   1. There was no evidence of high grade AV block.     DIARY   1. The diary was returned, but not completed     MISCELLANEOUS   1. There were occasional hookup related artifacts. Overall, the study was of good quality.     2. This was a tape of adequate length (24 hrs).     ASSESSMENT:     1. Shortness of breath on exertion  2. Hypertension  3. Obstructive sleep apnea  4. Obesity    PLAN:     1.  Shortness of breath on exertion:  2D echocardiogram to assess structure and function.  Exercise myocardial perfusion study for ischemic evaluation.  Will also given information regarding heart rate variability since patient is both on metoprolol and verapamil.  2. Hypertension:  Continue current management.  3. Return to clinic 1 month.            Dante Castro MD, MPH, FACC, [facility]

## 2022-06-29 ENCOUNTER — PATIENT MESSAGE (OUTPATIENT)
Dept: FAMILY MEDICINE | Facility: CLINIC | Age: 67
End: 2022-06-29

## 2022-06-29 ENCOUNTER — CLINICAL SUPPORT (OUTPATIENT)
Dept: FAMILY MEDICINE | Facility: CLINIC | Age: 67
End: 2022-06-29
Payer: MEDICARE

## 2022-06-29 VITALS — DIASTOLIC BLOOD PRESSURE: 70 MMHG | SYSTOLIC BLOOD PRESSURE: 120 MMHG | HEART RATE: 55 BPM

## 2022-06-29 DIAGNOSIS — I10 PRIMARY HYPERTENSION: Primary | ICD-10-CM

## 2022-06-29 PROCEDURE — 99211 OFF/OP EST MAY X REQ PHY/QHP: CPT | Mod: PBBFAC,PN

## 2022-06-29 PROCEDURE — 99999 PR PBB SHADOW E&M-EST. PATIENT-LVL I: ICD-10-PCS | Mod: PBBFAC,,,

## 2022-06-29 PROCEDURE — 99499 UNLISTED E&M SERVICE: CPT | Mod: S$PBB,,, | Performed by: FAMILY MEDICINE

## 2022-06-29 PROCEDURE — 99499 NO LOS: ICD-10-PCS | Mod: S$PBB,,, | Performed by: FAMILY MEDICINE

## 2022-06-29 PROCEDURE — 99999 PR PBB SHADOW E&M-EST. PATIENT-LVL I: CPT | Mod: PBBFAC,,,

## 2022-06-29 NOTE — PROGRESS NOTES
.  Dain Phelps 66 y.o. female is here today for Blood Pressure check.   History of HTN yes.    Review of patient's allergies indicates:   Allergen Reactions    Ace inhibitors Edema     Angioedema     Creatinine   Date Value Ref Range Status   06/07/2022 0.7 0.5 - 1.4 mg/dL Final     Sodium   Date Value Ref Range Status   06/07/2022 141 136 - 145 mmol/L Final     Potassium   Date Value Ref Range Status   06/07/2022 4.2 3.5 - 5.1 mmol/L Final   ]  Patient verifies taking blood pressure medications on a regular basis at the same time of the day.     Current Outpatient Medications:     albuterol (PROVENTIL/VENTOLIN HFA) 90 mcg/actuation inhaler, Inhale 2 puffs into the lungs every 6 (six) hours as needed for Wheezing., Disp: 18 g, Rfl: 0    ergocalciferol, vitamin D2, 2,000 unit Tab, Take 2,000 Units by mouth 2 (two) times daily., Disp: , Rfl:     estradioL (ESTRACE) 0.01 % (0.1 mg/gram) vaginal cream, Insert 2 g daily intravaginally for 2 weeks, then 1 g twice weekly, Disp: 42.5 g, Rfl: 5    hydroCHLOROthiazide (HYDRODIURIL) 12.5 MG Tab, TAKE 1 TABLET(12.5 MG) BY MOUTH EVERY DAY, Disp: 30 tablet, Rfl: 5    levothyroxine (SYNTHROID) 125 MCG tablet, Take 1 tablet (125 mcg total) by mouth before breakfast., Disp: 90 tablet, Rfl: 3    metoprolol succinate (TOPROL-XL) 25 MG 24 hr tablet, TAKE 1 TABLET(25 MG) BY MOUTH EVERY DAY, Disp: 90 tablet, Rfl: 1    verapamiL (VERELAN) 360 MG C24P, TAKE 1 CAPSULE(360 MG) BY MOUTH EVERY DAY, Disp: 90 capsule, Rfl: 3  Does patient have record of home blood pressure readings yes. Readings have been averaging 130/80.   Last dose of blood pressure medication was taken at 06/29/2022.  Patient is asymptomatic.   Complains of N/A.    BP: 120/70 , Pulse: (!) 55 .       notified.

## 2022-06-30 ENCOUNTER — HOSPITAL ENCOUNTER (OUTPATIENT)
Dept: RADIOLOGY | Facility: CLINIC | Age: 67
Discharge: HOME | End: 2022-06-30
Attending: STUDENT IN AN ORGANIZED HEALTH CARE EDUCATION/TRAINING PROGRAM
Payer: MEDICARE

## 2022-06-30 DIAGNOSIS — Z78.0 MENOPAUSE: ICD-10-CM

## 2022-06-30 PROCEDURE — 77080 DXA BONE DENSITY AXIAL: CPT | Mod: 26,,, | Performed by: INTERNAL MEDICINE

## 2022-06-30 PROCEDURE — 77080 DXA BONE DENSITY AXIAL: CPT | Mod: TC,PO

## 2022-06-30 PROCEDURE — 77080 DEXA BONE DENSITY SPINE HIP: ICD-10-PCS | Mod: 26,,, | Performed by: INTERNAL MEDICINE

## 2022-07-07 ENCOUNTER — OFFICE VISIT (OUTPATIENT)
Dept: URGENT CARE | Facility: CLINIC | Age: 67
End: 2022-07-07
Payer: MEDICARE

## 2022-07-07 VITALS
TEMPERATURE: 98 F | WEIGHT: 234 LBS | HEART RATE: 58 BPM | OXYGEN SATURATION: 96 % | BODY MASS INDEX: 36.73 KG/M2 | RESPIRATION RATE: 16 BRPM | HEIGHT: 67 IN | SYSTOLIC BLOOD PRESSURE: 130 MMHG | DIASTOLIC BLOOD PRESSURE: 76 MMHG

## 2022-07-07 DIAGNOSIS — R05.9 COUGH: ICD-10-CM

## 2022-07-07 DIAGNOSIS — U07.1 COVID-19: Primary | ICD-10-CM

## 2022-07-07 DIAGNOSIS — U07.1 COVID-19 VIRUS DETECTED: ICD-10-CM

## 2022-07-07 LAB
CTP QC/QA: YES
SARS-COV-2 RDRP RESP QL NAA+PROBE: POSITIVE

## 2022-07-07 PROCEDURE — U0002 COVID-19 LAB TEST NON-CDC: HCPCS | Mod: QW,CR,S$GLB,

## 2022-07-07 PROCEDURE — 99213 PR OFFICE/OUTPT VISIT, EST, LEVL III, 20-29 MIN: ICD-10-PCS | Mod: CR,S$GLB,,

## 2022-07-07 PROCEDURE — U0002: ICD-10-PCS | Mod: QW,CR,S$GLB,

## 2022-07-07 PROCEDURE — 99213 OFFICE O/P EST LOW 20 MIN: CPT | Mod: CR,S$GLB,,

## 2022-07-07 RX ORDER — BENZONATATE 200 MG/1
200 CAPSULE ORAL 3 TIMES DAILY PRN
Qty: 30 CAPSULE | Refills: 0 | Status: SHIPPED | OUTPATIENT
Start: 2022-07-07 | End: 2022-07-17

## 2022-07-07 NOTE — PATIENT INSTRUCTIONS
You have tested positive for COVID-19 today.    ISOLATION  If you tested positive and do not have symptoms, you must isolate for 5 days starting on the day of the positive test.  If you tested positive and have symptoms, you must isolate for 5 days starting on the day of the first symptoms,  not the day of the positive test.   This is the most important part, both the CDC and the [facility] emphasize that you do not test out of isolation.   After 5 days, if your symptoms have improved and you have not had fever on day 5, you can return to the community on day 6- NO TESTING REQUIRED!    In fact, we do not retest if you were positive in the last 90 days.  After your 5 days of isolation are completed, the CDC recommends strict mask use for the first 5 days that you come out of isolation.    - Rest.    - Drink plenty of fluids.  - Viral upper respiratory infections typically run their course in 10-14 days.      - You can take over-the-counter claritin, zyrtec, allegra, or xyzal as directed. These are antihistamines that can help with runny nose, nasal congestion, sneezing, and helps to dry up post-nasal drip, which usually causes sore throat and cough.              - If you do NOT have high blood pressure, you may use a decongestant form (D)  of this medication (ie. Claritin- D, zyrtec-D, allegra-D) or if you do not take the D form, you can take sudafed (pseudoephedrine) over the counter, which is a decongestant. Do NOT take two decongestant (D) medications at the same time (such as mucinex-D and claritin-D or plain sudafed and claritin D)    - If you DO have Hypertension, anxiety, or palpitations, it is safe to take Coricidin HBP for relief of sinus symptoms.     - You can use Flonase (fluticasone) nasal spray as directed for sinus congestion and postnasal drip. This is a steroid nasal spray that works locally over time to decrease the inflammation in your nose/sinuses and help with allergic symptoms. This is not an quick-  relief spray like afrin, but it works well if used daily.  Discontinue if you develop nose bleed  - use nasal saline prior to Flonase.  - Use Ocean Spray Nasal Saline 1-3 puffs each nostril every 2-3 hours then blow out onto tissue. This is to irrigate the nasal passage way to clear the sinus openings. Use until sinus problem resolved.     - you can take plain Mucinex (guaifenesin) 1200 mg twice a day to help loosen mucous.      -warm salt water gargles can help with sore throat     - warm tea with honey can help with cough. Honey is a natural cough suppressant.     - Dextromethorphan (DM) is a cough suppressant over the counter (ie. mucinex DM, robitussin, delsym; dayquil/nyquil has DM as well.)        - Follow up with your PCP or specialty clinic as directed in the next 1-2 weeks if not improved or as needed.  You can call [phone] to schedule an appointment with the appropriate provider.       - Go to the ER if you develop new or worsening symptoms.      - You must understand that you have received an Urgent Care treatment only and that you may be released before all of your medical problems are known or treated.   - You, the patient, will arrange for follow up care as instructed.   - If your condition worsens or fails to improve we recommend that you receive another evaluation at the ER immediately or contact your PCP to discuss your concerns or return here.

## 2022-07-07 NOTE — PROGRESS NOTES
"Subjective:       Patient ID: Dain Phelps is a 66 y.o. female.    Vitals:  height is 5' 7" (1.702 m) and weight is 106.1 kg (234 lb). Her tympanic temperature is 98 °F (36.7 °C). Her blood pressure is 130/76 and her pulse is 58 (abnormal). Her respiration is 16 and oxygen saturation is 96%.     Chief Complaint: Cough    Patient is a 66-year-old female who presents with sore throat, coughing, congestion, headaches, fever x3 days.  She has been taking Tylenol with relief.  Her  tested positive for COVID this week.  Denies any chest pain, shortness of breath, nausea, vomiting, abdominal pain, diarrhea.    Cough  This is a new problem. The current episode started in the past 7 days. The problem has been unchanged. Associated symptoms include a fever, headaches and a sore throat. Pertinent negatives include no chest pain, chills, ear pain, myalgias, nasal congestion, rash or shortness of breath. Treatments tried: tylenol. The treatment provided mild relief. There is no history of asthma, bronchitis or COPD.       Constitution: Positive for fever. Negative for chills.   HENT: Positive for congestion and sore throat. Negative for ear pain and ear discharge.    Neck: Negative for neck pain and neck stiffness.   Cardiovascular: Negative for chest pain.   Respiratory: Positive for cough. Negative for chest tightness and shortness of breath.    Gastrointestinal: Negative for abdominal pain, nausea, vomiting and diarrhea.   Musculoskeletal: Negative for muscle ache.   Skin: Negative for rash.   Neurological: Positive for headaches. Negative for dizziness and light-headedness.       Objective:      Physical Exam   Constitutional: She is oriented to person, place, and time. She appears well-developed. She is cooperative.  Non-toxic appearance. She does not appear ill. No distress.   HENT:   Head: Normocephalic and atraumatic.   Ears:   Right Ear: Hearing, tympanic membrane, external ear and ear canal normal.   Left " Ear: Hearing, tympanic membrane, external ear and ear canal normal.   Nose: Congestion present. No mucosal edema, rhinorrhea or nasal deformity. No epistaxis. Right sinus exhibits no maxillary sinus tenderness and no frontal sinus tenderness. Left sinus exhibits no maxillary sinus tenderness and no frontal sinus tenderness.   Mouth/Throat: Uvula is midline and mucous membranes are normal. No trismus in the jaw. Normal dentition. No uvula swelling. Posterior oropharyngeal erythema present. No oropharyngeal exudate or posterior oropharyngeal edema.   Eyes: Conjunctivae and lids are normal. No scleral icterus.   Neck: Trachea normal and phonation normal. Neck supple. No edema present. No erythema present. No neck rigidity present.   Cardiovascular: Normal rate, regular rhythm, normal heart sounds and normal pulses.   Pulmonary/Chest: Effort normal and breath sounds normal. No respiratory distress. She has no decreased breath sounds. She has no wheezes. She has no rhonchi. She has no rales.   Abdominal: Normal appearance.   Musculoskeletal: Normal range of motion.         General: No deformity. Normal range of motion.   Neurological: no focal deficit. She is alert and oriented to person, place, and time. She exhibits normal muscle tone. Coordination normal.   Skin: Skin is warm, dry, intact, not diaphoretic and not pale. Capillary refill takes less than 2 seconds.   Psychiatric: Her speech is normal and behavior is normal. Judgment and thought content normal.   Nursing note and vitals reviewed.          Results for orders placed or performed in visit on 07/07/22   POCT COVID-19 Rapid Screening   Result Value Ref Range    POC Rapid COVID Positive (A) Negative     Acceptable Yes        Assessment:       1. COVID-19    2. Cough          Plan:         COVID-19  -     POCT COVID-19 Rapid Screening  -     nirmatrelvir-ritonavir 150 mg x 2- 100 mg copackaged tablets (EUA); Take 3 tablets by mouth 2 (two) times  daily for 5 days. Each dose contains 2 nirmatrelvir (pink tablets) and 1 ritonavir (white tablet). Take all 3 tablets together  Dispense: 30 tablet; Refill: 0    Cough  -     benzonatate (TESSALON) 200 MG capsule; Take 1 capsule (200 mg total) by mouth 3 (three) times daily as needed for Cough.  Dispense: 30 capsule; Refill: 0         +COVID. COVID risk score 3 (calculated by Epic). Will send paxlovid.  Discussed medication interactions with verapamil.  Discussed isolation.  Discussed supportive care and over-the-counter medications.  Will send benzonatate for coughing.  Clinic return and ED precautions given.  Follow-up with PCP.  Patient verbalizes understanding and agrees with plan.          Patient Instructions   You have tested positive for COVID-19 today.    ISOLATION  If you tested positive and do not have symptoms, you must isolate for 5 days starting on the day of the positive test.  If you tested positive and have symptoms, you must isolate for 5 days starting on the day of the first symptoms,  not the day of the positive test.   This is the most important part, both the CDC and the [facility] emphasize that you do not test out of isolation.   After 5 days, if your symptoms have improved and you have not had fever on day 5, you can return to the community on day 6- NO TESTING REQUIRED!    In fact, we do not retest if you were positive in the last 90 days.  After your 5 days of isolation are completed, the CDC recommends strict mask use for the first 5 days that you come out of isolation.    - Rest.    - Drink plenty of fluids.  - Viral upper respiratory infections typically run their course in 10-14 days.      - You can take over-the-counter claritin, zyrtec, allegra, or xyzal as directed. These are antihistamines that can help with runny nose, nasal congestion, sneezing, and helps to dry up post-nasal drip, which usually causes sore throat and cough.              - If you do NOT have high blood pressure, you may  use a decongestant form (D)  of this medication (ie. Claritin- D, zyrtec-D, allegra-D) or if you do not take the D form, you can take sudafed (pseudoephedrine) over the counter, which is a decongestant. Do NOT take two decongestant (D) medications at the same time (such as mucinex-D and claritin-D or plain sudafed and claritin D)    - If you DO have Hypertension, anxiety, or palpitations, it is safe to take Coricidin HBP for relief of sinus symptoms.     - You can use Flonase (fluticasone) nasal spray as directed for sinus congestion and postnasal drip. This is a steroid nasal spray that works locally over time to decrease the inflammation in your nose/sinuses and help with allergic symptoms. This is not an quick- relief spray like afrin, but it works well if used daily.  Discontinue if you develop nose bleed  - use nasal saline prior to Flonase.  - Use Ocean Spray Nasal Saline 1-3 puffs each nostril every 2-3 hours then blow out onto tissue. This is to irrigate the nasal passage way to clear the sinus openings. Use until sinus problem resolved.     - you can take plain Mucinex (guaifenesin) 1200 mg twice a day to help loosen mucous.      -warm salt water gargles can help with sore throat     - warm tea with honey can help with cough. Honey is a natural cough suppressant.     - Dextromethorphan (DM) is a cough suppressant over the counter (ie. mucinex DM, robitussin, delsym; dayquil/nyquil has DM as well.)        - Follow up with your PCP or specialty clinic as directed in the next 1-2 weeks if not improved or as needed.  You can call [phone] to schedule an appointment with the appropriate provider.       - Go to the ER if you develop new or worsening symptoms.      - You must understand that you have received an Urgent Care treatment only and that you may be released before all of your medical problems are known or treated.   - You, the patient, will arrange for follow up care as instructed.   - If your  condition worsens or fails to improve we recommend that you receive another evaluation at the ER immediately or contact your PCP to discuss your concerns or return here.

## 2022-07-11 ENCOUNTER — PATIENT MESSAGE (OUTPATIENT)
Dept: FAMILY MEDICINE | Facility: CLINIC | Age: 67
End: 2022-07-11
Payer: MEDICARE

## 2022-07-14 ENCOUNTER — PATIENT MESSAGE (OUTPATIENT)
Dept: CARDIOLOGY | Facility: CLINIC | Age: 67
End: 2022-07-14
Payer: MEDICARE

## 2022-07-15 ENCOUNTER — HOSPITAL ENCOUNTER (OUTPATIENT)
Dept: RADIOLOGY | Facility: HOSPITAL | Age: 67
Discharge: HOME | End: 2022-07-15
Attending: STUDENT IN AN ORGANIZED HEALTH CARE EDUCATION/TRAINING PROGRAM
Payer: MEDICARE

## 2022-07-15 DIAGNOSIS — Z12.31 BREAST CANCER SCREENING BY MAMMOGRAM: ICD-10-CM

## 2022-07-15 PROCEDURE — 77063 BREAST TOMOSYNTHESIS BI: CPT | Mod: 26,,, | Performed by: RADIOLOGY

## 2022-07-15 PROCEDURE — 77067 SCR MAMMO BI INCL CAD: CPT | Mod: 26,,, | Performed by: RADIOLOGY

## 2022-07-15 PROCEDURE — 77063 MAMMO DIGITAL SCREENING BILAT WITH TOMO: ICD-10-PCS | Mod: 26,,, | Performed by: RADIOLOGY

## 2022-07-15 PROCEDURE — 77067 SCR MAMMO BI INCL CAD: CPT | Mod: TC,PO

## 2022-07-15 PROCEDURE — 77067 MAMMO DIGITAL SCREENING BILAT WITH TOMO: ICD-10-PCS | Mod: 26,,, | Performed by: RADIOLOGY

## 2022-07-23 ENCOUNTER — PATIENT MESSAGE (OUTPATIENT)
Dept: FAMILY MEDICINE | Facility: CLINIC | Age: 67
End: 2022-07-23
Payer: MEDICARE

## 2022-07-23 LAB
BILIRUBIN, POC UA: NEGATIVE
BLOOD, POC UA: ABNORMAL
CLARITY, POC UA: CLEAR
COLOR, POC UA: YELLOW
GLUCOSE, POC UA: NEGATIVE
KETONES, POC UA: NEGATIVE
LEUKOCYTE EST, POC UA: ABNORMAL
NITRITE, POC UA: NEGATIVE
PH UR STRIP: 5.5 [PH]
PROTEIN, POC UA: NEGATIVE
SPECIFIC GRAVITY, POC UA: 1.02
UROBILINOGEN, POC UA: 0.2 E.U./DL

## 2022-07-23 PROCEDURE — 87088 URINE BACTERIA CULTURE: CPT | Performed by: EMERGENCY MEDICINE

## 2022-07-23 PROCEDURE — 87077 CULTURE AEROBIC IDENTIFY: CPT | Performed by: EMERGENCY MEDICINE

## 2022-07-23 PROCEDURE — 99284 EMERGENCY DEPT VISIT MOD MDM: CPT | Mod: ER

## 2022-07-23 PROCEDURE — 87086 URINE CULTURE/COLONY COUNT: CPT | Performed by: EMERGENCY MEDICINE

## 2022-07-23 PROCEDURE — 87186 SC STD MICRODIL/AGAR DIL: CPT | Performed by: EMERGENCY MEDICINE

## 2022-07-23 PROCEDURE — 81003 URINALYSIS AUTO W/O SCOPE: CPT | Mod: ER

## 2022-07-24 ENCOUNTER — HOSPITAL ENCOUNTER (EMERGENCY)
Facility: HOSPITAL | Age: 67
Discharge: HOME | End: 2022-07-24
Attending: EMERGENCY MEDICINE
Payer: MEDICARE

## 2022-07-24 VITALS
HEIGHT: 67 IN | WEIGHT: 234 LBS | SYSTOLIC BLOOD PRESSURE: 179 MMHG | OXYGEN SATURATION: 97 % | TEMPERATURE: 98 F | DIASTOLIC BLOOD PRESSURE: 76 MMHG | HEART RATE: 75 BPM | RESPIRATION RATE: 18 BRPM | BODY MASS INDEX: 36.73 KG/M2

## 2022-07-24 DIAGNOSIS — N30.01 ACUTE CYSTITIS WITH HEMATURIA: Primary | ICD-10-CM

## 2022-07-24 PROCEDURE — 25000003 PHARM REV CODE 250: Mod: ER | Performed by: EMERGENCY MEDICINE

## 2022-07-24 PROCEDURE — 96372 THER/PROPH/DIAG INJ SC/IM: CPT | Performed by: EMERGENCY MEDICINE

## 2022-07-24 PROCEDURE — 63600175 PHARM REV CODE 636 W HCPCS: Mod: ER | Performed by: EMERGENCY MEDICINE

## 2022-07-24 RX ORDER — NITROFURANTOIN 25; 75 MG/1; MG/1
100 CAPSULE ORAL 2 TIMES DAILY
Qty: 10 CAPSULE | Refills: 0 | Status: SHIPPED | OUTPATIENT
Start: 2022-07-24 | End: 2022-07-29

## 2022-07-24 RX ORDER — PHENAZOPYRIDINE HYDROCHLORIDE 200 MG/1
200 TABLET, FILM COATED ORAL
Qty: 6 TABLET | Refills: 0 | Status: SHIPPED | OUTPATIENT
Start: 2022-07-24 | End: 2022-07-26

## 2022-07-24 RX ORDER — PHENAZOPYRIDINE HYDROCHLORIDE 100 MG/1
200 TABLET, FILM COATED ORAL
Status: COMPLETED | OUTPATIENT
Start: 2022-07-24 | End: 2022-07-24

## 2022-07-24 RX ADMIN — PHENAZOPYRIDINE HYDROCHLORIDE 200 MG: 100 TABLET ORAL at 02:07

## 2022-07-24 RX ADMIN — CEFTRIAXONE 1 G: 1 INJECTION, POWDER, FOR SOLUTION INTRAMUSCULAR; INTRAVENOUS at 01:07

## 2022-07-24 NOTE — ED PROVIDER NOTES
Encounter Date: 2022       History     Chief Complaint   Patient presents with    Possible uti     Pt c/o urinary frequency since this afternoon with pelvic pressure feeling at the end of urination similar to when she had bladder infections in the past     66 y.o. female with hypertension, hypothyroidism, GERD and others presents to emergency department complaining of acute urinary frequency, urgency and pressure sensation at the end of urination that began earlier this afternoon.  She denies taking medication for symptoms.  She denies nausea, vomiting, diarrhea, vaginal discharge, hematuria, oliguria, abdominal pain or flank pain.  She reports similar symptoms in the past when she was diagnosed with a urinary tract infection.        Review of patient's allergies indicates:   Allergen Reactions    Ace inhibitors Edema     Angioedema     Past Medical History:   Diagnosis Date    Chronic low back pain     Fatigue     GERD (gastroesophageal reflux disease)     History of colon polyps 2015    Hypertension     Hypothyroidism     Impaired glucose tolerance 1/15/2014    Leukopenia     Obesity (BMI 30-39.9) 1/15/2014    Primary hypothyroidism 2015    Snoring 2015    Vitamin D deficiency 1/15/2014     Past Surgical History:   Procedure Laterality Date    BREAST BIOPSY Left     CATARACT EXTRACTION       SECTION, CLASSIC      x3    COLONOSCOPY N/A 2021    Procedure: COLONOSCOPY;  Surgeon: Parvez Mazariegos MD;  Location: [facility];  Service: Endoscopy;  Laterality: N/A;  covid test  lapalco, instructions through [facility] -ml    HYSTERECTOMY      total    OOPHORECTOMY      thyroid surgey       Family History   Problem Relation Age of Onset    Heart disease Mother     Kidney disease Mother     Hypertension Mother     Cancer Father         throat cancer    No Known Problems Daughter     Hypertension Son     Hypertension Maternal Aunt     Diabetes Maternal  Aunt     Thyroid disease Maternal Aunt     Breast cancer Maternal Aunt 70    Diabetes Maternal Grandmother      Social History     Tobacco Use    Smoking status: Never Smoker    Smokeless tobacco: Never Used   Substance Use Topics    Alcohol use: No    Drug use: No     Review of Systems   Constitutional: Negative for fever.   Gastrointestinal: Negative for abdominal pain, constipation, nausea and vomiting.   Genitourinary: Positive for frequency and urgency. Negative for difficulty urinating, dysuria, flank pain, hematuria and vaginal discharge.   All other systems reviewed and are negative.      Physical Exam     Initial Vitals [07/23/22 2240]   BP Pulse Resp Temp SpO2   (!) 182/99 77 18 98.3 °F (36.8 °C) 97 %      MAP       --         Physical Exam    Nursing note and vitals reviewed.  Constitutional: She appears well-developed and well-nourished. She is not diaphoretic. No distress.   HENT:   Head: Normocephalic and atraumatic.   Eyes: Conjunctivae are normal.   Neck: Neck supple.   Normal range of motion.  Cardiovascular: Normal rate and intact distal pulses.   Pulmonary/Chest: No accessory muscle usage. No tachypnea. No respiratory distress.   Abdominal: She exhibits no distension. There is no abdominal tenderness.   Musculoskeletal:         General: No tenderness. Normal range of motion.      Cervical back: Normal range of motion and neck supple.     Neurological: She is alert and oriented to person, place, and time. She has normal strength. Gait normal. GCS eye subscore is 4. GCS verbal subscore is 5. GCS motor subscore is 6.   Skin: Skin is warm. Capillary refill takes less than 2 seconds.   Psychiatric: She has a normal mood and affect.         ED Course   Procedures  Labs Reviewed   POCT URINALYSIS W/O SCOPE - Abnormal; Notable for the following components:       Result Value    Blood, UA 2+ (*)     Leukocytes, UA 1+ (*)     All other components within normal limits   CULTURE, URINE   POCT  URINALYSIS W/O SCOPE          Imaging Results    None          Medications   cefTRIAXone (ROCEPHIN) 1 g in LIDOcaine HCL 10 mg/ml (1%) IM only syringe (1 g Intramuscular Given 7/24/22 0151)   phenazopyridine tablet 200 mg (200 mg Oral Given 7/24/22 0245)                          Clinical Impression:   Final diagnoses:  [N30.01] Acute cystitis with hematuria (Primary)          ED Disposition Condition    Discharge Stable        ED Prescriptions     Medication Sig Dispense Start Date End Date Auth. Provider    phenazopyridine (PYRIDIUM) 200 MG tablet Take 1 tablet (200 mg total) by mouth 3 (three) times daily with meals. for 2 days 6 tablet 7/24/2022 7/26/2022 Carmelo Pan MD    nitrofurantoin, macrocrystal-monohydrate, (MACROBID) 100 MG capsule Take 1 capsule (100 mg total) by mouth 2 (two) times daily. for 5 days 10 capsule 7/24/2022 7/29/2022 Carmelo Pan MD        Follow-up Information     Follow up With Specialties Details Why Contact Info    Yary Valadez MD Family Medicine Call  As needed, for ongoing care [address] [place] [address] [id number] [phone]      The nearest emergency department.  Go to  As needed, If symptoms worsen            Carmelo Pan MD  07/29/22 1395

## 2022-07-25 ENCOUNTER — TELEPHONE (OUTPATIENT)
Dept: FAMILY MEDICINE | Facility: CLINIC | Age: 67
End: 2022-07-25
Payer: MEDICARE

## 2022-07-25 NOTE — TELEPHONE ENCOUNTER
Spoke with pt about request for rx for bladder infection provider was not in the office pt went to [facility] and received treatment and meds.

## 2022-07-26 LAB — BACTERIA UR CULT: ABNORMAL

## 2022-08-10 ENCOUNTER — OFFICE VISIT (OUTPATIENT)
Dept: PULMONOLOGY | Facility: CLINIC | Age: 67
End: 2022-08-10
Payer: MEDICARE

## 2022-08-10 VITALS
SYSTOLIC BLOOD PRESSURE: 151 MMHG | WEIGHT: 232.94 LBS | HEIGHT: 67 IN | HEART RATE: 70 BPM | DIASTOLIC BLOOD PRESSURE: 89 MMHG | OXYGEN SATURATION: 96 % | BODY MASS INDEX: 36.56 KG/M2

## 2022-08-10 DIAGNOSIS — E66.01 SEVERE OBESITY (BMI 35.0-39.9) WITH COMORBIDITY: ICD-10-CM

## 2022-08-10 DIAGNOSIS — G47.33 OSA ON CPAP: ICD-10-CM

## 2022-08-10 PROCEDURE — 99214 OFFICE O/P EST MOD 30 MIN: CPT | Mod: S$PBB,,, | Performed by: INTERNAL MEDICINE

## 2022-08-10 PROCEDURE — 99999 PR PBB SHADOW E&M-EST. PATIENT-LVL III: ICD-10-PCS | Mod: PBBFAC,,, | Performed by: INTERNAL MEDICINE

## 2022-08-10 PROCEDURE — 99213 OFFICE O/P EST LOW 20 MIN: CPT | Mod: PBBFAC | Performed by: INTERNAL MEDICINE

## 2022-08-10 PROCEDURE — 99999 PR PBB SHADOW E&M-EST. PATIENT-LVL III: CPT | Mod: PBBFAC,,, | Performed by: INTERNAL MEDICINE

## 2022-08-10 PROCEDURE — 99214 PR OFFICE/OUTPT VISIT, EST, LEVL IV, 30-39 MIN: ICD-10-PCS | Mod: S$PBB,,, | Performed by: INTERNAL MEDICINE

## 2022-08-10 NOTE — PROGRESS NOTES
Dain Phelps  was seen as a follow up.      CHIEF COMPLAINT:    Chief Complaint   Patient presents with    cpap fu       HISTORY OF PRESENT ILLNESS: Dain Phelps is a 66 y.o. female is here for sleep evaluation.   Our first encounter was 4/28/15.  At that time, patient with loud snoring.  On occasion, patient wake up from sleep due to snoring.  +awakenig with choking sensation a few weeks ago.  +fatigue upon awake 3-4 times per week.  No parasomnia.  No cataplexy.      Occasional restless sensation in legs.  Worse at night with supine position.  Improve with stretching.  Has not recurred since 3/15.     [place] Sleepiness Scale score during initial sleep evaluation was 4.    Patient underwent HST on 5/13/15 with ahi of 20.  Patient was set up with apap 6/15.   Patient stopped using apap 10/16 until now due to mother's illness.  Mother moved in with patient.  Patient stopped using apap due to concern of not being able to hear her mom in the middle of the night.  Mother has passed away 2018.  Patient resumed using apap afterward.  Initially had issue tolerating apap.  S/p titration 5/2/22.  Patient was set up with cpap 8 cm H20.  Doing well with cpap.  Sleep improved with apap.  Snoring resolved.  Sleeping thru the night.  Feeling rested upon awake.     SLEEP ROUTINE:  Activity the hour prior to sleep: watch tv in bed  Bed partner:     Time to bed:  9 pm   Lights off:  tv is on  Sleep onset latency:  30-60 minutes        Disruptions or awakenings:    0 times per night    Wakeup time:      8 am   Perceived sleep quality:  rested     Daytime naps:      Occasional 30 minutes nap (feel refresh upon awake)  Weekend sleep routine:      10 pm till 8 am  Caffeine use: Occasional coffee  exercise habit:   none      PAST MEDICAL HISTORY:    Active Ambulatory Problems     Diagnosis Date Noted    Postoperative hypothyroidism 11/24/2012    HTN (hypertension) 11/24/2012    GERD (gastroesophageal reflux disease)  2012    Proteinuria 2012    Vitamin D deficiency 01/15/2014    LORRAINE on CPAP 2015    Vaginal atrophy 2019    Severe obesity (BMI 35.0-39.9) with comorbidity 2022    Left shoulder pain 06/10/2022     Resolved Ambulatory Problems     Diagnosis Date Noted    Obesity (BMI 30-39.9) 01/15/2014    Impaired glucose tolerance 01/15/2014    Fatigue 01/15/2014    Snoring 2015    Primary hypothyroidism 2015    Preop examination 07/15/2015    History of colon polyps 2015    Dysuria 2016    Acute cystitis without hematuria 2016    Colon cancer screening 2021    Nasal congestion 2022     Past Medical History:   Diagnosis Date    Chronic low back pain     Hypertension     Hypothyroidism     Leukopenia                 PAST SURGICAL HISTORY:    Past Surgical History:   Procedure Laterality Date    BREAST BIOPSY Left     CATARACT EXTRACTION       SECTION, CLASSIC      x3    COLONOSCOPY N/A 2021    Procedure: COLONOSCOPY;  Surgeon: Parvez Mazariegos MD;  Location: [facility];  Service: Endoscopy;  Laterality: N/A;  covid test  [facility]alco, instructions through [facility] -[facility]    HYSTERECTOMY      total    OOPHORECTOMY      thyroid surgey           FAMILY HISTORY:                Family History   Problem Relation Age of Onset    Heart disease Mother     Kidney disease Mother     Hypertension Mother     Cancer Father         throat cancer    No Known Problems Daughter     Hypertension Son     Hypertension Maternal Aunt     Diabetes Maternal Aunt     Thyroid disease Maternal Aunt     Breast cancer Maternal Aunt 70    Diabetes Maternal Grandmother        SOCIAL HISTORY:          Tobacco:   Social History     Tobacco Use   Smoking Status Never Smoker   Smokeless Tobacco Never Used       alcohol use:    Social History     Substance and Sexual Activity   Alcohol Use No                 Occupation:  Retire; former  " in [employer] system    ALLERGIES:    Allergies   Allergen Reactions    Ace Inhibitors Edema     Angioedema       CURRENT MEDICATIONS:    Current Outpatient Medications   Medication Sig Dispense Refill    albuterol (PROVENTIL/VENTOLIN HFA) 90 mcg/actuation inhaler Inhale 2 puffs into the lungs every 6 (six) hours as needed for Wheezing. 18 g 0    ergocalciferol, vitamin D2, 2,000 unit Tab Take 2,000 Units by mouth 2 (two) times daily.      estradioL (ESTRACE) 0.01 % (0.1 mg/gram) vaginal cream Insert 2 g daily intravaginally for 2 weeks, then 1 g twice weekly 42.5 g 5    hydroCHLOROthiazide (HYDRODIURIL) 12.5 MG Tab TAKE 1 TABLET(12.5 MG) BY MOUTH EVERY DAY 30 tablet 5    levothyroxine (SYNTHROID) 125 MCG tablet Take 1 tablet (125 mcg total) by mouth before breakfast. 90 tablet 3    metoprolol succinate (TOPROL-XL) 25 MG 24 hr tablet TAKE 1 TABLET(25 MG) BY MOUTH EVERY DAY 90 tablet 1    verapamiL (VERELAN) 360 MG C24P TAKE 1 CAPSULE(360 MG) BY MOUTH EVERY DAY 90 capsule 3     No current facility-administered medications for this visit.                  REVIEW OF SYSTEMS:     Sleep related symptoms as per HPI.  CONST:Denies weight gain    HEENT: Denies sinus congestion  PULM: Denies dyspnea  CARD:  + occasional palpitations mainly during the day  GI:  Denies acid reflux  : Denies polyuria  NEURO: Denies headaches  PSYCH: Denies mood disturbance  HEME: Denies anemia   Otherwise, a balance of systems reviewed is negative.          PHYSICAL EXAM:  Vitals:    08/10/22 0819   BP: (!) 151/89   Pulse: 70   SpO2: 96%   Weight: 105.7 kg (232 lb 14.7 oz)   Height: 5' 7" (1.702 m)   PainSc: 0-No pain     Body mass index is 36.48 kg/m².     GENERAL: Normal development, well groomed  HEENT:  Conjunctivae are non-erythematous; Pupils equal, round, and reactive to light; Nose is symmetrical; Nasal mucosa is pink and moist; Septum is midline; Inferior turbinates are normal; Nasal airflow is " normal; Posterior pharynx is pink; Modified Mallampati: 4; Posterior palate is normal; Tonsils +1; Uvula is normal and pink;Tongue is normal; Dentition is fair; No TMJ tenderness; Jaw opening and protrusion without click and without discomfort.  NECK: Supple. Neck circumference is 13.5 inches. No thyromegaly. No palpable nodes.     SKIN: On face and neck: No abrasions, no rashes, no lesions.  No subcutaneous nodules are palpable.  RESPIRATORY: Chest is clear to auscultation.  Normal chest expansion and non-labored breathing at rest.  CARDIOVASCULAR: Normal S1, S2.  No murmurs, gallops or rubs. No carotid bruits bilaterally.  EXTREMITIES: No edema. No clubbing. No cyanosis. Station normal. Gait normal.        NEURO/PSYCH: Oriented to time, place and person. Normal attention span and concentration. Affect is full. Mood is normal.                                                     DATA   2/25/14 echo  1 - Normal left ventricular systolic function (EF 55-60%).   2 - Mild mitral regurgitation.   3 - Moderate tricuspid regurgitation.   4 - Trivial pulmonic regurgitation.     HST 5/13/15 ahi of 20  cpap titration 5/2/22 optimal cpap of 8 cm H20    Lab Results   Component Value Date    TSH 0.861 04/28/2022     ASSESSMENT  Problem List Items Addressed This Visit     LORRAINE on CPAP    Overview     - ahi of 20  - tolerate apap well initially but having difficulty with machine since 2018.    -s/p cpap titration  -currently with [facility] at 8 cm h20  -no issue with cpap.  poor compliance due to traveling conflict.  Compliance requirement d/w patient and patient expressed understanding.             Severe obesity (BMI 35.0-39.9) with comorbidity    Overview     -trying to be more active.                  Nasal congestion - improve.      Obesity - aware of need for drastic weight loss.  Patient has been exercising with .        Education: During our discussion today, we talked about the etiology of obstructive sleep apnea as  well as the potential ramifications of untreated sleep apnea, which could include daytime sleepiness, hypertension, heart disease and/or stroke.      Precautions: The patient was advised to abstain from driving should they feel sleepy or drowsy.       Patient will No follow-ups on file.    25 minutes of total time spent on the encounter, which includes face to face time and non-face to face time preparing to see the patient (eg, review of tests), Obtaining and/or reviewing separately obtained history, documenting clinical information in the electronic or other health record, independently interpreting results (not separately reported) and communicating results to the patient/family/caregiver, or Care coordination (not separately reported).

## 2022-08-18 ENCOUNTER — TELEPHONE (OUTPATIENT)
Dept: CARDIOLOGY | Facility: CLINIC | Age: 67
End: 2022-08-18

## 2022-08-18 NOTE — TELEPHONE ENCOUNTER
----- Message from Pari Álvarez MA sent at 7/8/2022  9:42 AM CDT -----  Type: Patient Call Back    Who called: self    What is the request in detail: pt. Needs appt's. Rescheduled due to covid..    Can the clinic reply by [facility]?no    Would the patient rather a call back or a response via [facility]? yes    Best call back number:[phone]

## 2022-08-24 ENCOUNTER — PATIENT OUTREACH (OUTPATIENT)
Dept: ADMINISTRATIVE | Facility: HOSPITAL | Age: 67
End: 2022-08-24
Payer: COMMERCIAL

## 2022-08-29 ENCOUNTER — HOSPITAL ENCOUNTER (OUTPATIENT)
Dept: RADIOLOGY | Facility: HOSPITAL | Age: 67
Discharge: HOME | End: 2022-08-29
Attending: INTERNAL MEDICINE
Payer: MEDICARE

## 2022-08-29 ENCOUNTER — HOSPITAL ENCOUNTER (OUTPATIENT)
Dept: CARDIOLOGY | Facility: HOSPITAL | Age: 67
Discharge: HOME | End: 2022-08-29
Attending: INTERNAL MEDICINE
Payer: MEDICARE

## 2022-08-29 DIAGNOSIS — G47.33 OSA ON CPAP: ICD-10-CM

## 2022-08-29 DIAGNOSIS — R06.02 SHORTNESS OF BREATH: ICD-10-CM

## 2022-08-29 DIAGNOSIS — R06.09 DYSPNEA ON EXERTION: ICD-10-CM

## 2022-08-29 DIAGNOSIS — I10 PRIMARY HYPERTENSION: ICD-10-CM

## 2022-08-29 LAB
ASCENDING AORTA: 3.11 CM
AV INDEX (PROSTH): 0.8
AV MEAN GRADIENT: 10 MMHG
AV PEAK GRADIENT: 16 MMHG
AV VALVE AREA: 2.22 CM2
AV VELOCITY RATIO: 0.79
CV ECHO LV RWT: 0.49 CM
CV STRESS BASE HR: 47 BPM
DIASTOLIC BLOOD PRESSURE: 60 MMHG
DOP CALC AO PEAK VEL: 1.97 M/S
DOP CALC AO VTI: 48.37 CM
DOP CALC LVOT AREA: 2.8 CM2
DOP CALC LVOT DIAMETER: 1.88 CM
DOP CALC LVOT PEAK VEL: 1.55 M/S
DOP CALC LVOT STROKE VOLUME: 107.23 CM3
DOP CALCLVOT PEAK VEL VTI: 38.65 CM
E WAVE DECELERATION TIME: 228.24 MSEC
E/A RATIO: 1.36
E/E' RATIO: 10.78 M/S
ECHO LV POSTERIOR WALL: 1.01 CM (ref 0.6–1.1)
EJECTION FRACTION: 65 %
FRACTIONAL SHORTENING: 37 % (ref 28–44)
INTERVENTRICULAR SEPTUM: 0.99 CM (ref 0.6–1.1)
IVRT: 114.19 MSEC
LA MAJOR: 5.87 CM
LA MINOR: 6.06 CM
LA WIDTH: 4.1 CM
LEFT ATRIUM SIZE: 4.67 CM
LEFT ATRIUM VOLUME: 97.06 CM3
LEFT INTERNAL DIMENSION IN SYSTOLE: 2.61 CM (ref 2.1–4)
LEFT VENTRICLE DIASTOLIC VOLUME: 74.92 ML
LEFT VENTRICLE SYSTOLIC VOLUME: 24.93 ML
LEFT VENTRICULAR INTERNAL DIMENSION IN DIASTOLE: 4.12 CM (ref 3.5–6)
LEFT VENTRICULAR MASS: 133.13 G
LV LATERAL E/E' RATIO: 13.78 M/S
LV SEPTAL E/E' RATIO: 8.86 M/S
MV PEAK A VEL: 0.91 M/S
MV PEAK E VEL: 1.24 M/S
NUC STRESS DIASTOLIC VOLUME INDEX: 47
NUC STRESS EJECTION FRACTION: 76 %
NUC STRESS SYSTOLIC VOLUME INDEX: 11
OHS CV CPX 1 MINUTE RECOVERY HEART RATE: 131 BPM
OHS CV CPX 85 PERCENT MAX PREDICTED HEART RATE MALE: 126
OHS CV CPX ESTIMATED METS: 7
OHS CV CPX MAX PREDICTED HEART RATE: 148
OHS CV CPX PATIENT IS FEMALE: 1
OHS CV CPX PATIENT IS MALE: 0
OHS CV CPX PEAK DIASTOLIC BLOOD PRESSURE: 84 MMHG
OHS CV CPX PEAK HEAR RATE: 141 BPM
OHS CV CPX PEAK RATE PRESSURE PRODUCT: NORMAL
OHS CV CPX PEAK SYSTOLIC BLOOD PRESSURE: 257 MMHG
OHS CV CPX PERCENT MAX PREDICTED HEART RATE ACHIEVED: 95
OHS CV CPX RATE PRESSURE PRODUCT PRESENTING: 6298
PISA TR MAX VEL: 2.76 M/S
PULM VEIN S/D RATIO: 1.09
PV PEAK D VEL: 0.68 M/S
PV PEAK S VEL: 0.74 M/S
PV PEAK VELOCITY: 0.97 CM/S
RA MAJOR: 5.18 CM
RA PRESSURE: 3 MMHG
RA WIDTH: 4.86 CM
RIGHT VENTRICULAR END-DIASTOLIC DIMENSION: 3.87 CM
RV TISSUE DOPPLER FREE WALL SYSTOLIC VELOCITY 1 (APICAL 4 CHAMBER VIEW): 14.72 CM/S
SINUS: 2.88 CM
STJ: 2.29 CM
STRESS ECHO POST EXERCISE DUR MIN: 5 MINUTES
STRESS ECHO POST EXERCISE DUR SEC: 45 SECONDS
SYSTOLIC BLOOD PRESSURE: 134 MMHG
TDI LATERAL: 0.09 M/S
TDI SEPTAL: 0.14 M/S
TDI: 0.12 M/S
TR MAX PG: 30 MMHG
TRICUSPID ANNULAR PLANE SYSTOLIC EXCURSION: 2.62 CM
TV REST PULMONARY ARTERY PRESSURE: 33 MMHG

## 2022-08-29 PROCEDURE — 93016 STRESS TEST WITH MYOCARDIAL PERFUSION (CUPID ONLY): ICD-10-PCS | Mod: ,,, | Performed by: INTERNAL MEDICINE

## 2022-08-29 PROCEDURE — 93017 CV STRESS TEST TRACING ONLY: CPT

## 2022-08-29 PROCEDURE — 93016 CV STRESS TEST SUPVJ ONLY: CPT | Mod: ,,, | Performed by: INTERNAL MEDICINE

## 2022-08-29 PROCEDURE — 78452 HT MUSCLE IMAGE SPECT MULT: CPT | Mod: 26,,, | Performed by: INTERNAL MEDICINE

## 2022-08-29 PROCEDURE — 93018 STRESS TEST WITH MYOCARDIAL PERFUSION (CUPID ONLY): ICD-10-PCS | Mod: ,,, | Performed by: INTERNAL MEDICINE

## 2022-08-29 PROCEDURE — A9502 TC99M TETROFOSMIN: HCPCS

## 2022-08-29 PROCEDURE — 93018 CV STRESS TEST I&R ONLY: CPT | Mod: ,,, | Performed by: INTERNAL MEDICINE

## 2022-08-29 PROCEDURE — 93306 ECHO (CUPID ONLY): ICD-10-PCS | Mod: 26,,, | Performed by: INTERNAL MEDICINE

## 2022-08-29 PROCEDURE — 78452 STRESS TEST WITH MYOCARDIAL PERFUSION (CUPID ONLY): ICD-10-PCS | Mod: 26,,, | Performed by: INTERNAL MEDICINE

## 2022-08-29 PROCEDURE — 93306 TTE W/DOPPLER COMPLETE: CPT | Mod: 26,,, | Performed by: INTERNAL MEDICINE

## 2022-08-29 PROCEDURE — 93306 TTE W/DOPPLER COMPLETE: CPT

## 2022-09-19 ENCOUNTER — OFFICE VISIT (OUTPATIENT)
Dept: CARDIOLOGY | Facility: CLINIC | Age: 67
End: 2022-09-19
Payer: MEDICARE

## 2022-09-19 VITALS
WEIGHT: 234.25 LBS | RESPIRATION RATE: 15 BRPM | HEART RATE: 72 BPM | HEIGHT: 67 IN | OXYGEN SATURATION: 98 % | DIASTOLIC BLOOD PRESSURE: 78 MMHG | SYSTOLIC BLOOD PRESSURE: 136 MMHG | BODY MASS INDEX: 36.76 KG/M2

## 2022-09-19 DIAGNOSIS — R06.09 DYSPNEA ON EXERTION: Primary | ICD-10-CM

## 2022-09-19 DIAGNOSIS — I10 PRIMARY HYPERTENSION: ICD-10-CM

## 2022-09-19 DIAGNOSIS — E66.01 SEVERE OBESITY (BMI 35.0-39.9) WITH COMORBIDITY: ICD-10-CM

## 2022-09-19 DIAGNOSIS — G47.33 OSA ON CPAP: ICD-10-CM

## 2022-09-19 DIAGNOSIS — I27.20 PULMONARY HYPERTENSION: ICD-10-CM

## 2022-09-19 PROCEDURE — 93010 ELECTROCARDIOGRAM REPORT: CPT | Mod: S$PBB,,, | Performed by: INTERNAL MEDICINE

## 2022-09-19 PROCEDURE — 99999 PR PBB SHADOW E&M-EST. PATIENT-LVL III: ICD-10-PCS | Mod: PBBFAC,,, | Performed by: INTERNAL MEDICINE

## 2022-09-19 PROCEDURE — 93010 EKG 12-LEAD: ICD-10-PCS | Mod: S$PBB,,, | Performed by: INTERNAL MEDICINE

## 2022-09-19 PROCEDURE — 99213 OFFICE O/P EST LOW 20 MIN: CPT | Mod: PBBFAC,PO | Performed by: INTERNAL MEDICINE

## 2022-09-19 PROCEDURE — 99214 PR OFFICE/OUTPT VISIT, EST, LEVL IV, 30-39 MIN: ICD-10-PCS | Mod: S$PBB,,, | Performed by: INTERNAL MEDICINE

## 2022-09-19 PROCEDURE — 93005 ELECTROCARDIOGRAM TRACING: CPT | Mod: PBBFAC,PO | Performed by: INTERNAL MEDICINE

## 2022-09-19 PROCEDURE — 99999 PR PBB SHADOW E&M-EST. PATIENT-LVL III: CPT | Mod: PBBFAC,,, | Performed by: INTERNAL MEDICINE

## 2022-09-19 PROCEDURE — 99214 OFFICE O/P EST MOD 30 MIN: CPT | Mod: S$PBB,,, | Performed by: INTERNAL MEDICINE

## 2022-09-19 NOTE — PROGRESS NOTES
CARDIOLOGY CLINIC VISIT        HISTORY OF PRESENT ILLNESS:     Dain Phelps presents for continued care. Seen 2022 for evaluation of shortness of breath on exertion.  Seen by Dr. Dorsey in 2018. Evaluation of chest discomfort.  Palpitations.  She has noted over the past 6 months progressive shortness of breath on exertion.  Now symptoms at rest.  No associated chest discomfort.  Weight has been stable.  Blood pressure controlled.  EKG showed sinus bradycardia.  Denies dizziness.  No syncope/presyncope.  On metoprolol and verapamil.    2022:  Nuclear stress was negative for ischemia.  Hypertensive response exercise 257/84.  Patient exercised for 5 minutes 45 seconds.  Functional capacity 7 Mets.  Echocardiogram showed normal left ventricular systolic function with estimated ejection fraction 65%.  Left ventricular hypertrophy.  Mildly elevated pulmonary pressure.  Has stopped metoprolol.  Now taking verapamil in the morning along with her hydrochlorothiazide.  Digital medicine logs reviewed.  Feels good.  No complaints.  Going on a trip to [place] this month.    CARDIOVASCULAR HISTORY:     Pulmonary hypertension    PAST MEDICAL HISTORY:     Past Medical History:   Diagnosis Date    Chronic low back pain     Fatigue     GERD (gastroesophageal reflux disease)     History of colon polyps 2015    Hypertension     Hypothyroidism     Impaired glucose tolerance 1/15/2014    Leukopenia     Obesity (BMI 30-39.9) 1/15/2014    Primary hypothyroidism 2015    Snoring 2015    Vitamin D deficiency 1/15/2014       PAST SURGICAL HISTORY:     Past Surgical History:   Procedure Laterality Date    BREAST BIOPSY Left     CATARACT EXTRACTION       SECTION, CLASSIC      x3    COLONOSCOPY N/A 2021    Procedure: COLONOSCOPY;  Surgeon: Parvez Mazariegos MD;  Location: [facility];  Service: Endoscopy;  Laterality: N/A;  covid test  lapalco, instructions through [facility]    HYSTERECTOMY       total    OOPHORECTOMY      thyroid surgey  2009       ALLERGIES AND MEDICATION:     Review of patient's allergies indicates:   Allergen Reactions    Ace inhibitors Edema     Angioedema        Medication List            Accurate as of September 19, 2022 10:02 AM. If you have any questions, ask your nurse or doctor.                CONTINUE taking these medications      albuterol 90 mcg/actuation inhaler  Commonly known as: PROVENTIL/VENTOLIN HFA  Inhale 2 puffs into the lungs every 6 (six) hours as needed for Wheezing.     ergocalciferol (vitamin D2) 50 mcg (2,000 unit) Tab     estradioL 0.01 % (0.1 mg/gram) vaginal cream  Commonly known as: ESTRACE  Insert 2 g daily intravaginally for 2 weeks, then 1 g twice weekly     hydroCHLOROthiazide 12.5 MG Tab  Commonly known as: HYDRODIURIL  TAKE 1 TABLET(12.5 MG) BY MOUTH EVERY DAY     levothyroxine 125 MCG tablet  Commonly known as: SYNTHROID  Take 1 tablet (125 mcg total) by mouth before breakfast.     metoprolol succinate 25 MG 24 hr tablet  Commonly known as: TOPROL-XL  TAKE 1 TABLET(25 MG) BY MOUTH EVERY DAY     verapamiL 360 MG C24p  Commonly known as: VERELAN  TAKE 1 CAPSULE(360 MG) BY MOUTH EVERY DAY              SOCIAL HISTORY:     Social History     Socioeconomic History    Marital status:    Occupational History     Comment: retired   Tobacco Use    Smoking status: Never    Smokeless tobacco: Never   Substance and Sexual Activity    Alcohol use: No    Drug use: No    Sexual activity: Not Currently     Partners: Male     Social Determinants of Health     Financial Resource Strain: Low Risk     Difficulty of Paying Living Expenses: Not hard at all   Food Insecurity: No Food Insecurity    Worried About Running Out of Food in the Last Year: Never true    Ran Out of Food in the Last Year: Never true   Transportation Needs: No Transportation Needs    Lack of Transportation (Medical): No    Lack of Transportation (Non-Medical): No   Physical Activity: Inactive     Days of Exercise per Week: 0 days    Minutes of Exercise per Session: 0 min   Stress: No Stress Concern Present    Feeling of Stress : Not at all   Social Connections: Socially Integrated    Frequency of Communication with Friends and Family: More than three times a week    Frequency of Social Gatherings with Friends and Family: More than three times a week    Attends Synagogue Services: More than 4 times per year    Active Member of Clubs or Organizations: Yes    Attends Club or Organization Meetings: More than 4 times per year    Marital Status:    Housing Stability: Low Risk     Unable to Pay for Housing in the Last Year: No    Number of Places Lived in the Last Year: 1    Unstable Housing in the Last Year: No       FAMILY HISTORY:     Family History   Problem Relation Age of Onset    Heart disease Mother     Kidney disease Mother     Hypertension Mother     Cancer Father         throat cancer    No Known Problems Daughter     Hypertension Son     Hypertension Maternal Aunt     Diabetes Maternal Aunt     Thyroid disease Maternal Aunt     Breast cancer Maternal Aunt 70    Diabetes Maternal Grandmother        REVIEW OF SYSTEMS:   Review of Systems   Constitutional:  Negative for chills, diaphoresis, fever, malaise/fatigue and weight loss.   Eyes:  Negative for blurred vision and pain.   Respiratory:  Negative for sputum production, shortness of breath and wheezing.    Cardiovascular:  Negative for chest pain, palpitations, orthopnea, claudication, leg swelling and PND.   Gastrointestinal:  Negative for abdominal pain, heartburn, nausea and vomiting.   Musculoskeletal:  Negative for back pain, falls, joint pain, myalgias and neck pain.   Neurological:  Negative for dizziness, speech change, focal weakness, loss of consciousness, weakness and headaches.   Endo/Heme/Allergies:  Does not bruise/bleed easily.   Psychiatric/Behavioral:  Negative for depression, memory loss and substance abuse. The patient is not  "nervous/anxious.      PHYSICAL EXAM:     Vitals:    09/19/22 0943   BP: 136/78   Pulse: 72   Resp: 15    Body mass index is 36.69 kg/m².  Weight: 106.3 kg (234 lb 3.8 oz)   Height: 5' 7" (170.2 cm)     Physical Exam  Constitutional:       General: She is not in acute distress.     Appearance: She is well-developed. She is obese. She is not diaphoretic.   HENT:      Head: Normocephalic.   Neck:      Vascular: No carotid bruit or JVD.   Cardiovascular:      Rate and Rhythm: Normal rate and regular rhythm.      Pulses: Normal pulses.      Heart sounds: Murmur heard.   Crescendo systolic murmur is present with a grade of 2/6.   Pulmonary:      Effort: Pulmonary effort is normal.      Breath sounds: Normal breath sounds.   Abdominal:      General: Bowel sounds are normal.      Palpations: Abdomen is soft.      Tenderness: There is no abdominal tenderness.   Skin:     General: Skin is warm and dry.   Neurological:      Mental Status: She is alert and oriented to person, place, and time.   Psychiatric:         Speech: Speech normal.         Behavior: Behavior normal.         Thought Content: Thought content normal.       DATA:   EKG: (personally reviewed tracing)  09/19/2022-normal sinus rhythm  06/03/2022-sinus bradycardia  Laboratory:  CBC:  Recent Labs   Lab 12/07/20  1022 05/25/21  0815 06/07/22  1018   WBC 4.31 3.78 L 4.92   Hemoglobin 13.0 12.2 12.7   Hematocrit 41.2 38.3 40.2   Platelets 222 199 224       CHEMISTRIES:  Recent Labs   Lab 12/07/20  1022 05/25/21  0815 06/07/22  1018   Glucose 90 93 82   Sodium 141 139 141   Potassium 3.6 4.1 4.2   BUN 18 18 17   Creatinine 0.8 0.9 0.7   eGFR if African American >60.0 >60.0 >60   eGFR if non African American >60.0 >60.0 >60   Calcium 8.9 9.1 8.8       CARDIAC BIOMARKERS:        COAGS:        LIPIDS/LFTS:  Recent Labs   Lab 12/07/20  1022 05/25/21  0815 06/07/22  1018   Cholesterol 165 155 173   Triglycerides 53 84 74   HDL 49 43 47   LDL Cholesterol 105.4 95.2 111.2 "   Non-HDL Cholesterol 116 112 126   AST 15 14 17   ALT 15 15 28       Cardiovascular Testing:    Nuclear stress 08/29/2022:      Normal myocardial perfusion scan. There is no evidence of myocardial ischemia or infarction.    The gated perfusion images showed an ejection fraction of 76% post stress.    There is normal wall motion post stress.    The EKG portion of this study is negative for ischemia.    The patient reported no chest pain during the stress test.    There were no arrhythmias during stress.    The exercise capacity was average.    Hypertensive response to exercise 257/84.    Echocardiogram 08/29/2022:    The left ventricle is normal in size with concentric hypertrophy and normal systolic function.  The estimated ejection fraction is 65%.  Mild left atrial enlargement.  Normal left ventricular diastolic function.  Normal right ventricular size with normal right ventricular systolic function.  Mild tricuspid regurgitation.  Normal central venous pressure (3 mmHg).  The estimated PA systolic pressure is 33 mmHg.  There is mild pulmonary hypertension.    NST:  6-18  Impression: PROBABLY NORMAL MYOCARDIAL PERFUSION  1. There is a mild to moderate mostly reversible anterior wall defect of uncertain significance.   2. The perfusion scan is free of evidence for myocardial ischemia.   3. Resting wall motion is physiologic.   4. Resting LV function is normal.   5. The ventricular volumes are normal at rest and stress.   6. The extracardiac distribution of radioactivity is normal.   7. When compared to the previous study from 05/19/2015, no significant change     Echo:  CONCLUSIONS     1 - Normal left ventricular systolic function (EF 55-60%).      Holter 06/28/2018:     TEST DESCRIPTION   PREDOMINANT RHYTHM   1. Sinus rhythm with heart rates varying between 42 and 90 bpm with an average of 64 bpm.     VENTRICULAR ARRHYTHMIAS   1. There was a single PVC recorded.     2. There were no episodes of ventricular  tachycardia.     SUPRA VENTRICULAR ARRHYTHMIAS   1. There were very rare PACs recorded totalling 1 and averaging less than 1 per hour.     2. There were no episodes of sustained supraventricular tachycardia.     SINUS NODE FUNCTION   1. There was no evidence of high grade SA merari block.     AV CONDUCTION   1. There was no evidence of high grade AV block.     DIARY   1. The diary was returned, but not completed     MISCELLANEOUS   1. There were occasional hookup related artifacts. Overall, the study was of good quality.     2. This was a tape of adequate length (24 hrs).     ASSESSMENT:     1. Shortness of breath on exertion: Resolved  2. Hypertension  3. Obstructive sleep apnea  4. Obesity  5. Pulmonary hypertension    PLAN:     1.  Shortness of breath on exertion:  2D echocardiogram showed normal function.  Exercise myocardial perfusion was negative for ischemia.  Normal heart rate response.  Hypertensive response to exercise.  2. Hypertension:  Continue current management.  Monitor.  3. Return to clinic 6 months.           Dante Castro MD, MPH, FACC, [facility]

## 2022-10-12 ENCOUNTER — HOSPITAL ENCOUNTER (EMERGENCY)
Facility: HOSPITAL | Age: 67
Discharge: HOME | End: 2022-10-12
Attending: EMERGENCY MEDICINE
Payer: MEDICARE

## 2022-10-12 VITALS
TEMPERATURE: 98 F | BODY MASS INDEX: 36.41 KG/M2 | RESPIRATION RATE: 16 BRPM | HEART RATE: 72 BPM | SYSTOLIC BLOOD PRESSURE: 168 MMHG | DIASTOLIC BLOOD PRESSURE: 88 MMHG | HEIGHT: 67 IN | OXYGEN SATURATION: 96 % | WEIGHT: 232 LBS

## 2022-10-12 DIAGNOSIS — M79.605 LEFT LEG PAIN: Primary | ICD-10-CM

## 2022-10-12 PROCEDURE — 99284 EMERGENCY DEPT VISIT MOD MDM: CPT | Mod: ER

## 2022-10-12 RX ORDER — MELOXICAM 7.5 MG/1
7.5 TABLET ORAL DAILY
Qty: 5 TABLET | Refills: 0 | Status: SHIPPED | OUTPATIENT
Start: 2022-10-12 | End: 2022-10-17

## 2022-10-12 RX ORDER — METHOCARBAMOL 750 MG/1
1500 TABLET, FILM COATED ORAL 3 TIMES DAILY
Qty: 30 TABLET | Refills: 0 | Status: SHIPPED | OUTPATIENT
Start: 2022-10-12 | End: 2022-10-17

## 2022-10-12 NOTE — DISCHARGE INSTRUCTIONS
Take medications as directed. You may do the attached exercises. Take mobic after your biggest meal. Follow up with your doctor if you are not better with this treatment.

## 2022-10-12 NOTE — FIRST PROVIDER EVALUATION
Emergency Department TeleTriage Encounter Note      CHIEF COMPLAINT    Chief Complaint   Patient presents with    Leg Pain     Pt has non traumatic left leg pain. The pain starts at her left hip and with ceratin movements she has a sharp pain that radiates down her left leg the has been worsening since Monday        VITAL SIGNS   Initial Vitals [10/12/22 1355]   BP Pulse Resp Temp SpO2   (!) 162/88 74 16 98.3 °F (36.8 °C) 97 %      MAP       --            ALLERGIES    Review of patient's allergies indicates:   Allergen Reactions    Ace inhibitors Edema     Angioedema       PROVIDER TRIAGE NOTE  Patient presents with complaint of   Denies swelling.  She denies redness or warmth.  She reports the pain radiates down the leg.  She denies numbness or tingling.  She denies loss of bowel or bladder control.  There is no injury.      Phy:   Constitutional: well nourished, well developed, appearing stated age, NAD   HEENT: NCAT, symmetrical lids, No obvious facial deformity.  Normal phonation. Normal Conjunctiva   Neck: NAROM   Respiratory: Normal effort.  No obvious use of accessory muscles   Musculoskeletal: Moved upper extremities well   Neuro: Alert, answers questions appropriately    Psych: appropriate mood and affect      Initial orders will be placed and care will be transferred to an alternate provider when patient is roomed for a full evaluation. Any additional orders and the final disposition will be determined by that provider.        ORDERS  Labs Reviewed - No data to display    ED Orders (720h ago, onward)      None              Virtual Visit Note: The provider triage portion of this emergency department evaluation and documentation was performed via [employer], a HIPAA-compliant telemedicine application, in concert with a tele-presenter in the room. A face to face patient evaluation with one of my colleagues will occur once the patient is placed in an emergency department room.      DISCLAIMER: This note was  prepared with [employer] voice recognition transcription software. Garbled syntax, mangled pronouns, and other bizarre constructions may be attributed to that software system.

## 2022-10-12 NOTE — ED PROVIDER NOTES
Encounter Date: 10/12/2022    SCRIBE #1 NOTE: I, Arabella Mejia, am scribing for, and in the presence of,  Reina Crawford MD. I have scribed the following portions of the note - Other sections scribed: HPI; ROS; PE.     History     Chief Complaint   Patient presents with    Leg Pain     Pt has non traumatic left leg pain. The pain starts at her left hip and with ceratin movements she has a sharp pain that radiates down her left leg the has been worsening since Monday      Dain Phelps is a 67 y.o. female with Hx of HTN who presents to the ED for chief complaint of left hip pain radiating down the leg that began 2 days ago. Patient reports the pain originated in the left knee and was worsened with movement. She states the pain moved into the left hip today and progressed as the day went on. Patient notes no trauma or injuries. She attempted treatment with Tylenol. Patient denies associated weakness, numbness, or incontinence. No recent trauma, falls, injury or new activity.      The history is provided by the patient. No  was used.   Review of patient's allergies indicates:   Allergen Reactions    Ace inhibitors Edema     Angioedema     Past Medical History:   Diagnosis Date    Chronic low back pain     Fatigue     GERD (gastroesophageal reflux disease)     History of colon polyps 2015    Hypertension     Hypothyroidism     Impaired glucose tolerance 1/15/2014    Leukopenia     Obesity (BMI 30-39.9) 1/15/2014    Primary hypothyroidism 2015    Snoring 2015    Vitamin D deficiency 1/15/2014     Past Surgical History:   Procedure Laterality Date    BREAST BIOPSY Left     CATARACT EXTRACTION       SECTION, CLASSIC      x3    COLONOSCOPY N/A 2021    Procedure: COLONOSCOPY;  Surgeon: Parvez Mazariegos MD;  Location: [facility];  Service: Endoscopy;  Laterality: N/A;  covid test  lapalco, instructions through [facility]    HYSTERECTOMY      total    OOPHORECTOMY       thyroid surgey  2009     Family History   Problem Relation Age of Onset    Heart disease Mother     Kidney disease Mother     Hypertension Mother     Cancer Father         throat cancer    No Known Problems Daughter     Hypertension Son     Hypertension Maternal Aunt     Diabetes Maternal Aunt     Thyroid disease Maternal Aunt     Breast cancer Maternal Aunt 70    Diabetes Maternal Grandmother      Social History     Tobacco Use    Smoking status: Never    Smokeless tobacco: Never   Substance Use Topics    Alcohol use: No    Drug use: No     Review of Systems   Constitutional:  Negative for activity change, appetite change, chills and fever.   HENT:  Negative for congestion, rhinorrhea, sneezing and sore throat.    Respiratory:  Negative for cough, choking, shortness of breath and wheezing.    Cardiovascular:  Negative for chest pain and palpitations.   Gastrointestinal:  Negative for abdominal pain, diarrhea, nausea and vomiting.        Negative for bowel incontinence.    Genitourinary:         Negative for bladder incontinence.    Musculoskeletal:  Positive for arthralgias and myalgias. Negative for gait problem and joint swelling.   Skin:  Negative for color change, pallor, rash and wound.   Neurological:  Negative for dizziness, syncope, weakness, light-headedness, numbness and headaches.   All other systems reviewed and are negative.    Physical Exam     Initial Vitals [10/12/22 1355]   BP Pulse Resp Temp SpO2   (!) 162/88 74 16 98.3 °F (36.8 °C) 97 %      MAP       --         Physical Exam    Nursing note and vitals reviewed.  Constitutional: Vital signs are normal. She appears well-developed and well-nourished. She is cooperative. She does not appear ill. No distress.   HENT:   Head: Normocephalic and atraumatic.   Mouth/Throat: Uvula is midline and mucous membranes are normal.   Eyes: Conjunctivae and lids are normal.   Neck: Neck supple.   Normal range of motion.  Cardiovascular:  Normal rate, regular  rhythm, S1 normal, S2 normal and normal heart sounds.           No murmur heard.  Pulmonary/Chest: Effort normal and breath sounds normal. No respiratory distress. She has no wheezes.   Abdominal: Abdomen is soft. Bowel sounds are normal. She exhibits no distension. There is no abdominal tenderness.   Musculoskeletal:         General: No tenderness or edema.      Cervical back: Normal range of motion and neck supple.      Lumbar back: Negative left straight leg raise test.      Comments: Normal ROM and weight bearing of the left leg.      Neurological: She is alert and oriented to person, place, and time.   Skin: Skin is warm, dry and intact.   Psychiatric: She has a normal mood and affect. Her speech is normal and behavior is normal.       ED Course   Procedures  Labs Reviewed - No data to display       Imaging Results    None          Medications - No data to display  Medical Decision Making:   History:   Old Medical Records: I decided to obtain old medical records.  ED Management:  Non-traumatic left hip/leg pain - normal exam. Fully weightbearing. No need for emergent imaging. Treat with mobic and muscle relaxer.        Scribe Attestation:   Scribe #1: I performed the above scribed service and the documentation accurately describes the services I performed. I attest to the accuracy of the note.            I, Dr. Reina Crawford, personally performed the services described in this documentation.   All medical record entries made by the scribe were at my direction and in my presence.   I have reviewed the chart and agree that the record is accurate and complete.   Reina Crawford MD.  4:12 PM 10/12/2022          Clinical Impression:   Final diagnoses:  [M79.605] Left leg pain (Primary)      ED Disposition Condition    Discharge Stable          ED Prescriptions       Medication Sig Dispense Start Date End Date Auth. Provider    meloxicam (MOBIC) 7.5 MG tablet Take 1 tablet (7.5 mg total) by mouth once daily. for 5 days 5  tablet 10/12/2022 10/17/2022 Reina Crawford MD    methocarbamoL (ROBAXIN) 750 MG Tab Take 2 tablets (1,500 mg total) by mouth 3 (three) times daily. for 5 days 30 tablet 10/12/2022 10/17/2022 Reina Crawford MD          Follow-up Information       Follow up With Specialties Details Why Contact Info    Yary Valadez MD Family Medicine  As needed [address] [facility] [address]  [phone]               Reina Crawford MD  10/12/22 3154

## 2022-10-21 ENCOUNTER — TELEPHONE (OUTPATIENT)
Dept: FAMILY MEDICINE | Facility: CLINIC | Age: 67
End: 2022-10-21
Payer: COMMERCIAL

## 2022-10-24 ENCOUNTER — CLINICAL SUPPORT (OUTPATIENT)
Dept: FAMILY MEDICINE | Facility: CLINIC | Age: 67
End: 2022-10-24
Payer: MEDICARE

## 2022-10-24 DIAGNOSIS — Z23 NEED FOR IMMUNIZATION AGAINST INFLUENZA: Primary | ICD-10-CM

## 2022-10-24 PROCEDURE — G0008 ADMIN INFLUENZA VIRUS VAC: HCPCS | Mod: PBBFAC,PO | Performed by: FAMILY MEDICINE

## 2022-12-12 ENCOUNTER — OFFICE VISIT (OUTPATIENT)
Dept: FAMILY MEDICINE | Facility: CLINIC | Age: 67
End: 2022-12-12
Payer: MEDICARE

## 2022-12-12 VITALS
HEART RATE: 61 BPM | RESPIRATION RATE: 16 BRPM | BODY MASS INDEX: 27.99 KG/M2 | TEMPERATURE: 98 F | WEIGHT: 174.19 LBS | HEIGHT: 66 IN | DIASTOLIC BLOOD PRESSURE: 78 MMHG | SYSTOLIC BLOOD PRESSURE: 116 MMHG | OXYGEN SATURATION: 98 %

## 2022-12-12 DIAGNOSIS — M25.562 CHRONIC PAIN OF LEFT KNEE: ICD-10-CM

## 2022-12-12 DIAGNOSIS — I10 PRIMARY HYPERTENSION: Primary | ICD-10-CM

## 2022-12-12 DIAGNOSIS — G89.29 CHRONIC PAIN OF LEFT KNEE: ICD-10-CM

## 2022-12-12 PROCEDURE — 99214 PR OFFICE/OUTPT VISIT, EST, LEVL IV, 30-39 MIN: ICD-10-PCS | Mod: S$PBB,,, | Performed by: FAMILY MEDICINE

## 2022-12-12 PROCEDURE — 99999 PR PBB SHADOW E&M-EST. PATIENT-LVL V: CPT | Mod: PBBFAC,,, | Performed by: FAMILY MEDICINE

## 2022-12-12 PROCEDURE — 99215 OFFICE O/P EST HI 40 MIN: CPT | Mod: PBBFAC,PN | Performed by: FAMILY MEDICINE

## 2022-12-12 PROCEDURE — 99214 OFFICE O/P EST MOD 30 MIN: CPT | Mod: S$PBB,,, | Performed by: FAMILY MEDICINE

## 2022-12-12 PROCEDURE — 99999 PR PBB SHADOW E&M-EST. PATIENT-LVL V: ICD-10-PCS | Mod: PBBFAC,,, | Performed by: FAMILY MEDICINE

## 2022-12-12 RX ORDER — KETOCONAZOLE 20 MG/ML
SHAMPOO, SUSPENSION TOPICAL
COMMUNITY
Start: 2022-09-09 | End: 2023-02-17 | Stop reason: SDUPTHER

## 2022-12-12 RX ORDER — MELOXICAM 7.5 MG/1
7.5 TABLET ORAL DAILY
Qty: 30 TABLET | Refills: 5 | Status: SHIPPED | OUTPATIENT
Start: 2022-12-12 | End: 2023-01-11

## 2022-12-12 NOTE — PATIENT INSTRUCTIONS
For more information about the Nutrition Program, contact [facility] at [phone] or via email at [email or web address].

## 2022-12-12 NOTE — PROGRESS NOTES
Chief Complaint   Patient presents with    Hypertension       HPI  Dain Phelps is a 67 y.o. female with multiple medical diagnoses as listed in the medical history and problem list that presents for follow-up for knee pain    Left leg pain  This has been present for almost two months  She has had her left knee give out on her, she does not have any clicking or popping  She was seen in the ED for pain that started in her hip and radiated and she was treated for sciatica  She has had the pain in her knee before      ALLERGIES AND MEDICATIONS: updated and reviewed.  Review of patient's allergies indicates:   Allergen Reactions    Ace inhibitors Edema     Angioedema     Medication List with Changes/Refills   New Medications    MELOXICAM (MOBIC) 7.5 MG TABLET    Take 1 tablet (7.5 mg total) by mouth once daily.   Current Medications    ALBUTEROL (PROVENTIL/VENTOLIN HFA) 90 MCG/ACTUATION INHALER    Inhale 2 puffs into the lungs every 6 (six) hours as needed for Wheezing.    ERGOCALCIFEROL, VITAMIN D2, 2,000 UNIT TAB    Take 2,000 Units by mouth 2 (two) times daily.    ESTRADIOL (ESTRACE) 0.01 % (0.1 MG/GRAM) VAGINAL CREAM    Insert 2 g daily intravaginally for 2 weeks, then 1 g twice weekly    HYDROCHLOROTHIAZIDE (HYDRODIURIL) 12.5 MG TAB    TAKE 1 TABLET(12.5 MG) BY MOUTH EVERY DAY    KETOCONAZOLE (NIZORAL) 2 % SHAMPOO    Apply topically.    LEVOTHYROXINE (SYNTHROID) 125 MCG TABLET    Take 1 tablet (125 mcg total) by mouth before breakfast.    METOPROLOL SUCCINATE (TOPROL-XL) 25 MG 24 HR TABLET    TAKE 1 TABLET(25 MG) BY MOUTH EVERY DAY    VERAPAMIL (VERELAN) 360 MG C24P    TAKE 1 CAPSULE(360 MG) BY MOUTH EVERY DAY       ROS  Review of Systems   Constitutional:  Negative for chills, diaphoresis, fatigue, fever and unexpected weight change.   HENT:  Negative for rhinorrhea, sinus pressure, sore throat and tinnitus.    Eyes:  Negative for photophobia and visual disturbance.   Respiratory:  Negative for cough,  "shortness of breath and wheezing.    Cardiovascular:  Negative for chest pain and palpitations.   Gastrointestinal:  Negative for abdominal pain, blood in stool, constipation, diarrhea, nausea and vomiting.   Genitourinary:  Negative for dysuria, flank pain, frequency and vaginal discharge.   Musculoskeletal:  Positive for arthralgias. Negative for joint swelling.   Skin:  Negative for rash.   Neurological:  Negative for speech difficulty, weakness, light-headedness and headaches.   Psychiatric/Behavioral:  Negative for behavioral problems and dysphoric mood.      Physical Exam  Vitals:    12/12/22 1049   BP: 116/78   Pulse: 61   Resp: 16   Temp: 98 °F (36.7 °C)   TempSrc: Oral   SpO2: 98%   Weight: 79 kg (174 lb 2.6 oz)   Height: 5' 6" (1.676 m)    Body mass index is 28.11 kg/m².  Weight: 79 kg (174 lb 2.6 oz)   Height: 5' 6" (167.6 cm)     Physical Exam  Vitals and nursing note reviewed.   Constitutional:       Appearance: She is well-developed.   Musculoskeletal:      Right knee: Swelling present. No LCL laxity or MCL laxity.      Instability Tests: Anterior Lachman test negative.      Left knee: Swelling and crepitus present. Tenderness present over the lateral joint line. No LCL laxity or MCL laxity.     Instability Tests: Anterior Lachman test negative.   Skin:     General: Skin is warm and dry.      Findings: No erythema or rash.   Neurological:      Mental Status: She is alert. Mental status is at baseline.   Psychiatric:         Behavior: Behavior normal.       Health Maintenance         Date Due Completion Date    Shingles Vaccine (1 of 2) Never done ---    Pneumococcal Vaccines (Age 65+) (2 - PPSV23 if available, else PCV20) 12/07/2021 12/7/2020    COVID-19 Vaccine (5 - Booster for Moderna series) 10/14/2022 8/19/2022    Mammogram 07/15/2023 7/15/2022    DEXA Scan 06/30/2026 6/30/2022    Lipid Panel 06/07/2027 6/7/2022    Colorectal Cancer Screening 02/26/2028 2/26/2021    TETANUS VACCINE 12/11/2029 " 12/11/2019    Override on 4/7/2017: Declined            Health maintenance reviewed and addressed as ordered      ASSESSMENT     1. Primary hypertension    2. Chronic pain of left knee    3. BMI 28.0-28.9,adult        PLAN:     Problem List Items Addressed This Visit          Cardiac/Vascular    HTN (hypertension) - Primary  Continue current regimen     Other Visit Diagnoses       Chronic pain of left knee      Refer for orthopedic consult due to swelling and knee giving out on her    Relevant Medications    meloxicam (MOBIC) 7.5 MG tablet    Other Relevant Orders    Ambulatory referral/consult to Orthopedics    BMI 28.0-28.9,adult      She would like to discuss weight loss and possible take medication to lose weight  I have referred her to bariatric medicine and nutrition    Relevant Orders    Ambulatory referral/consult to Bariatric Medicine              Yary Valadez MD  12/12/2022 11:01 AM      Follow up in about 6 months (around 6/12/2023) for management of chronic conditions.

## 2022-12-15 DIAGNOSIS — M25.562 LEFT KNEE PAIN, UNSPECIFIED CHRONICITY: Primary | ICD-10-CM

## 2022-12-19 ENCOUNTER — TELEPHONE (OUTPATIENT)
Dept: ORTHOPEDICS | Facility: CLINIC | Age: 67
End: 2022-12-19
Payer: COMMERCIAL

## 2022-12-19 ENCOUNTER — OFFICE VISIT (OUTPATIENT)
Dept: ORTHOPEDICS | Facility: CLINIC | Age: 67
End: 2022-12-19
Payer: MEDICARE

## 2022-12-19 ENCOUNTER — HOSPITAL ENCOUNTER (OUTPATIENT)
Dept: RADIOLOGY | Facility: HOSPITAL | Age: 67
Discharge: HOME | End: 2022-12-19
Attending: ORTHOPAEDIC SURGERY
Payer: MEDICARE

## 2022-12-19 VITALS — HEIGHT: 66 IN | BODY MASS INDEX: 37.79 KG/M2 | WEIGHT: 235.13 LBS

## 2022-12-19 DIAGNOSIS — M76.52 PATELLAR TENDINITIS OF LEFT KNEE: Primary | ICD-10-CM

## 2022-12-19 DIAGNOSIS — M25.562 LEFT KNEE PAIN, UNSPECIFIED CHRONICITY: ICD-10-CM

## 2022-12-19 DIAGNOSIS — M25.562 CHRONIC PAIN OF LEFT KNEE: ICD-10-CM

## 2022-12-19 DIAGNOSIS — G89.29 CHRONIC PAIN OF LEFT KNEE: ICD-10-CM

## 2022-12-19 PROCEDURE — 99999 PR PBB SHADOW E&M-EST. PATIENT-LVL III: ICD-10-PCS | Mod: PBBFAC,,, | Performed by: ORTHOPAEDIC SURGERY

## 2022-12-19 PROCEDURE — 73562 XR KNEE 3 VIEW LEFT: ICD-10-PCS | Mod: 26,LT,, | Performed by: RADIOLOGY

## 2022-12-19 PROCEDURE — 99203 OFFICE O/P NEW LOW 30 MIN: CPT | Mod: S$PBB,,, | Performed by: ORTHOPAEDIC SURGERY

## 2022-12-19 PROCEDURE — 99203 PR OFFICE/OUTPT VISIT, NEW, LEVL III, 30-44 MIN: ICD-10-PCS | Mod: S$PBB,,, | Performed by: ORTHOPAEDIC SURGERY

## 2022-12-19 PROCEDURE — 73562 X-RAY EXAM OF KNEE 3: CPT | Mod: 26,LT,, | Performed by: RADIOLOGY

## 2022-12-19 PROCEDURE — 99213 OFFICE O/P EST LOW 20 MIN: CPT | Mod: PBBFAC,PN | Performed by: ORTHOPAEDIC SURGERY

## 2022-12-19 PROCEDURE — 99999 PR PBB SHADOW E&M-EST. PATIENT-LVL III: CPT | Mod: PBBFAC,,, | Performed by: ORTHOPAEDIC SURGERY

## 2022-12-19 PROCEDURE — 73562 X-RAY EXAM OF KNEE 3: CPT | Mod: TC,FY,LT

## 2022-12-19 NOTE — TELEPHONE ENCOUNTER
LVM for patient that [facility] X-ray is not working, that we are trying to defer patient to other places to get xrays, If she could please call the office at [phone]

## 2022-12-19 NOTE — PROGRESS NOTES
NEW PATIENT ORTHOPAEDIC: Knee    PRIMARY CARE PHYSICIAN: Yary Valadez MD   REFERRING PROVIDER: Yary Valadez MD  [address] [facility]  [place] [id number]     ASSESSMENT & PLAN:    Impression:  Left Knee moderate Osteoarthritis, Primary      Follow Up Plan: PRN        Non operative care:    Dain Phelps has physical exam evidence of above and wishes to pursue an non-operative care. I am recommending the following: activity modification, weight loss, Physical Therapy, NSAIDs PRN    Patient comes in with greater than 6 month history of having left knee pain.  She localizes this both the anterior, medial and lateral aspects of her knee.  This pain is intermittent and happens about 4 times a week.  Sometimes it sustained other times it is transient.  She has 2 different kinds of pain the 1 that is mediolateral is based on turning or twisting in a certain way that reproduces her pain.  She has anterior knee pain when getting up from low stool positions.  She is also reporting some associated weakness.  She does not take anything over-the-counter and she carries a diagnosis of the ulcer in her history.  She is most concerned about this weakness in her inability to get off low seats easily.  For her recommending physical therapy to work on her quadriceps weakness in this will help I think with her patella tendinitis and hopefully offload the medial and lateral compartments of her knee.  She denies any specific mechanical symptoms.  Clinically she has pain over her patella tendon and along her medial and lateral joint lines.  She will obtain x-rays after this visit today which I will review.  See her back as needed in the future should therapy not prove to be big benefit.    The patient has been ordered:  Physical Therapy    CONSULTS:   None    ACTIVE PROBLEM LIST  Patient Active Problem List   Diagnosis    Postoperative hypothyroidism    HTN (hypertension)    GERD (gastroesophageal reflux disease)    Proteinuria     Vitamin D deficiency    LORRAINE on CPAP    Vaginal atrophy    Severe obesity (BMI 35.0-39.9) with comorbidity    Left shoulder pain           SUBJECTIVE    CHIEF COMPLAINT: Knee Pain    HPI:   Dain Phelps is a 67 y.o. female here for evaluation and management of left knee pain. There is not a specific incident that brought about this pain. she has had progressive problems with the knee(s) starting 6 months ago but is now progressing to interfere with activities which include: walking, exercise, and rising from a sitting position    Currently the pain in the joint is rated at moderate with activity. The pain is intermittent and is located in the knee, at level of joint line, located medially, located laterally, and located anterior. The pain is described as aching, severe, and sharp. Relieving factors include rest and repositioning.     There is not associated Catching, Clicking, and Popping.     Dain Phelps has no additional complaints.     PROGRESSIVE SYMPTOMS:  Pain worsened by weight bearing  Pain effecting living situation    FUNCTIONAL STATUS:   Climb a flight of stairs or walk up a hill     PREVIOUS TREATMENTS:  Medical: Intolerant of NSAIDS  Physical Therapy: Activities Modified   Previous Orthopaedic Surgery: None    REVIEW OF SYSTEMS:  PAIN ASSESSMENT:  See HPI.  MUSCULOSKELETAL: See HPI.  OTHER 10 point review of systems is negative except as stated in HPI above    PAST MEDICAL HISTORY   has a past medical history of Chronic low back pain, Fatigue, GERD (gastroesophageal reflux disease), History of colon polyps (2015), Hypertension, Hypothyroidism, Impaired glucose tolerance (1/15/2014), Leukopenia, Obesity (BMI 30-39.9) (1/15/2014), Primary hypothyroidism (2015), Snoring (2015), and Vitamin D deficiency (1/15/2014).     PAST SURGICAL HISTORY   has a past surgical history that includes  section, classic; thyroid surgey (); Hysterectomy; Cataract extraction; Oophorectomy;  "Breast biopsy (Left); and Colonoscopy (N/A, 2/26/2021).     FAMILY HISTORY  family history includes Breast cancer (age of onset: 70) in her maternal aunt; Cancer in her father; Diabetes in her maternal aunt and maternal grandmother; Heart disease in her mother; Hypertension in her maternal aunt, mother, and son; Kidney disease in her mother; No Known Problems in her daughter; Thyroid disease in her maternal aunt.     SOCIAL HISTORY   reports that she has never smoked. She has never used smokeless tobacco. She reports that she does not drink alcohol and does not use drugs.     ALLERGIES   Review of patient's allergies indicates:   Allergen Reactions    Ace inhibitors Edema     Angioedema        MEDICATIONS  Current Outpatient Medications on File Prior to Visit   Medication Sig Dispense Refill    albuterol (PROVENTIL/VENTOLIN HFA) 90 mcg/actuation inhaler Inhale 2 puffs into the lungs every 6 (six) hours as needed for Wheezing. 18 g 0    ergocalciferol, vitamin D2, 2,000 unit Tab Take 2,000 Units by mouth 2 (two) times daily.      estradioL (ESTRACE) 0.01 % (0.1 mg/gram) vaginal cream Insert 2 g daily intravaginally for 2 weeks, then 1 g twice weekly 42.5 g 5    hydroCHLOROthiazide (HYDRODIURIL) 12.5 MG Tab TAKE 1 TABLET(12.5 MG) BY MOUTH EVERY DAY 30 tablet 5    ketoconazole (NIZORAL) 2 % shampoo Apply topically.      levothyroxine (SYNTHROID) 125 MCG tablet Take 1 tablet (125 mcg total) by mouth before breakfast. 90 tablet 3    meloxicam (MOBIC) 7.5 MG tablet Take 1 tablet (7.5 mg total) by mouth once daily. 30 tablet 5    metoprolol succinate (TOPROL-XL) 25 MG 24 hr tablet TAKE 1 TABLET(25 MG) BY MOUTH EVERY DAY 90 tablet 1    verapamiL (VERELAN) 360 MG C24P TAKE 1 CAPSULE(360 MG) BY MOUTH EVERY DAY 90 capsule 3     No current facility-administered medications on file prior to visit.          PHYSICAL EXAM   height is 5' 6" (1.676 m) and weight is 106.6 kg (235 lb 1.6 oz).   Body mass index is 37.95 kg/m².      All " other systems deferred.  GENERAL:  No acute distress  HABITUS: Obese  GAIT: Non-antalgic  SKIN: Normal     KNEE EXAM:    left:   Effusion: No joint effusion  TTP: yes over Medial Joint Line and Lateral Joint Line and patellar tendon  no crepitus with passive knee ROM  Passive ROM: Extension 0, Flexion 130  No pain with manipulation of patella  Stable to varus/valgus stress. No increased laxity to anterior/posterior drawer testing  negative Vivienne's test  No pain with IR/ER rotation of the hip  5/5 strength in knee flexion and extension, ankle plantarflexion and dorsiflexion  Neurovascular Status: Sensation intact to light touch in Sural, Saphenous, SPN, DPN, Tibial nerve distribution  2+ pulse DP/PT, normal capillary refill, foot has normal warmth    DATA:  Diagnostic tests reviewed for today's visit:     4v of the knee reveal Moderate degenerative changes of the Medial compartment. There is evidence of advanced osteoarthritis changes with Osteophyte formation and Joint space narrowing. The limb is in netural alignment. The patella is tracking midline.

## 2023-01-05 ENCOUNTER — PATIENT MESSAGE (OUTPATIENT)
Dept: ORTHOPEDICS | Facility: CLINIC | Age: 68
End: 2023-01-05
Payer: COMMERCIAL

## 2023-01-05 DIAGNOSIS — M76.52 PATELLAR TENDINITIS OF LEFT KNEE: Primary | ICD-10-CM

## 2023-01-09 ENCOUNTER — PATIENT MESSAGE (OUTPATIENT)
Dept: FAMILY MEDICINE | Facility: CLINIC | Age: 68
End: 2023-01-09
Payer: COMMERCIAL

## 2023-01-23 ENCOUNTER — OFFICE VISIT (OUTPATIENT)
Dept: FAMILY MEDICINE | Facility: CLINIC | Age: 68
End: 2023-01-23
Payer: MEDICARE

## 2023-01-23 ENCOUNTER — TELEPHONE (OUTPATIENT)
Dept: FAMILY MEDICINE | Facility: CLINIC | Age: 68
End: 2023-01-23

## 2023-01-23 DIAGNOSIS — I10 ESSENTIAL HYPERTENSION: ICD-10-CM

## 2023-01-23 DIAGNOSIS — E89.0 POSTOPERATIVE HYPOTHYROIDISM: ICD-10-CM

## 2023-01-23 DIAGNOSIS — E66.01 SEVERE OBESITY (BMI 35.0-39.9) WITH COMORBIDITY: Primary | ICD-10-CM

## 2023-01-23 PROCEDURE — 99214 PR OFFICE/OUTPT VISIT, EST, LEVL IV, 30-39 MIN: ICD-10-PCS | Mod: 95,,, | Performed by: FAMILY MEDICINE

## 2023-01-23 PROCEDURE — 99214 OFFICE O/P EST MOD 30 MIN: CPT | Mod: 95,,, | Performed by: FAMILY MEDICINE

## 2023-01-23 RX ORDER — VERAPAMIL HYDROCHLORIDE 360 MG/1
CAPSULE, DELAYED RELEASE PELLETS ORAL
Qty: 90 CAPSULE | Refills: 3 | Status: SHIPPED | OUTPATIENT
Start: 2023-01-23 | End: 2024-02-02

## 2023-01-23 RX ORDER — LEVOTHYROXINE SODIUM 125 UG/1
125 TABLET ORAL
Qty: 90 TABLET | Refills: 0 | Status: SHIPPED | OUTPATIENT
Start: 2023-01-23 | End: 2023-05-05

## 2023-01-23 RX ORDER — METOPROLOL SUCCINATE 25 MG/1
25 TABLET, EXTENDED RELEASE ORAL DAILY
Qty: 90 TABLET | Refills: 1 | Status: SHIPPED | OUTPATIENT
Start: 2023-01-23 | End: 2023-07-26 | Stop reason: SDUPTHER

## 2023-01-23 NOTE — PROGRESS NOTES
Chief Complaint   Patient presents with    Weight Loss       HPI  Dain Phelps is a 67 y.o. female with multiple medical diagnoses as listed in the medical history and problem list that presents for follow-up for weight loss    She has concerns that bariatric medicine will involve invasive procedures she is not interested in    We did discuss they also manage medications     She is having trouble losing weight and would like to discuss mounjaro and ozempic       ALLERGIES AND MEDICATIONS: updated and reviewed.  Review of patient's allergies indicates:   Allergen Reactions    Ace inhibitors Edema     Angioedema     Medication List with Changes/Refills   Current Medications    ALBUTEROL (PROVENTIL/VENTOLIN HFA) 90 MCG/ACTUATION INHALER    Inhale 2 puffs into the lungs every 6 (six) hours as needed for Wheezing.    ERGOCALCIFEROL, VITAMIN D2, 2,000 UNIT TAB    Take 2,000 Units by mouth 2 (two) times daily.    ESTRADIOL (ESTRACE) 0.01 % (0.1 MG/GRAM) VAGINAL CREAM    Insert 2 g daily intravaginally for 2 weeks, then 1 g twice weekly    HYDROCHLOROTHIAZIDE (HYDRODIURIL) 12.5 MG TAB    TAKE 1 TABLET(12.5 MG) BY MOUTH EVERY DAY    KETOCONAZOLE (NIZORAL) 2 % SHAMPOO    Apply topically.   Changed and/or Refilled Medications    Modified Medication Previous Medication    LEVOTHYROXINE (SYNTHROID) 125 MCG TABLET levothyroxine (SYNTHROID) 125 MCG tablet       Take 1 tablet (125 mcg total) by mouth before breakfast.    Take 1 tablet (125 mcg total) by mouth before breakfast.    METOPROLOL SUCCINATE (TOPROL-XL) 25 MG 24 HR TABLET metoprolol succinate (TOPROL-XL) 25 MG 24 hr tablet       Take 1 tablet (25 mg total) by mouth once daily.    TAKE 1 TABLET(25 MG) BY MOUTH EVERY DAY    VERAPAMIL (VERELAN) 360 MG C24P verapamiL (VERELAN) 360 MG C24P       TAKE 1 CAPSULE(360 MG) BY MOUTH EVERY DAY    TAKE 1 CAPSULE(360 MG) BY MOUTH EVERY DAY       ROS  Review of Systems   Constitutional:  Negative for activity change and unexpected  weight change.   HENT:  Negative for hearing loss, rhinorrhea and trouble swallowing.    Eyes:  Negative for discharge and visual disturbance.   Respiratory:  Negative for chest tightness and wheezing.    Cardiovascular:  Negative for chest pain and palpitations.   Gastrointestinal:  Negative for blood in stool, constipation, diarrhea and vomiting.   Endocrine: Negative for polydipsia and polyuria.   Genitourinary:  Negative for difficulty urinating, dysuria, hematuria and menstrual problem.   Musculoskeletal:  Negative for arthralgias, joint swelling and neck pain.   Neurological:  Negative for weakness and headaches.   Psychiatric/Behavioral:  Negative for confusion and dysphoric mood.      Physical Exam  There were no vitals filed for this visit. There is no height or weight on file to calculate BMI.            Physical Exam  Vitals and nursing note reviewed.   Constitutional:       Appearance: She is well-developed.   Skin:     General: Skin is warm and dry.      Findings: No erythema or rash.   Neurological:      Mental Status: She is alert. Mental status is at baseline.   Psychiatric:         Behavior: Behavior normal.       Health Maintenance         Date Due Completion Date    Shingles Vaccine (1 of 2) Never done ---    Pneumococcal Vaccines (Age 65+) (2 - PPSV23 if available, else PCV20) 12/07/2021 12/7/2020    COVID-19 Vaccine (5 - Booster for Moderna series) 10/14/2022 8/19/2022    Mammogram 07/15/2023 7/15/2022    Hemoglobin A1c (Diabetic Prevention Screening) 12/17/2024 12/17/2021    DEXA Scan 06/30/2026 6/30/2022    Lipid Panel 06/07/2027 6/7/2022    Colorectal Cancer Screening 02/26/2028 2/26/2021    TETANUS VACCINE 12/11/2029 12/11/2019    Override on 4/7/2017: Declined            Health maintenance reviewed and addressed as ordered      ASSESSMENT     1. Severe obesity (BMI 35.0-39.9) with comorbidity    2. Postoperative hypothyroidism    3. Essential hypertension        PLAN:     Problem List Items  Addressed This Visit          Endocrine    Postoperative hypothyroidism  She is due for f/u with her endocrine specialist    Relevant Medications    levothyroxine (SYNTHROID) 125 MCG tablet    Severe obesity (BMI 35.0-39.9) with comorbidity - Primary  Has f/u with bariatrics  We discussed that most insurances are not covering ozempic or mounjaro unless pt has diabetes      Overview     -trying to be more active.            Other Visit Diagnoses       Essential hypertension      Continue current regimen    Relevant Medications    verapamiL (VERELAN) 360 MG C24P    metoprolol succinate (TOPROL-XL) 25 MG 24 hr tablet              Yary Valadez MD  01/23/2023 7:38 AM      Follow up in about 3 months (around 4/23/2023) for management of chronic conditions.

## 2023-01-31 ENCOUNTER — TELEPHONE (OUTPATIENT)
Dept: BARIATRICS | Facility: CLINIC | Age: 68
End: 2023-01-31
Payer: COMMERCIAL

## 2023-01-31 NOTE — TELEPHONE ENCOUNTER
Notified patient of the date & time of financial phone call appt.  Pt aware appt is a telephone call.    Dashboard updated  Appt. 02.01.2023

## 2023-02-01 ENCOUNTER — TELEPHONE (OUTPATIENT)
Dept: BARIATRICS | Facility: CLINIC | Age: 68
End: 2023-02-01
Payer: COMMERCIAL

## 2023-02-01 NOTE — TELEPHONE ENCOUNTER
Pt called per TERE Guadalupe at 1021 on 2/1/23:  [facility]   Contacted and verified patient's demo to discuss and advise of bariatric benefits. Pt is aware and understands his/her benefits/responsibility. Pt wish to proceed with initial consult visit.

## 2023-02-01 NOTE — TELEPHONE ENCOUNTER
----- Message from Darius Tremayne Brown sent at 2/1/2023 10:12 AM CST -----  Regarding: pt appt  Contact: pt @ [phone]  Pt Dain Phelps is calling in regards to her 10 AM  Bariatric Fin Consult, still awaiting call. Please call.

## 2023-02-13 ENCOUNTER — CLINICAL SUPPORT (OUTPATIENT)
Dept: REHABILITATION | Facility: HOSPITAL | Age: 68
End: 2023-02-13
Attending: ORTHOPAEDIC SURGERY
Payer: MEDICARE

## 2023-02-13 DIAGNOSIS — R26.89 ANTALGIC GAIT: ICD-10-CM

## 2023-02-13 DIAGNOSIS — Z74.09 DECREASED STRENGTH, ENDURANCE, AND MOBILITY: ICD-10-CM

## 2023-02-13 DIAGNOSIS — M76.52 PATELLAR TENDINITIS OF LEFT KNEE: ICD-10-CM

## 2023-02-13 DIAGNOSIS — R53.1 DECREASED STRENGTH, ENDURANCE, AND MOBILITY: ICD-10-CM

## 2023-02-13 DIAGNOSIS — R68.89 DECREASED STRENGTH, ENDURANCE, AND MOBILITY: ICD-10-CM

## 2023-02-13 PROCEDURE — 97110 THERAPEUTIC EXERCISES: CPT | Mod: PN

## 2023-02-13 PROCEDURE — 97161 PT EVAL LOW COMPLEX 20 MIN: CPT | Mod: PN

## 2023-02-13 NOTE — PLAN OF CARE
[facility]   Physical Therapy Initial Evaluation     Date: 2/13/2023   Name: Dain Phelps  Clinic Number: [id number]    Therapy Diagnosis:   Encounter Diagnoses   Name Primary?    Patellar tendinitis of left knee     Decreased strength, endurance, and mobility     Antalgic gait      Physician: Deondre Marin MD    Physician Orders: PT Eval and Treat   Medical Diagnosis from Referral: M76.52 (ICD-10-CM) - Patellar tendinitis of left knee  Evaluation Date: 2/13/2023  Authorization Period Expiration: 01/05/2024  Plan of Care Expiration: 5/13/23  Progress Note Due: 3/13/23  Visit # / Visits authorized: 1/ 1   FOTO: 1/5    Precautions: Standard and hypothyroidism, Leukopenia     Time In: 0930a  Time Out: 1015a  Total Appointment Time (timed & untimed codes): 45 minutes      SUBJECTIVE     Date of onset: 6 months-1 year    History of current condition - Dain reports: She has pain in her left knee if she turns or walks in a certain way or direction. Sometimes she has pain with just walking she will get a sharp pain in her knee. She thinks that it is the pain in her knee that is causing her leg to hurt. She will feel tightness in her calf region. She localizes her knee pain to the medial aspect of her left knee. Both of her knees are weak. If she gets into a squatting position she cannot get back up without support. She denies pain in her right knee.  She has some difficulty with stairs. She does not have the pain but she does endorse weakness.     Falls: none    Imaging, x-ray per Gallito Jordan MD:    FINDINGS:  The bones are intact.  There is no evidence for acute fracture or bone destruction.  There is no evidence for dislocation.  There are degenerative changes with spurring of the tibial spines with osteophytes at the femorotibial and patellofemoral joints.  There is moderate narrowing of the medial compartment of the left femorotibial joint.  There is no plain film evidence for  "joint effusion.  Soft tissues are unremarkable.     Impression:     No evidence for acute fracture, bone destruction, or dislocation.     Degenerative changes with moderate narrowing of the medial compartment of the left femorotibial joint.      Prior Therapy: none  Social History: 2 story home lives with their spouse  Occupation: not working   Prior Level of Function: independent  Current Level of Function: difficulty walking, stairs, cutting while walking, squatting,     Pain:  Current 0/10, worst 5/10, best 0/10   Location:  left knee(s)   Description: Sharp  Aggravating Factors: Walking and stairs, squatting  Easing Factors: rest and Tylenol    Patients goals: "I would like to be able to squat and get up without difficulty."     Medical History:   Past Medical History:   Diagnosis Date    Chronic low back pain     Fatigue     GERD (gastroesophageal reflux disease)     History of colon polyps 2015    Hypertension     Hypothyroidism     Impaired glucose tolerance 1/15/2014    Leukopenia     Obesity (BMI 30-39.9) 1/15/2014    Primary hypothyroidism 2015    Snoring 2015    Vitamin D deficiency 1/15/2014       Surgical History:   Dain Phelps  has a past surgical history that includes  section, classic; thyroid surgey (); Hysterectomy; Cataract extraction; Oophorectomy; Breast biopsy (Left); and Colonoscopy (N/A, 2021).    Medications:   Dain has a current medication list which includes the following prescription(s): albuterol, ergocalciferol (vitamin d2), estradiol, hydrochlorothiazide, ketoconazole, levothyroxine, metoprolol succinate, and verapamil.    Allergies:   Review of patient's allergies indicates:   Allergen Reactions    Ace inhibitors Edema     Angioedema          OBJECTIVE     Observation: bilateral pes planus, slight left lateral trunk lean      Range of Motion: Knee   Left Right   Flexion: 130 130   Extension 0 0     Strength: Knee   Left Right   Quadriceps 4/5 " "4/5   Hamstrings 4/5 4/5       Strength: Hip   Left Right   Iliopsoas 4/5 4/5   Glute Med 3+/5 3+/5   Hip IR 4-/5 4/5   Hip ER 4-/5 4/5   Hip Ext 4-/5 4-/5       Special Tests: Knee   Left   Valgus at 0 Deg - instability   Valgus at 30 Deg - instability   Varus at 0 Deg - instability   Varus at 30 Deg - instability   Vivienne's - pain   Thessaly's + pain   Forced Flexion - pain   Forced Extension - pain   Lateral Patellar Apprehension - pain   Patellar Grind Test + pain     Functional squat: quad dominant  Single leg squat: pain in anterior Left knee    Joint Mobility: hypomobile patellar mobility of Left   Palpation: Tender to palpation to medial and lateral joint line  Sensation: intact to light touch    Edema:  Left: absent  Right: absent        Gait: Lenin ambulated with none.  Level of Assistance: independent  Patient displays decrease stance time on left lower extremity .   Balance: NT    DTR's: NT      Limitation/Restriction for FOTO Knee Survey    Therapist reviewed FOTO scores for Dain Phelps on 2/13/2023.   FOTO documents entered into [employer] - see Media section.    Limitation Score: 48%         TREATMENT     Total Treatment time (time-based codes) separate from Evaluation: 10 minutes      Dain received the treatments listed below:      therapeutic exercises to develop strength, posture, and core stabilization for 10 minutes including:    Quad set 5"x20 Left   Straight leg raise 3" hold 2x10 Left   Bridges 3" 2x10  Side lying clams 3" hold 2x10        PATIENT EDUCATION AND HOME EXERCISES     Education provided:   - Home exercise program  - Plan of Care     Written Home Exercises Provided: yes. Exercises were reviewed and Dain was able to demonstrate them prior to the end of the session.  Dain demonstrated good  understanding of the education provided. See EMR under Patient Instructions for exercises provided during therapy sessions.    ASSESSMENT     Dain is a 67 y.o. female referred to " outpatient Physical Therapy with a medical diagnosis of Patellar tendinitis of left knee. Patient presents with Left anterior and medial knee pain that is exacerbated with single leg squat, patellar grind, and Thessaly's. Patient also displays global hip and quad weakness in bilateral lower extremity. Signs and symptoms consistent with medical diagnosis. Patient is most limited with her ability to walk, pivot while walking, stairs, and squatting.     Patient prognosis is Good.   Patient will benefit from skilled outpatient Physical Therapy to address the deficits stated above and in the chart below, provide patient /family education, and to maximize patientt's level of independence.     Plan of care discussed with patient: Yes  Patient's spiritual, cultural and educational needs considered and patient is agreeable to the plan of care and goals as stated below:     Anticipated Barriers for therapy: chronicity of condition     Medical Necessity is demonstrated by the following  History  Co-morbidities and personal factors that may impact the plan of care Co-morbidities:   Chronic low back pain   Fatigue   GERD (gastroesophageal reflux disease)   History of colon polyps   Hypertension   Hypothyroidism   Impaired glucose tolerance   Leukopenia   Obesity (BMI 30-39.9)   Primary hypothyroidism   Snoring   Vitamin D deficiency       Personal Factors:   age  social background  lifestyle     moderate   Examination  Body Structures and Functions, activity limitations and participation restrictions that may impact the plan of care Body Regions:   lower extremities    Body Systems:    strength  gross coordinated movement  balance  gait  transfers  transitions  motor control  motor learning    Participation Restrictions:   Walking, stairs, squatting,     Activity limitations:   Learning and applying knowledge  no deficits    General Tasks and Commands  no deficits    Communication  no deficits    Mobility  lifting and carrying  objects  walking    Self care  no deficits    Domestic Life  shopping  cooking  doing house work (cleaning house, washing dishes, laundry)  assisting others    Interactions/Relationships  no deficits    Life Areas  no deficits    Community and Social Life  community life  recreation and leisure         moderate   Clinical Presentation stable and uncomplicated low   Decision Making/ Complexity Score: low     Short Term GOALS: 6 weeks. Pt agrees with goals set.  1. Patient demonstrates independence with HEP.   2. Patient demonstrates ability to perform light activities around her house with no difficulty to improve tolerance to functional tasks pain free.   3. Patient demonstrates ability to navigate stairs with little to no difficulty to improve ability to navigate home environment    Long Term GOALS: 12 weeks. Pt agrees with goals set.  1. Patient demonstrates ability to perform functional squat with little to no difficulty to improve tolerance to functional tasks pain free.   2. Patient demonstrates increased strength BLE's to 4+/5 or greater to improve tolerance to functional activities pain free.   3. Patient demonstrates improved overall function per FOTO Knee Survey to 34% Limitation or less.   4. Patient demonstrates ability to walk 1 mile with little to no difficulty to improve tolerance to functional tasks pain free.     PLAN   Plan of care Certification: 2/13/2023 to 5/13/23.    Outpatient Physical Therapy 1-2 times weekly for 12 weeks to include the following interventions: Gait Training, Manual Therapy, Moist Heat/ Ice, Neuromuscular Re-ed, Patient Education, Therapeutic Activities, Therapeutic Exercise, and Dry needling PRN.     Irma Deluca, PT,DPT  02/13/2023        I CERTIFY THE NEED FOR THESE SERVICES FURNISHED UNDER THIS PLAN OF TREATMENT AND WHILE UNDER MY CARE   Physician's comments:     Physician's Signature: ___________________________________________________

## 2023-02-13 NOTE — PROGRESS NOTES
"  [facility]   Physical Therapy Initial Evaluation     Date: 2/13/2023   Name: Dain Phelps  Clinic Number: [id number]    Therapy Diagnosis: No diagnosis found.  Physician: Deondre Marin MD    Physician Orders: PT Eval and Treat   Medical Diagnosis from Referral: M76.52 (ICD-10-CM) - Patellar tendinitis of left knee  Evaluation Date: 2/13/2023  Authorization Period Expiration: 01/05/2024  Plan of Care Expiration: 5/13/23  Progress Note Due: 3/13/23  Visit # / Visits authorized: 1/ 1   FOTO: 1/5    Precautions: Standard and hypothyroidism, Leukopenia      Time In: ***  Time Out: ***  Total Appointment Time (timed & untimed codes): *** minutes      SUBJECTIVE     Date of onset: ***    History of current condition - Dain reports: ***    Falls: ***    Imaging, x-ray per Gallito Jordan MD:    FINDINGS:  The bones are intact.  There is no evidence for acute fracture or bone destruction.  There is no evidence for dislocation.  There are degenerative changes with spurring of the tibial spines with osteophytes at the femorotibial and patellofemoral joints.  There is moderate narrowing of the medial compartment of the left femorotibial joint.  There is no plain film evidence for joint effusion.  Soft tissues are unremarkable.     Impression:     No evidence for acute fracture, bone destruction, or dislocation.     Degenerative changes with moderate narrowing of the medial compartment of the left femorotibial joint.      Prior Therapy: ***  Social History: *** {LIVES WITH:94440}  Occupation: ***  Prior Level of Function: ***  Current Level of Function: ***    Pain:  Current {0-10:18440::"0"}/10, worst {0-10:20507::"0"}/10, best {0-10:20507::"0"}/10   Location:  left knee(s)   Description: {Pain Description:57681}  Aggravating Factors: {Causes; Pain:65122}  Easing Factors: {Pain (activities that relieve):90306}    Patients goals: ***     Medical History:   Past Medical History:   Diagnosis " Date    Chronic low back pain     Fatigue     GERD (gastroesophageal reflux disease)     History of colon polyps 2015    Hypertension     Hypothyroidism     Impaired glucose tolerance 1/15/2014    Leukopenia     Obesity (BMI 30-39.9) 1/15/2014    Primary hypothyroidism 2015    Snoring 2015    Vitamin D deficiency 1/15/2014       Surgical History:   Dain Phelps  has a past surgical history that includes  section, classic; thyroid surgey (); Hysterectomy; Cataract extraction; Oophorectomy; Breast biopsy (Left); and Colonoscopy (N/A, 2021).    Medications:   Dain has a current medication list which includes the following prescription(s): albuterol, ergocalciferol (vitamin d2), estradiol, hydrochlorothiazide, ketoconazole, levothyroxine, metoprolol succinate, and verapamil.    Allergies:   Review of patient's allergies indicates:   Allergen Reactions    Ace inhibitors Edema     Angioedema          OBJECTIVE     Observation: ***  Paolo's Sign:    Range of Motion: Knee   Left Right   Flexion: *** ***   Extension *** ***     Strength: Knee   Left Right   Quadriceps {AMB PT VESTIBULAR STRENGTH:03630} {AMB PT VESTIBULAR STRENGTH:16998}   Hamstrings {AMB PT VESTIBULAR STRENGTH:02162} {AMB PT VESTIBULAR STRENGTH:38982}       Strength: Hip   Left Right   Iliopsoas {AMB PT VESTIBULAR STRENGTH:69818} {AMB PT VESTIBULAR STRENGTH:65993}   Glute Med {AMB PT VESTIBULAR STRENGTH:91138} {AMB PT VESTIBULAR STRENGTH:32144}   Hip IR {AMB PT VESTIBULAR STRENGTH:34220} {AMB PT VESTIBULAR STRENGTH:69293}   Hip ER {AMB PT VESTIBULAR STRENGTH:85366} {AMB PT VESTIBULAR STRENGTH:99162}   Hip Ext {AMB PT VESTIBULAR STRENGTH:00077} {AMB PT VESTIBULAR STRENGTH:63091}   Ankle PF {AMB PT VESTIBULAR STRENGTH:06154} {AMB PT VESTIBULAR STRENGTH:98925}   Ankle DF {AMB PT VESTIBULAR STRENGTH:31178} {AMB PT VESTIBULAR STRENGTH:78877}       Special Tests: Knee   Left Right   Ant. Drawer {AMB PT KNEE SPECIAL TESTS  ANSWERS:54018} {AMB PT KNEE SPECIAL TESTS ANSWERS:98301}   Post. Drawer {AMB PT KNEE SPECIAL TESTS ANSWERS:79490} {AMB PT KNEE SPECIAL TESTS ANSWERS:66675}   Lachman's {AMB PT KNEE SPECIAL TESTS ANSWERS:51430} {AMB PT KNEE SPECIAL TESTS ANSWERS:02538}   Valgus at 0 Deg {AMB PT KNEE SPECIAL TESTS ANSWERS:57312} {AMB PT KNEE SPECIAL TESTS ANSWERS:57942}   Valgus at 30 Deg {AMB PT KNEE SPECIAL TESTS ANSWERS:49966} {AMB PT KNEE SPECIAL TESTS ANSWERS:92098}   Varus at 0 Deg {AMB PT KNEE SPECIAL TESTS ANSWERS:38806} {AMB PT KNEE SPECIAL TESTS ANSWERS:83349}   Varus at 30 Deg {AMB PT KNEE SPECIAL TESTS ANSWERS:01153} {AMB PT KNEE SPECIAL TESTS ANSWERS:88157}   Vivienne's {AMB PT KNEE SPECIAL TESTS ANSWERS:48806} {AMB PT KNEE SPECIAL TESTS ANSWERS:50125}   Apley's Compression {AMB PT KNEE SPECIAL TESTS ANSWERS:92673} {AMB PT KNEE SPECIAL TESTS ANSWERS:34726}   Apley's Distraction {AMB PT KNEE SPECIAL TESTS ANSWERS:71611} {AMB PT KNEE SPECIAL TESTS ANSWERS:22099}   Forced Flexion {AMB PT KNEE SPECIAL TESTS ANSWERS:59810} {AMB PT KNEE SPECIAL TESTS ANSWERS:36151}   Forced Extension {AMB PT KNEE SPECIAL TESTS ANSWERS:80196} {AMB PT KNEE SPECIAL TESTS ANSWERS:48708}   Lateral Patellar Apprehension {AMB PT KNEE SPECIAL TESTS ANSWERS:99070} {AMB PT KNEE SPECIAL TESTS ANSWERS:30190}   Patellar Grind Test {AMB PT KNEE SPECIAL TESTS ANSWERS:78967} {AMB PT KNEE SPECIAL TESTS ANSWERS:39482}     Joint Mobility: {AMB PT KNEE MOBILITY:42191}  Palpation: ***  Sensation: {AMB PT KNEE SENSATION:15012}    Edema:  Left: {AMB PT KNEE EDEMA:42956}  Right: {AMB PT KNEE EDEMA:68543}    Girth Measurement Left Right   10 cm above mid-patella *** ***   Mid-patella     10 cm below mid-patella *** ***       Gait: Phelps ambulated with {AMB PT KNEE GAIT ASSISTIVE DEVICE:33827}.  Level of Assistance: {AMB PT KNEE LEVEL OF ASSISTANCE:09203}  Patient displays {AMB PT KNEE DISPLAYS:85812}.   Balance: Maintains SLS *** seconds with {[employer] POOR:65688}  "balance strategies.    DTR's:   Left Right   Patella Tendon {AMB PT KNEE DTR'S:80515} {AMB PT KNEE DTR'S:31083}   Achilles Tendon {AMB PT KNEE DTR'S:89564} {AMB PT KNEE DTR'S:91787}         Limitation/Restriction for FOTO *** Survey    Therapist reviewed FOTO scores for Dain Phelps on 2/13/2023.   FOTO documents entered into [employer] - see Media section.    Limitation Score: ***%         TREATMENT     Total Treatment time (time-based codes) separate from Evaluation: *** minutes      Dain received the treatments listed below:      therapeutic exercises to develop {AMB PT PROGRESS OBJECTIVE:47665} for *** minutes including:  ***    manual therapy techniques: {AMB PT PROGRESS MANUAL THERAPY:70587} were applied to the: *** for *** minutes, including:  ***    neuromuscular re-education activities to improve: {AMB PT PROGRESS NEURO RE-ED:75652} for *** minutes. The following activities were included:  ***    therapeutic activities to improve functional performance for ***  minutes, including:  ***    gait training to improve functional mobility and safety for ***  minutes, including:  ***    direct contact modalities after being cleared for contraindications: {AMB PT PROGRESS DIRECT CONTACT MODES:10456}    supervised modalities after being cleared for contradictions: {AMB PT SUPERVISED MODES:65723}    hot pack for *** minutes to ***.    cold pack for *** minutes to ***.      PATIENT EDUCATION AND HOME EXERCISES     Education provided:   - ***    Written Home Exercises Provided: {Blank single:24532::"yes","Patient instructed to cont prior HEP"}. Exercises were reviewed and Dain was able to demonstrate them prior to the end of the session.  Dain demonstrated {Desc; good/fair/poor:60317} understanding of the education provided. See EMR under Patient Instructions for exercises provided during therapy sessions.    ASSESSMENT     Dain is a 67 y.o. female referred to outpatient Physical Therapy with a medical " diagnosis of Patellar tendinitis of left knee. Patient presents with ***    Patient prognosis is {REHAB PROGNOSIS OHS:75260}.   Patient will benefit from skilled outpatient Physical Therapy to address the deficits stated above and in the chart below, provide patient /family education, and to maximize patientt's level of independence.     Plan of care discussed with patient: {YES:94695}  Patient's spiritual, cultural and educational needs considered and patient is agreeable to the plan of care and goals as stated below:     Anticipated Barriers for therapy: ***    Medical Necessity is demonstrated by the following  History  Co-morbidities and personal factors that may impact the plan of care Co-morbidities:   Chronic low back pain   Fatigue   GERD (gastroesophageal reflux disease)   History of colon polyps   Hypertension   Hypothyroidism   Impaired glucose tolerance   Leukopenia   Obesity (BMI 30-39.9)   Primary hypothyroidism   Snoring   Vitamin D deficiency       Personal Factors:   {Personal Factors:87628}     {Desc; low/moderate/high:308404}   Examination  Body Structures and Functions, activity limitations and participation restrictions that may impact the plan of care Body Regions:   {Body Regions:23146}    Body Systems:    {Body Systems:69634}    Participation Restrictions:   ***    Activity limitations:   Learning and applying knowledge  {Learning and applying knowledge:16050}    General Tasks and Commands  {Gen tasks and commands:33053}    Communication  {Communication:47142}    Mobility  {Mobility:11409}    Self care  {Self Care:07164}    Domestic Life  {Domestic Life:61310}    Interactions/Relationships  {Interactions/Relationships:61089}    Life Areas  {Life Areas:86831}    Community and Social Life  {Community/Social Life:72097}         {Desc; low/moderate/high:697256}   Clinical Presentation {Clinical Presentation :70617} {Desc; low/moderate/high:081873}   Decision Making/ Complexity Score: {Desc;  "low/moderate/high:344466}     Short Term GOALS: *** weeks. Pt agrees with goals set.  1. Patient demonstrates independence with HEP.   2. Patient demonstrates independence with Postural Awareness.   3. Patient demonstrates independence with body mechanics.   4. Patient will report pain of ***/10 at worst, on 0-10 pain scale, with all activity    Long Term GOALS: *** weeks. Pt agrees with goals set.  1. Patient demonstrates increased *** to *** to improve tolerance to functional activities pain free.   2. Patient demonstrates increased strength BLE's to ***/5 or greater to improve tolerance to functional activities pain free.   3. Patient demonstrates improved overall function per FOTO Knee Survey to ***% Limitation or less.   4. Patient will report pain of ***/10 at worst, on 0-10 pain scale, with all activity    PLAN   Plan of care Certification: 2/13/2023 to ***.    Outpatient Physical Therapy {NUMBERS 1-5:84168} times weekly for {0-10:95148::"0"} weeks to include the following interventions: {[place] PLAN:77702}.     Irma Deluca, PT      I CERTIFY THE NEED FOR THESE SERVICES FURNISHED UNDER THIS PLAN OF TREATMENT AND WHILE UNDER MY CARE   Physician's comments:     Physician's Signature: ___________________________________________________          "

## 2023-02-17 ENCOUNTER — TELEPHONE (OUTPATIENT)
Dept: FAMILY MEDICINE | Facility: CLINIC | Age: 68
End: 2023-02-17
Payer: COMMERCIAL

## 2023-02-17 ENCOUNTER — LAB VISIT (OUTPATIENT)
Dept: LAB | Facility: HOSPITAL | Age: 68
End: 2023-02-17
Attending: INTERNAL MEDICINE
Payer: MEDICARE

## 2023-02-17 ENCOUNTER — OFFICE VISIT (OUTPATIENT)
Dept: FAMILY MEDICINE | Facility: CLINIC | Age: 68
End: 2023-02-17
Payer: MEDICARE

## 2023-02-17 VITALS
BODY MASS INDEX: 37.98 KG/M2 | HEART RATE: 70 BPM | OXYGEN SATURATION: 97 % | WEIGHT: 236.31 LBS | DIASTOLIC BLOOD PRESSURE: 82 MMHG | SYSTOLIC BLOOD PRESSURE: 132 MMHG | TEMPERATURE: 98 F | HEIGHT: 66 IN

## 2023-02-17 DIAGNOSIS — N30.00 ACUTE CYSTITIS WITHOUT HEMATURIA: ICD-10-CM

## 2023-02-17 DIAGNOSIS — Z23 NEED FOR VACCINATION FOR STREP PNEUMONIAE: ICD-10-CM

## 2023-02-17 DIAGNOSIS — N30.00 ACUTE CYSTITIS WITHOUT HEMATURIA: Primary | ICD-10-CM

## 2023-02-17 DIAGNOSIS — L65.9 HAIR LOSS: ICD-10-CM

## 2023-02-17 LAB
BACTERIA #/AREA URNS AUTO: ABNORMAL /HPF
BILIRUB UR QL STRIP: NEGATIVE
CLARITY UR REFRACT.AUTO: ABNORMAL
COLOR UR AUTO: YELLOW
GLUCOSE UR QL STRIP: NEGATIVE
HGB UR QL STRIP: ABNORMAL
HYALINE CASTS UR QL AUTO: 0 /LPF
KETONES UR QL STRIP: NEGATIVE
LEUKOCYTE ESTERASE UR QL STRIP: ABNORMAL
MICROSCOPIC COMMENT: ABNORMAL
NITRITE UR QL STRIP: NEGATIVE
PH UR STRIP: 5 [PH] (ref 5–8)
PROT UR QL STRIP: ABNORMAL
RBC #/AREA URNS AUTO: 60 /HPF (ref 0–4)
SP GR UR STRIP: 1.03 (ref 1–1.03)
SQUAMOUS #/AREA URNS AUTO: 4 /HPF
URN SPEC COLLECT METH UR: ABNORMAL
WBC #/AREA URNS AUTO: >100 /HPF (ref 0–5)

## 2023-02-17 PROCEDURE — 99214 PR OFFICE/OUTPT VISIT, EST, LEVL IV, 30-39 MIN: ICD-10-PCS | Mod: S$PBB,,, | Performed by: INTERNAL MEDICINE

## 2023-02-17 PROCEDURE — 99214 OFFICE O/P EST MOD 30 MIN: CPT | Mod: S$PBB,,, | Performed by: INTERNAL MEDICINE

## 2023-02-17 PROCEDURE — 87086 URINE CULTURE/COLONY COUNT: CPT | Performed by: INTERNAL MEDICINE

## 2023-02-17 PROCEDURE — 99213 OFFICE O/P EST LOW 20 MIN: CPT | Mod: PBBFAC,PO | Performed by: INTERNAL MEDICINE

## 2023-02-17 PROCEDURE — 99999 PR PBB SHADOW E&M-EST. PATIENT-LVL III: CPT | Mod: PBBFAC,,, | Performed by: INTERNAL MEDICINE

## 2023-02-17 PROCEDURE — 81001 URINALYSIS AUTO W/SCOPE: CPT | Performed by: INTERNAL MEDICINE

## 2023-02-17 PROCEDURE — 90677 PCV20 VACCINE IM: CPT | Mod: PBBFAC,PO

## 2023-02-17 PROCEDURE — 99999 PR PBB SHADOW E&M-EST. PATIENT-LVL III: ICD-10-PCS | Mod: PBBFAC,,, | Performed by: INTERNAL MEDICINE

## 2023-02-17 RX ORDER — NITROFURANTOIN 25; 75 MG/1; MG/1
100 CAPSULE ORAL 2 TIMES DAILY
Qty: 10 CAPSULE | Refills: 0 | Status: SHIPPED | OUTPATIENT
Start: 2023-02-17 | End: 2023-02-22

## 2023-02-17 RX ORDER — KETOCONAZOLE 20 MG/ML
SHAMPOO, SUSPENSION TOPICAL WEEKLY
Qty: 120 ML | Refills: 0 | Status: SHIPPED | OUTPATIENT
Start: 2023-02-17 | End: 2023-07-24

## 2023-02-17 NOTE — PROGRESS NOTES
This note was created by combination of typed  and M-Modal dictation.  Transcription errors may be present.  If there are any questions, please contact me.    Assessment and Plan:   Acute cystitis without hematuria  -symptoms suspicious for UTI.  She also has not been taking her topical Estrace cream.  Discussed possibility that this could be something like vaginal atrophy causing discomfort.    Check urinalysis and urine culture.  Empiric treatment with Macrobid.    If completely normal would have her follow-up with gynecology.  -     nitrofurantoin, macrocrystal-monohydrate, (MACROBID) 100 MG capsule; Take 1 capsule (100 mg total) by mouth 2 (two) times daily. for 5 days  Dispense: 10 capsule; Refill: 0  -     Urinalysis; Future; Expected date: 02/17/2023  -     Urine culture; Future; Expected date: 02/17/2023    Hair loss  -reports she was given ketoconazole shampoo for possible adjunct treatment for hair loss.  Never filled it and is interested in filling.  Sent to pharmacy.  -     ketoconazole (NIZORAL) 2 % shampoo; Apply topically once a week.  Dispense: 120 mL; Refill: 0    Need for vaccination for Strep pneumoniae  -     (In Office Administered) Pneumococcal Conjugate Vaccine (20 Valent) (IM)          Medications Discontinued During This Encounter   Medication Reason    ketoconazole (NIZORAL) 2 % shampoo Reorder       meds sent this encounter:  Medications Ordered This Encounter   Medications    ketoconazole (NIZORAL) 2 % shampoo     Sig: Apply topically once a week.     Dispense:  120 mL     Refill:  0    nitrofurantoin, macrocrystal-monohydrate, (MACROBID) 100 MG capsule     Sig: Take 1 capsule (100 mg total) by mouth 2 (two) times daily. for 5 days     Dispense:  10 capsule     Refill:  0       Follow Up: No follow-ups on file.    Subjective:     Chief Complaint   Patient presents with    Urinary Tract Infection       CARRI Gautam is a 67 y.o. female.    Social History     Tobacco Use    Smoking  status: Never    Smokeless tobacco: Never   Substance Use Topics    Alcohol use: No      Social History     Occupational History     Comment: retired      Social History     Social History Narrative    Not on file       No LMP recorded. Patient has had a hysterectomy.    Last appointment with this clinic was 10/24/2022. Last visit with me Visit date not found   To summarize last visit and events leading up to today:  2022 urine culture Klebsiella pansensitive  2019 urine culture group B strep and E coli sensitive to Macrobid resistant to Bactrim.  Sensitive to Augmentin.  10/2016 urine culture negative    Today's visit:  Please note: Chronic medical problems that are stable but are being addressed at this visit may not be listed/documented here, but may be addressed in more detail in the Assessment and Plan section.   Below are salient features of chronic medical conditions, or new/acute conditions and their details.  Sx started on wed  But last night pain at end of urination  Wednesday urine cloudy and odor and dark.    No pain on initiation but at the end, a discomfort. At the urethral meatus she thinks.  No obvious skin irritation.  This AM no discomfort but still cloudy.  No vaginal discharge.    Last night took 2 tylenol.    No rectal pain or constipation.     Similar symptoms as previous in 2022. But that time was worse.   Unclear the trigger for this.  Holding in the urine?     No abd pain no back pain.      Not taking the topical estrogen.  Rx .  Needs to follow-up with gynecology.      Requesting refill on her ketoconazole shampoo.  She was unaware that she was prescribed it and never filled it but she would be interested.  She tells me that she was given to this as a possible adjunct treatment for hair thinning.    Patient Care Team:  Yary Valadez MD as PCP - General (Internal Medicine)  Brittani Vuong MA as Care Coordinator  Karlie Gottlieb as [employer]ic    Bry Clarke PA-C as Hypertension Digital Medicine Clinician (Physician Assistant)  Yary Valadez MD as Hypertension Digital Medicine Responsible Provider (Family Medicine)  Medicare Shared Savings Program as Hypertension Digital Medicine Contract    Patient Active Problem List    Diagnosis Date Noted    Decreased strength, endurance, and mobility 02/13/2023    Antalgic gait 02/13/2023    Left shoulder pain 06/10/2022    Severe obesity (BMI 35.0-39.9) with comorbidity 05/11/2022     -trying to be more active.        Vaginal atrophy 04/12/2019    LORRAINE on CPAP 09/14/2015     - ahi of 20  - tolerate apap well initially but having difficulty with machine since 2018.    -s/p cpap titration  -currently with [facility] at 8 cm h20  -no issue with cpap.  poor compliance due to traveling conflict.  Compliance requirement d/w patient and patient expressed understanding.        Vitamin D deficiency 01/15/2014    Postoperative hypothyroidism 11/24/2012    HTN (hypertension) 11/24/2012    GERD (gastroesophageal reflux disease) 11/24/2012    Proteinuria 11/24/2012       PAST MEDICAL PROBLEMS, PAST SURGICAL HISTORY: please see relevant portions of the electronic medical record    ALLERGIES AND MEDICATIONS: updated and reviewed.  Review of patient's allergies indicates:   Allergen Reactions    Ace inhibitors Edema     Angioedema       Medication List with Changes/Refills   Current Medications    ALBUTEROL (PROVENTIL/VENTOLIN HFA) 90 MCG/ACTUATION INHALER    Inhale 2 puffs into the lungs every 6 (six) hours as needed for Wheezing.    ERGOCALCIFEROL, VITAMIN D2, 2,000 UNIT TAB    Take 2,000 Units by mouth 2 (two) times daily.    ESTRADIOL (ESTRACE) 0.01 % (0.1 MG/GRAM) VAGINAL CREAM    Insert 2 g daily intravaginally for 2 weeks, then 1 g twice weekly    HYDROCHLOROTHIAZIDE (HYDRODIURIL) 12.5 MG TAB    TAKE 1 TABLET(12.5 MG) BY MOUTH EVERY DAY    KETOCONAZOLE (NIZORAL) 2 % SHAMPOO    Apply topically.    LEVOTHYROXINE  "(SYNTHROID) 125 MCG TABLET    Take 1 tablet (125 mcg total) by mouth before breakfast.    METOPROLOL SUCCINATE (TOPROL-XL) 25 MG 24 HR TABLET    Take 1 tablet (25 mg total) by mouth once daily.    VERAPAMIL (VERELAN) 360 MG C24P    TAKE 1 CAPSULE(360 MG) BY MOUTH EVERY DAY        Objective:   Physical Exam   Vitals:    02/17/23 1103   BP: 132/82   BP Location: Left arm   Patient Position: Sitting   BP Method: Large (Manual)   Pulse: 70   Temp: 98.1 °F (36.7 °C)   TempSrc: Oral   SpO2: 97%   Weight: 107.2 kg (236 lb 5.3 oz)   Height: 5' 6" (1.676 m)    Body mass index is 38.15 kg/m².  Weight: 107.2 kg (236 lb 5.3 oz)   Height: 5' 6" (167.6 cm)     Physical Exam  Constitutional:       General: She is not in acute distress.     Appearance: She is well-developed.   Eyes:      Extraocular Movements: Extraocular movements intact.   Cardiovascular:      Rate and Rhythm: Normal rate and regular rhythm.      Heart sounds: Normal heart sounds. No murmur heard.  Pulmonary:      Effort: Pulmonary effort is normal.      Breath sounds: Normal breath sounds.   Abdominal:      General: There is no distension.      Palpations: There is no mass.      Tenderness: There is no abdominal tenderness. There is no right CVA tenderness, left CVA tenderness or guarding.   Musculoskeletal:         General: Normal range of motion.   Skin:     General: Skin is warm and dry.   Neurological:      Mental Status: She is alert and oriented to person, place, and time.      Coordination: Coordination normal.   Psychiatric:         Behavior: Behavior normal.         Thought Content: Thought content normal.         "

## 2023-02-17 NOTE — TELEPHONE ENCOUNTER
----- Message from Jesusita Renteria sent at 2/17/2023  1:14 PM CST -----  Type: Patient Call Back    Who called: Self     What is the request in detail: Did a urinalysis this morning .. and states now she see another lab put in her chart for her to do and she's asking what is that for ? Asking for a call back regarding information     Can the clinic reply by [facility]? No     Would the patient rather a call back or a response via [facility]? Call     Best call back number: [phone]

## 2023-02-18 LAB
BACTERIA UR CULT: NORMAL
BACTERIA UR CULT: NORMAL

## 2023-02-19 NOTE — PROGRESS NOTES
Urine mult organisms  Empiric treated with macrobid  No changes. If sx persist, re-culture. Discussed possibility of vaginal atrophy with urinary sx.

## 2023-02-24 ENCOUNTER — CLINICAL SUPPORT (OUTPATIENT)
Dept: REHABILITATION | Facility: HOSPITAL | Age: 68
End: 2023-02-24
Payer: MEDICARE

## 2023-02-24 ENCOUNTER — TELEPHONE (OUTPATIENT)
Dept: BARIATRICS | Facility: CLINIC | Age: 68
End: 2023-02-24
Payer: COMMERCIAL

## 2023-02-24 DIAGNOSIS — Z74.09 DECREASED STRENGTH, ENDURANCE, AND MOBILITY: Primary | ICD-10-CM

## 2023-02-24 DIAGNOSIS — R26.89 ANTALGIC GAIT: ICD-10-CM

## 2023-02-24 DIAGNOSIS — R53.1 DECREASED STRENGTH, ENDURANCE, AND MOBILITY: Primary | ICD-10-CM

## 2023-02-24 DIAGNOSIS — R68.89 DECREASED STRENGTH, ENDURANCE, AND MOBILITY: Primary | ICD-10-CM

## 2023-02-24 PROCEDURE — 97110 THERAPEUTIC EXERCISES: CPT | Mod: PN,CQ

## 2023-02-24 PROCEDURE — 97140 MANUAL THERAPY 1/> REGIONS: CPT | Mod: PN,CQ

## 2023-02-24 NOTE — TELEPHONE ENCOUNTER
Phoned patient and discussed her insurance.  She stated that medicare is her primary insurance and [employer] [place] is her secondary.  Informed her that [employer] does not cover medical weight loss but that medicare did.  Scheduled new patient consult for 5/10/2023 at 9AM with Dr Sara Bowman.

## 2023-02-24 NOTE — PROGRESS NOTES
"[facility]   Physical Therapy Treatment Note     Name: Dain Phelps  Clinic Number: [id number]    Therapy Diagnosis: No diagnosis found.  Physician: Deondre Marin MD    Visit Date: 2/24/2023    Physician Orders: PT Eval and Treat   Medical Diagnosis from Referral: M76.52 (ICD-10-CM) - Patellar tendinitis of left knee  Evaluation Date: 2/13/2023  Authorization Period Expiration: 01/05/2024  Plan of Care Expiration: 5/13/23  Progress Note Due: 3/13/23  Visit # / Visits authorized: 1/ 1   FOTO: 1/5     Precautions: Standard and hypothyroidism, Leukopenia     PTA Visit #: 1/5     Time In: 9:15  Time Out: 10:10  Total Billable Time: 55 minutes (30 1:1 with PTA)    SUBJECTIVE     Pt reports: "I am feeling pretty good, just get the pain in my leg sometimes."   She was compliant with home exercise program.  Response to previous treatment: first after eval  Functional change: ongoing    Pain: 2/10  Location: left knee      OBJECTIVE     Objective Measures updated at progress report unless specified.     Treatment     Dain received the treatments listed below:      therapeutic exercises to develop strength, posture, and core stabilization for 45 minutes including:     Quad set 5"x20 Left   Straight leg raise 3" hold 2x10 Left   Bridges 3" 2x10  Side lying clams 3" hold 2x10  +Hookling hip adduction 2x10  +LAQ 2x10  +Step ups fwd and lateral x10 ea  +sit to stands x10  +Heel raises 2x10  +standing hip abduction 2x10    Manual techniques used to increase mobility and reduce pain for 10 minute including:    Patellar mobs       Patient Education and Home Exercises     Home Exercises Provided and Patient Education Provided   Education provided:   - Home exercise program  - Plan of Care      Written Home Exercises Provided: yes. Exercises were reviewed and Dain was able to demonstrate them prior to the end of the session.  Dain demonstrated good  understanding of the education provided. See " EMR under Patient Instructions for exercises provided during therapy sessions.    ASSESSMENT     Patient presents with minimal pain at start of therapy, however increases in discomfort with activities along medial aspect of knee. Patient demonstrates appropriate amount of fatigue with exercises. Good carry over demonstrated with HEP exercises. Manual techniques sued to increase patellar mobility and assess crepitus present. Plan to progress strengthening as tolerated.     Dain Is progressing well towards her goals.   Pt prognosis is Good.     Pt will continue to benefit from skilled outpatient physical therapy to address the deficits listed in the problem list box on initial evaluation, provide pt/family education and to maximize pt's level of independence in the home and community environment.     Pt's spiritual, cultural and educational needs considered and pt agreeable to plan of care and goals.     Anticipated barriers to physical therapy: chronicity of condition    Goals: Short Term GOALS: 6 weeks. Pt agrees with goals set.  1. Patient demonstrates independence with HEP.   2. Patient demonstrates ability to perform light activities around her house with no difficulty to improve tolerance to functional tasks pain free.   3. Patient demonstrates ability to navigate stairs with little to no difficulty to improve ability to navigate home environment     Long Term GOALS: 12 weeks. Pt agrees with goals set.  1. Patient demonstrates ability to perform functional squat with little to no difficulty to improve tolerance to functional tasks pain free.   2. Patient demonstrates increased strength BLE's to 4+/5 or greater to improve tolerance to functional activities pain free.   3. Patient demonstrates improved overall function per FOTO Knee Survey to 34% Limitation or less.   4. Patient demonstrates ability to walk 1 mile with little to no difficulty to improve tolerance to functional tasks pain free.     PLAN      Complete strengthening and mobility exercises to reduce pain and increase patient functional activity tolerance.     Rajwinder Burr, PTA

## 2023-02-24 NOTE — TELEPHONE ENCOUNTER
----- Message from Coty Chang sent at 2/24/2023  3:00 PM CST -----  Regarding: pt [phone]  Type: Patient Call Back    Who called:pt     What is the request in detail:appt concerns    Can the clinic reply by [facility]?yes     Would the patient rather a call back or a response via [facility]?call back     Best call back number: [phone]      Additional Information:

## 2023-02-28 ENCOUNTER — PATIENT MESSAGE (OUTPATIENT)
Dept: FAMILY MEDICINE | Facility: CLINIC | Age: 68
End: 2023-02-28
Payer: COMMERCIAL

## 2023-02-28 ENCOUNTER — CLINICAL SUPPORT (OUTPATIENT)
Dept: REHABILITATION | Facility: HOSPITAL | Age: 68
End: 2023-02-28
Payer: MEDICARE

## 2023-02-28 DIAGNOSIS — R53.1 DECREASED STRENGTH, ENDURANCE, AND MOBILITY: Primary | ICD-10-CM

## 2023-02-28 DIAGNOSIS — R68.89 DECREASED STRENGTH, ENDURANCE, AND MOBILITY: Primary | ICD-10-CM

## 2023-02-28 DIAGNOSIS — Z74.09 DECREASED STRENGTH, ENDURANCE, AND MOBILITY: Primary | ICD-10-CM

## 2023-02-28 DIAGNOSIS — R26.89 ANTALGIC GAIT: ICD-10-CM

## 2023-02-28 PROCEDURE — 97110 THERAPEUTIC EXERCISES: CPT | Mod: PN,CQ

## 2023-02-28 NOTE — PROGRESS NOTES
"[facility]   Physical Therapy Treatment Note     Name: Dain Phelps  Clinic Number: [id number]    Therapy Diagnosis:   Encounter Diagnoses   Name Primary?    Decreased strength, endurance, and mobility Yes    Antalgic gait      Physician: Deondre Marin MD    Visit Date: 2/28/2023    Physician Orders: PT Eval and Treat   Medical Diagnosis from Referral: M76.52 (ICD-10-CM) - Patellar tendinitis of left knee  Evaluation Date: 2/13/2023  Authorization Period Expiration: 01/05/2024  Plan of Care Expiration: 5/13/23  Progress Note Due: 3/13/23  Visit # / Visits authorized: 1/ 1   FOTO: 1/5     Precautions: Standard and hypothyroidism, Leukopenia     PTA Visit #: 2/5     Time In: 9:32  Time Out: 10:20  Total Billable Time: 48 minutes (1:1 30 minutes)    SUBJECTIVE     Pt reports: "I was a little sore after last time but I'm feeling good today."  She was compliant with home exercise program.  Response to previous treatment: first after eval  Functional change: ongoing    Pain: 2/10  Location: left knee      OBJECTIVE     Objective Measures updated at progress report unless specified.     Treatment     Dain received the treatments listed below:      therapeutic exercises to develop strength, posture, and core stabilization for 48 minutes including:     Quad set 5"x20 Left   Straight leg raise 3" hold 2x10 Left   Bridges 3" 2x10  Side lying clams 3" hold 2x10  Hookling hip adduction 2x10  LAQ 2x10  Step ups fwd and lateral x10 ea  +sit to stands 3x5 from 18" box  Heel raises 2x10  standing hip abduction 2x10    Shuttle (next visit)       Manual techniques used to increase mobility and reduce pain for 00 minute including:    Patellar mobs       Patient Education and Home Exercises     Home Exercises Provided and Patient Education Provided   Education provided:   - Home exercise program  - Plan of Care      Written Home Exercises Provided: yes. Exercises were reviewed and Dain was able to " demonstrate them prior to the end of the session.  Dain demonstrated good  understanding of the education provided. See EMR under Patient Instructions for exercises provided during therapy sessions.    ASSESSMENT     Patient reports no pain, but was sore after last session. Continued with exercises to increase quad and hip strength for better stability within knee. Patient continues to tolerate activities well with appropriate fatigue noted today. Patient progressed to resistive band use with hip abduction strengthening. Completed sit the stands today with no complications, just fatigue. Plan to continue with strengthening progressions as tolerated.     Dain Is progressing well towards her goals.   Pt prognosis is Good.     Pt will continue to benefit from skilled outpatient physical therapy to address the deficits listed in the problem list box on initial evaluation, provide pt/family education and to maximize pt's level of independence in the home and community environment.     Pt's spiritual, cultural and educational needs considered and pt agreeable to plan of care and goals.     Anticipated barriers to physical therapy: chronicity of condition    Goals: Short Term GOALS: 6 weeks. Pt agrees with goals set.  1. Patient demonstrates independence with HEP.   2. Patient demonstrates ability to perform light activities around her house with no difficulty to improve tolerance to functional tasks pain free.   3. Patient demonstrates ability to navigate stairs with little to no difficulty to improve ability to navigate home environment     Long Term GOALS: 12 weeks. Pt agrees with goals set.  1. Patient demonstrates ability to perform functional squat with little to no difficulty to improve tolerance to functional tasks pain free.   2. Patient demonstrates increased strength BLE's to 4+/5 or greater to improve tolerance to functional activities pain free.   3. Patient demonstrates improved overall function per  FOTO Knee Survey to 34% Limitation or less.   4. Patient demonstrates ability to walk 1 mile with little to no difficulty to improve tolerance to functional tasks pain free.     PLAN     Complete strengthening and mobility exercises to reduce pain and increase patient functional activity tolerance.     Rajwinder Burr, PTA

## 2023-03-01 ENCOUNTER — PATIENT MESSAGE (OUTPATIENT)
Dept: FAMILY MEDICINE | Facility: CLINIC | Age: 68
End: 2023-03-01
Payer: COMMERCIAL

## 2023-03-03 ENCOUNTER — CLINICAL SUPPORT (OUTPATIENT)
Dept: REHABILITATION | Facility: HOSPITAL | Age: 68
End: 2023-03-03
Payer: MEDICARE

## 2023-03-03 ENCOUNTER — PATIENT MESSAGE (OUTPATIENT)
Dept: FAMILY MEDICINE | Facility: CLINIC | Age: 68
End: 2023-03-03
Payer: COMMERCIAL

## 2023-03-03 DIAGNOSIS — Z74.09 DECREASED STRENGTH, ENDURANCE, AND MOBILITY: Primary | ICD-10-CM

## 2023-03-03 DIAGNOSIS — R68.89 DECREASED STRENGTH, ENDURANCE, AND MOBILITY: Primary | ICD-10-CM

## 2023-03-03 DIAGNOSIS — R26.89 ANTALGIC GAIT: ICD-10-CM

## 2023-03-03 DIAGNOSIS — R53.1 DECREASED STRENGTH, ENDURANCE, AND MOBILITY: Primary | ICD-10-CM

## 2023-03-03 PROCEDURE — 97110 THERAPEUTIC EXERCISES: CPT | Mod: PN,CQ

## 2023-03-03 NOTE — PROGRESS NOTES
"[facility]   Physical Therapy Treatment Note     Name: Dain Phelps  Clinic Number: [id number]    Therapy Diagnosis:   Encounter Diagnoses   Name Primary?    Decreased strength, endurance, and mobility Yes    Antalgic gait        Physician: Deondre Marin MD    Visit Date: 3/3/2023    Physician Orders: PT Eval and Treat   Medical Diagnosis from Referral: M76.52 (ICD-10-CM) - Patellar tendinitis of left knee  Evaluation Date: 2/13/2023  Authorization Period Expiration: 01/05/2024  Plan of Care Expiration: 5/13/23  Progress Note Due: 3/13/23  Visit # / Visits authorized: 1/ 1   FOTO: 1/5     Precautions: Standard and hypothyroidism, Leukopenia     PTA Visit #: 3/5     Time In: 8:30 (late)  Time Out: 9:00  Total Billable Time: 30 minutes (1:1 30 minutes with PTA)    SUBJECTIVE     Pt reports: "I had some pain in my hip after last time."  She was compliant with home exercise program.  Response to previous treatment: first after eval  Functional change: ongoing    Pain: 2/10  Location: left knee      OBJECTIVE     Objective Measures updated at progress report unless specified.     Treatment     Dain received the treatments listed below:      therapeutic exercises to develop strength, posture, and core stabilization for 30 minutes including:     Quad set 5"x20 Left   Straight leg raise 3" hold 2x10 Left   Bridges 3" 2x10  Side lying clams 3" hold 2x10  Hookling hip adduction 2x10  LAQ 2x10  Step ups fwd and lateral x10 ea  +sit to stands 3x5 from 18" box  Heel raises 2x10  standing hip abduction 2x10    +Nustep x5'  +Shuttle squat double leg 3x10  +side steps red band x2 laps  +monster walks red band x2 laps  +heel raises on step 2x10       Manual techniques used to increase mobility and reduce pain for 00 minute including:    Patellar mobs       Patient Education and Home Exercises     Home Exercises Provided and Patient Education Provided   Education provided:   - Home exercise " program  - Plan of Care      Written Home Exercises Provided: yes. Exercises were reviewed and Dain was able to demonstrate them prior to the end of the session.  Dain demonstrated good  understanding of the education provided. See EMR under Patient Instructions for exercises provided during therapy sessions.    ASSESSMENT     Patient arrives late to therapy but is willing to complete remainder of session today. Progressed patient to standing activities only due to time limitations. Patient tolerates new activities very well with no onset of pain and minimal fatigued noted. Education provided for HEP. Plan to complete table strengthening again next visit.     Dain Is progressing well towards her goals.   Pt prognosis is Good.     Pt will continue to benefit from skilled outpatient physical therapy to address the deficits listed in the problem list box on initial evaluation, provide pt/family education and to maximize pt's level of independence in the home and community environment.     Pt's spiritual, cultural and educational needs considered and pt agreeable to plan of care and goals.     Anticipated barriers to physical therapy: chronicity of condition    Goals: Short Term GOALS: 6 weeks. Pt agrees with goals set.  1. Patient demonstrates independence with HEP.   2. Patient demonstrates ability to perform light activities around her house with no difficulty to improve tolerance to functional tasks pain free.   3. Patient demonstrates ability to navigate stairs with little to no difficulty to improve ability to navigate home environment     Long Term GOALS: 12 weeks. Pt agrees with goals set.  1. Patient demonstrates ability to perform functional squat with little to no difficulty to improve tolerance to functional tasks pain free.   2. Patient demonstrates increased strength BLE's to 4+/5 or greater to improve tolerance to functional activities pain free.   3. Patient demonstrates improved overall  function per FOTO Knee Survey to 34% Limitation or less.   4. Patient demonstrates ability to walk 1 mile with little to no difficulty to improve tolerance to functional tasks pain free.     PLAN     Complete strengthening and mobility exercises to reduce pain and increase patient functional activity tolerance.     Rajwinder Burr, PTA

## 2023-03-07 ENCOUNTER — CLINICAL SUPPORT (OUTPATIENT)
Dept: REHABILITATION | Facility: HOSPITAL | Age: 68
End: 2023-03-07
Payer: MEDICARE

## 2023-03-07 DIAGNOSIS — R26.89 ANTALGIC GAIT: ICD-10-CM

## 2023-03-07 DIAGNOSIS — R53.1 DECREASED STRENGTH, ENDURANCE, AND MOBILITY: Primary | ICD-10-CM

## 2023-03-07 DIAGNOSIS — Z74.09 DECREASED STRENGTH, ENDURANCE, AND MOBILITY: Primary | ICD-10-CM

## 2023-03-07 DIAGNOSIS — R68.89 DECREASED STRENGTH, ENDURANCE, AND MOBILITY: Primary | ICD-10-CM

## 2023-03-07 PROCEDURE — 97110 THERAPEUTIC EXERCISES: CPT | Mod: PN,CQ

## 2023-03-07 PROCEDURE — 97112 NEUROMUSCULAR REEDUCATION: CPT | Mod: PN,CQ

## 2023-03-07 NOTE — PROGRESS NOTES
"[facility]   Physical Therapy Treatment Note     Name: Dain Phelps  Clinic Number: [id number]    Therapy Diagnosis:   Encounter Diagnoses   Name Primary?    Decreased strength, endurance, and mobility Yes    Antalgic gait          Physician: Deondre Marin MD    Visit Date: 3/7/2023    Physician Orders: PT Eval and Treat   Medical Diagnosis from Referral: M76.52 (ICD-10-CM) - Patellar tendinitis of left knee  Evaluation Date: 2/13/2023  Authorization Period Expiration: 01/05/2024  Plan of Care Expiration: 5/13/23  Progress Note Due: 3/13/23  Visit # / Visits authorized: 1/ 1   FOTO: 1/5     Precautions: Standard and hypothyroidism, Leukopenia     PTA Visit #: 4/5     Time In: 9:37  Time Out: 10:30  Total Billable Time: 53 minutes    SUBJECTIVE     Pt reports: "I am feeling pretty good,just a little sore."   She was compliant with home exercise program.  Response to previous treatment: first after eval  Functional change: ongoing    Pain: 2/10  Location: left knee      OBJECTIVE     Objective Measures updated at progress report unless specified.     Treatment     Dain received the treatments listed below:      therapeutic exercises to develop strength, posture, and core stabilization for 30 minutes including:     Quad set 5"x20 Left   Straight leg raise 3" hold 2x10   LAQ 2x10  Heel raises 2x10  standing hip abduction 2x10  +upright bike 5' (due to availability)    +Nustep x5'  Shuttle squat double leg 3x10  +monster walks red band x2 laps      neuromusclular re-education  to improve balance and stability for 23 minutes including:    Bridges 3" 2x10  Side lying clams 3" hold 2x10  +side steps red band x2 laps  Step ups fwd and lateral x10 ea  Sit to stands 3x5 from 18" box      Manual techniques used to increase mobility and reduce pain for 00 minute including:    Patellar mobs       Patient Education and Home Exercises     Home Exercises Provided and Patient Education Provided "   Education provided:   - Home exercise program  - Plan of Care      Written Home Exercises Provided: yes. Exercises were reviewed and Dain was able to demonstrate them prior to the end of the session.  Dain demonstrated good  understanding of the education provided. See EMR under Patient Instructions for exercises provided during therapy sessions.    ASSESSMENT     Patient presents to therapy with no increase in pain and states things feel better in general. Continued with strengthening exercises to enhance stability and functional movement with decreased pain. Patient tolerates session very well today with progressions completed, fatigue noted by end of session. Patient biggest concern is coming into stand from low surfaces. Plan to continue with strengthening techniques and work towards functional transfers.     Dain Is progressing well towards her goals.   Pt prognosis is Good.     Pt will continue to benefit from skilled outpatient physical therapy to address the deficits listed in the problem list box on initial evaluation, provide pt/family education and to maximize pt's level of independence in the home and community environment.     Pt's spiritual, cultural and educational needs considered and pt agreeable to plan of care and goals.     Anticipated barriers to physical therapy: chronicity of condition    Goals: Short Term GOALS: 6 weeks. Pt agrees with goals set.  1. Patient demonstrates independence with HEP.   2. Patient demonstrates ability to perform light activities around her house with no difficulty to improve tolerance to functional tasks pain free.   3. Patient demonstrates ability to navigate stairs with little to no difficulty to improve ability to navigate home environment     Long Term GOALS: 12 weeks. Pt agrees with goals set.  1. Patient demonstrates ability to perform functional squat with little to no difficulty to improve tolerance to functional tasks pain free.   2. Patient  demonstrates increased strength BLE's to 4+/5 or greater to improve tolerance to functional activities pain free.   3. Patient demonstrates improved overall function per FOTO Knee Survey to 34% Limitation or less.   4. Patient demonstrates ability to walk 1 mile with little to no difficulty to improve tolerance to functional tasks pain free.     PLAN     Complete strengthening and mobility exercises to reduce pain and increase patient functional activity tolerance.     Rajwinder Burr, PTA

## 2023-03-09 ENCOUNTER — CLINICAL SUPPORT (OUTPATIENT)
Dept: REHABILITATION | Facility: HOSPITAL | Age: 68
End: 2023-03-09
Payer: MEDICARE

## 2023-03-09 ENCOUNTER — OFFICE VISIT (OUTPATIENT)
Dept: FAMILY MEDICINE | Facility: CLINIC | Age: 68
End: 2023-03-09
Payer: MEDICARE

## 2023-03-09 DIAGNOSIS — Z13.1 SCREENING FOR DIABETES MELLITUS: ICD-10-CM

## 2023-03-09 DIAGNOSIS — R53.1 DECREASED STRENGTH, ENDURANCE, AND MOBILITY: Primary | ICD-10-CM

## 2023-03-09 DIAGNOSIS — R26.89 ANTALGIC GAIT: ICD-10-CM

## 2023-03-09 DIAGNOSIS — R68.89 DECREASED STRENGTH, ENDURANCE, AND MOBILITY: Primary | ICD-10-CM

## 2023-03-09 DIAGNOSIS — L65.9 HAIR LOSS: ICD-10-CM

## 2023-03-09 DIAGNOSIS — Z74.09 DECREASED STRENGTH, ENDURANCE, AND MOBILITY: Primary | ICD-10-CM

## 2023-03-09 DIAGNOSIS — E66.9 OBESITY (BMI 35.0-39.9 WITHOUT COMORBIDITY): Primary | ICD-10-CM

## 2023-03-09 PROCEDURE — 99214 OFFICE O/P EST MOD 30 MIN: CPT | Mod: 95,,, | Performed by: FAMILY MEDICINE

## 2023-03-09 PROCEDURE — 97112 NEUROMUSCULAR REEDUCATION: CPT | Mod: PN

## 2023-03-09 PROCEDURE — 97110 THERAPEUTIC EXERCISES: CPT | Mod: PN

## 2023-03-09 PROCEDURE — 99214 PR OFFICE/OUTPT VISIT, EST, LEVL IV, 30-39 MIN: ICD-10-PCS | Mod: 95,,, | Performed by: FAMILY MEDICINE

## 2023-03-09 PROCEDURE — 97140 MANUAL THERAPY 1/> REGIONS: CPT | Mod: PN

## 2023-03-09 NOTE — PROGRESS NOTES
[facility]   Physical Therapy Treatment Note     Name: Dain Phelps  Clinic Number: [id number]    Therapy Diagnosis:   Encounter Diagnoses   Name Primary?    Decreased strength, endurance, and mobility Yes    Antalgic gait        Physician: Deondre Marin MD    Visit Date: 3/9/2023    Physician Orders: PT Eval and Treat   Medical Diagnosis from Referral: M76.52 (ICD-10-CM) - Patellar tendinitis of left knee  Evaluation Date: 2/13/2023  Authorization Period Expiration: 01/05/2024  Plan of Care Expiration: 5/13/23  Progress Note Due: 4/9/23  Visit # / Visits authorized: 5/12 (+eval)  FOTO: 1/5     Precautions: Standard and hypothyroidism, Leukopenia     PTA Visit #: 0/5     Time In: 10:49  Time Out: 11:43  Total Billable Time: 54 minutes    SUBJECTIVE     Pt reports: Feeling good today without pain. Every now and then when she is walking she may get a shooting pain in her knee. If she cuts wrong while walking she will get a shooting pain and then it will go away. The pain is not happening as much as it was prior to starting therapy. She is still having difficulty if she is trying to squat or stoop. She has a discomfort in her knees.   She was compliant with home exercise program.  Response to previous treatment: some soreness  Functional change: ongoing    Pain: 0/10  Location: left knee      OBJECTIVE     Objective Measures updated at progress report unless specified.     3/9/23:  CMS Impairment/Limitation/Restriction for FOTO knee Survey    Therapist reviewed FOTO scores for Dain Phelps on 3/9/2023.   FOTO documents entered into [employer] - see Media section.    Limitation Score: 51%       Strength: Knee    Left Right   Quadriceps 4+/5 4+/5   Hamstrings 4+/5 4/5         Strength: Hip    Left Right   Iliopsoas 4/5 4/5   Glute Med 3+/5 3+/5   Hip IR 4/5 4+/5   Hip ER 4-/5 4/5   Hip Ext 4-/5 4-/5     Functional squat: pain in anterior knees with quad heavy movement  Thessaly's:   "negative  Patellar grind: negative       Patellar mobility: decrease superior<>inferior mobility    Treatment     Dain received the treatments listed below:      therapeutic exercises to develop strength, posture, and core stabilization for 24 minutes including:    +Nustep x 6' lvl 2    Straight leg raise 3" hold 3x10  ea  Shuttle squat double leg 3 bands 2x10  +single leg shuttle 1.5 bands 2x10 ea  side steps red band x2 laps tape   LAQ 2x10    standing hip abduction 2x10    +monster walks red band x2 laps      neuromusclular re-education  to improve balance and stability for 10 minutes including:    Bridges 5" 3x10 +Red theraband   Side lying clams +Red theraband 3" hold 3x10 ea    Step ups fwd and lateral x10 ea  Sit to stands 3x5 from 18" box (from chair today)      Manual techniques used to increase mobility and reduce pain for 20 minute including:    Reassessment seen above  Patellar mobs       Patient Education and Home Exercises     Home Exercises Provided and Patient Education Provided   Education provided:   - reassessment findings     Written Home Exercises Provided: yes. Exercises were reviewed and Dain was able to demonstrate them prior to the end of the session.  Dain demonstrated good  understanding of the education provided. See EMR under Patient Instructions for exercises provided during therapy sessions.    ASSESSMENT     Patient was reassessed today and display global decrease in bilateral hip and glute strength. She displayed quad dominant functional squat and single leg squat due to decrease in hip and glute strength. She would continue to benefit from skilled physical therapy to address decrease in strength and tolerance to functional tasks such as squatting/stooping.     Dain Is progressing well towards her goals.   Pt prognosis is Good.     Pt will continue to benefit from skilled outpatient physical therapy to address the deficits listed in the problem list box on initial " evaluation, provide pt/family education and to maximize pt's level of independence in the home and community environment.     Pt's spiritual, cultural and educational needs considered and pt agreeable to plan of care and goals.     Anticipated barriers to physical therapy: chronicity of condition    Goals: Short Term GOALS: 6 weeks. Pt agrees with goals set.  1. Patient demonstrates independence with HEP. Progressing, not met  2. Patient demonstrates ability to perform light activities around her house with no difficulty to improve tolerance to functional tasks pain free. Progressing, not met  3. Patient demonstrates ability to navigate stairs with little to no difficulty to improve ability to navigate home environmentProgressing, not met     Long Term GOALS: 12 weeks. Pt agrees with goals set.  1. Patient demonstrates ability to perform functional squat with little to no difficulty to improve tolerance to functional tasks pain free. Progressing, not met  2. Patient demonstrates increased strength BLE's to 4+/5 or greater to improve tolerance to functional activities pain free. Progressing, not met  3. Patient demonstrates improved overall function per FOTO Knee Survey to 34% Limitation or less. Progressing, not met  4. Patient demonstrates ability to walk 1 mile with little to no difficulty to improve tolerance to functional tasks pain free. Progressing, not met    PLAN     Improve bilateral glute and hip strength to improve tolerance to functional tasks.     Irma Deluca, PT,DPT  03/09/2023

## 2023-03-09 NOTE — PROGRESS NOTES
Chief Complaint   Patient presents with    Weight Loss       The patient location is:  Patient Home   The chief complaint leading to consultation is: weight  Visit type: Virtual visit with synchronous audio and video  Total time spent with patient: 15 min  Each patient to whom he or she provides medical services by telemedicine is:  (1) informed of the relationship between the physician and patient and the respective role of any other health care provider with respect to management of the patient; and (2) notified that he or she may decline to receive medical services by telemedicine and may withdraw from such care at any time.      CARRI Phelps is a 67 y.o. female with multiple medical diagnoses as listed in the medical history and problem list that presents for follow-up for weight    Weight loss- she is concerned about her weight as she has been having trouble getting it down and is scheduled to see bariatrics in May; she would like to check her hormone levels; since our last msg she has been eating better and has added exercise and has lost two pounds; going to PT    PAST MEDICAL HISTORY:  Past Medical History:   Diagnosis Date    Chronic low back pain     Fatigue     GERD (gastroesophageal reflux disease)     History of colon polyps 2015    Hypertension     Hypothyroidism     Impaired glucose tolerance 1/15/2014    Leukopenia     Obesity (BMI 30-39.9) 1/15/2014    Primary hypothyroidism 2015    Snoring 2015    Vitamin D deficiency 1/15/2014       PAST SURGICAL HISTORY:  Past Surgical History:   Procedure Laterality Date    BREAST BIOPSY Left     CATARACT EXTRACTION       SECTION, CLASSIC      x3    COLONOSCOPY N/A 2021    Procedure: COLONOSCOPY;  Surgeon: Parvez Mazariegos MD;  Location: [facility];  Service: Endoscopy;  Laterality: N/A;  covid test  [facility], instructions through [facility]    HYSTERECTOMY      total    OOPHORECTOMY      thyroid surgey          SOCIAL HISTORY:  Social History     Socioeconomic History    Marital status:    Occupational History     Comment: retired   Tobacco Use    Smoking status: Never    Smokeless tobacco: Never   Substance and Sexual Activity    Alcohol use: No    Drug use: No    Sexual activity: Not Currently     Partners: Male     Social Determinants of Health     Financial Resource Strain: Low Risk     Difficulty of Paying Living Expenses: Not hard at all   Food Insecurity: No Food Insecurity    Worried About Running Out of Food in the Last Year: Never true    Ran Out of Food in the Last Year: Never true   Transportation Needs: No Transportation Needs    Lack of Transportation (Medical): No    Lack of Transportation (Non-Medical): No   Physical Activity: Inactive    Days of Exercise per Week: 0 days    Minutes of Exercise per Session: 0 min   Stress: No Stress Concern Present    Feeling of Stress : Not at all   Social Connections: Socially Integrated    Frequency of Communication with Friends and Family: More than three times a week    Frequency of Social Gatherings with Friends and Family: More than three times a week    Attends Gn[employer] Services: More than 4 times per year    Active Member of Clubs or Organizations: Yes    Attends Club or Organization Meetings: More than 4 times per year    Marital Status:    Housing Stability: Low Risk     Unable to Pay for Housing in the Last Year: No    Number of Places Lived in the Last Year: 1    Unstable Housing in the Last Year: No       FAMILY HISTORY:  Family History   Problem Relation Age of Onset    Heart disease Mother     Kidney disease Mother     Hypertension Mother     Cancer Father         throat cancer    No Known Problems Daughter     Hypertension Son     Hypertension Maternal Aunt     Diabetes Maternal Aunt     Thyroid disease Maternal Aunt     Breast cancer Maternal Aunt 70    Diabetes Maternal Grandmother        ALLERGIES AND MEDICATIONS: updated and  reviewed.  Review of patient's allergies indicates:   Allergen Reactions    Ace inhibitors Edema     Angioedema     Medication List with Changes/Refills   Current Medications    ALBUTEROL (PROVENTIL/VENTOLIN HFA) 90 MCG/ACTUATION INHALER    Inhale 2 puffs into the lungs every 6 (six) hours as needed for Wheezing.    ERGOCALCIFEROL, VITAMIN D2, 2,000 UNIT TAB    Take 2,000 Units by mouth 2 (two) times daily.    ESTRADIOL (ESTRACE) 0.01 % (0.1 MG/GRAM) VAGINAL CREAM    Insert 2 g daily intravaginally for 2 weeks, then 1 g twice weekly    HYDROCHLOROTHIAZIDE (HYDRODIURIL) 12.5 MG TAB    TAKE 1 TABLET(12.5 MG) BY MOUTH EVERY DAY    KETOCONAZOLE (NIZORAL) 2 % SHAMPOO    Apply topically once a week.    LEVOTHYROXINE (SYNTHROID) 125 MCG TABLET    Take 1 tablet (125 mcg total) by mouth before breakfast.    METOPROLOL SUCCINATE (TOPROL-XL) 25 MG 24 HR TABLET    Take 1 tablet (25 mg total) by mouth once daily.    VERAPAMIL (VERELAN) 360 MG C24P    TAKE 1 CAPSULE(360 MG) BY MOUTH EVERY DAY       ROS  Review of Systems   Constitutional:  Negative for chills, diaphoresis, fatigue, fever and unexpected weight change.   HENT:  Negative for rhinorrhea, sinus pressure, sore throat and tinnitus.    Eyes:  Negative for photophobia and visual disturbance.   Respiratory:  Negative for cough, shortness of breath and wheezing.    Cardiovascular:  Negative for chest pain and palpitations.   Gastrointestinal:  Negative for abdominal pain, blood in stool, constipation, diarrhea, nausea and vomiting.   Genitourinary:  Negative for dysuria, flank pain, frequency and vaginal discharge.   Musculoskeletal:  Negative for arthralgias and joint swelling.   Skin:  Negative for rash.   Neurological:  Negative for speech difficulty, weakness, light-headedness and headaches.   Psychiatric/Behavioral:  Negative for behavioral problems and dysphoric mood.      Physical Exam  There were no vitals filed for this visit. There is no height or weight on file  to calculate BMI.            Physical Exam  Vitals and nursing note reviewed.   Constitutional:       Appearance: She is well-developed.   Skin:     General: Skin is warm and dry.      Findings: No erythema or rash.   Neurological:      Mental Status: She is alert. Mental status is at baseline.   Psychiatric:         Behavior: Behavior normal.       Health Maintenance         Date Due Completion Date    Shingles Vaccine (1 of 2) Never done ---    COVID-19 Vaccine (5 - Booster for Moderna series) 10/14/2022 8/19/2022    Mammogram 07/15/2023 7/15/2022    Hemoglobin A1c (Diabetic Prevention Screening) 12/17/2024 12/17/2021    DEXA Scan 06/30/2026 6/30/2022    Lipid Panel 06/07/2027 6/7/2022    Colorectal Cancer Screening 02/26/2028 2/26/2021    TETANUS VACCINE 12/11/2029 12/11/2019    Override on 4/7/2017: Declined            Health maintenance reviewed and addressed as ordered      ASSESSMENT     1. Obesity (BMI 35.0-39.9 without comorbidity)    2. Screening for diabetes mellitus    3. Hair loss        PLAN:     Problem List Items Addressed This Visit    None  Visit Diagnoses       Obesity (BMI 35.0-39.9 without comorbidity)    -  Primary  Patient would like to check for hormone levels that can affect obesity  Recommend calorie tracking for 3 days and continue exercising    Relevant Orders    TSH    ESTROGENS, TOTAL    PROGESTERONE    LUTEINIZING HORMONE    FOLLICLE STIMULATING HORMONE    Comprehensive Metabolic Panel    Insulin, Random    Screening for diabetes mellitus     Will order insulin level     Hair loss      as ordered       Relevant Orders    TSH              Yary Valadez MD  03/09/2023 2:04 PM        Follow up if symptoms worsen or fail to improve.    Orders Placed This Encounter   Procedures    TSH    ESTROGENS, TOTAL    PROGESTERONE    LUTEINIZING HORMONE    FOLLICLE STIMULATING HORMONE    Comprehensive Metabolic Panel    Insulin, Random

## 2023-03-14 ENCOUNTER — LAB VISIT (OUTPATIENT)
Dept: LAB | Facility: HOSPITAL | Age: 68
End: 2023-03-14
Attending: FAMILY MEDICINE
Payer: MEDICARE

## 2023-03-14 DIAGNOSIS — E66.9 OBESITY (BMI 35.0-39.9 WITHOUT COMORBIDITY): ICD-10-CM

## 2023-03-14 DIAGNOSIS — L65.9 HAIR LOSS: ICD-10-CM

## 2023-03-14 LAB
ALBUMIN SERPL BCP-MCNC: 3.6 G/DL (ref 3.5–5.2)
ALP SERPL-CCNC: 108 U/L (ref 55–135)
ALT SERPL W/O P-5'-P-CCNC: 16 U/L (ref 10–44)
ANION GAP SERPL CALC-SCNC: 11 MMOL/L (ref 8–16)
AST SERPL-CCNC: 15 U/L (ref 10–40)
BILIRUB SERPL-MCNC: 0.5 MG/DL (ref 0.1–1)
BUN SERPL-MCNC: 10 MG/DL (ref 8–23)
CALCIUM SERPL-MCNC: 9.6 MG/DL (ref 8.7–10.5)
CHLORIDE SERPL-SCNC: 105 MMOL/L (ref 95–110)
CO2 SERPL-SCNC: 24 MMOL/L (ref 23–29)
CREAT SERPL-MCNC: 0.7 MG/DL (ref 0.5–1.4)
EST. GFR  (NO RACE VARIABLE): >60 ML/MIN/1.73 M^2
FSH SERPL-ACNC: 84.68 MIU/ML
GLUCOSE SERPL-MCNC: 80 MG/DL (ref 70–110)
INSULIN COLLECTION INTERVAL: NORMAL
INSULIN SERPL-ACNC: 21.6 UU/ML
LH SERPL-ACNC: 50.7 MIU/ML
POTASSIUM SERPL-SCNC: 3.6 MMOL/L (ref 3.5–5.1)
PROGEST SERPL-MCNC: 0.1 NG/ML
PROT SERPL-MCNC: 7.8 G/DL (ref 6–8.4)
SODIUM SERPL-SCNC: 140 MMOL/L (ref 136–145)
TSH SERPL DL<=0.005 MIU/L-ACNC: 0.7 UIU/ML (ref 0.4–4)

## 2023-03-14 PROCEDURE — 82672 ASSAY OF ESTROGEN: CPT | Performed by: FAMILY MEDICINE

## 2023-03-14 PROCEDURE — 84443 ASSAY THYROID STIM HORMONE: CPT | Performed by: FAMILY MEDICINE

## 2023-03-14 PROCEDURE — 83001 ASSAY OF GONADOTROPIN (FSH): CPT | Performed by: FAMILY MEDICINE

## 2023-03-14 PROCEDURE — 83002 ASSAY OF GONADOTROPIN (LH): CPT | Performed by: FAMILY MEDICINE

## 2023-03-14 PROCEDURE — 36415 COLL VENOUS BLD VENIPUNCTURE: CPT | Mod: PO | Performed by: FAMILY MEDICINE

## 2023-03-14 PROCEDURE — 83525 ASSAY OF INSULIN: CPT | Performed by: FAMILY MEDICINE

## 2023-03-14 PROCEDURE — 80053 COMPREHEN METABOLIC PANEL: CPT | Performed by: FAMILY MEDICINE

## 2023-03-14 PROCEDURE — 84144 ASSAY OF PROGESTERONE: CPT | Performed by: FAMILY MEDICINE

## 2023-03-17 LAB — ESTROGEN SERPL-MCNC: 143 PG/ML

## 2023-03-21 NOTE — PROGRESS NOTES
"[facility]   Physical Therapy Treatment Note     Name: Dain Phelps  Clinic Number: [id number]    Therapy Diagnosis:   Encounter Diagnoses   Name Primary?    Decreased strength, endurance, and mobility Yes    Antalgic gait          Physician: Deondre Marin MD    Visit Date: 3/22/2023    Physician Orders: PT Eval and Treat   Medical Diagnosis from Referral: M76.52 (ICD-10-CM) - Patellar tendinitis of left knee  Evaluation Date: 2/13/2023  Authorization Period Expiration: 01/05/2024  Plan of Care Expiration: 5/13/23  Progress Note Due: 4/9/23  Visit # / Visits authorized: 6/12 (+eval)  FOTO: 1/5     Precautions: Standard and hypothyroidism, Leukopenia     PTA Visit #: 0/5     Time In: 0830a (late)  Time Out: 0925a  Total Billable Time: 20 minutes 1:1 c/ PT     SUBJECTIVE     Pt reports: Her Right knee was really bothering her after her last visit and that is why she has not come back to a visit since then. She thinks that she over did it at her visit and was hurting in her Right knee and hip. She does not think it was a normal soreness from a work out. Her Left knee was not bothering her.   She was compliant with home exercise program.  Response to previous treatment: some soreness  Functional change: ongoing    Pain: 0/10  Location: left knee      OBJECTIVE     Objective Measures updated at progress report unless specified.     Updated 3/9/23:      Treatment     Dain received the treatments listed below:  bolded=performed    therapeutic exercises to develop strength, posture, and core stabilization for 35 minutes including:    recumbent x 6' lvl 2    Straight leg raise 3" hold 3x10  ea  Shuttle squat double leg 3 bands 2x10  +single leg shuttle 1.5 bands 2x10 ea  side steps red band x2 laps tape   LAQ 2x10 ea + 2#     standing hip abduction 2x10    +monster walks red band x2 laps      neuromusclular re-education  to improve balance and stability for 15 minutes including:    Bridges " "5" 3x10 +Red theraband   Side lying clams +Red theraband 3" hold 3x10 ea    Step ups fwd and lateral x10 ea    therapeutic activities to improve functional performance for 5  minutes, including:      Sit to stands 3x5 from 18" box (from chair today)      Manual techniques used to increase mobility and reduce pain for 00 minute including:    Patellar mobs       Patient Education and Home Exercises     Home Exercises Provided and Patient Education Provided   Education provided:   - reassessment findings     Written Home Exercises Provided: yes. Exercises were reviewed and Dain was able to demonstrate them prior to the end of the session.  Dain demonstrated good  understanding of the education provided. See EMR under Patient Instructions for exercises provided during therapy sessions.    ASSESSMENT     Patient arrived to today's session after missing previous 3 sessions due to pain. PT modified today's treatment plan to decrease work load that was performed at previous visit that caused pain. Patient had better tolerance to today's session without reports of exacerbation of symptoms.PT to continue to modify and progress as tolerated by patient.     Dain Is progressing well towards her goals.   Pt prognosis is Good.     Pt will continue to benefit from skilled outpatient physical therapy to address the deficits listed in the problem list box on initial evaluation, provide pt/family education and to maximize pt's level of independence in the home and community environment.     Pt's spiritual, cultural and educational needs considered and pt agreeable to plan of care and goals.     Anticipated barriers to physical therapy: chronicity of condition    Goals: Short Term GOALS: 6 weeks. Pt agrees with goals set.  1. Patient demonstrates independence with HEP. Progressing, not met  2. Patient demonstrates ability to perform light activities around her house with no difficulty to improve tolerance to functional tasks " pain free. Progressing, not met  3. Patient demonstrates ability to navigate stairs with little to no difficulty to improve ability to navigate home environmentProgressing, not met     Long Term GOALS: 12 weeks. Pt agrees with goals set.  1. Patient demonstrates ability to perform functional squat with little to no difficulty to improve tolerance to functional tasks pain free. Progressing, not met  2. Patient demonstrates increased strength BLE's to 4+/5 or greater to improve tolerance to functional activities pain free. Progressing, not met  3. Patient demonstrates improved overall function per FOTO Knee Survey to 34% Limitation or less. Progressing, not met  4. Patient demonstrates ability to walk 1 mile with little to no difficulty to improve tolerance to functional tasks pain free. Progressing, not met    PLAN     Improve bilateral glute and hip strength to improve tolerance to functional tasks.     Irma Deluca, PT,DPT  03/22/2023

## 2023-03-22 ENCOUNTER — CLINICAL SUPPORT (OUTPATIENT)
Dept: REHABILITATION | Facility: HOSPITAL | Age: 68
End: 2023-03-22
Payer: MEDICARE

## 2023-03-22 DIAGNOSIS — R53.1 DECREASED STRENGTH, ENDURANCE, AND MOBILITY: Primary | ICD-10-CM

## 2023-03-22 DIAGNOSIS — R68.89 DECREASED STRENGTH, ENDURANCE, AND MOBILITY: Primary | ICD-10-CM

## 2023-03-22 DIAGNOSIS — Z74.09 DECREASED STRENGTH, ENDURANCE, AND MOBILITY: Primary | ICD-10-CM

## 2023-03-22 DIAGNOSIS — R26.89 ANTALGIC GAIT: ICD-10-CM

## 2023-03-22 PROCEDURE — 97110 THERAPEUTIC EXERCISES: CPT | Mod: PN

## 2023-03-23 ENCOUNTER — OFFICE VISIT (OUTPATIENT)
Dept: BARIATRICS | Facility: CLINIC | Age: 68
End: 2023-03-23
Payer: MEDICARE

## 2023-03-23 VITALS
BODY MASS INDEX: 37.8 KG/M2 | DIASTOLIC BLOOD PRESSURE: 80 MMHG | HEART RATE: 72 BPM | HEIGHT: 66 IN | OXYGEN SATURATION: 96 % | WEIGHT: 235.19 LBS | SYSTOLIC BLOOD PRESSURE: 138 MMHG

## 2023-03-23 DIAGNOSIS — G47.33 OSA ON CPAP: ICD-10-CM

## 2023-03-23 DIAGNOSIS — I10 PRIMARY HYPERTENSION: ICD-10-CM

## 2023-03-23 DIAGNOSIS — Z71.3 ENCOUNTER FOR WEIGHT LOSS COUNSELING: ICD-10-CM

## 2023-03-23 DIAGNOSIS — E66.01 CLASS 2 SEVERE OBESITY DUE TO EXCESS CALORIES WITH SERIOUS COMORBIDITY AND BODY MASS INDEX (BMI) OF 37.0 TO 37.9 IN ADULT: Primary | ICD-10-CM

## 2023-03-23 PROCEDURE — 99999 PR PBB SHADOW E&M-EST. PATIENT-LVL III: ICD-10-PCS | Mod: PBBFAC,,, | Performed by: STUDENT IN AN ORGANIZED HEALTH CARE EDUCATION/TRAINING PROGRAM

## 2023-03-23 PROCEDURE — 99204 OFFICE O/P NEW MOD 45 MIN: CPT | Mod: S$PBB,,, | Performed by: STUDENT IN AN ORGANIZED HEALTH CARE EDUCATION/TRAINING PROGRAM

## 2023-03-23 PROCEDURE — 99213 OFFICE O/P EST LOW 20 MIN: CPT | Mod: PBBFAC | Performed by: STUDENT IN AN ORGANIZED HEALTH CARE EDUCATION/TRAINING PROGRAM

## 2023-03-23 PROCEDURE — 99999 PR PBB SHADOW E&M-EST. PATIENT-LVL III: CPT | Mod: PBBFAC,,, | Performed by: STUDENT IN AN ORGANIZED HEALTH CARE EDUCATION/TRAINING PROGRAM

## 2023-03-23 PROCEDURE — 99204 PR OFFICE/OUTPT VISIT, NEW, LEVL IV, 45-59 MIN: ICD-10-PCS | Mod: S$PBB,,, | Performed by: STUDENT IN AN ORGANIZED HEALTH CARE EDUCATION/TRAINING PROGRAM

## 2023-03-23 RX ORDER — SEMAGLUTIDE 1.34 MG/ML
0.5 INJECTION, SOLUTION SUBCUTANEOUS
Qty: 1.5 ML | Refills: 2 | Status: SHIPPED | OUTPATIENT
Start: 2023-03-23 | End: 2023-06-12

## 2023-03-23 NOTE — PROGRESS NOTES
Subjective:       Patient ID: Dain Phelps is a 67 y.o. female.    Chief Complaint: Consult and Obesity    Patient presents for treatment of obesity.     Co-morbidities  HTN  LORRAINE  Hypothroidism   GERD    Negative for thyroid cancer  Negative for kidney stones  Negative for glaucoma    Current Physical Activity  PT for knees    Current Eating Habits  Breakfast - coffee with cream and sugar  Lunch - salad, may have full meal (meat, rice or pasta, vegetable)  Dinner - full meal  Snacks - night time snacker  Beverages - water, soft drinks    Medical Weight Loss  3/23/2023: 235.2 lbs, BMI 38, BFP 51.1%, .1 lbs, SMM 63.1 lbs, BMR 1498 kcal          Review of Systems   Constitutional:  Negative for chills and fever.   Respiratory:  Negative for shortness of breath.    Cardiovascular:  Negative for chest pain and palpitations.   Gastrointestinal:  Negative for abdominal pain, nausea and vomiting.   Neurological:  Negative for dizziness and light-headedness.   Psychiatric/Behavioral:  The patient is not nervous/anxious.        Objective:           Latest Reference Range & Units 03/14/23 11:15   Sodium 136 - 145 mmol/L 140   Potassium 3.5 - 5.1 mmol/L 3.6   Chloride 95 - 110 mmol/L 105   CO2 23 - 29 mmol/L 24   Anion Gap 8 - 16 mmol/L 11   BUN 8 - 23 mg/dL 10   Creatinine 0.5 - 1.4 mg/dL 0.7   eGFR >60 mL/min/1.73 m^2 >60.0   Glucose 70 - 110 mg/dL 80   Calcium 8.7 - 10.5 mg/dL 9.6   Alkaline Phosphatase 55 - 135 U/L 108   PROTEIN TOTAL 6.0 - 8.4 g/dL 7.8   Albumin 3.5 - 5.2 g/dL 3.6   BILIRUBIN TOTAL 0.1 - 1.0 mg/dL 0.5   AST 10 - 40 U/L 15   ALT 10 - 44 U/L 16   Insulin <25.0 uU/mL 21.6   Insulin Collection Interval  blood   TSH 0.400 - 4.000 uIU/mL 0.705   Estrogen pg/mL 143   FSH See Text mIU/mL 84.68   LH See Text mIU/mL 50.7   Progesterone See Text ng/mL 0.1       Vitals:    03/23/23 1403   BP: 138/80   Pulse: 72       Physical Exam  Vitals reviewed.   Constitutional:       General: She is not in acute  distress.     Appearance: Normal appearance. She is obese. She is not ill-appearing, toxic-appearing or diaphoretic.   HENT:      Head: Normocephalic and atraumatic.   Cardiovascular:      Rate and Rhythm: Normal rate.   Pulmonary:      Effort: Pulmonary effort is normal. No respiratory distress.   Skin:     General: Skin is warm and dry.   Neurological:      Mental Status: She is alert and oriented to person, place, and time.       Assessment:       Problem List Items Addressed This Visit       HTN (hypertension)    LORRAINE on CPAP     Other Visit Diagnoses       Class 2 severe obesity due to excess calories with serious comorbidity and body mass index (BMI) of 37.0 to 37.9 in adult    -  Primary    Encounter for weight loss counseling                Plan:   - Start Ozempic once a week. Start with 0.25mg once a week x 4 weeks, then 0.5 mg weekly.     - Log all food and beverage intake with a daily calorie goal of 1200 calories per day    - Low intensity aerobic exercise for 15-30 minutes 3-5x/week           unable to perform

## 2023-03-23 NOTE — PATIENT INSTRUCTIONS
Start Ozempic once a week. Start with 0.25mg once a week x 4 weeks, then 0.5 mg weekly.       Decrease portions as soon as you start Ozempic. Avoid fried, greasy, fatty foods.     Some nausea in the first 2 weeks is not unusual.     If you get pain across the upper abdomen and around to your back, please call the office.                 Copyright © 2011, [facility]. For more information about The Healthy Eating Plate, please see The Nutrition Source, Department of Nutrition, [employer], www.thenutritionsource.org, and [employer], www.health.[place].edu.      Meal Planning & Grocery Shopping    Meal planning builds the foundation for healthy eating. When you have structured ideas for healthy meals and foods available at home to prepare those meals, weight control becomes easier.  If only healthy foods are available at home, then you will be much more likely to eat healthy foods. And you will be less likely to go to a restaurant or  a fast food meal, which tend to be unhealthy and higher in calories than meals prepared at home.      Take 5-10 minutes each week to plan meals for the next 7 days.  Make a grocery list based on the meal plan.    Grocery Shopping Tips:  Shop on a full stomach.  Schedule your shopping for times when you are most motivated and able to be disciplined about your purchases. For example, after a stressful day at work it may be difficult to make the healthiest choices. Shopping at other times, such as early in the morning or after dinner, may be easier.  Focus your shopping on the outside aisles of the store, which tend to contain more fresh foods and lower calorie foods. The inside aisles tend to have more processed foods.  Stick to your list. Avoid buying unhealthy items just because they are on sale.   Compare nutrition labels to check the number of calories and percentage of fat.      What to buy:    Vegetables  Fresh  vegetables  Frozen vegetables with no sauce or added salt  Canned vegetables with no sauce or added salt    Protein  Lean meats, such as chicken and turkey  Limit red meats, such as beef to no more than 1x/week  Limit processed meats, such as cold cuts, dempsey, sausage, and hot dogs. Look for brands that have no nitrites and are minimally processed. Consider turkey sausage or turkey dempsey.  Fish and Shellfish  Eggs  Dried beans  Canned beans (reduced sodium)    Fat  Use healthy oils, such as olive oil or canola oil, for cooking, salad dressings, etc.  Unflavored nuts and seeds  Nut butters (no added sugar)    Dairy  Yogurt (no sugar added)  Cheese  Low-fat milk  Unsweetened nondairy milks (almond milk, soy milk, etc)    Fruit  Fresh Fruit  Frozen fruit with no added sugar  Canned fruit with no added sugar  Dried fruit with no added sugar  100% fruit juice    Whole Grains  Single ingredient grains, such as oats, quinoa, brown rice  Whole-wheat pasta  Sprouted whole-grain bread    What to avoid:  - Avoid fried foods  - Avoid foods with added sugar  - Avoid sugar-sweetened beverages  - Avoid ultra-processed foods      SEATED RESISTANCE BAND EXERCISES    If you do not have a resistance band, or do not feel comfortable using a resistance band, these exercises can also be done holding a light hand weight or water bottle.  If you are just starting to exercise, you may want to go through the motions without any weights or resistance till you become comfortable with the movements.    Do each of the movements shown 10 times (10 repetitions). You can repeat the exercises a second or  third time as well for greater benefit. The amount of tension on the resistance bands should be adjusted so  you can complete one set of 10 repetitions with effort. Increase the tension every few weeks. Do these  exercises 2 or 3 times a week.    * If an exercise hurts your back or joints, stop doing that particular exercise, but keep doing all  the others *      CHEST EXERCISE        Start Position:   Sit tall, with feet shoulder width apart and feet in front of knees.   Belly pulled in.   Place the band around your upper back, grasp band in each hand, knuckles (rings) facing front. Arms  should be bent so that knuckles are in front of elbows   Slide shoulder blades down your back and slightly together (as if making a V).   Relax your neck.    To Perform This Exercise:   Press hands forward to lengthen arms using chest muscles (not arms!).   Dont arch your back!)   Return to start position and repeat 10 times.   Breathe!        BACK EXERCISE        Start Position:   Sit tall, with feet shoulder width apart and feet in front of knees.   Belly pulled in.   Grasp band in each hand and raise overhead. Arms should be slightly wider than shoulder width and no  slack in the band.   Slide shoulder blades down your back and slightly together (as if making a V).   Relax your neck.    To Perform This Exercise:   Open arms pulling down towards chest using upper back muscles (not arms!).   Squeeze through shoulder blades at the bottom of the movement (dont arch your back!).   Return to start position and repeat 10 times.   Breathe!        SHOULDER EXERCISE        Start Position:   Sit tall, with feet shoulder width apart and feet in front of knees.   Belly pulled in.   Sit on band so that you can grasp band in one hand with tension on the band, but not so much tension that  you cannot straighten the arm. It may take a few tries to find the right amount of tension.   Slide shoulder blades down your back and slightly together (as if making a V).   Relax your neck.    To Perform This Exercise:   Press fist to the ceiling, slightly in front of the body.   SLOWLY return to start position and repeat 10 times.   Switch sides and repeat on the other side.   Breathe!        TRICEPS EXERCISE        Start Position:   Sit tall, with feet shoulder width apart and feet in  front of knees.   Belly pulled in.   Sit on one end of the band. Grasp other end of band in one hand.   Point elbow directly toward the ceiling (if this is difficult, you may support the upper arm with the opposite  hand)   Be sure there is no slack in the band in the starting position.   Slide shoulder blades down your back and slightly together (as if making a V).   Relax your neck.    To Perform This Exercise:   Extend your arm up to the ceiling, as shown.   Squeeze through shoulder blades at the bottom of the movement (dont arch your back!).   SLOWLY return to start position and repeat 10 times.   Repeat on the other side.   Breathe!        BICEP EXERCISE        Start Position:   Sit tall, with feet shoulder width apart and feet in front of knees.   Belly pulled in.   Place center of exercise band under one foot and step the end of the band under the other foot.   Grasp each end of the band with one hand. Make sure there is no slack between your foot and the hand  that holds the band.   Hold your elbows to your sides.   Pull abdominals in, lift chest, press shoulders down and back.    To Perform This Exercise:   As you curl up, keep your wrist from changing position in relation to your forearm and your arm stable  from the shoulder to the elbow.   Bend and straighten your elbow in a slow and controlled movement. Repeat this motion 10 times.   Repeat on the other side.   Breathe!

## 2023-03-27 ENCOUNTER — CLINICAL SUPPORT (OUTPATIENT)
Dept: REHABILITATION | Facility: HOSPITAL | Age: 68
End: 2023-03-27
Payer: MEDICARE

## 2023-03-27 DIAGNOSIS — R53.1 DECREASED STRENGTH, ENDURANCE, AND MOBILITY: Primary | ICD-10-CM

## 2023-03-27 DIAGNOSIS — R26.89 ANTALGIC GAIT: ICD-10-CM

## 2023-03-27 DIAGNOSIS — Z74.09 DECREASED STRENGTH, ENDURANCE, AND MOBILITY: Primary | ICD-10-CM

## 2023-03-27 DIAGNOSIS — R68.89 DECREASED STRENGTH, ENDURANCE, AND MOBILITY: Primary | ICD-10-CM

## 2023-03-27 PROCEDURE — 97110 THERAPEUTIC EXERCISES: CPT | Mod: PN

## 2023-03-27 NOTE — PROGRESS NOTES
"[facility]   Physical Therapy Treatment Note     Name: Dain Phelps  Clinic Number: [id number]    Therapy Diagnosis:   Encounter Diagnoses   Name Primary?    Decreased strength, endurance, and mobility Yes    Antalgic gait        Physician: Deondre Marin MD    Visit Date: 3/27/2023    Physician Orders: PT Eval and Treat   Medical Diagnosis from Referral: M76.52 (ICD-10-CM) - Patellar tendinitis of left knee  Evaluation Date: 2/13/2023  Authorization Period Expiration: 01/05/2024  Plan of Care Expiration: 5/13/23  Progress Note Due: 4/9/23  Visit # / Visits authorized: 7/12 (+eval)  FOTO: 1/5     Precautions: Standard and hypothyroidism, Leukopenia     PTA Visit #: 0/5     Time In:0825 (late)  Time Out: 0920a  Total Billable Time: 15 minutes 1:1 c/ PT     SUBJECTIVE     Pt reports:She felt better after her last visit.    She was compliant with home exercise program.  Response to previous treatment: some soreness  Functional change: ongoing    Pain: 0/10  Location: left knee      OBJECTIVE     Objective Measures updated at progress report unless specified.     Updated 3/9/23:    Treatment     Dain received the treatments listed below:  bolded=performed    therapeutic exercises to develop strength, posture, and core stabilization for 45 minutes including:    Nustep x 6' lvl 2    Straight leg raise 3" hold 3x10  ea  +short arc quad 4# 2x10 ea   LAQ 2x10 ea +4#   standing hip abduction 2x10 ea  Shuttle squat double leg 3 bands 2x10  +single leg shuttle 1.5 bands 2x10 ea (increase in pain at previous visit)  side steps red band x2 laps tape     +monster walks red band x2 laps  neuromusclular re-education  to improve balance and stability for 10 minutes including:    Bridges 5" 3x10 Red theraband   Side lying clams Red theraband 3" hold 3x10 ea    therapeutic activities to improve functional performance for 00  minutes, including:  Sit to stands 3x5 from 18" box (from chair today)  Step " ups fwd and lateral x10 ea    Manual techniques used to increase mobility and reduce pain for 00 minute including:    Patellar mobs       Patient Education and Home Exercises     Home Exercises Provided and Patient Education Provided   Education provided:   - reassessment findings     Written Home Exercises Provided: yes. Exercises were reviewed and Dain was able to demonstrate them prior to the end of the session.  Dain demonstrated good  understanding of the education provided. See EMR under Patient Instructions for exercises provided during therapy sessions.    ASSESSMENT     Patient was challenged with resistance progression of short arc quad and long arc quad for quad strengthening. 2/2 time constraints not all exercises ere able to be performed today. Some discomfort noted with lateral walks when stepping onto left lower extremity. However, she did not have the pain with every step. Decrease in knee extension on Right noted wit shuttle today.     Dain Is progressing well towards her goals.   Pt prognosis is Good.     Pt will continue to benefit from skilled outpatient physical therapy to address the deficits listed in the problem list box on initial evaluation, provide pt/family education and to maximize pt's level of independence in the home and community environment.     Pt's spiritual, cultural and educational needs considered and pt agreeable to plan of care and goals.     Anticipated barriers to physical therapy: chronicity of condition    Goals: Short Term GOALS: 6 weeks. Pt agrees with goals set.  1. Patient demonstrates independence with HEP. Progressing, not met  2. Patient demonstrates ability to perform light activities around her house with no difficulty to improve tolerance to functional tasks pain free. Progressing, not met  3. Patient demonstrates ability to navigate stairs with little to no difficulty to improve ability to navigate home environmentProgressing, not met     Long Term  GOALS: 12 weeks. Pt agrees with goals set.  1. Patient demonstrates ability to perform functional squat with little to no difficulty to improve tolerance to functional tasks pain free. Progressing, not met  2. Patient demonstrates increased strength BLE's to 4+/5 or greater to improve tolerance to functional activities pain free. Progressing, not met  3. Patient demonstrates improved overall function per FOTO Knee Survey to 34% Limitation or less. Progressing, not met  4. Patient demonstrates ability to walk 1 mile with little to no difficulty to improve tolerance to functional tasks pain free. Progressing, not met    PLAN     Improve bilateral glute and hip strength to improve tolerance to functional tasks.     Irma Deluca, PT,DPT  03/27/2023

## 2023-03-29 ENCOUNTER — TELEPHONE (OUTPATIENT)
Dept: BARIATRICS | Facility: CLINIC | Age: 68
End: 2023-03-29
Payer: COMMERCIAL

## 2023-03-29 NOTE — TELEPHONE ENCOUNTER
Informed patient that she can come to clinic after lunch and someone from the clinic can assist with helping her use her ozempic pen

## 2023-03-29 NOTE — TELEPHONE ENCOUNTER
----- Message from Grisel Springer sent at 3/29/2023 10:07 AM CDT -----  Regarding: appoinment request  Name of Who is Calling:NEY SHETH [[id number]]          What is the request in detail:     pt would like  appointment today to show her how to use her ozempic pen           Can the clinic reply by [facility]: no           What Number to Call Back if not in [facility]: [phone] ([place]) [phone]

## 2023-04-06 ENCOUNTER — CLINICAL SUPPORT (OUTPATIENT)
Dept: REHABILITATION | Facility: HOSPITAL | Age: 68
End: 2023-04-06
Payer: MEDICARE

## 2023-04-06 DIAGNOSIS — R53.1 DECREASED STRENGTH, ENDURANCE, AND MOBILITY: Primary | ICD-10-CM

## 2023-04-06 DIAGNOSIS — R68.89 DECREASED STRENGTH, ENDURANCE, AND MOBILITY: Primary | ICD-10-CM

## 2023-04-06 DIAGNOSIS — R26.89 ANTALGIC GAIT: ICD-10-CM

## 2023-04-06 DIAGNOSIS — Z74.09 DECREASED STRENGTH, ENDURANCE, AND MOBILITY: Primary | ICD-10-CM

## 2023-04-06 PROCEDURE — 97112 NEUROMUSCULAR REEDUCATION: CPT | Mod: PN,CQ

## 2023-04-06 PROCEDURE — 97110 THERAPEUTIC EXERCISES: CPT | Mod: PN,CQ

## 2023-04-06 PROCEDURE — 97530 THERAPEUTIC ACTIVITIES: CPT | Mod: PN,CQ

## 2023-04-06 NOTE — PROGRESS NOTES
"[facility]   Physical Therapy Treatment Note     Name: Dain Phelps  Clinic Number: [id number]    Therapy Diagnosis:   Encounter Diagnoses   Name Primary?    Decreased strength, endurance, and mobility Yes    Antalgic gait          Physician: Deondre Marin MD    Visit Date: 4/6/2023    Physician Orders: PT Eval and Treat   Medical Diagnosis from Referral: M76.52 (ICD-10-CM) - Patellar tendinitis of left knee  Evaluation Date: 2/13/2023  Authorization Period Expiration: 01/05/2024  Plan of Care Expiration: 5/13/23  Progress Note Due: 4/9/23  Visit # / Visits authorized: 8/12 (+eval)  FOTO: 1/5     Precautions: Standard and hypothyroidism, Leukopenia     PTA Visit #: 1/5     Time In: 8:20  Time Out: 9:11  Total Billable Time: 49 minutes     SUBJECTIVE     Pt reports: "I am feeling okay but I still have the sharp pains in my kneecap when I try to move and turn suddenly."  She was compliant with home exercise program.  Response to previous treatment: some soreness  Functional change: ongoing    Pain: 0/10  Location: left knee      OBJECTIVE     Objective Measures updated at progress report unless specified.     Updated 3/9/23:    Treatment     Dain received the treatments listed below:  bolded=performed    therapeutic exercises to develop strength, posture, and core stabilization for 29 minutes including:  Bike x 6' lvl 2  Straight leg raise 3" hold 3x10  ea  Short arc quad 4# 2x10 ea   LAQ 2x10 ea +4#   standing hip abduction 2x10 ea  Shuttle squat double leg 3 bands 2x10  +single leg shuttle 1.5 bands 2x10 ea (increase in pain at previous visit)  side steps red band x2 laps tape   monster walks red band x2 laps      neuromusclular re-education  to improve balance and stability for 10 minutes including:  Bridges 5" 3x10 Red theraband   Side lying clams Red theraband 3" hold 3x10 ea      therapeutic activities to improve functional performance for 10  minutes, including:  Sit to " "stands 3x5 from 18" box (from chair today)  Step ups fwd and lateral x10 ea      Manual techniques used to increase mobility and reduce pain for 0 minute including:    Patellar mobs       Patient Education and Home Exercises     Home Exercises Provided and Patient Education Provided   Education provided:   - reassessment findings     Written Home Exercises Provided: yes. Exercises were reviewed and Dain was able to demonstrate them prior to the end of the session.  Dain demonstrated good  understanding of the education provided. See EMR under Patient Instructions for exercises provided during therapy sessions.    ASSESSMENT     Patient presents to therapy with continued discomfort in knees with dynamic directional changes. Patient reports sharp pain and feeling like it needs to pop. Continued with exercises to increase strength and stability of knee. Patient demonstrates more fatigue today with cues required to correct techniques. Discussed HEP compliance. Plan to progress back into standing activities next visit.     Dain Is progressing well towards her goals.   Pt prognosis is Good.     Pt will continue to benefit from skilled outpatient physical therapy to address the deficits listed in the problem list box on initial evaluation, provide pt/family education and to maximize pt's level of independence in the home and community environment.     Pt's spiritual, cultural and educational needs considered and pt agreeable to plan of care and goals.     Anticipated barriers to physical therapy: chronicity of condition    Goals: Short Term GOALS: 6 weeks. Pt agrees with goals set.  1. Patient demonstrates independence with HEP. Progressing, not met  2. Patient demonstrates ability to perform light activities around her house with no difficulty to improve tolerance to functional tasks pain free. Progressing, not met  3. Patient demonstrates ability to navigate stairs with little to no difficulty to improve ability " to navigate home environmentProgressing, not met     Long Term GOALS: 12 weeks. Pt agrees with goals set.  1. Patient demonstrates ability to perform functional squat with little to no difficulty to improve tolerance to functional tasks pain free. Progressing, not met  2. Patient demonstrates increased strength BLE's to 4+/5 or greater to improve tolerance to functional activities pain free. Progressing, not met  3. Patient demonstrates improved overall function per FOTO Knee Survey to 34% Limitation or less. Progressing, not met  4. Patient demonstrates ability to walk 1 mile with little to no difficulty to improve tolerance to functional tasks pain free. Progressing, not met    PLAN     Improve bilateral glute and hip strength to improve tolerance to functional tasks.     Rajwinder Burr, PTA  04/06/2023

## 2023-04-11 ENCOUNTER — CLINICAL SUPPORT (OUTPATIENT)
Dept: REHABILITATION | Facility: HOSPITAL | Age: 68
End: 2023-04-11
Payer: MEDICARE

## 2023-04-11 DIAGNOSIS — R68.89 DECREASED STRENGTH, ENDURANCE, AND MOBILITY: Primary | ICD-10-CM

## 2023-04-11 DIAGNOSIS — R26.89 ANTALGIC GAIT: ICD-10-CM

## 2023-04-11 DIAGNOSIS — Z74.09 DECREASED STRENGTH, ENDURANCE, AND MOBILITY: Primary | ICD-10-CM

## 2023-04-11 DIAGNOSIS — R53.1 DECREASED STRENGTH, ENDURANCE, AND MOBILITY: Primary | ICD-10-CM

## 2023-04-11 PROCEDURE — 97140 MANUAL THERAPY 1/> REGIONS: CPT | Mod: PN

## 2023-04-11 PROCEDURE — 97112 NEUROMUSCULAR REEDUCATION: CPT | Mod: PN

## 2023-04-11 PROCEDURE — 97110 THERAPEUTIC EXERCISES: CPT | Mod: PN

## 2023-04-11 NOTE — PROGRESS NOTES
"[facility]   Physical Therapy Treatment Note     Name: Dain Phelps  Clinic Number: [id number]    Therapy Diagnosis:   Encounter Diagnoses   Name Primary?    Decreased strength, endurance, and mobility Yes    Antalgic gait            Physician: Deondre Marin MD    Visit Date: 4/11/2023    Physician Orders: PT Eval and Treat   Medical Diagnosis from Referral: M76.52 (ICD-10-CM) - Patellar tendinitis of left knee  Evaluation Date: 2/13/2023  Authorization Period Expiration: 01/05/2024  Plan of Care Expiration: 5/13/23  Progress Note Due: 4/9/23  Visit # / Visits authorized: 9/12 (+eval)  FOTO: 1/5     Precautions: Standard and hypothyroidism, Leukopenia     PTA Visit #: 0/5     Time In: 0826a (late)  Time Out: 0917a  Total Billable Time: 51 minutes     SUBJECTIVE     Pt reports: Her knees are about the same. Still just pain with certain movements  She was compliant with home exercise program.  Response to previous treatment: some soreness  Functional change: ongoing    Pain: 0/10  Location: left knee      OBJECTIVE     Objective Measures updated at progress report unless specified.     Updated 3/9/23:    Treatment     Dain received the treatments listed below:  bolded=performed    therapeutic exercises to develop strength, posture, and core stabilization for 15 minutes including:  Bike x +8' lvl 2  Straight leg raise 3" hold 3x10  ea  Short arc quad 4# 2x10 ea   LAQ 2x10 ea +4#   standing hip abduction 2x10 ea  Shuttle squat double leg 3 bands 2x10  +single leg shuttle 1.5 bands 2x10 ea (increase in pain at previous visit)  side steps red band x2 laps tape   monster walks red band x2 laps      neuromusclular re-education  to improve balance and stability for 20 minutes including:  Bridges 5" 3x10 Red theraband   Side lying clams Red theraband 3" hold 3x10 ea  +seated edge of mat knee extension with contralateral hamstring curl green theraband 2x10 ea      therapeutic activities to " "improve functional performance for 6  minutes, including:  Sit to stands 3x5 from 18" box (from chair today)  Step ups fwd and lateral x10 ea      Manual techniques used to increase mobility and reduce pain for 10 minute including:  Seated edge of mat distraction with passive range of motion knee flexion   Patellar mobs       Patient Education and Home Exercises     Home Exercises Provided and Patient Education Provided   Education provided:   -      Written Home Exercises Provided: yes. Exercises were reviewed and Dain was able to demonstrate them prior to the end of the session.  Dain demonstrated good  understanding of the education provided. See EMR under Patient Instructions for exercises provided during therapy sessions.    ASSESSMENT     Patient was late to today's session and therefore exercises were modified accordingly. PT performed patellar mobs with crepitus noted in bilateral knees. Patient reported relief with knee distraction. She has greatest difficulty with functional tasks such as sit to stands and step ups. Patient to be reassessed at next visit.     Dain Is progressing well towards her goals.   Pt prognosis is Good.     Pt will continue to benefit from skilled outpatient physical therapy to address the deficits listed in the problem list box on initial evaluation, provide pt/family education and to maximize pt's level of independence in the home and community environment.     Pt's spiritual, cultural and educational needs considered and pt agreeable to plan of care and goals.     Anticipated barriers to physical therapy: chronicity of condition    Goals: Short Term GOALS: 6 weeks. Pt agrees with goals set.  1. Patient demonstrates independence with HEP. Progressing, not met  2. Patient demonstrates ability to perform light activities around her house with no difficulty to improve tolerance to functional tasks pain free. Progressing, not met  3. Patient demonstrates ability to navigate " stairs with little to no difficulty to improve ability to navigate home environmentProgressing, not met     Long Term GOALS: 12 weeks. Pt agrees with goals set.  1. Patient demonstrates ability to perform functional squat with little to no difficulty to improve tolerance to functional tasks pain free. Progressing, not met  2. Patient demonstrates increased strength BLE's to 4+/5 or greater to improve tolerance to functional activities pain free. Progressing, not met  3. Patient demonstrates improved overall function per FOTO Knee Survey to 34% Limitation or less. Progressing, not met  4. Patient demonstrates ability to walk 1 mile with little to no difficulty to improve tolerance to functional tasks pain free. Progressing, not met    PLAN     Improve bilateral glute and hip strength to improve tolerance to functional tasks.     Irma Deluca, PT,DPT  04/11/2023

## 2023-04-12 NOTE — PROGRESS NOTES
"[facility]   Physical Therapy Treatment Note     Name: Dain Phelps  Clinic Number: [id number]    Therapy Diagnosis:   Encounter Diagnoses   Name Primary?    Decreased strength, endurance, and mobility Yes    Antalgic gait        Physician: Deondre Marin MD    Visit Date: 4/13/2023    Physician Orders: PT Eval and Treat   Medical Diagnosis from Referral: M76.52 (ICD-10-CM) - Patellar tendinitis of left knee  Evaluation Date: 2/13/2023  Authorization Period Expiration: 01/05/2024  Plan of Care Expiration: 5/13/23  Progress Note Due: 5/13/23  Visit # / Visits authorized: 10/12 (+eval)  FOTO: 1/5     Precautions: Standard and hypothyroidism, Leukopenia     PTA Visit #: 0/5     Time In: 0828a (late)  Time Out: 0912a  Total Billable Time: 44 minutes     SUBJECTIVE     Pt reports: She is feeling okay today without much pain  She was compliant with home exercise program.  Response to previous treatment: some soreness  Functional change: ongoing    Pain: 0/10  Location: left knee      OBJECTIVE     Objective Measures updated at progress report unless specified.     Updated 4/12/23:       Strength: Knee    Left Right   Quadriceps 4+/5 4+/5   Hamstrings 4+/5 4+/5         Strength: Hip    Left Right   Iliopsoas 4/5 4+/5   Glute Med 3+/5 3+/5   Hip IR 4/5 4+/5   Hip ER 4/5 4+/5   Hip Ext 4-/5 4-/5      Functional squat: patient reported stiffness discomfort with quad heavy movement  Single leg squat:   Right heavy quad dominant with stiffness   Left  heavy quad dominant with stiffness and "cracking" felt by patient      Patellar mobility: decrease superior<>inferior mobility    CMS Impairment/Limitation/Restriction for FOTO Knee Survey    Therapist reviewed FOTO scores for Dain Phelps on 4/13/2023.   FOTO documents entered into [facility] - see Media section.    Limitation Score: 40%       Treatment     Dain received the treatments listed below:  bolded=performed    therapeutic exercises to " "develop strength, posture, and core stabilization for 10 minutes including:  Upright Bike x +8' lvl 2  +unloaded Yoruba squat sit to stand from chair with red sport cord bilateral knees 2x10   +TRX squat 2x10  Straight leg raise 3" hold 3x10  ea  Short arc quad 4# 2x10 ea   LAQ 2x10 ea +4#   standing hip abduction 2x10 ea  Shuttle squat double leg 3 bands 2x10  +single leg shuttle 1.5 bands 2x10 ea (increase in pain at previous visit)  side steps red band x2 laps tape   monster walks red band x2 laps      neuromusclular re-education  to improve balance and stability for 00 minutes including:  Bridges 5" 3x10 Red theraband   Side lying clams Red theraband 3" hold 3x10 ea  +seated edge of mat knee extension with contralateral hamstring curl green theraband 2x10 ea      therapeutic activities to improve functional performance for 04 minutes, including:  Sit to stands 3x5 from 18" box (from chair today)  Step ups fwd and lateral x10 ea      Manual techniques used to increase mobility and reduce pain for 30 minute including:  reassessment  Knee flexion gapping with towel and AP mobilization for knee flexion   Patellar mobs       Patient Education and Home Exercises     Home Exercises Provided and Patient Education Provided   Education provided:   - reassessment findings  - rationale for progression of exercises     Written Home Exercises Provided: yes. Exercises were reviewed and Dain was able to demonstrate them prior to the end of the session.  Dain demonstrated good  understanding of the education provided. See EMR under Patient Instructions for exercises provided during therapy sessions.    ASSESSMENT     Patient was reassessed today and continued to display weakness in bilateral hips and glutes as well as crepitus and decrease in mobility of bilateral knees. Patient is quad dominant with functional squat and has difficulty with proper body mechanics. At this time she would continue to benefit from skilled PT " to address mobility and strength deficits to improve functional mobility and quality of life. PT progressed exercises today to unload joint with functional exercises to improve tolerance. Patient responded well with improvement in body mechanics. PT to reintroduce strengthening exercises at next visit as time allows.     Dain Is progressing well towards her goals.   Pt prognosis is Good.     Pt will continue to benefit from skilled outpatient physical therapy to address the deficits listed in the problem list box on initial evaluation, provide pt/family education and to maximize pt's level of independence in the home and community environment.     Pt's spiritual, cultural and educational needs considered and pt agreeable to plan of care and goals.     Anticipated barriers to physical therapy: chronicity of condition    Goals: Short Term GOALS: 6 weeks. Pt agrees with goals set.  1. Patient demonstrates independence with HEP. Progressing, not met  2. Patient demonstrates ability to perform light activities around her house with no difficulty to improve tolerance to functional tasks pain free. Progressing, not met  3. Patient demonstrates ability to navigate stairs with little to no difficulty to improve ability to navigate home environmentProgressing, not met     Long Term GOALS: 12 weeks. Pt agrees with goals set.  1. Patient demonstrates ability to perform functional squat with little to no difficulty to improve tolerance to functional tasks pain free. Progressing, not met  2. Patient demonstrates increased strength BLE's to 4+/5 or greater to improve tolerance to functional activities pain free. Progressing, not met  3. Patient demonstrates improved overall function per FOTO Knee Survey to 34% Limitation or less. Progressing, not met  4. Patient demonstrates ability to walk 1 mile with little to no difficulty to improve tolerance to functional tasks pain free. Progressing, not met    PLAN     Continue to unload  bilateral knee joints with functional movements as well as improve bilateral hip and glute strength    Irma Deluca, PT,DPT  04/13/2023

## 2023-04-13 ENCOUNTER — CLINICAL SUPPORT (OUTPATIENT)
Dept: REHABILITATION | Facility: HOSPITAL | Age: 68
End: 2023-04-13
Payer: MEDICARE

## 2023-04-13 DIAGNOSIS — R53.1 DECREASED STRENGTH, ENDURANCE, AND MOBILITY: Primary | ICD-10-CM

## 2023-04-13 DIAGNOSIS — R26.89 ANTALGIC GAIT: ICD-10-CM

## 2023-04-13 DIAGNOSIS — Z74.09 DECREASED STRENGTH, ENDURANCE, AND MOBILITY: Primary | ICD-10-CM

## 2023-04-13 DIAGNOSIS — R68.89 DECREASED STRENGTH, ENDURANCE, AND MOBILITY: Primary | ICD-10-CM

## 2023-04-13 PROCEDURE — 97110 THERAPEUTIC EXERCISES: CPT | Mod: PN

## 2023-04-13 PROCEDURE — 97140 MANUAL THERAPY 1/> REGIONS: CPT | Mod: PN

## 2023-04-17 ENCOUNTER — CLINICAL SUPPORT (OUTPATIENT)
Dept: REHABILITATION | Facility: HOSPITAL | Age: 68
End: 2023-04-17
Payer: MEDICARE

## 2023-04-17 DIAGNOSIS — Z74.09 DECREASED STRENGTH, ENDURANCE, AND MOBILITY: Primary | ICD-10-CM

## 2023-04-17 DIAGNOSIS — R26.89 ANTALGIC GAIT: ICD-10-CM

## 2023-04-17 DIAGNOSIS — R68.89 DECREASED STRENGTH, ENDURANCE, AND MOBILITY: Primary | ICD-10-CM

## 2023-04-17 DIAGNOSIS — R53.1 DECREASED STRENGTH, ENDURANCE, AND MOBILITY: Primary | ICD-10-CM

## 2023-04-17 PROCEDURE — 97140 MANUAL THERAPY 1/> REGIONS: CPT | Mod: PN,CQ

## 2023-04-17 PROCEDURE — 97530 THERAPEUTIC ACTIVITIES: CPT | Mod: PN,CQ

## 2023-04-17 NOTE — PROGRESS NOTES
"[facility]   Physical Therapy Treatment Note     Name: Dain Phelps  Clinic Number: [id number]    Therapy Diagnosis:   Encounter Diagnoses   Name Primary?    Decreased strength, endurance, and mobility Yes    Antalgic gait          Physician: Deondre Marin MD    Visit Date: 4/17/2023    Physician Orders: PT Eval and Treat   Medical Diagnosis from Referral: M76.52 (ICD-10-CM) - Patellar tendinitis of left knee  Evaluation Date: 2/13/2023  Authorization Period Expiration: 01/05/2024  Plan of Care Expiration: 5/13/23  Progress Note Due: 5/13/23  Visit # / Visits authorized: 11/12 (+eval)  FOTO: 1/5     Precautions: Standard and hypothyroidism, Leukopenia     PTA Visit #: 1/5     Time In: 8:35  Time Out:10:26  Total Billable Time: 51 minutes (30 billed)     SUBJECTIVE     Pt reports: She says she was really sore [place] the [place] because she walked so much.   She was compliant with home exercise program.  Response to previous treatment: some soreness  Functional change: ongoing    Pain: 0/10  Location: left knee      OBJECTIVE     Objective Measures updated at progress report unless specified.     Updated 4/12/23      Treatment     Dain received the treatments listed below:  bolded=performed    therapeutic exercises to develop strength, posture, and core stabilization for 23 minutes including:  Upright Bike x +8' lvl 2  +unloaded Arabic squat sit to stand from chair with red sport cord bilateral knees 2x10   +TRX squat 2x10  Straight leg raise 3" hold 3x10  ea  Short arc quad 4# 2x10 ea   LAQ 2x10 ea +4#   standing hip abduction 2x10 ea  Shuttle squat double leg 3 bands 2x10  +single leg shuttle 1.5 bands 2x10 ea (increase in pain at previous visit)  side steps red band x2 laps tape   monster walks red band x2 laps      neuromusclular re-education  to improve balance and stability for 10 minutes including:  Bridges 5" 3x10 Red theraband   Side lying clams " "Red theraband 3" hold 3x10 ea  +seated edge of mat knee extension with contralateral hamstring curl green theraband 2x10 ea      therapeutic activities to improve functional performance for 8 minutes, including:  Sit to stands 3x5 from 18" box (from chair today)  Step ups fwd and lateral x10 ea      Manual techniques used to increase mobility and reduce pain for 10 minute including:  STM to calf muscle  Knee flexion gapping with towel and AP mobilization for knee flexion   Patellar mobs       Patient Education and Home Exercises     Home Exercises Provided and Patient Education Provided   Education provided:   - reassessment findings  - rationale for progression of exercises     Written Home Exercises Provided: yes. Exercises were reviewed and Dain was able to demonstrate them prior to the end of the session.  Dain demonstrated good  understanding of the education provided. See EMR under Patient Instructions for exercises provided during therapy sessions.    ASSESSMENT     Patient presents with increased soreness in calf muscle secondary to walking a lot over the weekend. Session regressed slightly secondary to discomfort. Patient continued with lower extremity strengthening with rapid onset of muscle fatigue present today. Added gastroc/soleus stretches for HEP and education was given to use heat to muscle in order to reduce tension. Plan to get back into functional training next visit.     Dain Is progressing well towards her goals.   Pt prognosis is Good.     Pt will continue to benefit from skilled outpatient physical therapy to address the deficits listed in the problem list box on initial evaluation, provide pt/family education and to maximize pt's level of independence in the home and community environment.     Pt's spiritual, cultural and educational needs considered and pt agreeable to plan of care and goals.     Anticipated barriers to physical therapy: chronicity of condition    Goals: Short Term " GOALS: 6 weeks. Pt agrees with goals set.  1. Patient demonstrates independence with HEP. Progressing, not met  2. Patient demonstrates ability to perform light activities around her house with no difficulty to improve tolerance to functional tasks pain free. Progressing, not met  3. Patient demonstrates ability to navigate stairs with little to no difficulty to improve ability to navigate home environmentProgressing, not met     Long Term GOALS: 12 weeks. Pt agrees with goals set.  1. Patient demonstrates ability to perform functional squat with little to no difficulty to improve tolerance to functional tasks pain free. Progressing, not met  2. Patient demonstrates increased strength BLE's to 4+/5 or greater to improve tolerance to functional activities pain free. Progressing, not met  3. Patient demonstrates improved overall function per FOTO Knee Survey to 34% Limitation or less. Progressing, not met  4. Patient demonstrates ability to walk 1 mile with little to no difficulty to improve tolerance to functional tasks pain free. Progressing, not met    PLAN     Continue to unload bilateral knee joints with functional movements as well as improve bilateral hip and glute strength    Rajwinder Burr, PTA  04/17/2023

## 2023-04-24 ENCOUNTER — CLINICAL SUPPORT (OUTPATIENT)
Dept: REHABILITATION | Facility: HOSPITAL | Age: 68
End: 2023-04-24
Payer: MEDICARE

## 2023-04-24 DIAGNOSIS — R53.1 DECREASED STRENGTH, ENDURANCE, AND MOBILITY: Primary | ICD-10-CM

## 2023-04-24 DIAGNOSIS — R68.89 DECREASED STRENGTH, ENDURANCE, AND MOBILITY: Primary | ICD-10-CM

## 2023-04-24 DIAGNOSIS — R26.89 ANTALGIC GAIT: ICD-10-CM

## 2023-04-24 DIAGNOSIS — Z74.09 DECREASED STRENGTH, ENDURANCE, AND MOBILITY: Primary | ICD-10-CM

## 2023-04-24 PROCEDURE — 97112 NEUROMUSCULAR REEDUCATION: CPT | Mod: PN

## 2023-04-24 PROCEDURE — 97110 THERAPEUTIC EXERCISES: CPT | Mod: PN

## 2023-04-24 NOTE — PROGRESS NOTES
"[facility]   Physical Therapy Treatment Note     Name: Dain Phelps  Clinic Number: [id number]    Therapy Diagnosis:   Encounter Diagnoses   Name Primary?    Decreased strength, endurance, and mobility Yes    Antalgic gait        Physician: Deondre Marin MD    Visit Date: 4/24/2023    Physician Orders: PT Eval and Treat   Medical Diagnosis from Referral: M76.52 (ICD-10-CM) - Patellar tendinitis of left knee  Evaluation Date: 2/13/2023  Authorization Period Expiration: 01/05/2024  Plan of Care Expiration: 5/13/23  Progress Note Due: 5/13/23  Visit # / Visits authorized: 12/32(+eval)  FOTO: 1/5     Precautions: Standard and hypothyroidism, Leukopenia     PTA Visit #: 0/5     Time In: 0822a  Time Out:0916a  Total Billable Time: 54 minutes (30 billed)     SUBJECTIVE     Pt reports: She was a little achy after her last visit but she put the heating pad on and felt better.   She was compliant with home exercise program.  Response to previous treatment: some soreness  Functional change: ongoing    Pain: 0/10  Location: left knee      OBJECTIVE     Objective Measures updated at progress report unless specified.     Updated 4/12/23      Treatment     Dain received the treatments listed below:  bolded=performed    therapeutic exercises to develop strength, posture, and core stabilization for 30 minutes including:  Upright Bike x 8' lvl 2  unloaded Uzbek squat sit to stand from chair with red sport cord bilateral knees 3x10   TRX squat +3x10  Straight leg raise 3" hold 3x10  ea  Short arc quad 4# 2x10 ea   LAQ 2x10 ea +4#     Shuttle squat double leg 3 bands +3x10  single leg shuttle 1 red band 2x10 ea         neuromusclular re-education  to improve balance and stability for 24 minutes including:  Bridges 5" 3x10 Red theraband   Side lying clams Red theraband 3" hold 3x10 ea  seated edge of mat knee extension with contralateral hamstring curl green theraband 2x10 ea      therapeutic " "activities to improve functional performance for 10 minutes, including:  Sit to stands 3x5 from 18" box   Step ups 4" fwd and lateral 2x10 ea      Manual techniques used to increase mobility and reduce pain for 00 minute including:  STM to calf muscle  Knee flexion gapping with towel and AP mobilization for knee flexion   Patellar mobs       Patient Education and Home Exercises     Home Exercises Provided and Patient Education Provided   Education provided:   - scheduling more visits while waiting to follow up with doctor     Written Home Exercises Provided: yes. Exercises were reviewed and Dain was able to demonstrate them prior to the end of the session.  Dain demonstrated good  understanding of the education provided. See EMR under Patient Instructions for exercises provided during therapy sessions.    ASSESSMENT     Patient arrived to clinic without complaints of pain today. PT reintroduced functional strengthening without exacerbation of symptoms. Patient was also able to tolerate single leg shuttle with decrease in resistance without increase in symptoms. Patient was appropriately fatigued with all strengthening exercises today.     Dain Is progressing well towards her goals.   Pt prognosis is Good.     Pt will continue to benefit from skilled outpatient physical therapy to address the deficits listed in the problem list box on initial evaluation, provide pt/family education and to maximize pt's level of independence in the home and community environment.     Pt's spiritual, cultural and educational needs considered and pt agreeable to plan of care and goals.     Anticipated barriers to physical therapy: chronicity of condition    Goals: Short Term GOALS: 6 weeks. Pt agrees with goals set.  1. Patient demonstrates independence with HEP. Progressing, not met  2. Patient demonstrates ability to perform light activities around her house with no difficulty to improve tolerance to functional tasks pain " free. Progressing, not met  3. Patient demonstrates ability to navigate stairs with little to no difficulty to improve ability to navigate home environmentProgressing, not met     Long Term GOALS: 12 weeks. Pt agrees with goals set.  1. Patient demonstrates ability to perform functional squat with little to no difficulty to improve tolerance to functional tasks pain free. Progressing, not met  2. Patient demonstrates increased strength BLE's to 4+/5 or greater to improve tolerance to functional activities pain free. Progressing, not met  3. Patient demonstrates improved overall function per FOTO Knee Survey to 34% Limitation or less. Progressing, not met  4. Patient demonstrates ability to walk 1 mile with little to no difficulty to improve tolerance to functional tasks pain free. Progressing, not met    PLAN     Continue to unload bilateral knee joints with functional movements as well as improve bilateral hip and glute strength    Irma Deluca, PT,DPT  04/24/2023

## 2023-05-01 ENCOUNTER — PES CALL (OUTPATIENT)
Dept: ADMINISTRATIVE | Facility: CLINIC | Age: 68
End: 2023-05-01
Payer: COMMERCIAL

## 2023-05-02 ENCOUNTER — PES CALL (OUTPATIENT)
Dept: ADMINISTRATIVE | Facility: CLINIC | Age: 68
End: 2023-05-02
Payer: COMMERCIAL

## 2023-05-03 ENCOUNTER — CLINICAL SUPPORT (OUTPATIENT)
Dept: REHABILITATION | Facility: HOSPITAL | Age: 68
End: 2023-05-03
Payer: MEDICARE

## 2023-05-03 DIAGNOSIS — Z74.09 DECREASED STRENGTH, ENDURANCE, AND MOBILITY: Primary | ICD-10-CM

## 2023-05-03 DIAGNOSIS — R68.89 DECREASED STRENGTH, ENDURANCE, AND MOBILITY: Primary | ICD-10-CM

## 2023-05-03 DIAGNOSIS — R26.89 ANTALGIC GAIT: ICD-10-CM

## 2023-05-03 DIAGNOSIS — R53.1 DECREASED STRENGTH, ENDURANCE, AND MOBILITY: Primary | ICD-10-CM

## 2023-05-03 PROCEDURE — 97110 THERAPEUTIC EXERCISES: CPT | Mod: PN

## 2023-05-03 NOTE — PROGRESS NOTES
"[facility]   Physical Therapy Treatment Note     Name: Dain Phelps  Clinic Number: [id number]    Therapy Diagnosis:   Encounter Diagnoses   Name Primary?    Decreased strength, endurance, and mobility Yes    Antalgic gait          Physician: Deondre Marin MD    Visit Date: 5/3/2023    Physician Orders: PT Eval and Treat   Medical Diagnosis from Referral: M76.52 (ICD-10-CM) - Patellar tendinitis of left knee  Evaluation Date: 2/13/2023  Authorization Period Expiration: 01/05/2024  Plan of Care Expiration: 5/13/23  Progress Note Due: 5/13/23  Visit # / Visits authorized: 13/32(+eval)  FOTO: 1/5     Precautions: Standard and hypothyroidism, Leukopenia     PTA Visit #: 0/5     Time In: 0823a (late)  Time Out:0912a  Total Billable Time: 30 minutes     SUBJECTIVE     Pt reports: She has been feeling better. She only has some pain every now and then when she steps a certain way but it better than when she started therapy.   She was compliant with home exercise program.  Response to previous treatment: some soreness  Functional change: ongoing    Pain: 0/10  Location: left knee      OBJECTIVE     Objective Measures updated at progress report unless specified.     Updated 4/12/23      Treatment     Dain received the treatments listed below:  bolded=performed    therapeutic exercises to develop strength, posture, and core stabilization for 24 minutes including:  Upright Bike x 8' lvl +3  unloaded Persian squat sit to stand from chair with red sport cord bilateral knees 3x10   TRX squat 3x10  Straight leg raise 3" hold 3x10  ea  Short arc quad 4# 2x10 ea   LAQ 2x10 ea +4#     Shuttle squat double leg 3 bands 3x10  single leg shuttle 1 red band 2x10 ea         neuromusclular re-education  to improve balance and stability for 20 minutes including:  Bridges 5" 3x10 Red theraband   Side lying clams Red theraband 3" hold 3x10 ea  seated edge of mat knee extension with contralateral hamstring " "curl green theraband 3x10 ea      therapeutic activities to improve functional performance for 5 minutes, including:  Sit to stands 3x5 from 18" box   Step ups 4" fwd and lateral 2x10 ea      Manual techniques used to increase mobility and reduce pain for 00 minute including:  STM to calf muscle  Knee flexion gapping with towel and AP mobilization for knee flexion   Patellar mobs       Patient Education and Home Exercises     Home Exercises Provided and Patient Education Provided   Education provided:   - scheduling more visits while waiting to follow up with doctor     Written Home Exercises Provided: yes. Exercises were reviewed and Dain was able to demonstrate them prior to the end of the session.  Dain demonstrated good  understanding of the education provided. See EMR under Patient Instructions for exercises provided during therapy sessions.    ASSESSMENT     Patient was able to complete all exercises today without reports of increase in symptoms. She continues to have greatest difficulty with double leg and single leg shuttle. 2/2 time constraints of patient late arrival not all exercises were performed today. PT plans to incorporate more functional strengthening as appropriate.     Dain Is progressing well towards her goals.   Pt prognosis is Good.     Pt will continue to benefit from skilled outpatient physical therapy to address the deficits listed in the problem list box on initial evaluation, provide pt/family education and to maximize pt's level of independence in the home and community environment.     Pt's spiritual, cultural and educational needs considered and pt agreeable to plan of care and goals.     Anticipated barriers to physical therapy: chronicity of condition    Goals: Short Term GOALS: 6 weeks. Pt agrees with goals set.  1. Patient demonstrates independence with HEP. Progressing, not met  2. Patient demonstrates ability to perform light activities around her house with no " difficulty to improve tolerance to functional tasks pain free. Progressing, not met  3. Patient demonstrates ability to navigate stairs with little to no difficulty to improve ability to navigate home environmentProgressing, not met     Long Term GOALS: 12 weeks. Pt agrees with goals set.  1. Patient demonstrates ability to perform functional squat with little to no difficulty to improve tolerance to functional tasks pain free. Progressing, not met  2. Patient demonstrates increased strength BLE's to 4+/5 or greater to improve tolerance to functional activities pain free. Progressing, not met  3. Patient demonstrates improved overall function per FOTO Knee Survey to 34% Limitation or less. Progressing, not met  4. Patient demonstrates ability to walk 1 mile with little to no difficulty to improve tolerance to functional tasks pain free. Progressing, not met    PLAN     Continue to unload bilateral knee joints with functional movements as well as improve bilateral hip and glute strength    Irma Deluca, PT,DPT  05/03/2023

## 2023-05-04 ENCOUNTER — PATIENT MESSAGE (OUTPATIENT)
Dept: FAMILY MEDICINE | Facility: CLINIC | Age: 68
End: 2023-05-04
Payer: COMMERCIAL

## 2023-05-04 DIAGNOSIS — E89.0 POSTOPERATIVE HYPOTHYROIDISM: ICD-10-CM

## 2023-05-05 ENCOUNTER — OFFICE VISIT (OUTPATIENT)
Dept: FAMILY MEDICINE | Facility: CLINIC | Age: 68
End: 2023-05-05
Payer: MEDICARE

## 2023-05-05 VITALS
BODY MASS INDEX: 37.09 KG/M2 | WEIGHT: 230.81 LBS | OXYGEN SATURATION: 99 % | HEART RATE: 70 BPM | DIASTOLIC BLOOD PRESSURE: 90 MMHG | HEIGHT: 66 IN | TEMPERATURE: 98 F | RESPIRATION RATE: 16 BRPM | SYSTOLIC BLOOD PRESSURE: 140 MMHG

## 2023-05-05 DIAGNOSIS — E89.0 POSTOPERATIVE HYPOTHYROIDISM: Primary | ICD-10-CM

## 2023-05-05 DIAGNOSIS — E66.01 SEVERE OBESITY (BMI 35.0-39.9) WITH COMORBIDITY: ICD-10-CM

## 2023-05-05 DIAGNOSIS — I10 PRIMARY HYPERTENSION: ICD-10-CM

## 2023-05-05 PROCEDURE — 99214 OFFICE O/P EST MOD 30 MIN: CPT | Mod: S$PBB,,, | Performed by: FAMILY MEDICINE

## 2023-05-05 PROCEDURE — 99214 OFFICE O/P EST MOD 30 MIN: CPT | Mod: PBBFAC,PN | Performed by: FAMILY MEDICINE

## 2023-05-05 PROCEDURE — 99214 PR OFFICE/OUTPT VISIT, EST, LEVL IV, 30-39 MIN: ICD-10-PCS | Mod: S$PBB,,, | Performed by: FAMILY MEDICINE

## 2023-05-05 PROCEDURE — 99999 PR PBB SHADOW E&M-EST. PATIENT-LVL IV: ICD-10-PCS | Mod: PBBFAC,,, | Performed by: FAMILY MEDICINE

## 2023-05-05 PROCEDURE — 99999 PR PBB SHADOW E&M-EST. PATIENT-LVL IV: CPT | Mod: PBBFAC,,, | Performed by: FAMILY MEDICINE

## 2023-05-05 NOTE — PROGRESS NOTES
Chief Complaint   Patient presents with    Hypertension       HPI  Dain Phelps is a 67 y.o. female with multiple medical diagnoses as listed in the medical history and problem list that presents for follow-up for HTN    She is on ozempic and has lost 5 lbs, she has been taking it for 6 weeks and has concerns that this is too slow. She is planning to start water exercises to help but has made changes to her diet also      ALLERGIES AND MEDICATIONS: updated and reviewed.  Review of patient's allergies indicates:   Allergen Reactions    Ace inhibitors Edema     Angioedema     Medication List with Changes/Refills   Current Medications    ALBUTEROL (PROVENTIL/VENTOLIN HFA) 90 MCG/ACTUATION INHALER    Inhale 2 puffs into the lungs every 6 (six) hours as needed for Wheezing.    ERGOCALCIFEROL, VITAMIN D2, 2,000 UNIT TAB    Take 2,000 Units by mouth 2 (two) times daily.    ESTRADIOL (ESTRACE) 0.01 % (0.1 MG/GRAM) VAGINAL CREAM    Insert 2 g daily intravaginally for 2 weeks, then 1 g twice weekly    HYDROCHLOROTHIAZIDE (HYDRODIURIL) 12.5 MG TAB    TAKE 1 TABLET(12.5 MG) BY MOUTH EVERY DAY    KETOCONAZOLE (NIZORAL) 2 % SHAMPOO    Apply topically once a week.    LEVOTHYROXINE (SYNTHROID) 125 MCG TABLET    Take 1 tablet (125 mcg total) by mouth before breakfast.    METOPROLOL SUCCINATE (TOPROL-XL) 25 MG 24 HR TABLET    Take 1 tablet (25 mg total) by mouth once daily.    SEMAGLUTIDE (OZEMPIC) 0.25 MG OR 0.5 MG(2 MG/1.5 ML) PEN INJECTOR    Inject 0.5 mg into the skin every 7 days. Start with 0.25 mg once a week for 4 weeks, then increase to 0.5 mg once a week    VERAPAMIL (VERELAN) 360 MG C24P    TAKE 1 CAPSULE(360 MG) BY MOUTH EVERY DAY       ROS  Review of Systems   Constitutional:  Negative for chills, diaphoresis, fatigue, fever and unexpected weight change.   HENT:  Negative for rhinorrhea, sinus pressure, sore throat and tinnitus.    Eyes:  Negative for photophobia and visual disturbance.   Respiratory:  Negative for  "cough, shortness of breath and wheezing.    Cardiovascular:  Negative for chest pain and palpitations.   Gastrointestinal:  Negative for abdominal pain, blood in stool, constipation, diarrhea, nausea and vomiting.   Genitourinary:  Negative for dysuria, flank pain, frequency and vaginal discharge.   Musculoskeletal:  Negative for arthralgias and joint swelling.   Skin:  Negative for rash.   Neurological:  Negative for speech difficulty, weakness, light-headedness and headaches.   Psychiatric/Behavioral:  Negative for behavioral problems and dysphoric mood.      Physical Exam  Vitals:    05/05/23 0910 05/05/23 0939   BP: (!) 140/90 (!) 140/90   BP Location: Left arm    Patient Position: Sitting    BP Method: X-Large (Manual)    Pulse: 70    Resp: 16    Temp: 98 °F (36.7 °C)    TempSrc: Oral    SpO2: 99%    Weight: 104.7 kg (230 lb 13.2 oz)    Height: 5' 6" (1.676 m)     Body mass index is 37.26 kg/m².  Weight: 104.7 kg (230 lb 13.2 oz)   Height: 5' 6" (167.6 cm)     Physical Exam  Vitals and nursing note reviewed.   Constitutional:       Appearance: She is well-developed.   Skin:     General: Skin is warm and dry.      Findings: No erythema or rash.   Neurological:      Mental Status: She is alert. Mental status is at baseline.   Psychiatric:         Behavior: Behavior normal.       Health Maintenance         Date Due Completion Date    Shingles Vaccine (1 of 2) Never done ---    COVID-19 Vaccine (5 - Booster for Moderna series) 10/14/2022 8/19/2022    Mammogram 07/15/2023 7/15/2022    Hemoglobin A1c (Diabetic Prevention Screening) 12/17/2024 12/17/2021    DEXA Scan 06/30/2026 6/30/2022    Lipid Panel 06/07/2027 6/7/2022    Colorectal Cancer Screening 02/26/2028 2/26/2021    TETANUS VACCINE 12/11/2029 12/11/2019    Override on 4/7/2017: Declined            Health maintenance reviewed and addressed as ordered      ASSESSMENT/PLAN       1. Postoperative hypothyroidism  Continue current regimen    2. Primary " hypertension  She just took BP medication  Will recheck, she is on digital medicine    3. Severe obesity (BMI 35.0-39.9) with comorbidity  We discussed adding the exercise to help with weight loss  She is having good progress        Yary Valadez MD  05/05/2023 9:23 AM        Follow up in about 6 months (around 11/5/2023) for management of chronic conditions.    No orders of the defined types were placed in this encounter.

## 2023-05-09 ENCOUNTER — OFFICE VISIT (OUTPATIENT)
Dept: ENDOCRINOLOGY | Facility: CLINIC | Age: 68
End: 2023-05-09
Payer: MEDICARE

## 2023-05-09 DIAGNOSIS — G47.33 OSA ON CPAP: ICD-10-CM

## 2023-05-09 DIAGNOSIS — E66.01 SEVERE OBESITY (BMI 35.0-39.9) WITH COMORBIDITY: ICD-10-CM

## 2023-05-09 DIAGNOSIS — E89.0 POSTOPERATIVE HYPOTHYROIDISM: Primary | ICD-10-CM

## 2023-05-09 DIAGNOSIS — E55.9 VITAMIN D DEFICIENCY: ICD-10-CM

## 2023-05-09 PROCEDURE — 99214 OFFICE O/P EST MOD 30 MIN: CPT | Mod: 95,,, | Performed by: INTERNAL MEDICINE

## 2023-05-09 PROCEDURE — 99214 PR OFFICE/OUTPT VISIT, EST, LEVL IV, 30-39 MIN: ICD-10-PCS | Mod: 95,,, | Performed by: INTERNAL MEDICINE

## 2023-05-09 NOTE — PROGRESS NOTES
Subjective:      Patient ID: Dain Phelps is a 67 y.o. female.    Chief Complaint:  postoperative hypothyroidism    History of Present Illness  With regards to her postoperative hypothyroidism:  initially seen her for MNG-- we did a FNA which was indeterminate and then she had a total thyroidectomy 2009 -- final path was benign         Current medication:  generic lt4 125 mcg daily           With regards to the vitamin d deficiency:       takes otc vitamin d3 2000 iu daily      BMD 5/13/19  Normal bone mineral density.    With regards to obesity, There is no height or weight on file to calculate BMI.,   Denies symptoms of hyperglycemia such as polyuria, polydipsia, nocturia, unexplained weight loss or blurred vision       She was started on Ozempic 0.5 for weight loss - 6 weeks in and only lost 5 lbs     +FH of T2DM     Does snore - has christine - did see the sleep physician in August of 2022- using cpap!     ROS:   As above    Objective:     There were no vitals taken for this visit.  BP Readings from Last 3 Encounters:   05/05/23 (!) 140/90   03/23/23 138/80   02/17/23 132/82     Wt Readings from Last 1 Encounters:   05/05/23 0910 104.7 kg (230 lb 13.2 oz)     There is no height or weight on file to calculate BMI.      Physical Exam  Constitutional:       Appearance: Normal appearance.   Psychiatric:         Attention and Perception: Attention normal.         Mood and Affect: Mood normal.         Speech: Speech normal.         Behavior: Behavior normal.         Thought Content: Thought content normal.         Judgment: Judgment normal.              Lab Review:   Lab Results   Component Value Date    HGBA1C 5.5 12/17/2021     Lab Results   Component Value Date    CHOL 173 06/07/2022    HDL 47 06/07/2022    LDLCALC 111.2 06/07/2022    TRIG 74 06/07/2022    CHOLHDL 27.2 06/07/2022     Lab Results   Component Value Date     03/14/2023    K 3.6 03/14/2023     03/14/2023    CO2 24 03/14/2023    GLU 80  03/14/2023    BUN 10 03/14/2023    CREATININE 0.7 03/14/2023    CALCIUM 9.6 03/14/2023    PROT 7.8 03/14/2023    ALBUMIN 3.6 03/14/2023    BILITOT 0.5 03/14/2023    ALKPHOS 108 03/14/2023    AST 15 03/14/2023    ALT 16 03/14/2023    ANIONGAP 11 03/14/2023    ESTGFRAFRICA >60 06/07/2022    EGFRNONAA >60 06/07/2022    TSH 0.705 03/14/2023     Vit D, 25-Hydroxy   Date Value Ref Range Status   07/07/2020 33 30 - 96 ng/mL Final     Comment:     Vitamin D deficiency.........<10 ng/mL                              Vitamin D insufficiency......10-29 ng/mL       Vitamin D sufficiency........> or equal to 30 ng/mL  Vitamin D toxicity............>100 ng/mL         Assessment and Plan     Postoperative hypothyroidism  Clinically and biochemically euthyroid  Goal is a normal TSH  Avoid exogenous hyperthyroidism as this can accelerate bone loss and increase risk of CV complications.     Follow-up with me yearly or primary care      Vitamin D deficiency   over the counter vitamin D 2000 iu a day      LORRAINE on CPAP  I am glad that she had this reinvestigated and she is now on treatment    Severe obesity (BMI 35.0-39.9) with comorbidity  She is on Ozempic, reviewed that she should not be frustrated with 5 pound weight loss as she is on the low-dose Ozempic.  Urged her to stick with it.  Follow-up primary         The patient location is: [place]  The chief complaint leading to consultation is: above     Visit type: audiovisual    Face to Face time with patient: 20  minutes of total time spent on the encounter, which includes face to face time and non-face to face time preparing to see the patient (eg, review of tests), Obtaining and/or reviewing separately obtained history, Documenting clinical information in the electronic or other health record, Independently interpreting results (not separately reported) and communicating results to the patient/family/caregiver, or Care coordination (not separately reported).         Each patient to whom  he or she provides medical services by telemedicine is:  (1) informed of the relationship between the physician and patient and the respective role of any other health care provider with respect to management of the patient; and (2) notified that he or she may decline to receive medical services by telemedicine and may withdraw from such care at any time.    Notes:

## 2023-05-09 NOTE — ASSESSMENT & PLAN NOTE
Clinically and biochemically euthyroid  Goal is a normal TSH  Avoid exogenous hyperthyroidism as this can accelerate bone loss and increase risk of CV complications.     Follow-up with me yearly or primary care

## 2023-05-09 NOTE — ASSESSMENT & PLAN NOTE
She is on Ozempic, reviewed that she should not be frustrated with 5 pound weight loss as she is on the low-dose Ozempic.  Urged her to stick with it.  Follow-up primary

## 2023-05-09 NOTE — PATIENT INSTRUCTIONS
Thank you for completing a virtual visit with me!     Per our conversation, continue your current thyroid regimen.  I would urge you to stick with the Ozempic as you may start losing more weight with the higher doses.    Please follow-up with me or your primary care yearly for your hypothyroidism.        Please let me know if you have any other questions.    Thank you,  Erika Mcnally MD

## 2023-05-19 ENCOUNTER — OFFICE VISIT (OUTPATIENT)
Dept: CARDIOLOGY | Facility: CLINIC | Age: 68
End: 2023-05-19
Payer: MEDICARE

## 2023-05-19 VITALS
WEIGHT: 227.94 LBS | RESPIRATION RATE: 15 BRPM | OXYGEN SATURATION: 97 % | HEART RATE: 70 BPM | DIASTOLIC BLOOD PRESSURE: 64 MMHG | HEIGHT: 66 IN | SYSTOLIC BLOOD PRESSURE: 122 MMHG | BODY MASS INDEX: 36.63 KG/M2

## 2023-05-19 DIAGNOSIS — I10 PRIMARY HYPERTENSION: ICD-10-CM

## 2023-05-19 DIAGNOSIS — R00.2 PALPITATIONS: Primary | ICD-10-CM

## 2023-05-19 DIAGNOSIS — E66.01 SEVERE OBESITY (BMI 35.0-39.9) WITH COMORBIDITY: ICD-10-CM

## 2023-05-19 DIAGNOSIS — R42 DIZZINESS: ICD-10-CM

## 2023-05-19 DIAGNOSIS — I27.20 PULMONARY HYPERTENSION: ICD-10-CM

## 2023-05-19 DIAGNOSIS — E89.0 POSTOPERATIVE HYPOTHYROIDISM: ICD-10-CM

## 2023-05-19 DIAGNOSIS — G47.33 OSA ON CPAP: ICD-10-CM

## 2023-05-19 PROCEDURE — 99214 OFFICE O/P EST MOD 30 MIN: CPT | Mod: S$PBB,,, | Performed by: INTERNAL MEDICINE

## 2023-05-19 PROCEDURE — 93010 EKG 12-LEAD: ICD-10-PCS | Mod: S$PBB,,, | Performed by: INTERNAL MEDICINE

## 2023-05-19 PROCEDURE — 99999 PR PBB SHADOW E&M-EST. PATIENT-LVL IV: CPT | Mod: PBBFAC,,, | Performed by: INTERNAL MEDICINE

## 2023-05-19 PROCEDURE — 99214 OFFICE O/P EST MOD 30 MIN: CPT | Mod: PBBFAC,PO | Performed by: INTERNAL MEDICINE

## 2023-05-19 PROCEDURE — 99999 PR PBB SHADOW E&M-EST. PATIENT-LVL IV: ICD-10-PCS | Mod: PBBFAC,,, | Performed by: INTERNAL MEDICINE

## 2023-05-19 PROCEDURE — 93010 ELECTROCARDIOGRAM REPORT: CPT | Mod: S$PBB,,, | Performed by: INTERNAL MEDICINE

## 2023-05-19 PROCEDURE — 99214 PR OFFICE/OUTPT VISIT, EST, LEVL IV, 30-39 MIN: ICD-10-PCS | Mod: S$PBB,,, | Performed by: INTERNAL MEDICINE

## 2023-05-19 PROCEDURE — 93005 ELECTROCARDIOGRAM TRACING: CPT | Mod: PBBFAC,PO | Performed by: INTERNAL MEDICINE

## 2023-05-19 NOTE — PROGRESS NOTES
CARDIOLOGY CLINIC VISIT        HISTORY OF PRESENT ILLNESS:     Dain Phelps presents for continued care. Seen 06/24/2022 for evaluation of shortness of breath on exertion.  Seen by Dr. Dorsey in 2018. Evaluation of chest discomfort.  Palpitations.  She has noted over the past 6 months progressive shortness of breath on exertion.  Now symptoms at rest.  No associated chest discomfort.  Weight has been stable.  Blood pressure controlled.  EKG showed sinus bradycardia.  Denies dizziness.  No syncope/presyncope.  On metoprolol and verapamil.    09/19/2022:  Nuclear stress was negative for ischemia.  Hypertensive response exercise 257/84.  Patient exercised for 5 minutes 45 seconds.  Functional capacity 7 Mets.  Echocardiogram showed normal left ventricular systolic function with estimated ejection fraction 65%.  Left ventricular hypertrophy.  Mildly elevated pulmonary pressure.  Has stopped metoprolol.  Now taking verapamil in the morning along with her hydrochlorothiazide.  Digital medicine logs reviewed.  Feels good.  No complaints.  Going on a trip to [place] this month.    05/19/2023:  Over the past few months has had episodes of dizziness.  Generally at rest.  Also notes palpitations.  She can feel her heart racing at times.  Episodes daily.  Last minutes.  Last TSH within normal limits.  EKG today shows sinus bradycardia, low-voltage.  Stress last year she had normal heart rate response to exercise.  She is on metoprolol and Cardizem.    CARDIOVASCULAR HISTORY:     Pulmonary hypertension    PAST MEDICAL HISTORY:     Past Medical History:   Diagnosis Date    Chronic low back pain     Fatigue     GERD (gastroesophageal reflux disease)     History of colon polyps 8/4/2015    Hypertension     Hypothyroidism     Impaired glucose tolerance 1/15/2014    Leukopenia     Obesity (BMI 30-39.9) 1/15/2014    Primary hypothyroidism 5/26/2015    Snoring 1/23/2015    Vitamin D deficiency 1/15/2014       PAST SURGICAL HISTORY:      Past Surgical History:   Procedure Laterality Date    BREAST BIOPSY Left     CATARACT EXTRACTION       SECTION, CLASSIC      x3    COLONOSCOPY N/A 2021    Procedure: COLONOSCOPY;  Surgeon: Parvez Mazariegos MD;  Location: [facility];  Service: Endoscopy;  Laterality: N/A;  covid test  lap[facility], instructions through [facility]    HYSTERECTOMY      total    OOPHORECTOMY      thyroid surgey         ALLERGIES AND MEDICATION:     Review of patient's allergies indicates:   Allergen Reactions    Ace inhibitors Edema     Angioedema        Medication List            Accurate as of May 19, 2023  8:59 AM. If you have any questions, ask your nurse or doctor.                CONTINUE taking these medications      ergocalciferol (vitamin D2) 50 mcg (2,000 unit) Tab     hydroCHLOROthiazide 12.5 MG Tab  Commonly known as: HYDRODIURIL  TAKE 1 TABLET(12.5 MG) BY MOUTH EVERY DAY     ketoconazole 2 % shampoo  Commonly known as: NIZORAL  Apply topically once a week.     levothyroxine 125 MCG tablet  Commonly known as: SYNTHROID  TAKE 1 TABLET(125 MCG) BY MOUTH BEFORE BREAKFAST     metoprolol succinate 25 MG 24 hr tablet  Commonly known as: TOPROL-XL  Take 1 tablet (25 mg total) by mouth once daily.     OZEMPIC 0.25 mg or 0.5 mg(2 mg/1.5 mL) pen injector  Generic drug: semaglutide  Inject 0.5 mg into the skin every 7 days. Start with 0.25 mg once a week for 4 weeks, then increase to 0.5 mg once a week     verapamiL 360 MG C24p  Commonly known as: VERELAN  TAKE 1 CAPSULE(360 MG) BY MOUTH EVERY DAY              SOCIAL HISTORY:     Social History     Socioeconomic History    Marital status:    Occupational History     Comment: retired   Tobacco Use    Smoking status: Never    Smokeless tobacco: Never   Substance and Sexual Activity    Alcohol use: No    Drug use: No    Sexual activity: Not Currently     Partners: Male     Social Determinants of Health     Financial Resource Strain: Low Risk     Difficulty  of Paying Living Expenses: Not hard at all   Food Insecurity: No Food Insecurity    Worried About Running Out of Food in the Last Year: Never true    Ran Out of Food in the Last Year: Never true   Transportation Needs: No Transportation Needs    Lack of Transportation (Medical): No    Lack of Transportation (Non-Medical): No   Physical Activity: Inactive    Days of Exercise per Week: 0 days    Minutes of Exercise per Session: 0 min   Stress: No Stress Concern Present    Feeling of Stress : Not at all   Social Connections: Socially Integrated    Frequency of Communication with Friends and Family: More than three times a week    Frequency of Social Gatherings with Friends and Family: More than three times a week    Attends [employer] Services: More than 4 times per year    Active Member of Clubs or Organizations: Yes    Attends Club or Organization Meetings: More than 4 times per year    Marital Status:    Housing Stability: Low Risk     Unable to Pay for Housing in the Last Year: No    Number of Places Lived in the Last Year: 1    Unstable Housing in the Last Year: No       FAMILY HISTORY:     Family History   Problem Relation Age of Onset    Heart disease Mother     Kidney disease Mother     Hypertension Mother     Cancer Father         throat cancer    No Known Problems Daughter     Hypertension Son     Hypertension Maternal Aunt     Diabetes Maternal Aunt     Thyroid disease Maternal Aunt     Breast cancer Maternal Aunt 70    Diabetes Maternal Grandmother        REVIEW OF SYSTEMS:   Review of Systems   Constitutional:  Negative for chills, diaphoresis, fever, malaise/fatigue and weight loss.   HENT:  Negative for congestion, hearing loss, sinus pain, sore throat and tinnitus.    Eyes:  Negative for blurred vision, double vision, photophobia and pain.   Respiratory:  Negative for cough, hemoptysis, sputum production, shortness of breath, wheezing and stridor.    Cardiovascular:  Positive for palpitations.  "Negative for chest pain, orthopnea, claudication, leg swelling and PND.   Gastrointestinal:  Negative for abdominal pain, blood in stool, heartburn, melena, nausea and vomiting.   Musculoskeletal:  Negative for back pain, falls, joint pain, myalgias and neck pain.   Neurological:  Positive for dizziness. Negative for tingling, tremors, sensory change, speech change, focal weakness, seizures, loss of consciousness, weakness and headaches.   Endo/Heme/Allergies:  Does not bruise/bleed easily.   Psychiatric/Behavioral:  Negative for depression, memory loss and substance abuse. The patient is not nervous/anxious.    All other systems reviewed and are negative.    PHYSICAL EXAM:     Vitals:    05/19/23 0831   BP: 122/64   Pulse: 70   Resp: 15    Body mass index is 36.79 kg/m².  Weight: 103.4 kg (227 lb 15.3 oz)   Height: 5' 6" (167.6 cm)     Physical Exam  Constitutional:       General: She is not in acute distress.     Appearance: She is well-developed. She is obese. She is not diaphoretic.   HENT:      Head: Normocephalic.   Neck:      Vascular: No carotid bruit or JVD.   Cardiovascular:      Rate and Rhythm: Normal rate and regular rhythm.      Pulses: Normal pulses.      Heart sounds: Murmur heard.   Crescendo systolic murmur is present with a grade of 2/6.   Pulmonary:      Effort: Pulmonary effort is normal.      Breath sounds: Normal breath sounds.   Abdominal:      General: Bowel sounds are normal.      Palpations: Abdomen is soft.      Tenderness: There is no abdominal tenderness.   Skin:     General: Skin is warm and dry.   Neurological:      Mental Status: She is alert and oriented to person, place, and time.   Psychiatric:         Speech: Speech normal.         Behavior: Behavior normal.         Thought Content: Thought content normal.       DATA:   EKG: (personally reviewed tracing)  05/19/2023-sinus bradycardia, low-voltage  09/19/2022-normal sinus rhythm  06/03/2022-sinus " bradycardia  Laboratory:  CBC:  Recent Labs   Lab 12/07/20  1022 05/25/21  0815 06/07/22  1018   WBC 4.31 3.78 L 4.92   Hemoglobin 13.0 12.2 12.7   Hematocrit 41.2 38.3 40.2   Platelets 222 199 224       CHEMISTRIES:  Recent Labs   Lab 12/07/20  1022 05/25/21  0815 06/07/22  1018 03/14/23  1115   Glucose 90 93 82 80   Sodium 141 139 141 140   Potassium 3.6 4.1 4.2 3.6   BUN 18 18 17 10   Creatinine 0.8 0.9 0.7 0.7   eGFR if African American >60.0 >60.0 >60  --    eGFR if non African American >60.0 >60.0 >60  --    Calcium 8.9 9.1 8.8 9.6       CARDIAC BIOMARKERS:        COAGS:        LIPIDS/LFTS:  Recent Labs   Lab 12/07/20  1022 05/25/21  0815 06/07/22  1018 03/14/23  1115   Cholesterol 165 155 173  --    Triglycerides 53 84 74  --    HDL 49 43 47  --    LDL Cholesterol 105.4 95.2 111.2  --    Non-HDL Cholesterol 116 112 126  --    AST 15 14 17 15   ALT 15 15 28 16       Cardiovascular Testing:    Nuclear stress 08/29/2022:      Normal myocardial perfusion scan. There is no evidence of myocardial ischemia or infarction.    The gated perfusion images showed an ejection fraction of 76% post stress.    There is normal wall motion post stress.    The EKG portion of this study is negative for ischemia.    The patient reported no chest pain during the stress test.    There were no arrhythmias during stress.    The exercise capacity was average.    Hypertensive response to exercise 257/84.    Echocardiogram 08/29/2022:    The left ventricle is normal in size with concentric hypertrophy and normal systolic function.  The estimated ejection fraction is 65%.  Mild left atrial enlargement.  Normal left ventricular diastolic function.  Normal right ventricular size with normal right ventricular systolic function.  Mild tricuspid regurgitation.  Normal central venous pressure (3 mmHg).  The estimated PA systolic pressure is 33 mmHg.  There is mild pulmonary hypertension.    NST:  6-18  Impression: PROBABLY NORMAL MYOCARDIAL  PERFUSION  1. There is a mild to moderate mostly reversible anterior wall defect of uncertain significance.   2. The perfusion scan is free of evidence for myocardial ischemia.   3. Resting wall motion is physiologic.   4. Resting LV function is normal.   5. The ventricular volumes are normal at rest and stress.   6. The extracardiac distribution of radioactivity is normal.   7. When compared to the previous study from 05/19/2015, no significant change     Echo:  CONCLUSIONS     1 - Normal left ventricular systolic function (EF 55-60%).      Holter 06/28/2018:     TEST DESCRIPTION   PREDOMINANT RHYTHM   1. Sinus rhythm with heart rates varying between 42 and 90 bpm with an average of 64 bpm.     VENTRICULAR ARRHYTHMIAS   1. There was a single PVC recorded.     2. There were no episodes of ventricular tachycardia.     SUPRA VENTRICULAR ARRHYTHMIAS   1. There were very rare PACs recorded totalling 1 and averaging less than 1 per hour.     2. There were no episodes of sustained supraventricular tachycardia.     SINUS NODE FUNCTION   1. There was no evidence of high grade SA merari block.     AV CONDUCTION   1. There was no evidence of high grade AV block.     DIARY   1. The diary was returned, but not completed     MISCELLANEOUS   1. There were occasional hookup related artifacts. Overall, the study was of good quality.     2. This was a tape of adequate length (24 hrs).     ASSESSMENT:     Palpitations  Dizziness  Hypertension:  Good values noted on digital medicine.  Hypothyroid  Obstructive sleep apnea  Pulmonary hypertension  Obesity    PLAN:     Palpitations:  Holter.  Dizziness:  Holter to assess heart rate variability.  Hypertension: Continue current management.  Return to clinic 1 month.           Dante Castro MD, MPH, FACC, [facility]

## 2023-05-22 ENCOUNTER — CLINICAL SUPPORT (OUTPATIENT)
Dept: FAMILY MEDICINE | Facility: CLINIC | Age: 68
End: 2023-05-22
Payer: MEDICARE

## 2023-05-22 VITALS — DIASTOLIC BLOOD PRESSURE: 84 MMHG | SYSTOLIC BLOOD PRESSURE: 119 MMHG | HEART RATE: 51 BPM

## 2023-05-22 DIAGNOSIS — I10 PRIMARY HYPERTENSION: Primary | ICD-10-CM

## 2023-05-22 PROCEDURE — 99499 NO LOS: ICD-10-PCS | Mod: S$PBB,,, | Performed by: FAMILY MEDICINE

## 2023-05-22 PROCEDURE — 99999 PR PBB SHADOW E&M-EST. PATIENT-LVL I: CPT | Mod: PBBFAC,,,

## 2023-05-22 PROCEDURE — 99211 OFF/OP EST MAY X REQ PHY/QHP: CPT | Mod: PBBFAC,PN

## 2023-05-22 PROCEDURE — 99999 PR PBB SHADOW E&M-EST. PATIENT-LVL I: ICD-10-PCS | Mod: PBBFAC,,,

## 2023-05-22 PROCEDURE — 99499 UNLISTED E&M SERVICE: CPT | Mod: S$PBB,,, | Performed by: FAMILY MEDICINE

## 2023-05-22 NOTE — PROGRESS NOTES
Dain Phelps 67 y.o. female is here today for Blood Pressure check.   History of HTN yes.    Review of patient's allergies indicates:   Allergen Reactions    Ace inhibitors Edema     Angioedema     Creatinine   Date Value Ref Range Status   03/14/2023 0.7 0.5 - 1.4 mg/dL Final     Sodium   Date Value Ref Range Status   03/14/2023 140 136 - 145 mmol/L Final     Potassium   Date Value Ref Range Status   03/14/2023 3.6 3.5 - 5.1 mmol/L Final   ]  Patient verifies taking blood pressure medications on a regular basis at the same time of the day.     Current Outpatient Medications:     ergocalciferol, vitamin D2, 2,000 unit Tab, Take 2,000 Units by mouth 2 (two) times daily., Disp: , Rfl:     hydroCHLOROthiazide (HYDRODIURIL) 12.5 MG Tab, TAKE 1 TABLET(12.5 MG) BY MOUTH EVERY DAY, Disp: 90 tablet, Rfl: 1    ketoconazole (NIZORAL) 2 % shampoo, Apply topically once a week., Disp: 120 mL, Rfl: 0    levothyroxine (SYNTHROID) 125 MCG tablet, TAKE 1 TABLET(125 MCG) BY MOUTH BEFORE BREAKFAST, Disp: 90 tablet, Rfl: 1    metoprolol succinate (TOPROL-XL) 25 MG 24 hr tablet, Take 1 tablet (25 mg total) by mouth once daily., Disp: 90 tablet, Rfl: 1    semaglutide (OZEMPIC) 0.25 mg or 0.5 mg(2 mg/1.5 mL) pen injector, Inject 0.5 mg into the skin every 7 days. Start with 0.25 mg once a week for 4 weeks, then increase to 0.5 mg once a week, Disp: 1.5 mL, Rfl: 2    verapamiL (VERELAN) 360 MG C24P, TAKE 1 CAPSULE(360 MG) BY MOUTH EVERY DAY, Disp: 90 capsule, Rfl: 3  Does patient have record of home blood pressure readings no. Readings have been averaging n/a.   Last dose of blood pressure medication was taken at 5/22/23 morning.  Patient is asymptomatic.   Complains of n/a.    BP: 119/84 , Pulse: (!) 51 .    Dr. Valadez notified.

## 2023-05-24 ENCOUNTER — HOSPITAL ENCOUNTER (OUTPATIENT)
Dept: CARDIOLOGY | Facility: HOSPITAL | Age: 68
Discharge: HOME | End: 2023-05-24
Attending: INTERNAL MEDICINE
Payer: MEDICARE

## 2023-05-24 DIAGNOSIS — R00.2 PALPITATIONS: ICD-10-CM

## 2023-05-24 DIAGNOSIS — I10 PRIMARY HYPERTENSION: ICD-10-CM

## 2023-05-24 DIAGNOSIS — G47.33 OSA ON CPAP: ICD-10-CM

## 2023-05-24 PROCEDURE — 93227 XTRNL ECG REC<48 HR R&I: CPT | Mod: ,,, | Performed by: INTERNAL MEDICINE

## 2023-05-24 PROCEDURE — 93225 XTRNL ECG REC<48 HRS REC: CPT

## 2023-05-24 PROCEDURE — 93227 HOLTER MONITOR - 48 HOUR (CUPID ONLY): ICD-10-PCS | Mod: ,,, | Performed by: INTERNAL MEDICINE

## 2023-06-01 LAB
OHS CV EVENT MONITOR DAY: 2
OHS CV HOLTER LENGTH DECIMAL HOURS: 96
OHS CV HOLTER LENGTH HOURS: 48
OHS CV HOLTER LENGTH MINUTES: 0
OHS CV HOLTER SINUS AVERAGE HR: 63
OHS CV HOLTER SINUS MAX HR: 95
OHS CV HOLTER SINUS MIN HR: 42

## 2023-06-02 ENCOUNTER — PES CALL (OUTPATIENT)
Dept: ADMINISTRATIVE | Facility: CLINIC | Age: 68
End: 2023-06-02
Payer: COMMERCIAL

## 2023-06-12 ENCOUNTER — OFFICE VISIT (OUTPATIENT)
Dept: BARIATRICS | Facility: CLINIC | Age: 68
End: 2023-06-12
Payer: MEDICARE

## 2023-06-12 VITALS
DIASTOLIC BLOOD PRESSURE: 82 MMHG | SYSTOLIC BLOOD PRESSURE: 122 MMHG | WEIGHT: 226.38 LBS | OXYGEN SATURATION: 98 % | HEART RATE: 55 BPM | BODY MASS INDEX: 36.38 KG/M2 | HEIGHT: 66 IN

## 2023-06-12 DIAGNOSIS — Z71.3 ENCOUNTER FOR WEIGHT LOSS COUNSELING: ICD-10-CM

## 2023-06-12 DIAGNOSIS — I10 PRIMARY HYPERTENSION: ICD-10-CM

## 2023-06-12 DIAGNOSIS — E66.01 CLASS 2 SEVERE OBESITY DUE TO EXCESS CALORIES WITH SERIOUS COMORBIDITY AND BODY MASS INDEX (BMI) OF 36.0 TO 36.9 IN ADULT: Primary | ICD-10-CM

## 2023-06-12 DIAGNOSIS — G47.33 OSA ON CPAP: ICD-10-CM

## 2023-06-12 PROCEDURE — 99213 PR OFFICE/OUTPT VISIT, EST, LEVL III, 20-29 MIN: ICD-10-PCS | Mod: S$PBB,,, | Performed by: STUDENT IN AN ORGANIZED HEALTH CARE EDUCATION/TRAINING PROGRAM

## 2023-06-12 PROCEDURE — 99999 PR PBB SHADOW E&M-EST. PATIENT-LVL III: ICD-10-PCS | Mod: PBBFAC,,, | Performed by: STUDENT IN AN ORGANIZED HEALTH CARE EDUCATION/TRAINING PROGRAM

## 2023-06-12 PROCEDURE — 99213 OFFICE O/P EST LOW 20 MIN: CPT | Mod: PBBFAC | Performed by: STUDENT IN AN ORGANIZED HEALTH CARE EDUCATION/TRAINING PROGRAM

## 2023-06-12 PROCEDURE — 99213 OFFICE O/P EST LOW 20 MIN: CPT | Mod: S$PBB,,, | Performed by: STUDENT IN AN ORGANIZED HEALTH CARE EDUCATION/TRAINING PROGRAM

## 2023-06-12 PROCEDURE — 99999 PR PBB SHADOW E&M-EST. PATIENT-LVL III: CPT | Mod: PBBFAC,,, | Performed by: STUDENT IN AN ORGANIZED HEALTH CARE EDUCATION/TRAINING PROGRAM

## 2023-06-12 RX ORDER — SEMAGLUTIDE 0.68 MG/ML
0.5 INJECTION, SOLUTION SUBCUTANEOUS
Qty: 3 ML | Refills: 2 | Status: SHIPPED | OUTPATIENT
Start: 2023-06-12 | End: 2023-09-12

## 2023-06-12 NOTE — PROGRESS NOTES
Subjective:       Patient ID: Dain Phelps is a 67 y.o. female.    Chief Complaint: Follow-up, Obesity, and Weight Check    Patient presents for treatment of obesity.     Co-morbidities  HTN  LORRAINE  Hypothroidism   GERD    Negative for thyroid cancer  Negative for kidney stones  Negative for glaucoma    Current Physical Activity  Finished PT for knees  Currently in water aerobics 3x/week    Current Eating Habits  Breakfast - coffee with cream and sugar  Lunch - salad, may have full meal (meat, rice or pasta, vegetable)  Dinner - full meal  Snacks - night time snacker  Beverages - water, soft drinks    Medical Weight Loss  3/23/2023: 235.2 lbs, BMI 38, BFP 51.1%, .1 lbs, SMM 63.1 lbs, BMR 1498 kcal  6/12/2023: 226.4 lbs, BMI 36.6, BFP 51%, .5 lbs, SMM 60.8 lbs, BMR 1456 kcal          Review of Systems   Constitutional:  Negative for chills and fever.   Respiratory:  Negative for shortness of breath.    Cardiovascular:  Negative for chest pain and palpitations.   Gastrointestinal:  Negative for abdominal pain, nausea and vomiting.   Neurological:  Negative for dizziness and light-headedness.   Psychiatric/Behavioral:  The patient is not nervous/anxious.        Objective:           Latest Reference Range & Units 03/14/23 11:15   Sodium 136 - 145 mmol/L 140   Potassium 3.5 - 5.1 mmol/L 3.6   Chloride 95 - 110 mmol/L 105   CO2 23 - 29 mmol/L 24   Anion Gap 8 - 16 mmol/L 11   BUN 8 - 23 mg/dL 10   Creatinine 0.5 - 1.4 mg/dL 0.7   eGFR >60 mL/min/1.73 m^2 >60.0   Glucose 70 - 110 mg/dL 80   Calcium 8.7 - 10.5 mg/dL 9.6   Alkaline Phosphatase 55 - 135 U/L 108   PROTEIN TOTAL 6.0 - 8.4 g/dL 7.8   Albumin 3.5 - 5.2 g/dL 3.6   BILIRUBIN TOTAL 0.1 - 1.0 mg/dL 0.5   AST 10 - 40 U/L 15   ALT 10 - 44 U/L 16   Insulin <25.0 uU/mL 21.6   Insulin Collection Interval  blood   TSH 0.400 - 4.000 uIU/mL 0.705   Estrogen pg/mL 143   FSH See Text mIU/mL 84.68   LH See Text mIU/mL 50.7   Progesterone See Text ng/mL 0.1        Vitals:    06/12/23 1005   BP: 122/82   Pulse: (!) 55         Physical Exam  Vitals reviewed.   Constitutional:       General: She is not in acute distress.     Appearance: Normal appearance. She is obese. She is not ill-appearing, toxic-appearing or diaphoretic.   HENT:      Head: Normocephalic and atraumatic.   Cardiovascular:      Rate and Rhythm: Normal rate.   Pulmonary:      Effort: Pulmonary effort is normal. No respiratory distress.   Skin:     General: Skin is warm and dry.   Neurological:      Mental Status: She is alert and oriented to person, place, and time.       Assessment:       Problem List Items Addressed This Visit       HTN (hypertension)    LORRAINE on CPAP     Other Visit Diagnoses       Class 2 severe obesity due to excess calories with serious comorbidity and body mass index (BMI) of 36.0 to 36.9 in adult    -  Primary    Relevant Medications    semaglutide (OZEMPIC) 0.25 mg or 0.5 mg (2 mg/3 mL) pen injector    Encounter for weight loss counseling                  Plan:   - Ozempic 0.5 mg weekly.     - Log all food and beverage intake with a daily calorie goal of 1200 calories per day    - Low intensity aerobic exercise for 15-30 minutes 3-5x/week

## 2023-06-15 ENCOUNTER — OFFICE VISIT (OUTPATIENT)
Dept: CARDIOLOGY | Facility: CLINIC | Age: 68
End: 2023-06-15
Payer: MEDICARE

## 2023-06-15 VITALS
HEIGHT: 66 IN | SYSTOLIC BLOOD PRESSURE: 128 MMHG | HEART RATE: 58 BPM | DIASTOLIC BLOOD PRESSURE: 62 MMHG | RESPIRATION RATE: 15 BRPM | WEIGHT: 226.63 LBS | OXYGEN SATURATION: 95 % | BODY MASS INDEX: 36.42 KG/M2

## 2023-06-15 DIAGNOSIS — G47.33 OSA ON CPAP: ICD-10-CM

## 2023-06-15 DIAGNOSIS — R42 DIZZINESS: ICD-10-CM

## 2023-06-15 DIAGNOSIS — I27.20 PULMONARY HYPERTENSION: ICD-10-CM

## 2023-06-15 DIAGNOSIS — E89.0 POSTOPERATIVE HYPOTHYROIDISM: ICD-10-CM

## 2023-06-15 DIAGNOSIS — E66.01 SEVERE OBESITY (BMI 35.0-39.9) WITH COMORBIDITY: ICD-10-CM

## 2023-06-15 DIAGNOSIS — I10 PRIMARY HYPERTENSION: ICD-10-CM

## 2023-06-15 DIAGNOSIS — R00.2 PALPITATIONS: Primary | ICD-10-CM

## 2023-06-15 PROCEDURE — 99214 PR OFFICE/OUTPT VISIT, EST, LEVL IV, 30-39 MIN: ICD-10-PCS | Mod: S$PBB,,, | Performed by: INTERNAL MEDICINE

## 2023-06-15 PROCEDURE — 99214 OFFICE O/P EST MOD 30 MIN: CPT | Mod: PBBFAC,PO | Performed by: INTERNAL MEDICINE

## 2023-06-15 PROCEDURE — 99999 PR PBB SHADOW E&M-EST. PATIENT-LVL IV: CPT | Mod: PBBFAC,,, | Performed by: INTERNAL MEDICINE

## 2023-06-15 PROCEDURE — 99999 PR PBB SHADOW E&M-EST. PATIENT-LVL IV: ICD-10-PCS | Mod: PBBFAC,,, | Performed by: INTERNAL MEDICINE

## 2023-06-15 PROCEDURE — 99214 OFFICE O/P EST MOD 30 MIN: CPT | Mod: S$PBB,,, | Performed by: INTERNAL MEDICINE

## 2023-06-15 NOTE — PROGRESS NOTES
CARDIOLOGY CLINIC VISIT        HISTORY OF PRESENT ILLNESS:     Dain Phepls presents for continued care. Seen 06/24/2022 for evaluation of shortness of breath on exertion.  Seen by Dr. Dorsey in 2018. Evaluation of chest discomfort.  Palpitations.  She has noted over the past 6 months progressive shortness of breath on exertion.  Now symptoms at rest.  No associated chest discomfort.  Weight has been stable.  Blood pressure controlled.  EKG showed sinus bradycardia.  Denies dizziness.  No syncope/presyncope.  On metoprolol and verapamil.    09/19/2022:  Nuclear stress was negative for ischemia.  Hypertensive response exercise 257/84.  Patient exercised for 5 minutes 45 seconds.  Functional capacity 7 Mets.  Echocardiogram showed normal left ventricular systolic function with estimated ejection fraction 65%.  Left ventricular hypertrophy.  Mildly elevated pulmonary pressure.  Has stopped metoprolol.  Now taking verapamil in the morning along with her hydrochlorothiazide.  Digital medicine logs reviewed.  Feels good.  No complaints.  Going on a trip to [place] this month.    05/19/2023:  Over the past few months has had episodes of dizziness.  Generally at rest.  Also notes palpitations.  She can feel her heart racing at times.  Episodes daily.  Last minutes.  Last TSH within normal limits.  EKG today shows sinus bradycardia, low-voltage.  Stress last year she had normal heart rate response to exercise.  She is on metoprolol and Cardizem.    06/15/2023: Holter showed average heart rate 63 beats per minute.  Very rare PVCs.  Rare PACs.  She did not have any symptoms while wearing the monitor.    CARDIOVASCULAR HISTORY:     Pulmonary hypertension    PAST MEDICAL HISTORY:     Past Medical History:   Diagnosis Date    Chronic low back pain     Fatigue     GERD (gastroesophageal reflux disease)     History of colon polyps 8/4/2015    Hypertension     Hypothyroidism     Impaired glucose tolerance 1/15/2014    Leukopenia      Obesity (BMI 30-39.9) 1/15/2014    Primary hypothyroidism 2015    Snoring 2015    Vitamin D deficiency 1/15/2014       PAST SURGICAL HISTORY:     Past Surgical History:   Procedure Laterality Date    BREAST BIOPSY Left     CATARACT EXTRACTION       SECTION, CLASSIC      x3    COLONOSCOPY N/A 2021    Procedure: COLONOSCOPY;  Surgeon: Parvez Mazariegos MD;  Location: [facility];  Service: Endoscopy;  Laterality: N/A;  covid test  lapalco, instructions through [facility] -ml    HYSTERECTOMY      total    OOPHORECTOMY      thyroid surgey         ALLERGIES AND MEDICATION:     Review of patient's allergies indicates:   Allergen Reactions    Ace inhibitors Edema     Angioedema        Medication List            Accurate as of Lilian 15, 2023  2:48 PM. If you have any questions, ask your nurse or doctor.                CONTINUE taking these medications      ergocalciferol (vitamin D2) 50 mcg (2,000 unit) Tab     hydroCHLOROthiazide 12.5 MG Tab  Commonly known as: HYDRODIURIL  TAKE 1 TABLET(12.5 MG) BY MOUTH EVERY DAY     ketoconazole 2 % shampoo  Commonly known as: NIZORAL  Apply topically once a week.     levothyroxine 125 MCG tablet  Commonly known as: SYNTHROID  TAKE 1 TABLET(125 MCG) BY MOUTH BEFORE BREAKFAST     metoprolol succinate 25 MG 24 hr tablet  Commonly known as: TOPROL-XL  Take 1 tablet (25 mg total) by mouth once daily.     OZEMPIC 0.25 mg or 0.5 mg (2 mg/3 mL) pen injector  Generic drug: semaglutide  Inject 0.5 mg into the skin every 7 days.     verapamiL 360 MG C24p  Commonly known as: VERELAN  TAKE 1 CAPSULE(360 MG) BY MOUTH EVERY DAY              SOCIAL HISTORY:     Social History     Socioeconomic History    Marital status:    Occupational History     Comment: retired   Tobacco Use    Smoking status: Never    Smokeless tobacco: Never   Substance and Sexual Activity    Alcohol use: No    Drug use: No    Sexual activity: Not Currently     Partners: Male       FAMILY  "HISTORY:     Family History   Problem Relation Age of Onset    Heart disease Mother     Kidney disease Mother     Hypertension Mother     Cancer Father         throat cancer    No Known Problems Daughter     Hypertension Son     Hypertension Maternal Aunt     Diabetes Maternal Aunt     Thyroid disease Maternal Aunt     Breast cancer Maternal Aunt 70    Diabetes Maternal Grandmother        REVIEW OF SYSTEMS:   Review of Systems   Constitutional:  Negative for chills, diaphoresis, fever, malaise/fatigue and weight loss.   HENT:  Negative for congestion, hearing loss, sinus pain, sore throat and tinnitus.    Eyes:  Negative for blurred vision, double vision, photophobia and pain.   Respiratory:  Negative for cough, hemoptysis, sputum production, shortness of breath, wheezing and stridor.    Cardiovascular:  Positive for palpitations. Negative for chest pain, orthopnea, claudication, leg swelling and PND.   Gastrointestinal:  Negative for abdominal pain, blood in stool, heartburn, melena, nausea and vomiting.   Musculoskeletal:  Negative for back pain, falls, joint pain, myalgias and neck pain.   Neurological:  Negative for dizziness, tingling, tremors, sensory change, speech change, focal weakness, seizures, loss of consciousness, weakness and headaches.   Endo/Heme/Allergies:  Does not bruise/bleed easily.   Psychiatric/Behavioral:  Negative for depression, memory loss and substance abuse. The patient is not nervous/anxious.    All other systems reviewed and are negative.    PHYSICAL EXAM:     Vitals:    06/15/23 1431   BP: 128/62   Pulse: (!) 58   Resp: 15    Body mass index is 36.58 kg/m².  Weight: 102.8 kg (226 lb 10.1 oz)   Height: 5' 6" (167.6 cm)     Physical Exam  Constitutional:       General: She is not in acute distress.     Appearance: She is well-developed. She is obese. She is not diaphoretic.   HENT:      Head: Normocephalic.   Neck:      Vascular: No carotid bruit or JVD.   Cardiovascular:      Rate and " Rhythm: Normal rate and regular rhythm.      Pulses: Normal pulses.      Heart sounds: Murmur heard.   Crescendo systolic murmur is present with a grade of 2/6.   Pulmonary:      Effort: Pulmonary effort is normal.      Breath sounds: Normal breath sounds.   Abdominal:      General: Bowel sounds are normal.      Palpations: Abdomen is soft.      Tenderness: There is no abdominal tenderness.   Skin:     General: Skin is warm and dry.   Neurological:      Mental Status: She is alert and oriented to person, place, and time.   Psychiatric:         Speech: Speech normal.         Behavior: Behavior normal.         Thought Content: Thought content normal.       DATA:   EKG: (personally reviewed tracing)  05/19/2023-sinus bradycardia, low-voltage  09/19/2022-normal sinus rhythm  06/03/2022-sinus bradycardia  Laboratory:  CBC:  Recent Labs   Lab 12/07/20  1022 05/25/21  0815 06/07/22  1018   WBC 4.31 3.78 L 4.92   Hemoglobin 13.0 12.2 12.7   Hematocrit 41.2 38.3 40.2   Platelets 222 199 224       CHEMISTRIES:  Recent Labs   Lab 12/07/20  1022 05/25/21  0815 06/07/22  1018 03/14/23  1115   Glucose 90 93 82 80   Sodium 141 139 141 140   Potassium 3.6 4.1 4.2 3.6   BUN 18 18 17 10   Creatinine 0.8 0.9 0.7 0.7   eGFR if African American >60.0 >60.0 >60  --    eGFR if non African American >60.0 >60.0 >60  --    Calcium 8.9 9.1 8.8 9.6       CARDIAC BIOMARKERS:        COAGS:        LIPIDS/LFTS:  Recent Labs   Lab 12/07/20  1022 05/25/21  0815 06/07/22  1018 03/14/23  1115   Cholesterol 165 155 173  --    Triglycerides 53 84 74  --    HDL 49 43 47  --    LDL Cholesterol 105.4 95.2 111.2  --    Non-HDL Cholesterol 116 112 126  --    AST 15 14 17 15   ALT 15 15 28 16       Cardiovascular Testing:    Holter 05/24/2023:    NSR. Heart rates varied between 42 and 95 BPM with an average of 63 BPM.  There were very rare PVCs totalling 2  There were rare PACs totalling 542    Nuclear stress 08/29/2022:      Normal myocardial perfusion scan.  There is no evidence of myocardial ischemia or infarction.    The gated perfusion images showed an ejection fraction of 76% post stress.    There is normal wall motion post stress.    The EKG portion of this study is negative for ischemia.    The patient reported no chest pain during the stress test.    There were no arrhythmias during stress.    The exercise capacity was average.    Hypertensive response to exercise 257/84.    Echocardiogram 08/29/2022:    The left ventricle is normal in size with concentric hypertrophy and normal systolic function.  The estimated ejection fraction is 65%.  Mild left atrial enlargement.  Normal left ventricular diastolic function.  Normal right ventricular size with normal right ventricular systolic function.  Mild tricuspid regurgitation.  Normal central venous pressure (3 mmHg).  The estimated PA systolic pressure is 33 mmHg.  There is mild pulmonary hypertension.    NST:  6-18  Impression: PROBABLY NORMAL MYOCARDIAL PERFUSION  1. There is a mild to moderate mostly reversible anterior wall defect of uncertain significance.   2. The perfusion scan is free of evidence for myocardial ischemia.   3. Resting wall motion is physiologic.   4. Resting LV function is normal.   5. The ventricular volumes are normal at rest and stress.   6. The extracardiac distribution of radioactivity is normal.   7. When compared to the previous study from 05/19/2015, no significant change     Echo:  CONCLUSIONS     1 - Normal left ventricular systolic function (EF 55-60%).      Holter 06/28/2018:     TEST DESCRIPTION   PREDOMINANT RHYTHM   1. Sinus rhythm with heart rates varying between 42 and 90 bpm with an average of 64 bpm.     VENTRICULAR ARRHYTHMIAS   1. There was a single PVC recorded.     2. There were no episodes of ventricular tachycardia.     SUPRA VENTRICULAR ARRHYTHMIAS   1. There were very rare PACs recorded totalling 1 and averaging less than 1 per hour.     2. There were no episodes of  sustained supraventricular tachycardia.     SINUS NODE FUNCTION   1. There was no evidence of high grade SA merari block.     AV CONDUCTION   1. There was no evidence of high grade AV block.     DIARY   1. The diary was returned, but not completed     MISCELLANEOUS   1. There were occasional hookup related artifacts. Overall, the study was of good quality.     2. This was a tape of adequate length (24 hrs).     ASSESSMENT:     Palpitations  Dizziness  Hypertension  Hypothyroid  Obstructive sleep apnea  Pulmonary hypertension  Obesity    PLAN:     Palpitations:  Holter showed no significant arrhythmias.  She did not have symptoms.  Place event monitor.  Dizziness:  Holter showed average heart rate 63 beats per minute.  Hypertension: Continue current management.  Return to clinic 3 months.           Dante Castro MD, MPH, FACC, [facility]

## 2023-06-16 NOTE — TELEPHONE ENCOUNTER
----- Message from Erika Mcnally MD sent at 2/17/2017  2:04 PM CST -----  tsh 3 months      PROGRESS NOTE:     Patient is a 22y old  Male who presents with a chief complaint of urgent hemodialysis (15 Rogerio 2023 17:02)      ---------------------------------------------------  Zuhair Duncan  PGY-1, Internal Medicine  Available on [employer] Teams  Pager: [phone] ([facility]), or [phone] (NS)  ---------------------------------------------------    INTERVAL / OVERNIGHT EVENTS:  No acute events overnight.     SUBJECTIVE:  Patient examined at bedside with no acute complaints.       BRIEF DAILY PLAN:    Pain:  Bowel Movements:  Urination:  OOB:  PT:    REVIEW OF SYSTEMS:    CONSTITUTIONAL: No weakness, fevers or chills  EYES/ENT: No visual changes;  No vertigo or throat pain   NECK: No pain or stiffness  RESPIRATORY: No cough, wheezing, hemoptysis; No shortness of breath  CARDIOVASCULAR: No chest pain or palpitations  GASTROINTESTINAL: No abdominal or epigastric pain. No nausea, vomiting, or hematemesis; No diarrhea or constipation. No melena or hematochezia.  GENITOURINARY: No dysuria, frequency or hematuria  NEUROLOGICAL: No numbness or weakness  SKIN: No itching, rashes      MEDICATIONS  (STANDING):  allopurinol 100 milliGRAM(s) Oral daily  ARIPiprazole 10 milliGRAM(s) Oral daily  carvedilol 25 milliGRAM(s) Oral every 12 hours  chlorhexidine 2% Cloths 1 Application(s) Topical daily  cholecalciferol 2000 Unit(s) Oral daily  cloNIDine 0.3 milliGRAM(s) Oral every 8 hours  heparin   Injectable 5000 Unit(s) SubCutaneous every 8 hours  hydrALAZINE 100 milliGRAM(s) Oral every 8 hours  isosorbide   dinitrate Tablet (ISORDIL) 30 milliGRAM(s) Oral three times a day  NIFEdipine  milliGRAM(s) Oral daily  pantoprazole    Tablet 40 milliGRAM(s) Oral before breakfast  sevelamer carbonate 800 milliGRAM(s) Oral three times a day with meals    MEDICATIONS  (PRN):      CAPILLARY BLOOD GLUCOSE      POCT Blood Glucose.: 109 mg/dL (15 Rogerio 2023 17:22)    I&O's Summary    15 Rogerio 2023 07:01  -  16 Jun 2023 07:00  --------------------------------------------------------  IN: 500 mL / OUT: 3500 mL / NET: -3000 mL        VITALS:   T(C): 37.1 (06-16-23 @ 07:15), Max: 37.4 (06-15-23 @ 12:01)  HR: 104 (06-16-23 @ 07:15) (102 - 115)  BP: 185/104 (06-16-23 @ 07:15) (151/101 - 244/143)  RR: 19 (06-16-23 @ 07:15) (14 - 20)  SpO2: 99% (06-16-23 @ 07:15) (97% - 100%)    GENERAL: NAD, lying in bed comfortably  HEAD:  Atraumatic, normocephalic  EYES: EOMI, PERRLA, conjunctiva and sclera clear  ENT: Moist mucous membranes  NECK: Supple, no JVD  HEART: Regular rate and rhythm, no murmurs, rubs, or gallops  LUNGS: Unlabored respirations.  Clear to auscultation bilaterally, no crackles, wheezing, or rhonchi  ABDOMEN: Soft, nontender, nondistended, +BS  EXTREMITIES: 2+ peripheral pulses bilaterally. No clubbing, cyanosis, or edema  NERVOUS SYSTEM:  A&Ox3, no focal deficits   SKIN: No rashes or lesions    LABS:                        7.8    8.45  )-----------( 135      ( 15 Rogerio 2023 13:50 )             24.5     06-15    139  |  98  |  91<H>  ----------------------------<  103<H>  5.6<H>   |  21<L>  |  18.27<H>    Ca    9.8      15 Rogerio 2023 13:50  Mg     1.90     06-15    TPro  7.4  /  Alb  4.4  /  TBili  0.5  /  DBili  x   /  AST  30  /  ALT  17  /  AlkPhos  229<H>  06-15    PT/INR - ( 15 Rogerio 2023 13:50 )   PT: 11.6 sec;   INR: 1.00 ratio         PTT - ( 15 Rogerio 2023 13:50 )  PTT:20.2 sec            RADIOLOGY & ADDITIONAL TESTS:  Results Reviewed:   Imaging Personally Reviewed:  Electrocardiogram Personally Reviewed:    COORDINATION OF CARE:  Care Discussed with Consultants/Other Providers [Y/N]:  Prior or Outpatient Records Reviewed [Y/N]:     PROGRESS NOTE:     Patient is a 22y old  Male who presents with a chief complaint of urgent hemodialysis (15 Rogerio 2023 17:02)      ---------------------------------------------------  Zuhair Duncan  PGY-1, Internal Medicine  Available on [employer] Teams  Pager: [phone] ([facility]), or [phone] ([facility])  ---------------------------------------------------    INTERVAL / OVERNIGHT EVENTS:  - Underwent urgent HD for hypertensive urgency and volume overload  - Improved BP this AM    SUBJECTIVE:  - Patient examined at bedside with no acute complaints.     BRIEF DAILY PLAN:  - HD per nephro  - Resume home meds    MEDICATIONS  (STANDING):  allopurinol 100 milliGRAM(s) Oral daily  ARIPiprazole 10 milliGRAM(s) Oral daily  carvedilol 25 milliGRAM(s) Oral every 12 hours  chlorhexidine 2% Cloths 1 Application(s) Topical daily  cholecalciferol 2000 Unit(s) Oral daily  cloNIDine 0.3 milliGRAM(s) Oral every 8 hours  heparin   Injectable 5000 Unit(s) SubCutaneous every 8 hours  hydrALAZINE 100 milliGRAM(s) Oral every 8 hours  isosorbide   dinitrate Tablet (ISORDIL) 30 milliGRAM(s) Oral three times a day  NIFEdipine  milliGRAM(s) Oral daily  pantoprazole    Tablet 40 milliGRAM(s) Oral before breakfast  sevelamer carbonate 800 milliGRAM(s) Oral three times a day with meals    MEDICATIONS  (PRN):      CAPILLARY BLOOD GLUCOSE      POCT Blood Glucose.: 109 mg/dL (15 Rogerio 2023 17:22)    I&O's Summary    15 Rogerio 2023 07:01  -  16 Jun 2023 07:00  --------------------------------------------------------  IN: 500 mL / OUT: 3500 mL / NET: -3000 mL      VITALS:   T(C): 37.1 (06-16-23 @ 07:15), Max: 37.4 (06-15-23 @ 12:01)  HR: 104 (06-16-23 @ 07:15) (102 - 115)  BP: 185/104 (06-16-23 @ 07:15) (151/101 - 244/143)  RR: 19 (06-16-23 @ 07:15) (14 - 20)  SpO2: 99% (06-16-23 @ 07:15) (97% - 100%)      GENERAL: NAD, lying in bed comfortably  HEART: Regular rate and rhythm, no murmurs, rubs, or gallops  LUNGS: Unlabored respirations.  ABDOMEN: Soft, nontender, nondistended, +BS  EXTREMITIES: 1+ bilateral pitting edema;  2+ peripheral pulses bilaterally  NERVOUS SYSTEM:  A&Ox3, no focal deficits   SKIN: No rashes or lesions      LABS:                        7.8    8.45  )-----------( 135      ( 15 Rogerio 2023 13:50 )             24.5     06-15    139  |  98  |  91<H>  ----------------------------<  103<H>  5.6<H>   |  21<L>  |  18.27<H>    Ca    9.8      15 Rogerio 2023 13:50  Mg     1.90     06-15    TPro  7.4  /  Alb  4.4  /  TBili  0.5  /  DBili  x   /  AST  30  /  ALT  17  /  AlkPhos  229<H>  06-15    PT/INR - ( 15 Rogerio 2023 13:50 )   PT: 11.6 sec;   INR: 1.00 ratio         PTT - ( 15 Rogerio 2023 13:50 )  PTT:20.2 sec

## 2023-07-05 ENCOUNTER — OFFICE VISIT (OUTPATIENT)
Dept: OBSTETRICS AND GYNECOLOGY | Facility: CLINIC | Age: 68
End: 2023-07-05
Payer: MEDICARE

## 2023-07-05 VITALS — DIASTOLIC BLOOD PRESSURE: 78 MMHG | BODY MASS INDEX: 37.04 KG/M2 | WEIGHT: 229.5 LBS | SYSTOLIC BLOOD PRESSURE: 124 MMHG

## 2023-07-05 DIAGNOSIS — Z01.419 WELL WOMAN EXAM WITH ROUTINE GYNECOLOGICAL EXAM: Primary | ICD-10-CM

## 2023-07-05 DIAGNOSIS — Z12.31 BREAST CANCER SCREENING BY MAMMOGRAM: ICD-10-CM

## 2023-07-05 DIAGNOSIS — N95.2 VAGINAL ATROPHY: ICD-10-CM

## 2023-07-05 PROCEDURE — G0101 PR CA SCREEN;PELVIC/BREAST EXAM: ICD-10-PCS | Mod: S$PBB,,, | Performed by: OBSTETRICS & GYNECOLOGY

## 2023-07-05 PROCEDURE — 99999 PR PBB SHADOW E&M-EST. PATIENT-LVL III: CPT | Mod: PBBFAC,,, | Performed by: OBSTETRICS & GYNECOLOGY

## 2023-07-05 PROCEDURE — G0101 CA SCREEN;PELVIC/BREAST EXAM: HCPCS | Mod: S$PBB,,, | Performed by: OBSTETRICS & GYNECOLOGY

## 2023-07-05 PROCEDURE — 99999 PR PBB SHADOW E&M-EST. PATIENT-LVL III: ICD-10-PCS | Mod: PBBFAC,,, | Performed by: OBSTETRICS & GYNECOLOGY

## 2023-07-05 PROCEDURE — 99213 OFFICE O/P EST LOW 20 MIN: CPT | Mod: PBBFAC,25 | Performed by: OBSTETRICS & GYNECOLOGY

## 2023-07-05 PROCEDURE — G0101 CA SCREEN;PELVIC/BREAST EXAM: HCPCS | Mod: PBBFAC | Performed by: OBSTETRICS & GYNECOLOGY

## 2023-07-05 RX ORDER — ESTRADIOL 4 UG/1
INSERT VAGINAL
Qty: 60 EACH | Refills: 2 | Status: SHIPPED | OUTPATIENT
Start: 2023-07-05

## 2023-07-05 NOTE — PROGRESS NOTES
SUBJECTIVE:   Dain Phelps is a 67 y.o. female   for annual well woman exam. No LMP recorded. Patient has had a hysterectomy..      S/p hayden/bso in  for aub/fibroids, was on vaginal estrace but reported not helping with vaginal atrophy but the cream caused irritation  She read an article that stated HRT can help with skin collagen and wanted to see if she needs to started on systemic treatment.  Denies vasomotor symptoms.  + vaginal dryness and pain with sex    Past Medical History:   Diagnosis Date    Chronic low back pain     Fatigue     GERD (gastroesophageal reflux disease)     History of colon polyps 2015    Hypertension     Hypothyroidism     Impaired glucose tolerance 1/15/2014    Leukopenia     Obesity (BMI 30-39.9) 1/15/2014    Primary hypothyroidism 2015    Snoring 2015    Vitamin D deficiency 1/15/2014     Past Surgical History:   Procedure Laterality Date    BREAST BIOPSY Left     CATARACT EXTRACTION       SECTION, CLASSIC      x3    COLONOSCOPY N/A 2021    Procedure: COLONOSCOPY;  Surgeon: Parvez Mazariegos MD;  Location: [facility];  Service: Endoscopy;  Laterality: N/A;  covid test  lapalco, instructions through [facility] -ml    HYSTERECTOMY      total    OOPHORECTOMY      thyroid surgey       Social History     Socioeconomic History    Marital status:    Occupational History     Comment: retired   Tobacco Use    Smoking status: Never    Smokeless tobacco: Never   Substance and Sexual Activity    Alcohol use: No    Drug use: No    Sexual activity: Not Currently     Partners: Male     Family History   Problem Relation Age of Onset    Heart disease Mother     Kidney disease Mother     Hypertension Mother     Cancer Father         throat cancer    No Known Problems Daughter     Hypertension Son     Hypertension Maternal Aunt     Diabetes Maternal Aunt     Thyroid disease Maternal Aunt     Breast cancer Maternal Aunt 70    Diabetes Maternal  Grandmother      OB History    Para Term  AB Living   6 3 3     3   SAB IAB Ectopic Multiple Live Births           3      # Outcome Date GA Lbr Silvestre/2nd Weight Sex Delivery Anes PTL Lv   6 Term            5 Term            4 Term            3      M CS-Classical  N PING   2      F CS-Classical  N PING   1      M CS-Classical  N PING      Obstetric Comments   S/p GENOVEVA/BSO in  for AUB/Fibroids   Denies abnl pap   MMG  neg   cscope , repeat in 5 years   dexa 2019 NEG         Current Outpatient Medications   Medication Sig Dispense Refill    ergocalciferol, vitamin D2, 2,000 unit Tab Take 2,000 Units by mouth 2 (two) times daily.      hydroCHLOROthiazide (HYDRODIURIL) 12.5 MG Tab TAKE 1 TABLET(12.5 MG) BY MOUTH EVERY DAY 90 tablet 1    ketoconazole (NIZORAL) 2 % shampoo Apply topically once a week. 120 mL 0    levothyroxine (SYNTHROID) 125 MCG tablet TAKE 1 TABLET(125 MCG) BY MOUTH BEFORE BREAKFAST 90 tablet 1    metoprolol succinate (TOPROL-XL) 25 MG 24 hr tablet Take 1 tablet (25 mg total) by mouth once daily. 90 tablet 1    semaglutide (OZEMPIC) 0.25 mg or 0.5 mg (2 mg/3 mL) pen injector Inject 0.5 mg into the skin every 7 days. 3 mL 2    verapamiL (VERELAN) 360 MG C24P TAKE 1 CAPSULE(360 MG) BY MOUTH EVERY DAY 90 capsule 3     No current facility-administered medications for this visit.     Allergies: Ace inhibitors       ROS:  GENERAL: Denies weight gain or weight loss. Feeling well overall.   SKIN: Denies rash or lesions.   HEAD: Denies head injury or headache.   NODES: Denies enlarged lymph nodes.   CHEST: Denies chest pain or shortness of breath.   CARDIOVASCULAR: Denies palpitations or left sided chest pain.   ABDOMEN: No abdominal pain, constipation, diarrhea, nausea, vomiting or rectal bleeding.   URINARY: No frequency, dysuria, hematuria, or burning on urination.  REPRODUCTIVE: Denies vaginal discharge, abnormal vaginal bleeding, lesions, pelvic pain  BREASTS: The  patient performs breast self-examination and denies pain, lumps, or nipple discharge.   HEMATOLOGIC: No easy bruisability or excessive bleeding.  MUSCULOSKELETAL: Denies joint pain or swelling.   NEUROLOGIC: Denies syncope or weakness.   PSYCHIATRIC: Denies depression, anxiety or mood swings.      OBJECTIVE:   /78   Wt 104.1 kg (229 lb 8 oz)   BMI 37.04 kg/m²   The patient appears well, alert, oriented x 3, in no distress.  PSYCH:  Normal, full range of affect  NECK: negative, no thyromegaly, trachea midline  SKIN: normal, good color, good turgor and no acne, striae, hirsutism  BREAST EXAM: breasts appear normal, no suspicious masses, no skin or nipple changes or axillary nodes  ABDOMEN: soft, non-tender; bowel sounds normal; no masses,  no organomegaly and no hernias, masses, or hepatosplenomegaly  GENITALIA: normal external genitalia, no erythema, no discharge  BLADDER: soft  URETHRA: normal appearing urethra with no masses, tenderness or lesions and normal urethra, normal urethral meatus  VAGINA: mucosal atrophy  CERVIX: absent  UTERUS: uterus absent  ADNEXA: no mass, fullness, tenderness      ASSESSMENT:   .Diagnoses and all orders for this visit:    Well woman exam with routine gynecological exam    Breast cancer screening by mammogram        1. Health maintenance  -pap not indicated  -screening MMG ordered  -counseled on exercise and healthy diet, weight loss  -bone health:  Discussed Vitamin D and Calcium supplementation, weight bearing exercises  2.  Discussed safety at home/school/work, healthy and balanced diet, sleep hygiene, as well as violence/weapons exposure.   3.  Discussed indication for HRT currently are for vasomotor symptoms of vaginal atrophy  Will try Imvexxy.  Rx sent.  Pt to message me in 3 months regarding symptoms

## 2023-07-24 ENCOUNTER — OFFICE VISIT (OUTPATIENT)
Dept: FAMILY MEDICINE | Facility: CLINIC | Age: 68
End: 2023-07-24
Payer: MEDICARE

## 2023-07-24 ENCOUNTER — PES CALL (OUTPATIENT)
Dept: ADMINISTRATIVE | Facility: CLINIC | Age: 68
End: 2023-07-24
Payer: COMMERCIAL

## 2023-07-24 ENCOUNTER — PATIENT OUTREACH (OUTPATIENT)
Dept: ADMINISTRATIVE | Facility: OTHER | Age: 68
End: 2023-07-24
Payer: COMMERCIAL

## 2023-07-24 VITALS
BODY MASS INDEX: 36.88 KG/M2 | DIASTOLIC BLOOD PRESSURE: 80 MMHG | RESPIRATION RATE: 18 BRPM | SYSTOLIC BLOOD PRESSURE: 132 MMHG | WEIGHT: 229.5 LBS | OXYGEN SATURATION: 98 % | TEMPERATURE: 98 F | HEIGHT: 66 IN | HEART RATE: 66 BPM

## 2023-07-24 DIAGNOSIS — G47.33 OSA ON CPAP: ICD-10-CM

## 2023-07-24 DIAGNOSIS — Z12.31 ENCOUNTER FOR SCREENING MAMMOGRAM FOR BREAST CANCER: ICD-10-CM

## 2023-07-24 DIAGNOSIS — E89.0 POSTOPERATIVE HYPOTHYROIDISM: ICD-10-CM

## 2023-07-24 DIAGNOSIS — E66.9 OBESITY (BMI 35.0-39.9 WITHOUT COMORBIDITY): ICD-10-CM

## 2023-07-24 DIAGNOSIS — I10 PRIMARY HYPERTENSION: ICD-10-CM

## 2023-07-24 DIAGNOSIS — M79.89 SYMPTOM OF LEG SWELLING: Primary | ICD-10-CM

## 2023-07-24 PROCEDURE — 99999 PR PBB SHADOW E&M-EST. PATIENT-LVL IV: ICD-10-PCS | Mod: PBBFAC,,, | Performed by: INTERNAL MEDICINE

## 2023-07-24 PROCEDURE — 99214 OFFICE O/P EST MOD 30 MIN: CPT | Mod: PBBFAC,PO | Performed by: INTERNAL MEDICINE

## 2023-07-24 PROCEDURE — 99999 PR PBB SHADOW E&M-EST. PATIENT-LVL IV: CPT | Mod: PBBFAC,,, | Performed by: INTERNAL MEDICINE

## 2023-07-24 PROCEDURE — 99214 PR OFFICE/OUTPT VISIT, EST, LEVL IV, 30-39 MIN: ICD-10-PCS | Mod: S$PBB,,, | Performed by: INTERNAL MEDICINE

## 2023-07-24 PROCEDURE — 99214 OFFICE O/P EST MOD 30 MIN: CPT | Mod: S$PBB,,, | Performed by: INTERNAL MEDICINE

## 2023-07-24 NOTE — PROGRESS NOTES
Subjective:       Patient ID: Dain Phelps is a pleasant 67 y.o. Black or  female patient    Chief Complaint: Edema (ankles)      Patient is a new pt to me but is pt from Dr. Valadez.    HPI     Patient with past medical history as per list of problems below coming today due to issue of swollen ankles.  Patient last seen by Dr. Valadez on 05th May 2023.  She was seen last by Dr. Castro, Cardiology, on 06/15/2023 for palpitations.   She f-up in Bariatric Medicine and is on semaglutide.  She traveled to go to a [employer] out of [facility] last week, they had to walk a lot during this assembly. She wore high heels on Saturday night.  They flew back and arrived at 2 AM today, they were delayed a lot yesterday evening.    She started to notice swelling of bilateral ankles there.   She ate more salty foods during this meeting.  It is better today, however she is concerned as she has bruising in regard of bilateral posterior calves.  She denies any shortness of breath or chest pain with breathing.  It is not the 1st time she has swelling of the LE, usually when hot and has to walk a lot.  She feels better when she can elevate her legs.      Patient Active Problem List   Diagnosis    Postoperative hypothyroidism    HTN (hypertension)    GERD (gastroesophageal reflux disease)    Proteinuria    Vitamin D deficiency    LORRAINE on CPAP    Vaginal atrophy    Severe obesity (BMI 35.0-39.9) with comorbidity    Left shoulder pain    Decreased strength, endurance, and mobility    Antalgic gait    Pulmonary hypertension          ACTIVE MEDICAL ISSUES:  Documented in Problem List     PAST MEDICAL HISTORY  Documented     PAST SURGICAL HISTORY:  Documented     SOCIAL HISTORY:  Documented     FAMILY HISTORY:  Documented     ALLERGIES AND MEDICATIONS: updated and reviewed.  Documented    Review of Systems   Constitutional:  Negative for chills, diaphoresis, fatigue, fever and unexpected weight change.   HENT:  Negative for  "rhinorrhea, sinus pressure, sore throat and tinnitus.    Eyes:  Negative for photophobia and visual disturbance.   Respiratory:  Negative for cough, shortness of breath and wheezing.    Cardiovascular:  Positive for leg swelling (bilateral ankles). Negative for chest pain and palpitations.   Gastrointestinal:  Negative for abdominal pain, blood in stool, constipation, diarrhea, nausea and vomiting.   Genitourinary:  Negative for dysuria, flank pain, frequency and vaginal discharge.   Musculoskeletal:  Negative for arthralgias and joint swelling.   Skin:  Negative for rash.        Bruising posterior aspect of bilateral calves   Neurological:  Negative for speech difficulty, weakness, light-headedness and headaches.   Psychiatric/Behavioral:  Negative for behavioral problems and dysphoric mood.      Objective:      Physical Exam  Vitals and nursing note reviewed.   Constitutional:       Appearance: Normal appearance. She is well-developed. She is obese.   Cardiovascular:      Rate and Rhythm: Normal rate and regular rhythm.      Pulses: Normal pulses.      Heart sounds: Normal heart sounds.   Pulmonary:      Effort: Pulmonary effort is normal.      Breath sounds: Normal breath sounds.   Musculoskeletal:      Right lower leg: Edema (very mild ankle swelling, 1 + pitting, symetrical) present.      Left lower leg: Edema present.   Skin:     General: Skin is warm and dry.      Findings: No erythema or rash.          Neurological:      Mental Status: She is alert. Mental status is at baseline.   Psychiatric:         Behavior: Behavior normal.       Vitals:    07/24/23 1258   BP: 132/80   BP Location: Left arm   Patient Position: Sitting   BP Method: Small (Manual)   Pulse: 66   Resp: 18   Temp: 98.1 °F (36.7 °C)   TempSrc: Oral   SpO2: 98%   Weight: 104.1 kg (229 lb 8 oz)   Height: 5' 6" (1.676 m)     Body mass index is 37.04 kg/m².    RESULTS: Reviewed labs from last 12 months    Last Lab Results:     Lab Results "   Component Value Date    WBC 4.92 06/07/2022    HGB 12.7 06/07/2022    HCT 40.2 06/07/2022     06/07/2022     03/14/2023    K 3.6 03/14/2023     03/14/2023    CO2 24 03/14/2023    BUN 10 03/14/2023    CREATININE 0.7 03/14/2023    CALCIUM 9.6 03/14/2023    ALBUMIN 3.6 03/14/2023    AST 15 03/14/2023    ALT 16 03/14/2023    CHOL 173 06/07/2022    TRIG 74 06/07/2022    HDL 47 06/07/2022    LDLCALC 111.2 06/07/2022    HGBA1C 5.5 12/17/2021    TSH 0.705 03/14/2023       Assessment:       1. Symptom of leg swelling    2. Primary hypertension    3. Obesity (BMI 35.0-39.9 without comorbidity)    4. Postoperative hypothyroidism    5. LORRAINE on CPAP    6. Encounter for screening mammogram for breast cancer        Plan:   Dain was seen today for edema.    Diagnoses and all orders for this visit:    Symptom of leg swelling  -     US Lower Extremity Veins Bilateral; Future    See HPI. Seems to be venous insufficiency, better with elevation. Advised pt to wear contention stockings when traveling or walking long distances. Advised re:: salt intake and the need to elevate her LE on a regular basis.  Pt is concerned to have DVT, will do US to be on the safe side. She has bruises on posterior aspect of bilateral calves, that seem to be related possibly to sitting in uncomfortable position in the place. Will contact me if any concern.    Primary hypertension    BP at goal, same treatment.     Obesity (BMI 35.0-39.9 without comorbidity)    On semaglutide.    Postoperative hypothyroidism    Most recent TSH fine.    LORRAINE on CPAP    Encounter for screening mammogram for breast cancer  -     Mammo Digital Screening Bilat; Future      No follow-ups on file.    This note was created by combination of typed  and M-Modal dictation.  Transcription errors may be present.  If there are any questions, please contact me.

## 2023-07-24 NOTE — PROGRESS NOTES
Health Maintenance Due   Topic     Shingles Vaccine (1 of 2) Pt decline    COVID-19 Vaccine (5 - Moderna series) Not offered at this office    Mammogram  Pending order

## 2023-07-24 NOTE — PROGRESS NOTES
CHW - Initial Contact    This Community Health Worker completed the Social Determinant of Health questionnaire with patient during clinic visit today.    Pt identified barriers of most importance are increasing physical activity. Pt states that she stretches for a few minutes a day but wishes to be more active.  Follow up required: Y  Follow-up Outreach - Due: 8/7/2023

## 2023-07-26 ENCOUNTER — PATIENT MESSAGE (OUTPATIENT)
Dept: FAMILY MEDICINE | Facility: CLINIC | Age: 68
End: 2023-07-26
Payer: COMMERCIAL

## 2023-07-26 ENCOUNTER — HOSPITAL ENCOUNTER (OUTPATIENT)
Dept: RADIOLOGY | Facility: HOSPITAL | Age: 68
Discharge: HOME | End: 2023-07-26
Attending: INTERNAL MEDICINE
Payer: MEDICARE

## 2023-07-26 DIAGNOSIS — I10 ESSENTIAL HYPERTENSION: ICD-10-CM

## 2023-07-26 DIAGNOSIS — M79.89 SYMPTOM OF LEG SWELLING: ICD-10-CM

## 2023-07-26 PROCEDURE — 93970 EXTREMITY STUDY: CPT | Mod: 26,,, | Performed by: RADIOLOGY

## 2023-07-26 PROCEDURE — 93970 EXTREMITY STUDY: CPT | Mod: TC

## 2023-07-26 PROCEDURE — 93970 US LOWER EXTREMITY VEINS BILATERAL: ICD-10-PCS | Mod: 26,,, | Performed by: RADIOLOGY

## 2023-07-26 RX ORDER — METOPROLOL SUCCINATE 25 MG/1
25 TABLET, EXTENDED RELEASE ORAL DAILY
Qty: 90 TABLET | Refills: 1 | Status: SHIPPED | OUTPATIENT
Start: 2023-07-26 | End: 2023-11-30

## 2023-07-26 NOTE — TELEPHONE ENCOUNTER
No care due was identified.  [facility] Embedded Care Due Messages. Reference number: [id number].   7/26/2023 5:51:01 AM CDT

## 2023-07-27 ENCOUNTER — HOSPITAL ENCOUNTER (OUTPATIENT)
Dept: RADIOLOGY | Facility: HOSPITAL | Age: 68
Discharge: HOME | End: 2023-07-27
Attending: OBSTETRICS & GYNECOLOGY
Payer: MEDICARE

## 2023-07-27 DIAGNOSIS — Z12.31 BREAST CANCER SCREENING BY MAMMOGRAM: ICD-10-CM

## 2023-07-27 PROCEDURE — 77067 SCR MAMMO BI INCL CAD: CPT | Mod: TC,PO

## 2023-07-27 PROCEDURE — 77067 SCR MAMMO BI INCL CAD: CPT | Mod: 26,,, | Performed by: RADIOLOGY

## 2023-07-27 PROCEDURE — 77063 BREAST TOMOSYNTHESIS BI: CPT | Mod: 26,,, | Performed by: RADIOLOGY

## 2023-07-27 PROCEDURE — 77063 MAMMO DIGITAL SCREENING BILAT WITH TOMO: ICD-10-PCS | Mod: 26,,, | Performed by: RADIOLOGY

## 2023-07-27 PROCEDURE — 77067 MAMMO DIGITAL SCREENING BILAT WITH TOMO: ICD-10-PCS | Mod: 26,,, | Performed by: RADIOLOGY

## 2023-07-27 NOTE — TELEPHONE ENCOUNTER
Called patient to inquire about her symptoms -she is denying any any other abnormal bleeding, intermittent gum bleeding every once in a while, denying calf pain at this time.  Informed her that she needs to be monitoring her bruising and to report to ER if the bruising spreads significantly. She verbalized understanding.  Assisted with setting appt w/PCP.

## 2023-08-09 ENCOUNTER — OFFICE VISIT (OUTPATIENT)
Dept: FAMILY MEDICINE | Facility: CLINIC | Age: 68
End: 2023-08-09
Payer: MEDICARE

## 2023-08-09 VITALS
DIASTOLIC BLOOD PRESSURE: 88 MMHG | WEIGHT: 229.94 LBS | BODY MASS INDEX: 36.95 KG/M2 | HEIGHT: 66 IN | RESPIRATION RATE: 16 BRPM | SYSTOLIC BLOOD PRESSURE: 126 MMHG | OXYGEN SATURATION: 96 % | TEMPERATURE: 98 F | HEART RATE: 58 BPM

## 2023-08-09 DIAGNOSIS — T14.8XXA BRUISING: ICD-10-CM

## 2023-08-09 DIAGNOSIS — I10 PRIMARY HYPERTENSION: Primary | ICD-10-CM

## 2023-08-09 DIAGNOSIS — G47.33 OSA ON CPAP: ICD-10-CM

## 2023-08-09 PROCEDURE — 99214 PR OFFICE/OUTPT VISIT, EST, LEVL IV, 30-39 MIN: ICD-10-PCS | Mod: S$PBB,,, | Performed by: FAMILY MEDICINE

## 2023-08-09 PROCEDURE — 99999 PR PBB SHADOW E&M-EST. PATIENT-LVL IV: CPT | Mod: PBBFAC,,, | Performed by: FAMILY MEDICINE

## 2023-08-09 PROCEDURE — 99214 OFFICE O/P EST MOD 30 MIN: CPT | Mod: PBBFAC,PN | Performed by: FAMILY MEDICINE

## 2023-08-09 PROCEDURE — 99999 PR PBB SHADOW E&M-EST. PATIENT-LVL IV: ICD-10-PCS | Mod: PBBFAC,,, | Performed by: FAMILY MEDICINE

## 2023-08-09 PROCEDURE — 99214 OFFICE O/P EST MOD 30 MIN: CPT | Mod: S$PBB,,, | Performed by: FAMILY MEDICINE

## 2023-08-09 RX ORDER — HYDROCHLOROTHIAZIDE 25 MG/1
25 TABLET ORAL DAILY
Qty: 30 TABLET | Refills: 11 | Status: SHIPPED | OUTPATIENT
Start: 2023-08-09 | End: 2024-08-08

## 2023-08-09 NOTE — PROGRESS NOTES
Chief Complaint   Patient presents with    Hypertension       HPI  Dain Phelps is a 67 y.o. female with multiple medical diagnoses as listed in the medical history and problem list that presents for follow-up for HTN    HTN- she has concerns that she is having trouble lowering her BP, on digital medicine, she has been exercising and has reduced salt in her diet but is having trouble lowering her blood pressure  Bruising- evaluated previously with negative US, first occurred when she was traveling and walking approx 6-9 miles daily      ALLERGIES AND MEDICATIONS: updated and reviewed.  Review of patient's allergies indicates:   Allergen Reactions    Ace inhibitors Edema     Angioedema     Medication List with Changes/Refills   New Medications    HYDROCHLOROTHIAZIDE (HYDRODIURIL) 25 MG TABLET    Take 1 tablet (25 mg total) by mouth once daily.   Current Medications    ERGOCALCIFEROL, VITAMIN D2, 2,000 UNIT TAB    Take 2,000 Units by mouth 2 (two) times daily.    ESTRADIOL (IMVEXXY MAINTENANCE PACK) 4 MCG INST    Insert 4mcg tablet once nightly x 2 weeks, then 4mcg tablet twice a week    LEVOTHYROXINE (SYNTHROID) 125 MCG TABLET    TAKE 1 TABLET(125 MCG) BY MOUTH BEFORE BREAKFAST    METOPROLOL SUCCINATE (TOPROL-XL) 25 MG 24 HR TABLET    Take 1 tablet (25 mg total) by mouth once daily.    SEMAGLUTIDE (OZEMPIC) 0.25 MG OR 0.5 MG (2 MG/3 ML) PEN INJECTOR    Inject 0.5 mg into the skin every 7 days.    VERAPAMIL (VERELAN) 360 MG C24P    TAKE 1 CAPSULE(360 MG) BY MOUTH EVERY DAY   Discontinued Medications    HYDROCHLOROTHIAZIDE (HYDRODIURIL) 12.5 MG TAB    TAKE 1 TABLET(12.5 MG) BY MOUTH EVERY DAY       ROS  Review of Systems   Constitutional:  Negative for chills, diaphoresis, fatigue, fever and unexpected weight change.   HENT:  Negative for rhinorrhea, sinus pressure, sore throat and tinnitus.    Eyes:  Negative for photophobia and visual disturbance.   Respiratory:  Negative for cough, shortness of breath and  "wheezing.    Cardiovascular:  Negative for chest pain and palpitations.   Gastrointestinal:  Negative for abdominal pain, blood in stool, constipation, diarrhea, nausea and vomiting.   Genitourinary:  Negative for dysuria, flank pain, frequency and vaginal discharge.   Musculoskeletal:  Negative for arthralgias and joint swelling.   Skin:  Negative for rash.   Neurological:  Negative for speech difficulty, weakness, light-headedness and headaches.   Psychiatric/Behavioral:  Negative for behavioral problems and dysphoric mood.        Physical Exam  Vitals:    08/09/23 1104   BP: 126/88   BP Location: Left arm   Patient Position: Sitting   BP Method: X-Large (Manual)   Pulse: (!) 58   Resp: 16   Temp: 98 °F (36.7 °C)   TempSrc: Oral   SpO2: 96%   Weight: 104.3 kg (229 lb 15 oz)   Height: 5' 6" (1.676 m)    Body mass index is 37.11 kg/m².  Weight: 104.3 kg (229 lb 15 oz)   Height: 5' 6" (167.6 cm)     Physical Exam  Vitals and nursing note reviewed.   Constitutional:       Appearance: She is well-developed.   Skin:     General: Skin is warm and dry.      Findings: No erythema or rash.   Neurological:      Mental Status: She is alert. Mental status is at baseline.   Psychiatric:         Behavior: Behavior normal.         Health Maintenance         Date Due Completion Date    Shingles Vaccine (1 of 2) Never done ---    COVID-19 Vaccine (5 - Moderna series) 10/14/2022 8/19/2022    Influenza Vaccine (1) 09/01/2023 10/24/2022    Mammogram 07/27/2024 7/27/2023    Hemoglobin A1c (Diabetic Prevention Screening) 12/17/2024 12/17/2021    DEXA Scan 06/30/2026 6/30/2022    Lipid Panel 06/07/2027 6/7/2022    Colorectal Cancer Screening 02/26/2028 2/26/2021    TETANUS VACCINE 12/11/2029 12/11/2019    Override on 4/7/2017: Declined            Health maintenance reviewed and addressed as ordered      ASSESSMENT/PLAN       1. Primary hypertension  Increase hctz to 25mg  - hydroCHLOROthiazide (HYDRODIURIL) 25 MG tablet; Take 1 tablet " (25 mg total) by mouth once daily.  Dispense: 30 tablet; Refill: 11    2. LORRAINE on CPAP  She is compliant with CPAP    3. Bruising  Unknown etiology, monitor for recurrence        Yary Valadez MD  08/09/2023 2:04 PM        Follow up in about 6 months (around 2/9/2024) for management of chronic conditions.    No orders of the defined types were placed in this encounter.

## 2023-08-28 ENCOUNTER — PATIENT OUTREACH (OUTPATIENT)
Dept: ADMINISTRATIVE | Facility: OTHER | Age: 68
End: 2023-08-28
Payer: COMMERCIAL

## 2023-08-28 NOTE — PROGRESS NOTES
CHW - Case Closure    This Community Health Worker spoke to patient via telephone today.   Pt denied any additional needs at this time and agrees with episode closure at this time.  Provided patient with Community Health Worker's contact information and encouraged him/her to contact this Community Health Worker if additional needs arise.

## 2023-09-12 ENCOUNTER — OFFICE VISIT (OUTPATIENT)
Dept: BARIATRICS | Facility: CLINIC | Age: 68
End: 2023-09-12
Payer: MEDICARE

## 2023-09-12 VITALS
SYSTOLIC BLOOD PRESSURE: 133 MMHG | BODY MASS INDEX: 37.51 KG/M2 | DIASTOLIC BLOOD PRESSURE: 71 MMHG | OXYGEN SATURATION: 95 % | WEIGHT: 232.38 LBS | HEART RATE: 54 BPM

## 2023-09-12 DIAGNOSIS — Z71.3 ENCOUNTER FOR WEIGHT LOSS COUNSELING: ICD-10-CM

## 2023-09-12 DIAGNOSIS — E66.01 CLASS 2 SEVERE OBESITY DUE TO EXCESS CALORIES WITH SERIOUS COMORBIDITY AND BODY MASS INDEX (BMI) OF 37.0 TO 37.9 IN ADULT: Primary | ICD-10-CM

## 2023-09-12 DIAGNOSIS — I10 PRIMARY HYPERTENSION: ICD-10-CM

## 2023-09-12 DIAGNOSIS — G47.33 OSA ON CPAP: ICD-10-CM

## 2023-09-12 PROCEDURE — 99213 OFFICE O/P EST LOW 20 MIN: CPT | Mod: S$PBB,,, | Performed by: STUDENT IN AN ORGANIZED HEALTH CARE EDUCATION/TRAINING PROGRAM

## 2023-09-12 PROCEDURE — 99213 OFFICE O/P EST LOW 20 MIN: CPT | Mod: PBBFAC | Performed by: STUDENT IN AN ORGANIZED HEALTH CARE EDUCATION/TRAINING PROGRAM

## 2023-09-12 PROCEDURE — 99999 PR PBB SHADOW E&M-EST. PATIENT-LVL III: ICD-10-PCS | Mod: PBBFAC,,, | Performed by: STUDENT IN AN ORGANIZED HEALTH CARE EDUCATION/TRAINING PROGRAM

## 2023-09-12 PROCEDURE — 99999 PR PBB SHADOW E&M-EST. PATIENT-LVL III: CPT | Mod: PBBFAC,,, | Performed by: STUDENT IN AN ORGANIZED HEALTH CARE EDUCATION/TRAINING PROGRAM

## 2023-09-12 PROCEDURE — 99213 PR OFFICE/OUTPT VISIT, EST, LEVL III, 20-29 MIN: ICD-10-PCS | Mod: S$PBB,,, | Performed by: STUDENT IN AN ORGANIZED HEALTH CARE EDUCATION/TRAINING PROGRAM

## 2023-09-12 RX ORDER — SEMAGLUTIDE 0.68 MG/ML
0.5 INJECTION, SOLUTION SUBCUTANEOUS
Qty: 3 ML | Refills: 0 | Status: SHIPPED | OUTPATIENT
Start: 2023-09-12 | End: 2023-11-28

## 2023-09-12 RX ORDER — SEMAGLUTIDE 1.34 MG/ML
1 INJECTION, SOLUTION SUBCUTANEOUS
Qty: 3 ML | Refills: 2 | Status: SHIPPED | OUTPATIENT
Start: 2023-10-10 | End: 2023-12-12 | Stop reason: SDUPTHER

## 2023-09-12 NOTE — PROGRESS NOTES
Subjective:       Patient ID: Dain Phelps is a 67 y.o. female.    Chief Complaint: Follow-up, Obesity, and Weight Check    Patient presents for treatment of obesity.     Co-morbidities  HTN  LORRAINE  Hypothroidism   GERD    Negative for thyroid cancer  Negative for kidney stones  Negative for glaucoma    Current Physical Activity  Currently in water aerobics 3x/week    Current Eating Habits  Breakfast - coffee with cream and sugar  Lunch - salad, may have full meal (meat, rice or pasta, vegetable)  Dinner - full meal  Snacks - night time snacker  Beverages - water, soft drinks    Medical Weight Loss  3/23/2023: 235.2 lbs, BMI 38, BFP 51.1%, .1 lbs, SMM 63.1 lbs, BMR 1498 kcal  6/12/2023: 226.4 lbs, BMI 36.6, BFP 51%, .5 lbs, SMM 60.8 lbs, BMR 1456 kcal  9/12/2023: 232.3 lbs, BMI 37.5, BFP 52%,  lbs, SMM 61.5 lbs, BMR 1462 kcal    Has not been taking Ozempic in about a month because didn't feel like Ozempic was controlling cravings.  Has switched to brown rice, whole wheat pasta and bread  Drinking 2 bottles of water  Has started drinking soft drinks the past couple weeks          Review of Systems   Constitutional:  Negative for chills and fever.   Respiratory:  Negative for shortness of breath.    Cardiovascular:  Negative for chest pain and palpitations.   Gastrointestinal:  Negative for abdominal pain, nausea and vomiting.   Neurological:  Negative for dizziness and light-headedness.   Psychiatric/Behavioral:  The patient is not nervous/anxious.          Objective:           Latest Reference Range & Units 03/14/23 11:15   Sodium 136 - 145 mmol/L 140   Potassium 3.5 - 5.1 mmol/L 3.6   Chloride 95 - 110 mmol/L 105   CO2 23 - 29 mmol/L 24   Anion Gap 8 - 16 mmol/L 11   BUN 8 - 23 mg/dL 10   Creatinine 0.5 - 1.4 mg/dL 0.7   eGFR >60 mL/min/1.73 m^2 >60.0   Glucose 70 - 110 mg/dL 80   Calcium 8.7 - 10.5 mg/dL 9.6   Alkaline Phosphatase 55 - 135 U/L 108   PROTEIN TOTAL 6.0 - 8.4 g/dL 7.8   Albumin  3.5 - 5.2 g/dL 3.6   BILIRUBIN TOTAL 0.1 - 1.0 mg/dL 0.5   AST 10 - 40 U/L 15   ALT 10 - 44 U/L 16   Insulin <25.0 uU/mL 21.6   Insulin Collection Interval  blood   TSH 0.400 - 4.000 uIU/mL 0.705   Estrogen pg/mL 143   FSH See Text mIU/mL 84.68   LH See Text mIU/mL 50.7   Progesterone See Text ng/mL 0.1       Vitals:    09/12/23 1101   BP: 133/71   Pulse: (!) 54       Physical Exam  Vitals reviewed.   Constitutional:       General: She is not in acute distress.     Appearance: Normal appearance. She is obese. She is not ill-appearing, toxic-appearing or diaphoretic.   HENT:      Head: Normocephalic and atraumatic.   Cardiovascular:      Rate and Rhythm: Normal rate.   Pulmonary:      Effort: Pulmonary effort is normal. No respiratory distress.   Skin:     General: Skin is warm and dry.   Neurological:      Mental Status: She is alert and oriented to person, place, and time.         Assessment:       Problem List Items Addressed This Visit       HTN (hypertension)    Relevant Medications    semaglutide (OZEMPIC) 0.25 mg or 0.5 mg (2 mg/3 mL) pen injector    semaglutide (OZEMPIC) 1 mg/dose (4 mg/3 mL) (Start on 10/10/2023)    LORRAINE on CPAP    Relevant Medications    semaglutide (OZEMPIC) 0.25 mg or 0.5 mg (2 mg/3 mL) pen injector    semaglutide (OZEMPIC) 1 mg/dose (4 mg/3 mL) (Start on 10/10/2023)     Other Visit Diagnoses       Class 2 severe obesity due to excess calories with serious comorbidity and body mass index (BMI) of 37.0 to 37.9 in adult    -  Primary    Relevant Medications    semaglutide (OZEMPIC) 0.25 mg or 0.5 mg (2 mg/3 mL) pen injector    semaglutide (OZEMPIC) 1 mg/dose (4 mg/3 mL) (Start on 10/10/2023)    Encounter for weight loss counseling                    Plan:   - Ozempic 0.5 mg weekly x 4 weeks, then 1 mg weekly.     - Log all food and beverage intake with a daily calorie goal of 1200 calories per day    - Low intensity aerobic exercise for 15-30 minutes 3-5x/week

## 2023-10-06 ENCOUNTER — CLINICAL SUPPORT (OUTPATIENT)
Dept: CARDIOLOGY | Facility: HOSPITAL | Age: 68
End: 2023-10-06
Attending: INTERNAL MEDICINE
Payer: MEDICARE

## 2023-10-06 DIAGNOSIS — R42 DIZZINESS: ICD-10-CM

## 2023-10-06 DIAGNOSIS — R00.2 PALPITATIONS: ICD-10-CM

## 2023-10-06 PROCEDURE — 93272 CARDIAC EVENT MONITOR (CUPID ONLY): ICD-10-PCS | Mod: ,,, | Performed by: INTERNAL MEDICINE

## 2023-10-06 PROCEDURE — 93271 ECG/MONITORING AND ANALYSIS: CPT

## 2023-10-06 PROCEDURE — 93272 ECG/REVIEW INTERPRET ONLY: CPT | Mod: ,,, | Performed by: INTERNAL MEDICINE

## 2023-10-06 PROCEDURE — 93270 REMOTE 30 DAY ECG REV/REPORT: CPT

## 2023-11-01 ENCOUNTER — PATIENT MESSAGE (OUTPATIENT)
Dept: BARIATRICS | Facility: CLINIC | Age: 68
End: 2023-11-01
Payer: COMMERCIAL

## 2023-11-10 ENCOUNTER — CLINICAL SUPPORT (OUTPATIENT)
Dept: FAMILY MEDICINE | Facility: CLINIC | Age: 68
End: 2023-11-10
Payer: MEDICARE

## 2023-11-10 VITALS — TEMPERATURE: 98 F

## 2023-11-10 DIAGNOSIS — Z23 NEEDS FLU SHOT: Primary | ICD-10-CM

## 2023-11-10 PROCEDURE — 99999 PR PBB SHADOW E&M-EST. PATIENT-LVL II: CPT | Mod: PBBFAC,,,

## 2023-11-10 PROCEDURE — 99999PBSHW FLU VACCINE - QUADRIVALENT - ADJUVANTED: ICD-10-PCS | Mod: PBBFAC,,,

## 2023-11-10 PROCEDURE — 90694 VACC AIIV4 NO PRSRV 0.5ML IM: CPT | Mod: PBBFAC,PO

## 2023-11-10 PROCEDURE — G0008 ADMIN INFLUENZA VIRUS VAC: HCPCS | Mod: PBBFAC,PO

## 2023-11-10 PROCEDURE — 99999 PR PBB SHADOW E&M-EST. PATIENT-LVL II: ICD-10-PCS | Mod: PBBFAC,,,

## 2023-11-10 PROCEDURE — 99999PBSHW FLU VACCINE - QUADRIVALENT - ADJUVANTED: Mod: PBBFAC,,,

## 2023-11-24 ENCOUNTER — PATIENT MESSAGE (OUTPATIENT)
Dept: ADMINISTRATIVE | Facility: CLINIC | Age: 68
End: 2023-11-24
Payer: COMMERCIAL

## 2023-11-27 ENCOUNTER — TELEPHONE (OUTPATIENT)
Dept: ADMINISTRATIVE | Facility: CLINIC | Age: 68
End: 2023-11-27
Payer: COMMERCIAL

## 2023-11-27 NOTE — TELEPHONE ENCOUNTER
Called pt; informed pt I was calling to confirm her virtual EAWV on 11/28/23 at 10:00am and to see if she needed any help; pt stated she did not need any help and would complete e-pre check later today; pt informed to login 10 minutes prior to appt time   Attending Attestation (For Attendings USE Only)... Attending Attestation (For Attendings USE Only)... Attending Attestation (For Attendings USE Only)...

## 2023-11-28 ENCOUNTER — OFFICE VISIT (OUTPATIENT)
Dept: INTERNAL MEDICINE | Facility: CLINIC | Age: 68
End: 2023-11-28
Payer: MEDICARE

## 2023-11-28 VITALS
DIASTOLIC BLOOD PRESSURE: 80 MMHG | HEIGHT: 66 IN | SYSTOLIC BLOOD PRESSURE: 140 MMHG | BODY MASS INDEX: 36.64 KG/M2 | WEIGHT: 228 LBS

## 2023-11-28 DIAGNOSIS — N95.2 VAGINAL ATROPHY: ICD-10-CM

## 2023-11-28 DIAGNOSIS — G47.33 OSA ON CPAP: ICD-10-CM

## 2023-11-28 DIAGNOSIS — E89.0 POSTOPERATIVE HYPOTHYROIDISM: ICD-10-CM

## 2023-11-28 DIAGNOSIS — I27.9 PULMONARY HEART DISEASE: ICD-10-CM

## 2023-11-28 DIAGNOSIS — Z00.00 ENCOUNTER FOR PREVENTIVE HEALTH EXAMINATION: Primary | ICD-10-CM

## 2023-11-28 DIAGNOSIS — E66.01 SEVERE OBESITY (BMI 35.0-39.9) WITH COMORBIDITY: ICD-10-CM

## 2023-11-28 DIAGNOSIS — I10 PRIMARY HYPERTENSION: ICD-10-CM

## 2023-11-28 DIAGNOSIS — E55.9 VITAMIN D DEFICIENCY: ICD-10-CM

## 2023-11-28 PROCEDURE — G0439 PR MEDICARE ANNUAL WELLNESS SUBSEQUENT VISIT: ICD-10-PCS | Mod: 95,,, | Performed by: NURSE PRACTITIONER

## 2023-11-28 PROCEDURE — G0439 PPPS, SUBSEQ VISIT: HCPCS | Mod: 95,,, | Performed by: NURSE PRACTITIONER

## 2023-11-28 NOTE — PROGRESS NOTES
"The patient location is: [place]  The chief complaint leading to consultation is:  Medicare AWV    Visit type: audiovisual    Face to Face time with patient:  30 min  45 minutes of total time spent on the encounter, which includes face to face time and non-face to face time preparing to see the patient (eg, review of tests), Obtaining and/or reviewing separately obtained history, Documenting clinical information in the electronic or other health record, Independently interpreting results (not separately reported) and communicating results to the patient/family/caregiver, or Care coordination (not separately reported).         Each patient to whom he or she provides medical services by telemedicine is:  (1) informed of the relationship between the physician and patient and the respective role of any other health care provider with respect to management of the patient; and (2) notified that he or she may decline to receive medical services by telemedicine and may withdraw from such care at any time.    Notes:       Dain Phelps presented for a  Medicare AWV and comprehensive Health Risk Assessment today. The following components were reviewed and updated:    Medical history  Family History  Social history  Allergies and Current Medications  Health Risk Assessment  Health Maintenance  Care Team         ** See Completed Assessments for Annual Wellness Visit within the encounter summary.**         The following assessments were completed:  Living Situation  CAGE  Depression Screening  Fall Risk Assessment (MACH 10)  Hearing Assessment(HHI)  Cognitive Function Screening  Nutrition Screening  ADL Screening  PAQ Screening      Vitals:    11/28/23 0956   BP: (!) 140/80   Weight: 103.4 kg (228 lb)   Height: 5' 6" (1.676 m)     Body mass index is 36.8 kg/m².  Physical Exam  Constitutional:       General: She is not in acute distress.     Appearance: Normal appearance. She is not ill-appearing, toxic-appearing or " diaphoretic.   Pulmonary:      Effort: Pulmonary effort is normal.   Neurological:      Mental Status: She is alert and oriented to person, place, and time.   Psychiatric:         Mood and Affect: Mood normal.         Behavior: Behavior normal.               Diagnoses and health risks identified today and associated recommendations/orders:    1. Encounter for preventive health examination  Screenings performed, as noted above.  Personal preventative testing needs reviewed.      2. Pulmonary heart disease  Monitored, stable, echo 8/29/22, mild, cont to follow up with pcp    3. Primary hypertension  Treated with HCTZ, metoprolol, verapamil, remains borderline elevated, she is working with her pcp on this    4. Vaginal atrophy  Treated with topical estrogen, stable, cont tx    5. Postoperative hypothyroidism  Treated with levothyroxine, stable, cont tx    6. Vitamin D deficiency  Treated with supplements, stable, cont tx    7. Severe obesity (BMI 35.0-39.9) with comorbidity  Treated with ozempic, stable, cont tx    8. LORRAINE on CPAP  Treated with cpap nightly, cont tx    Review for Substance Use Disorders: patient does not use substances per chart    Review for opioid screening: Patient is not prescribed opioids     Discussed Shingrix and TIMOTHY Gautam with a 5-10 year written screening schedule and personal prevention plan. Recommendations were developed using the USPSTF age appropriate recommendations. Education, counseling, and referrals were provided as needed. After Visit Summary printed and given to patient which includes a list of additional screenings\tests needed.    No follow-ups on file.    Jeronimo Vincent NP  I offered to discuss advanced care planning, including how to pick a person who would make decisions for you if you were unable to make them for yourself, called a health care power of , and what kind of decisions you might make such as use of life sustaining treatments such as  ventilators and tube feeding when faced with a life limiting illness recorded on a living will that they will need to know. (How you want to be cared for as you near the end of your natural life)     X Patient is interested in learning more about how to make advanced directives.  I provided them paperwork and offered to discuss this with them.

## 2023-11-28 NOTE — PATIENT INSTRUCTIONS
Counseling and Referral of Other Preventative  (Italic type indicates deductible and co-insurance are waived)    Patient Name: Dain Phelps  Today's Date: 11/28/2023    Health Maintenance       Date Due Completion Date    Shingles Vaccine (1 of 2) Never done ---    RSV Vaccine (Age 60+ and Pregnant patients) (1 - 1-dose 60+ series) Never done ---    COVID-19 Vaccine (9 - 2023-24 season) 09/01/2023 8/19/2022    Mammogram 07/27/2024 7/27/2023    Hemoglobin A1c (Diabetic Prevention Screening) 12/17/2024 12/17/2021    DEXA Scan 06/30/2026 6/30/2022    Lipid Panel 06/07/2027 6/7/2022    Colorectal Cancer Screening 02/26/2028 2/26/2021    TETANUS VACCINE 12/11/2029 12/11/2019    Override on 4/7/2017: Declined        No orders of the defined types were placed in this encounter.    The following information is provided to all patients.  This information is to help you find resources for any of the problems found today that may be affecting your health:                Living healthy guide: www.[facility].[place].gov      Understanding Diabetes: www.diabetes.org      Eating healthy: www.cdc.gov/healthyweight      CDC home safety checklist: www.cdc.gov/steadi/patient.html      Agency on Aging: www.goea.[place].[facility]      Alcoholics anonymous (AA): www.aa.org      Physical Activity: www.almaz.nih.gov/hq5ejpb      Tobacco use: www.quitwithusla.org

## 2023-11-30 ENCOUNTER — OFFICE VISIT (OUTPATIENT)
Dept: CARDIOLOGY | Facility: CLINIC | Age: 68
End: 2023-11-30
Attending: INTERNAL MEDICINE
Payer: MEDICARE

## 2023-11-30 VITALS
WEIGHT: 231.5 LBS | DIASTOLIC BLOOD PRESSURE: 70 MMHG | OXYGEN SATURATION: 93 % | HEIGHT: 66 IN | BODY MASS INDEX: 37.21 KG/M2 | RESPIRATION RATE: 15 BRPM | HEART RATE: 58 BPM | SYSTOLIC BLOOD PRESSURE: 128 MMHG

## 2023-11-30 DIAGNOSIS — I10 PRIMARY HYPERTENSION: ICD-10-CM

## 2023-11-30 DIAGNOSIS — E66.01 SEVERE OBESITY (BMI 35.0-39.9) WITH COMORBIDITY: ICD-10-CM

## 2023-11-30 DIAGNOSIS — R42 DIZZINESS: ICD-10-CM

## 2023-11-30 DIAGNOSIS — R00.2 PALPITATIONS: Primary | ICD-10-CM

## 2023-11-30 DIAGNOSIS — G47.33 OSA ON CPAP: ICD-10-CM

## 2023-11-30 PROCEDURE — 93010 EKG 12-LEAD: ICD-10-PCS | Mod: S$PBB,,, | Performed by: INTERNAL MEDICINE

## 2023-11-30 PROCEDURE — 99214 PR OFFICE/OUTPT VISIT, EST, LEVL IV, 30-39 MIN: ICD-10-PCS | Mod: S$PBB,,, | Performed by: INTERNAL MEDICINE

## 2023-11-30 PROCEDURE — 99213 OFFICE O/P EST LOW 20 MIN: CPT | Mod: PBBFAC | Performed by: INTERNAL MEDICINE

## 2023-11-30 PROCEDURE — 93005 ELECTROCARDIOGRAM TRACING: CPT | Mod: PBBFAC | Performed by: INTERNAL MEDICINE

## 2023-11-30 PROCEDURE — 99214 OFFICE O/P EST MOD 30 MIN: CPT | Mod: S$PBB,,, | Performed by: INTERNAL MEDICINE

## 2023-11-30 PROCEDURE — 99999 PR PBB SHADOW E&M-EST. PATIENT-LVL III: ICD-10-PCS | Mod: PBBFAC,,, | Performed by: INTERNAL MEDICINE

## 2023-11-30 PROCEDURE — 99999 PR PBB SHADOW E&M-EST. PATIENT-LVL III: CPT | Mod: PBBFAC,,, | Performed by: INTERNAL MEDICINE

## 2023-11-30 PROCEDURE — 93010 ELECTROCARDIOGRAM REPORT: CPT | Mod: S$PBB,,, | Performed by: INTERNAL MEDICINE

## 2023-11-30 NOTE — PROGRESS NOTES
CARDIOLOGY CLINIC VISIT        HISTORY OF PRESENT ILLNESS:     Dain Phelps presents for continued care. Seen 06/24/2022 for evaluation of shortness of breath on exertion.  Seen by Dr. Dorsey in 2018. Evaluation of chest discomfort.  Palpitations.  She has noted over the past 6 months progressive shortness of breath on exertion.  Now symptoms at rest.  No associated chest discomfort.  Weight has been stable.  Blood pressure controlled.  EKG showed sinus bradycardia.  Denies dizziness.  No syncope/presyncope.  On metoprolol and verapamil.    09/19/2022:  Nuclear stress was negative for ischemia.  Hypertensive response exercise 257/84.  Patient exercised for 5 minutes 45 seconds.  Functional capacity 7 Mets.  Echocardiogram showed normal left ventricular systolic function with estimated ejection fraction 65%.  Left ventricular hypertrophy.  Mildly elevated pulmonary pressure.  Has stopped metoprolol.  Now taking verapamil in the morning along with her hydrochlorothiazide.  Digital medicine logs reviewed.  Feels good.  No complaints.  Going on a trip to [place] this month.    05/19/2023:  Over the past few months has had episodes of dizziness.  Generally at rest.  Also notes palpitations.  She can feel her heart racing at times.  Episodes daily.  Last minutes.  Last TSH within normal limits.  EKG today shows sinus bradycardia, low-voltage.  Stress last year she had normal heart rate response to exercise.  She is on metoprolol and Cardizem.    06/15/2023: Holter showed average heart rate 63 beats per minute.  Very rare PVCs.  Rare PACs.  She did not have any symptoms while wearing the monitor.    11/30/2023:  Last seen by Dr. Castro June 2023.  The patient returns for follow up.  She denies intercurrent angina or dyspnea.  She does describe occasional palpitations and lightheadedness.  There has been no syncope.  She denies PND, orthopnea, melena, hematuria, or claudication symptoms.  Her event monitor noted occasional  junctional bradycardia.  She complained of palpitations but no tachyarrhythmia was noted.    CARDIOVASCULAR HISTORY:     Pulmonary hypertension    PAST MEDICAL HISTORY:     Past Medical History:   Diagnosis Date    Chronic low back pain     Fatigue     GERD (gastroesophageal reflux disease)     History of colon polyps 2015    Hypertension     Hypothyroidism     Impaired glucose tolerance 1/15/2014    Leukopenia     Obesity (BMI 30-39.9) 1/15/2014    Primary hypothyroidism 2015    Snoring 2015    Vitamin D deficiency 1/15/2014       PAST SURGICAL HISTORY:     Past Surgical History:   Procedure Laterality Date    BREAST BIOPSY Left     CATARACT EXTRACTION       SECTION, CLASSIC      x3    COLONOSCOPY N/A 2021    Procedure: COLONOSCOPY;  Surgeon: Parvez Mazariegos MD;  Location: [facility];  Service: Endoscopy;  Laterality: N/A;  covid test  lapalco, instructions through [facility]    HYSTERECTOMY      total    OOPHORECTOMY      thyroid surgey         ALLERGIES AND MEDICATION:     Review of patient's allergies indicates:   Allergen Reactions    Ace inhibitors Edema     Angioedema        Medication List            Accurate as of 2023  3:48 PM. If you have any questions, ask your nurse or doctor.                CONTINUE taking these medications      ergocalciferol (vitamin D2) 50 mcg (2,000 unit) Tab     hydroCHLOROthiazide 25 MG tablet  Commonly known as: HYDRODIURIL  Take 1 tablet (25 mg total) by mouth once daily.     IMVEXXY MAINTENANCE PACK 4 mcg Inst  Generic drug: estradioL  Insert 4mcg tablet once nightly x 2 weeks, then 4mcg tablet twice a week     levothyroxine 125 MCG tablet  Commonly known as: SYNTHROID  TAKE 1 TABLET(125 MCG) BY MOUTH BEFORE BREAKFAST     metoprolol succinate 25 MG 24 hr tablet  Commonly known as: TOPROL-XL  Take 1 tablet (25 mg total) by mouth once daily.     multivitamin capsule     OZEMPIC 1 mg/dose (4 mg/3 mL)  Generic drug:  semaglutide  Inject 1 mg into the skin every 7 days.     verapamiL 360 MG C24p  Commonly known as: VERELAN  TAKE 1 CAPSULE(360 MG) BY MOUTH EVERY DAY              SOCIAL HISTORY:     Social History     Socioeconomic History    Marital status:    Occupational History     Comment: retired   Tobacco Use    Smoking status: Never    Smokeless tobacco: Never   Substance and Sexual Activity    Alcohol use: No    Drug use: No    Sexual activity: Not Currently     Partners: Male     Social Determinants of Health     Financial Resource Strain: Low Risk  (11/27/2023)    Overall Financial Resource Strain (CARDIA)     Difficulty of Paying Living Expenses: Not hard at all   Food Insecurity: No Food Insecurity (11/27/2023)    Hunger Vital Sign     Worried About Running Out of Food in the Last Year: Never true     Ran Out of Food in the Last Year: Never true   Transportation Needs: No Transportation Needs (11/27/2023)    PRAPARE - Transportation     Lack of Transportation (Medical): No     Lack of Transportation (Non-Medical): No   Physical Activity: Sufficiently Active (11/27/2023)    Exercise Vital Sign     Days of Exercise per Week: 4 days     Minutes of Exercise per Session: 60 min   Stress: No Stress Concern Present (11/27/2023)    [employer] [place] [employer] - Occupational Stress Questionnaire     Feeling of Stress : Not at all   Social Connections: Socially Integrated (11/27/2023)    Social Connection and Isolation Panel [NHANES]     Frequency of Communication with Friends and Family: More than three times a week     Frequency of Social Gatherings with Friends and Family: More than three times a week     Attends Restorationism Services: More than 4 times per year     Active Member of Clubs or Organizations: Yes     Attends Club or Organization Meetings: More than 4 times per year     Marital Status:    Housing Stability: Low Risk  (11/27/2023)    Housing Stability Vital Sign     Unable to Pay for  "Housing in the Last Year: No     Number of Places Lived in the Last Year: 1     Unstable Housing in the Last Year: No       FAMILY HISTORY:     Family History   Problem Relation Age of Onset    Heart disease Mother     Kidney disease Mother     Hypertension Mother     Cancer Father         throat cancer    No Known Problems Daughter     Hypertension Son     Hypertension Maternal Aunt     Diabetes Maternal Aunt     Thyroid disease Maternal Aunt     Breast cancer Maternal Aunt 70    Diabetes Maternal Grandmother        REVIEW OF SYSTEMS:   Review of Systems   Constitutional:  Negative for chills, diaphoresis, fever, malaise/fatigue and weight loss.   HENT:  Negative for congestion, hearing loss, sinus pain, sore throat and tinnitus.    Eyes:  Negative for blurred vision, double vision, photophobia and pain.   Respiratory:  Negative for cough, hemoptysis, sputum production, shortness of breath, wheezing and stridor.    Cardiovascular:  Positive for palpitations. Negative for chest pain, orthopnea, claudication, leg swelling and PND.   Gastrointestinal:  Negative for abdominal pain, blood in stool, heartburn, melena, nausea and vomiting.   Musculoskeletal:  Negative for back pain, falls, joint pain, myalgias and neck pain.   Neurological:  Negative for dizziness, tingling, tremors, sensory change, speech change, focal weakness, seizures, loss of consciousness, weakness and headaches.   Endo/Heme/Allergies:  Does not bruise/bleed easily.   Psychiatric/Behavioral:  Negative for depression, memory loss and substance abuse. The patient is not nervous/anxious.        PHYSICAL EXAM:     Vitals:    11/30/23 1542   BP: 128/70   Pulse: (!) 58   Resp: 15    Body mass index is 37.36 kg/m².  Weight: 105 kg (231 lb 7.7 oz)   Height: 5' 6" (167.6 cm)     Physical Exam  Vitals reviewed.   Constitutional:       General: She is not in acute distress.     Appearance: She is well-developed. She is obese. She is not ill-appearing, " toxic-appearing or diaphoretic.   HENT:      Head: Normocephalic and atraumatic.   Eyes:      General: No scleral icterus.     Extraocular Movements: Extraocular movements intact.      Conjunctiva/sclera: Conjunctivae normal.      Pupils: Pupils are equal, round, and reactive to light.   Neck:      Thyroid: No thyromegaly.      Vascular: Normal carotid pulses. No carotid bruit or JVD.      Trachea: Trachea normal.   Cardiovascular:      Rate and Rhythm: Normal rate and regular rhythm.      Heart sounds: S1 normal and S2 normal. Heart sounds are distant. Murmur heard.      Crescendo systolic murmur is present with a grade of 2/6.      No friction rub. No gallop.   Pulmonary:      Effort: Pulmonary effort is normal. No respiratory distress.      Breath sounds: Normal breath sounds. No stridor. No wheezing, rhonchi or rales.   Chest:      Chest wall: No tenderness.   Abdominal:      General: Bowel sounds are normal. There is no distension.      Palpations: Abdomen is soft.      Tenderness: There is no abdominal tenderness.   Musculoskeletal:         General: No swelling or tenderness. Normal range of motion.      Cervical back: Normal range of motion and neck supple. No edema or rigidity.      Right lower leg: No edema.      Left lower leg: No edema.   Feet:      Right foot:      Skin integrity: No ulcer.      Left foot:      Skin integrity: No ulcer.   Skin:     General: Skin is warm and dry.      Coloration: Skin is not jaundiced.   Neurological:      General: No focal deficit present.      Mental Status: She is alert and oriented to person, place, and time.      Cranial Nerves: No cranial nerve deficit.   Psychiatric:         Mood and Affect: Mood normal.         Speech: Speech normal.         Behavior: Behavior normal. Behavior is cooperative.         Thought Content: Thought content normal.         DATA:   EKG: (personally reviewed tracing)  11/30/23 SR 54, PRWP    Laboratory:  CBC:  Recent Labs   Lab  12/07/20  1022 05/25/21  0815 06/07/22  1018   WBC 4.31 3.78 L 4.92   Hemoglobin 13.0 12.2 12.7   Hematocrit 41.2 38.3 40.2   Platelets 222 199 224       CHEMISTRIES:  Recent Labs   Lab 12/07/20  1022 05/25/21  0815 06/07/22  1018 03/14/23  1115   Glucose 90 93 82 80   Sodium 141 139 141 140   Potassium 3.6 4.1 4.2 3.6   BUN 18 18 17 10   Creatinine 0.8 0.9 0.7 0.7   eGFR if African American >60.0 >60.0 >60  --    eGFR if non African American >60.0 >60.0 >60  --    Calcium 8.9 9.1 8.8 9.6       CARDIAC BIOMARKERS:        COAGS:        LIPIDS/LFTS:  Recent Labs   Lab 12/07/20  1022 05/25/21  0815 06/07/22  1018 03/14/23  1115   Cholesterol 165 155 173  --    Triglycerides 53 84 74  --    HDL 49 43 47  --    LDL Cholesterol 105.4 95.2 111.2  --    Non-HDL Cholesterol 116 112 126  --    AST 15 14 17 15   ALT 15 15 28 16       Cardiovascular Testing:    Event monitor 10/06/2023:     Sinus rhythm with competing junctional beats/junctional rhythm during sinus bradycardia.     Holter 05/24/2023:    NSR. Heart rates varied between 42 and 95 BPM with an average of 63 BPM.  There were very rare PVCs totalling 2  There were rare PACs totalling 542    Nuclear stress 08/29/2022:      Normal myocardial perfusion scan. There is no evidence of myocardial ischemia or infarction.    The gated perfusion images showed an ejection fraction of 76% post stress.    There is normal wall motion post stress.    The EKG portion of this study is negative for ischemia.    The patient reported no chest pain during the stress test.    There were no arrhythmias during stress.    The exercise capacity was average.    Hypertensive response to exercise 257/84.    Echocardiogram 08/29/2022:    The left ventricle is normal in size with concentric hypertrophy and normal systolic function.  The estimated ejection fraction is 65%.  Mild left atrial enlargement.  Normal left ventricular diastolic function.  Normal right ventricular size with normal right  ventricular systolic function.  Mild tricuspid regurgitation.  Normal central venous pressure (3 mmHg).  The estimated PA systolic pressure is 33 mmHg.  There is mild pulmonary hypertension.    NST:  6-18  Impression: PROBABLY NORMAL MYOCARDIAL PERFUSION  1. There is a mild to moderate mostly reversible anterior wall defect of uncertain significance.   2. The perfusion scan is free of evidence for myocardial ischemia.   3. Resting wall motion is physiologic.   4. Resting LV function is normal.   5. The ventricular volumes are normal at rest and stress.   6. The extracardiac distribution of radioactivity is normal.   7. When compared to the previous study from 05/19/2015, no significant change     Echo:  CONCLUSIONS     1 - Normal left ventricular systolic function (EF 55-60%).      Holter 06/28/2018:     TEST DESCRIPTION   PREDOMINANT RHYTHM   1. Sinus rhythm with heart rates varying between 42 and 90 bpm with an average of 64 bpm.     VENTRICULAR ARRHYTHMIAS   1. There was a single PVC recorded.     2. There were no episodes of ventricular tachycardia.     SUPRA VENTRICULAR ARRHYTHMIAS   1. There were very rare PACs recorded totalling 1 and averaging less than 1 per hour.     2. There were no episodes of sustained supraventricular tachycardia.     SINUS NODE FUNCTION   1. There was no evidence of high grade SA merari block.     AV CONDUCTION   1. There was no evidence of high grade AV block.     DIARY   1. The diary was returned, but not completed     MISCELLANEOUS   1. There were occasional hookup related artifacts. Overall, the study was of good quality.     2. This was a tape of adequate length (24 hrs).     ASSESSMENT:     Palpitations, junct tyron on EVM (but no tachyarrhythmia) 10/2023.  Dizziness  Hypertension, controlled  Hypothyroid  Obstructive sleep apnea  Pulmonary hypertension  Obesity, BMI 37    PLAN:   Cont med rx  Stop toprol  Cont BP monitoring in digital med program  RTC 6 months with Dr. Castro  (May 2024).  If sxs persist, consider EPS sidney.        Domenic Collado MD, FACC

## 2023-12-06 ENCOUNTER — OFFICE VISIT (OUTPATIENT)
Dept: URGENT CARE | Facility: CLINIC | Age: 68
End: 2023-12-06
Payer: MEDICARE

## 2023-12-06 VITALS
OXYGEN SATURATION: 95 % | RESPIRATION RATE: 16 BRPM | WEIGHT: 232 LBS | HEIGHT: 66 IN | BODY MASS INDEX: 37.28 KG/M2 | TEMPERATURE: 98 F | SYSTOLIC BLOOD PRESSURE: 140 MMHG | HEART RATE: 70 BPM | DIASTOLIC BLOOD PRESSURE: 96 MMHG

## 2023-12-06 DIAGNOSIS — K52.9 AGE (ACUTE GASTROENTERITIS): Primary | ICD-10-CM

## 2023-12-06 LAB
CTP QC/QA: YES
POC MOLECULAR INFLUENZA A AGN: NEGATIVE
POC MOLECULAR INFLUENZA B AGN: NEGATIVE

## 2023-12-06 PROCEDURE — 99213 PR OFFICE/OUTPT VISIT, EST, LEVL III, 20-29 MIN: ICD-10-PCS | Mod: S$GLB,,,

## 2023-12-06 PROCEDURE — 87502 INFLUENZA DNA AMP PROBE: CPT | Mod: QW,S$GLB,,

## 2023-12-06 PROCEDURE — 99213 OFFICE O/P EST LOW 20 MIN: CPT | Mod: S$GLB,,,

## 2023-12-06 PROCEDURE — 87502 POCT INFLUENZA A/B MOLECULAR: ICD-10-PCS | Mod: QW,S$GLB,,

## 2023-12-06 RX ORDER — ONDANSETRON 8 MG/1
8 TABLET, ORALLY DISINTEGRATING ORAL
Status: COMPLETED | OUTPATIENT
Start: 2023-12-06 | End: 2023-12-06

## 2023-12-06 RX ORDER — ONDANSETRON 4 MG/1
4 TABLET, ORALLY DISINTEGRATING ORAL EVERY 8 HOURS PRN
Qty: 12 TABLET | Refills: 0 | Status: SHIPPED | OUTPATIENT
Start: 2023-12-06 | End: 2024-02-27

## 2023-12-06 RX ADMIN — ONDANSETRON 8 MG: 8 TABLET, ORALLY DISINTEGRATING ORAL at 11:12

## 2023-12-06 NOTE — PROGRESS NOTES
"Subjective:      Patient ID: Dain Phelps is a 68 y.o. female.    Vitals:  height is 5' 6" (1.676 m) and weight is 105.2 kg (232 lb). Her oral temperature is 98.2 °F (36.8 °C). Her blood pressure is 140/96 (abnormal) and her pulse is 70. Her respiration is 16 and oxygen saturation is 95%.     Chief Complaint: Emesis    Pt is a 67 y/o F who presents with nausea, vomiting, diarrhea x3 days. She had generalized abdominal pain Sunday, but abdominal pain has resolved. Symptoms slowly improving, but still nauseous. Tolerating liquids, but not solid foods. She was around her grandchild who had a stomach virus. She has not taken her BP medication today yet. Denies fever, chills, URI sxs, dysuria, CP, SoB, dizziness.    Emesis   This is a new problem. The current episode started in the past 7 days. The problem occurs 5 to 10 times per day. The problem has been gradually improving. The emesis has an appearance of stomach contents. There has been no fever. Associated symptoms include abdominal pain (resolved) and diarrhea. Pertinent negatives include no chest pain, chills, coughing, dizziness, fever, headaches or myalgias. Treatments tried: probitics.       Constitution: Negative for chills and fever.   HENT:  Negative for ear pain, congestion and sore throat.    Neck: Negative for neck pain.   Cardiovascular:  Negative for chest pain.   Respiratory:  Negative for cough and shortness of breath.    Gastrointestinal:  Positive for abdominal pain (resolved), nausea, vomiting and diarrhea.   Genitourinary:  Negative for dysuria, frequency and urgency.   Musculoskeletal:  Negative for muscle ache.   Neurological:  Negative for dizziness and headaches.      Objective:     Physical Exam   Constitutional: She is oriented to person, place, and time. She appears well-developed.   HENT:   Head: Normocephalic and atraumatic.   Ears:   Right Ear: External ear normal.   Left Ear: External ear normal.   Nose: Nose normal.   Mouth/Throat: " Oropharynx is clear and moist.   Eyes: Conjunctivae, EOM and lids are normal. Pupils are equal, round, and reactive to light.   Neck: Trachea normal and phonation normal. Neck supple.   Cardiovascular: Normal rate, regular rhythm, normal heart sounds and normal pulses.   Pulmonary/Chest: Effort normal and breath sounds normal.   Abdominal: Bowel sounds are normal. She exhibits no distension. Soft. There is no abdominal tenderness. There is no rebound and no guarding.   Musculoskeletal: Normal range of motion.         General: Normal range of motion.   Neurological: She is alert and oriented to person, place, and time.   Skin: Skin is warm, dry and intact.   Psychiatric: Her speech is normal and behavior is normal. Judgment and thought content normal.   Nursing note and vitals reviewed.    Results for orders placed or performed in visit on 12/06/23   POCT Influenza A/B MOLECULAR   Result Value Ref Range    POC Molecular Influenza A Ag Negative Negative, Not Reported    POC Molecular Influenza B Ag Negative Negative, Not Reported     Acceptable Yes          Assessment:     1. AGE (acute gastroenteritis)        Plan:       AGE (acute gastroenteritis)  -     POCT Influenza A/B MOLECULAR  -     ondansetron disintegrating tablet 8 mg  -     ondansetron (ZOFRAN-ODT) 4 MG TbDL; Take 1 tablet (4 mg total) by mouth every 8 (eight) hours as needed (nausea).  Dispense: 12 tablet; Refill: 0            Patient Instructions   - Rest.    - Drink plenty of fluids.  - Pedialyte, Gatorade/powerade, water     - Tylenol or Ibuprofen as directed as needed for fever/pain.      - Take zofran (ondansetron) 4 mg every 8 hours as needed, as prescribed for nausea     - can take over-the-counter Pepto-Bismol to help with diarrhea.     - Follow up with your PCP or specialty clinic as directed in the next 2-3 days if not improved or as needed.  You can call [phone] to schedule an appointment with the appropriate provider.     - Go to the ER if you develop any new or worsening symptoms     - You must understand that you have received an Urgent Care treatment only and that you may be released before all of your medical problems are known or treated.   - You, the patient, will arrange for follow up care as instructed.   - If your condition worsens or fails to improve we recommend that you receive another evaluation at the ER immediately or contact your PCP to discuss your concerns or return here.

## 2023-12-06 NOTE — PATIENT INSTRUCTIONS
- Rest.    - Drink plenty of fluids.  - Pedialyte, Gatorade/powerade, water     - Tylenol or Ibuprofen as directed as needed for fever/pain.      - Take zofran (ondansetron) 4 mg every 8 hours as needed, as prescribed for nausea     - can take over-the-counter Pepto-Bismol to help with diarrhea.     - Follow up with your PCP or specialty clinic as directed in the next 2-3 days if not improved or as needed.  You can call [phone] to schedule an appointment with the appropriate provider.    - Go to the ER if you develop any new or worsening symptoms     - You must understand that you have received an Urgent Care treatment only and that you may be released before all of your medical problems are known or treated.   - You, the patient, will arrange for follow up care as instructed.   - If your condition worsens or fails to improve we recommend that you receive another evaluation at the ER immediately or contact your PCP to discuss your concerns or return here.

## 2023-12-12 ENCOUNTER — OFFICE VISIT (OUTPATIENT)
Dept: BARIATRICS | Facility: CLINIC | Age: 68
End: 2023-12-12
Payer: MEDICARE

## 2023-12-12 VITALS
SYSTOLIC BLOOD PRESSURE: 151 MMHG | WEIGHT: 230.5 LBS | BODY MASS INDEX: 37.2 KG/M2 | OXYGEN SATURATION: 96 % | DIASTOLIC BLOOD PRESSURE: 73 MMHG | HEART RATE: 54 BPM

## 2023-12-12 DIAGNOSIS — Z71.3 ENCOUNTER FOR WEIGHT LOSS COUNSELING: ICD-10-CM

## 2023-12-12 DIAGNOSIS — I10 PRIMARY HYPERTENSION: ICD-10-CM

## 2023-12-12 DIAGNOSIS — G47.33 OSA ON CPAP: ICD-10-CM

## 2023-12-12 DIAGNOSIS — E66.01 CLASS 2 SEVERE OBESITY DUE TO EXCESS CALORIES WITH SERIOUS COMORBIDITY AND BODY MASS INDEX (BMI) OF 37.0 TO 37.9 IN ADULT: Primary | ICD-10-CM

## 2023-12-12 PROCEDURE — 99213 OFFICE O/P EST LOW 20 MIN: CPT | Mod: PBBFAC | Performed by: STUDENT IN AN ORGANIZED HEALTH CARE EDUCATION/TRAINING PROGRAM

## 2023-12-12 PROCEDURE — 99999 PR PBB SHADOW E&M-EST. PATIENT-LVL III: ICD-10-PCS | Mod: PBBFAC,,, | Performed by: STUDENT IN AN ORGANIZED HEALTH CARE EDUCATION/TRAINING PROGRAM

## 2023-12-12 PROCEDURE — 99999 PR PBB SHADOW E&M-EST. PATIENT-LVL III: CPT | Mod: PBBFAC,,, | Performed by: STUDENT IN AN ORGANIZED HEALTH CARE EDUCATION/TRAINING PROGRAM

## 2023-12-12 PROCEDURE — 99213 OFFICE O/P EST LOW 20 MIN: CPT | Mod: S$PBB,,, | Performed by: STUDENT IN AN ORGANIZED HEALTH CARE EDUCATION/TRAINING PROGRAM

## 2023-12-12 PROCEDURE — 99213 PR OFFICE/OUTPT VISIT, EST, LEVL III, 20-29 MIN: ICD-10-PCS | Mod: S$PBB,,, | Performed by: STUDENT IN AN ORGANIZED HEALTH CARE EDUCATION/TRAINING PROGRAM

## 2023-12-12 RX ORDER — SEMAGLUTIDE 1.34 MG/ML
1 INJECTION, SOLUTION SUBCUTANEOUS
Qty: 3 ML | Refills: 2 | Status: SHIPPED | OUTPATIENT
Start: 2023-12-12 | End: 2024-02-27

## 2023-12-12 NOTE — PROGRESS NOTES
Subjective:       Patient ID: Dain Phelps is a 68 y.o. female.    Chief Complaint: Follow-up, Obesity, and Weight Check    Patient presents for treatment of obesity.     Co-morbidities  HTN  LORRAINE on CPAP  Hypothroidism   GERD    Negative for thyroid cancer  Negative for kidney stones  Negative for glaucoma    Current Physical Activity  Currently in water aerobics 3x/week  You Tube walking videos    Current Eating Habits  Breakfast - coffee with cream and sugar  Lunch - salad, may have full meal (meat, rice or pasta, vegetable)  Dinner - full meal  Snacks - night time snacker  Beverages - water, soft drinks    Medical Weight Loss  3/23/2023: 235.2 lbs, BMI 38, BFP 51.1%, .1 lbs, SMM 63.1 lbs, BMR 1498 kcal  6/12/2023: 226.4 lbs, BMI 36.6, BFP 51%, .5 lbs, SMM 60.8 lbs, BMR 1456 kcal  9/12/2023: 232.3 lbs, BMI 37.5, BFP 52%,  lbs, SMM 61.5 lbs, BMR 1462 kcal  12/12/2023: 230.5 lbs, BMI 37.2, BFP 50.9%, .4 lbs, SMM 62.2 lbs, BMR 1478 kcal    Has switched to brown rice, whole wheat pasta and bread  Drinking 2 bottles of water, avoiding soft drinks        Review of Systems   Constitutional:  Negative for chills and fever.   Respiratory:  Negative for shortness of breath.    Cardiovascular:  Negative for chest pain and palpitations.   Gastrointestinal:  Negative for abdominal pain, nausea and vomiting.   Neurological:  Negative for dizziness and light-headedness.   Psychiatric/Behavioral:  The patient is not nervous/anxious.          Objective:           Latest Reference Range & Units 03/14/23 11:15   Sodium 136 - 145 mmol/L 140   Potassium 3.5 - 5.1 mmol/L 3.6   Chloride 95 - 110 mmol/L 105   CO2 23 - 29 mmol/L 24   Anion Gap 8 - 16 mmol/L 11   BUN 8 - 23 mg/dL 10   Creatinine 0.5 - 1.4 mg/dL 0.7   eGFR >60 mL/min/1.73 m^2 >60.0   Glucose 70 - 110 mg/dL 80   Calcium 8.7 - 10.5 mg/dL 9.6   Alkaline Phosphatase 55 - 135 U/L 108   PROTEIN TOTAL 6.0 - 8.4 g/dL 7.8   Albumin 3.5 - 5.2 g/dL 3.6    BILIRUBIN TOTAL 0.1 - 1.0 mg/dL 0.5   AST 10 - 40 U/L 15   ALT 10 - 44 U/L 16   Insulin <25.0 uU/mL 21.6   Insulin Collection Interval  blood   TSH 0.400 - 4.000 uIU/mL 0.705   Estrogen pg/mL 143   FSH See Text mIU/mL 84.68   LH See Text mIU/mL 50.7   Progesterone See Text ng/mL 0.1       Vitals:    12/12/23 1105   BP: (!) 151/73   Pulse: (!) 54         Physical Exam  Vitals reviewed.   Constitutional:       General: She is not in acute distress.     Appearance: Normal appearance. She is obese. She is not ill-appearing, toxic-appearing or diaphoretic.   HENT:      Head: Normocephalic and atraumatic.   Cardiovascular:      Rate and Rhythm: Normal rate.   Pulmonary:      Effort: Pulmonary effort is normal. No respiratory distress.   Skin:     General: Skin is warm and dry.   Neurological:      Mental Status: She is alert and oriented to person, place, and time.         Assessment:       Problem List Items Addressed This Visit       HTN (hypertension)    Relevant Medications    semaglutide (OZEMPIC) 1 mg/dose (4 mg/3 mL)    LORRAINE on CPAP    Relevant Medications    semaglutide (OZEMPIC) 1 mg/dose (4 mg/3 mL)     Other Visit Diagnoses       Class 2 severe obesity due to excess calories with serious comorbidity and body mass index (BMI) of 37.0 to 37.9 in adult    -  Primary    Relevant Medications    semaglutide (OZEMPIC) 1 mg/dose (4 mg/3 mL)    Encounter for weight loss counseling                      Plan:   - Ozempic 1 mg weekly.     - Log all food and beverage intake with a daily calorie goal of 1200 calories per day    - Low intensity aerobic exercise for 15-30 minutes 3-5x/week

## 2024-01-22 ENCOUNTER — TELEPHONE (OUTPATIENT)
Dept: FAMILY MEDICINE | Facility: CLINIC | Age: 69
End: 2024-01-22
Payer: COMMERCIAL

## 2024-01-22 NOTE — TELEPHONE ENCOUNTER
----- Message from Radha Mistry sent at 1/22/2024 12:00 PM CST -----  .Type: Patient Call Back    Who called: Laney olmstead/ [facility]     What is the request in detail: Calling about liver profile to prescribe patient some medication     Can the clinic reply by [facility]? No     Would the patient rather a call back or a response via [facility]? Call Back     Best call back number:[phone]    Additional Information:

## 2024-01-22 NOTE — TELEPHONE ENCOUNTER
Attempted to call, left voicemail regarding exactly which liver panel orders will be needed for patient

## 2024-02-02 DIAGNOSIS — I10 ESSENTIAL HYPERTENSION: ICD-10-CM

## 2024-02-02 RX ORDER — VERAPAMIL HYDROCHLORIDE 360 MG/1
CAPSULE, DELAYED RELEASE PELLETS ORAL
Qty: 90 CAPSULE | Refills: 1 | Status: SHIPPED | OUTPATIENT
Start: 2024-02-02

## 2024-02-02 NOTE — TELEPHONE ENCOUNTER
Refill Routing Note   Medication(s) are not appropriate for processing by [facility] for the following reason(s):        Required vitals abnormal    [facility] action(s):  Defer   Requires labs : Yes             Appointments  past 12m or future 3m with PCP    Date Provider   Last Visit   8/9/2023 Yary Valadez MD   Next Visit   Visit date not found Yary Valadez MD   ED visits in past 90 days: 0        Note composed:11:28 AM 02/02/2024

## 2024-02-02 NOTE — TELEPHONE ENCOUNTER
Care Due:                  Date            Visit Type   Department     Provider  --------------------------------------------------------------------------------                                [facility]                              PRIMARY      MEDICINE/  Last Visit: 08-      CARE ([facility])   INTERNAL MED   Yary Valadez                              [facility]                              PRIMARY      MED/ INTERNAL  Next Visit: 02-      CARE ([facility])   MED/ PEDS      Hilary Wells                                                            Last  Test          Frequency    Reason                     Performed    Due Date  --------------------------------------------------------------------------------    CMP.........  12 months..  hydroCHLOROthiazide......  03- 03-    [facility] Embedded Care Due Messages. Reference number: [id number].   2/02/2024 8:56:48 AM CST

## 2024-02-27 ENCOUNTER — OFFICE VISIT (OUTPATIENT)
Dept: FAMILY MEDICINE | Facility: CLINIC | Age: 69
End: 2024-02-27
Payer: MEDICARE

## 2024-02-27 VITALS
OXYGEN SATURATION: 96 % | WEIGHT: 235.25 LBS | HEART RATE: 70 BPM | DIASTOLIC BLOOD PRESSURE: 80 MMHG | SYSTOLIC BLOOD PRESSURE: 144 MMHG | HEIGHT: 66 IN | BODY MASS INDEX: 37.81 KG/M2 | TEMPERATURE: 98 F

## 2024-02-27 DIAGNOSIS — K21.9 GASTROESOPHAGEAL REFLUX DISEASE WITHOUT ESOPHAGITIS: ICD-10-CM

## 2024-02-27 DIAGNOSIS — R79.9 ABNORMAL FINDING OF BLOOD CHEMISTRY, UNSPECIFIED: ICD-10-CM

## 2024-02-27 DIAGNOSIS — I10 PRIMARY HYPERTENSION: ICD-10-CM

## 2024-02-27 DIAGNOSIS — E89.0 POSTOPERATIVE HYPOTHYROIDISM: ICD-10-CM

## 2024-02-27 DIAGNOSIS — E66.01 SEVERE OBESITY (BMI 35.0-39.9) WITH COMORBIDITY: ICD-10-CM

## 2024-02-27 DIAGNOSIS — Z00.00 ANNUAL PHYSICAL EXAM: Primary | ICD-10-CM

## 2024-02-27 DIAGNOSIS — I27.20 PULMONARY HYPERTENSION: ICD-10-CM

## 2024-02-27 PROCEDURE — 99214 OFFICE O/P EST MOD 30 MIN: CPT | Mod: PBBFAC,PO | Performed by: FAMILY MEDICINE

## 2024-02-27 PROCEDURE — 99397 PER PM REEVAL EST PAT 65+ YR: CPT | Mod: GZ,S$PBB,, | Performed by: FAMILY MEDICINE

## 2024-02-27 PROCEDURE — 99999 PR PBB SHADOW E&M-EST. PATIENT-LVL IV: CPT | Mod: PBBFAC,,, | Performed by: FAMILY MEDICINE

## 2024-02-27 NOTE — PROGRESS NOTES
"Routine Office Visit    Dain Phelps  1955  [id number]      Subjective     Dain is a 68 y.o. female who presents today for:    Annual exam / establish care / new to me   Hypertension - Patient had side effect of angioedema with ace inhibitor. She was switched to verapamil and hctz. She felt medication has not worked as well as ace. She does not want to change medication at this time and plans to work on lifestyle modification   Patient has joined weight [email or web address]. She joined last week and plans on making changes to help with weight. Patient continues to follow-up with bariatric medicine. She was on ozempic for weight loss. Insurance no longer covers it for her   Stiff legs - Patient with hx of knee problems improved with Physical Therapy. Patient has noticed she recently has stiffness in her legs (calves) that improve with movement; Patient admits she has not been exercising as much and plans to make some changes      Objective     Review of Systems   Constitutional:  Negative for chills and fever.   HENT:  Negative for congestion.    Eyes:  Negative for blurred vision.   Respiratory:  Negative for cough.    Cardiovascular:  Negative for chest pain.   Gastrointestinal:  Negative for abdominal pain, constipation, diarrhea, heartburn, nausea and vomiting.   Genitourinary:  Negative for dysuria.   Musculoskeletal:  Negative for myalgias.   Skin:  Negative for itching and rash.   Neurological:  Negative for dizziness and headaches.   Psychiatric/Behavioral:  Negative for depression.      BP (!) 144/80   Pulse 70   Temp 97.9 °F (36.6 °C)   Ht 5' 6" (1.676 m)   Wt 106.7 kg (235 lb 3.7 oz)   SpO2 96%   BMI 37.97 kg/m²   Physical Exam  Constitutional:       Appearance: Normal appearance. She is well-developed. She is obese.   HENT:      Head: Normocephalic and atraumatic.      Nose: Nose normal.   Eyes:      Extraocular Movements: Extraocular movements intact.      Conjunctiva/sclera: Conjunctivae normal.      " Pupils: Pupils are equal, round, and reactive to light.   Cardiovascular:      Rate and Rhythm: Normal rate and regular rhythm.      Pulses: Normal pulses.      Heart sounds: Normal heart sounds. No murmur heard.     No friction rub. No gallop.   Pulmonary:      Effort: Pulmonary effort is normal. No respiratory distress.      Breath sounds: Normal breath sounds.   Abdominal:      General: Bowel sounds are normal. There is no distension.      Palpations: Abdomen is soft.      Tenderness: There is no abdominal tenderness.   Musculoskeletal:         General: Normal range of motion.      Cervical back: Normal range of motion and neck supple.   Lymphadenopathy:      Cervical: No cervical adenopathy.   Skin:     General: Skin is warm.   Neurological:      Mental Status: She is alert and oriented to person, place, and time.   Psychiatric:         Mood and Affect: Mood normal.             Assessment     Health Maintenance         Date Due Completion Date    Shingles Vaccine (1 of 2) Never done ---    RSV Vaccine (Age 60+ and Pregnant patients) (1 - 1-dose 60+ series) Never done ---    COVID-19 Vaccine (9 - 2023-24 season) 09/01/2023 8/19/2022    Mammogram 07/27/2024 7/27/2023    Hemoglobin A1c (Diabetic Prevention Screening) 12/17/2024 12/17/2021    DEXA Scan 06/30/2026 6/30/2022    Lipid Panel 06/07/2027 6/7/2022    Colorectal Cancer Screening 02/26/2028 2/26/2021    TETANUS VACCINE 12/11/2029 12/11/2019    Override on 4/7/2017: Declined              Problem List Items Addressed This Visit          Cardiac/Vascular    HTN (hypertension)  The current medical regimen is effective;  continue present plan and medications.   Consider ARB  Continue to monitor bp at home       Pulmonary hypertension    Overview     2d echo 8/2022  · The estimated PA systolic pressure is 33 mmHg.  · There is mild pulmonary hypertension  Asymptomatic   Continue f/u with cardiology                   Endocrine    Postoperative hypothyroidism     Relevant Orders    CBC Auto Differential    Comprehensive Metabolic Panel    Lipid Panel    Hemoglobin A1C    TSH  The current medical regimen is effective;  continue present plan and medications.       Severe obesity (BMI 35.0-39.9) with comorbidity    Overview     -trying to be more active.              GI    GERD (gastroesophageal reflux disease)  The current medical regimen is effective;  continue present plan and medications.        Other Visit Diagnoses       Annual physical exam    -  Primary  I addressed all major concerns as it related to health maintenance.  All were ordered and scheduled based on the patients wishes.  Any additional health maintenance will be readdressed at the next physical if declined or deferred by the patient.       Abnormal finding of blood chemistry, unspecified        Relevant Orders    Hemoglobin A1C                  No follow-ups on file.

## 2024-02-28 ENCOUNTER — LAB VISIT (OUTPATIENT)
Dept: LAB | Facility: HOSPITAL | Age: 69
End: 2024-02-28
Attending: FAMILY MEDICINE
Payer: MEDICARE

## 2024-02-28 DIAGNOSIS — R79.9 ABNORMAL FINDING OF BLOOD CHEMISTRY, UNSPECIFIED: ICD-10-CM

## 2024-02-28 DIAGNOSIS — E89.0 POSTOPERATIVE HYPOTHYROIDISM: ICD-10-CM

## 2024-02-28 LAB
ALBUMIN SERPL BCP-MCNC: 3.4 G/DL (ref 3.5–5.2)
ALP SERPL-CCNC: 94 U/L (ref 55–135)
ALT SERPL W/O P-5'-P-CCNC: 18 U/L (ref 10–44)
ANION GAP SERPL CALC-SCNC: 12 MMOL/L (ref 8–16)
AST SERPL-CCNC: 17 U/L (ref 10–40)
BASOPHILS # BLD AUTO: 0.01 K/UL (ref 0–0.2)
BASOPHILS NFR BLD: 0.2 % (ref 0–1.9)
BILIRUB SERPL-MCNC: 0.5 MG/DL (ref 0.1–1)
BUN SERPL-MCNC: 16 MG/DL (ref 8–23)
CALCIUM SERPL-MCNC: 9.4 MG/DL (ref 8.7–10.5)
CHLORIDE SERPL-SCNC: 105 MMOL/L (ref 95–110)
CHOLEST SERPL-MCNC: 182 MG/DL (ref 120–199)
CHOLEST/HDLC SERPL: 3.5 {RATIO} (ref 2–5)
CO2 SERPL-SCNC: 22 MMOL/L (ref 23–29)
CREAT SERPL-MCNC: 0.8 MG/DL (ref 0.5–1.4)
DIFFERENTIAL METHOD BLD: NORMAL
EOSINOPHIL # BLD AUTO: 0.1 K/UL (ref 0–0.5)
EOSINOPHIL NFR BLD: 1.5 % (ref 0–8)
ERYTHROCYTE [DISTWIDTH] IN BLOOD BY AUTOMATED COUNT: 13.7 % (ref 11.5–14.5)
EST. GFR  (NO RACE VARIABLE): >60 ML/MIN/1.73 M^2
ESTIMATED AVG GLUCOSE: 114 MG/DL (ref 68–131)
GLUCOSE SERPL-MCNC: 91 MG/DL (ref 70–110)
HBA1C MFR BLD: 5.6 % (ref 4–5.6)
HCT VFR BLD AUTO: 42.2 % (ref 37–48.5)
HDLC SERPL-MCNC: 52 MG/DL (ref 40–75)
HDLC SERPL: 28.6 % (ref 20–50)
HGB BLD-MCNC: 13.6 G/DL (ref 12–16)
IMM GRANULOCYTES # BLD AUTO: 0 K/UL (ref 0–0.04)
IMM GRANULOCYTES NFR BLD AUTO: 0 % (ref 0–0.5)
LDLC SERPL CALC-MCNC: 116.6 MG/DL (ref 63–159)
LYMPHOCYTES # BLD AUTO: 1.8 K/UL (ref 1–4.8)
LYMPHOCYTES NFR BLD: 43.3 % (ref 18–48)
MCH RBC QN AUTO: 28.9 PG (ref 27–31)
MCHC RBC AUTO-ENTMCNC: 32.2 G/DL (ref 32–36)
MCV RBC AUTO: 90 FL (ref 82–98)
MONOCYTES # BLD AUTO: 0.4 K/UL (ref 0.3–1)
MONOCYTES NFR BLD: 9.4 % (ref 4–15)
NEUTROPHILS # BLD AUTO: 1.9 K/UL (ref 1.8–7.7)
NEUTROPHILS NFR BLD: 45.6 % (ref 38–73)
NONHDLC SERPL-MCNC: 130 MG/DL
NRBC BLD-RTO: 0 /100 WBC
PLATELET # BLD AUTO: 221 K/UL (ref 150–450)
PMV BLD AUTO: 11.8 FL (ref 9.2–12.9)
POTASSIUM SERPL-SCNC: 4.1 MMOL/L (ref 3.5–5.1)
PROT SERPL-MCNC: 7.5 G/DL (ref 6–8.4)
RBC # BLD AUTO: 4.7 M/UL (ref 4–5.4)
SODIUM SERPL-SCNC: 139 MMOL/L (ref 136–145)
T4 FREE SERPL-MCNC: 1.03 NG/DL (ref 0.71–1.51)
TRIGL SERPL-MCNC: 67 MG/DL (ref 30–150)
TSH SERPL DL<=0.005 MIU/L-ACNC: 5.7 UIU/ML (ref 0.4–4)
WBC # BLD AUTO: 4.06 K/UL (ref 3.9–12.7)

## 2024-02-28 PROCEDURE — 84443 ASSAY THYROID STIM HORMONE: CPT | Performed by: FAMILY MEDICINE

## 2024-02-28 PROCEDURE — 80053 COMPREHEN METABOLIC PANEL: CPT | Performed by: FAMILY MEDICINE

## 2024-02-28 PROCEDURE — 80061 LIPID PANEL: CPT | Performed by: FAMILY MEDICINE

## 2024-02-28 PROCEDURE — 83036 HEMOGLOBIN GLYCOSYLATED A1C: CPT | Performed by: FAMILY MEDICINE

## 2024-02-28 PROCEDURE — 36415 COLL VENOUS BLD VENIPUNCTURE: CPT | Mod: PO | Performed by: FAMILY MEDICINE

## 2024-02-28 PROCEDURE — 84439 ASSAY OF FREE THYROXINE: CPT | Performed by: FAMILY MEDICINE

## 2024-02-28 PROCEDURE — 85025 COMPLETE CBC W/AUTO DIFF WBC: CPT | Performed by: FAMILY MEDICINE

## 2024-03-04 ENCOUNTER — PATIENT MESSAGE (OUTPATIENT)
Dept: ADMINISTRATIVE | Facility: HOSPITAL | Age: 69
End: 2024-03-04
Payer: COMMERCIAL

## 2024-03-05 ENCOUNTER — PATIENT MESSAGE (OUTPATIENT)
Dept: ADMINISTRATIVE | Facility: HOSPITAL | Age: 69
End: 2024-03-05
Payer: COMMERCIAL

## 2024-03-05 ENCOUNTER — PATIENT OUTREACH (OUTPATIENT)
Dept: ADMINISTRATIVE | Facility: HOSPITAL | Age: 69
End: 2024-03-05
Payer: COMMERCIAL

## 2024-03-06 ENCOUNTER — PATIENT MESSAGE (OUTPATIENT)
Dept: PULMONOLOGY | Facility: CLINIC | Age: 69
End: 2024-03-06
Payer: COMMERCIAL

## 2024-03-06 ENCOUNTER — PATIENT MESSAGE (OUTPATIENT)
Dept: ENDOCRINOLOGY | Facility: CLINIC | Age: 69
End: 2024-03-06
Payer: COMMERCIAL

## 2024-03-06 DIAGNOSIS — E89.0 POSTOPERATIVE HYPOTHYROIDISM: ICD-10-CM

## 2024-03-06 RX ORDER — LEVOTHYROXINE SODIUM 125 UG/1
TABLET ORAL
Qty: 102 TABLET | Refills: 0 | Status: SHIPPED | OUTPATIENT
Start: 2024-03-11 | End: 2024-04-09

## 2024-03-06 RX ORDER — VALSARTAN 80 MG/1
80 TABLET ORAL DAILY
Qty: 30 TABLET | Refills: 3 | Status: SHIPPED | OUTPATIENT
Start: 2024-03-06 | End: 2025-03-06

## 2024-04-02 ENCOUNTER — PATIENT MESSAGE (OUTPATIENT)
Dept: ENDOCRINOLOGY | Facility: CLINIC | Age: 69
End: 2024-04-02
Payer: COMMERCIAL

## 2024-04-02 DIAGNOSIS — E89.0 POSTOPERATIVE HYPOTHYROIDISM: Primary | ICD-10-CM

## 2024-04-08 ENCOUNTER — LAB VISIT (OUTPATIENT)
Dept: LAB | Facility: HOSPITAL | Age: 69
End: 2024-04-08
Attending: INTERNAL MEDICINE
Payer: MEDICARE

## 2024-04-08 DIAGNOSIS — E89.0 POSTOPERATIVE HYPOTHYROIDISM: ICD-10-CM

## 2024-04-08 LAB
T4 FREE SERPL-MCNC: 1.55 NG/DL (ref 0.71–1.51)
TSH SERPL DL<=0.005 MIU/L-ACNC: <0.01 UIU/ML (ref 0.4–4)

## 2024-04-08 PROCEDURE — 36415 COLL VENOUS BLD VENIPUNCTURE: CPT | Mod: PO | Performed by: INTERNAL MEDICINE

## 2024-04-08 PROCEDURE — 84443 ASSAY THYROID STIM HORMONE: CPT | Performed by: INTERNAL MEDICINE

## 2024-04-08 PROCEDURE — 84439 ASSAY OF FREE THYROXINE: CPT | Performed by: INTERNAL MEDICINE

## 2024-04-09 ENCOUNTER — PATIENT MESSAGE (OUTPATIENT)
Dept: ENDOCRINOLOGY | Facility: CLINIC | Age: 69
End: 2024-04-09
Payer: COMMERCIAL

## 2024-04-09 DIAGNOSIS — E89.0 POSTOPERATIVE HYPOTHYROIDISM: ICD-10-CM

## 2024-04-09 RX ORDER — LEVOTHYROXINE SODIUM 125 UG/1
125 TABLET ORAL
Qty: 90 TABLET | Refills: 3 | Status: SHIPPED | OUTPATIENT
Start: 2024-04-09

## 2024-04-11 ENCOUNTER — OFFICE VISIT (OUTPATIENT)
Dept: BARIATRICS | Facility: CLINIC | Age: 69
End: 2024-04-11
Payer: MEDICARE

## 2024-04-11 VITALS
SYSTOLIC BLOOD PRESSURE: 126 MMHG | WEIGHT: 236.31 LBS | OXYGEN SATURATION: 99 % | BODY MASS INDEX: 38.14 KG/M2 | HEART RATE: 68 BPM | DIASTOLIC BLOOD PRESSURE: 82 MMHG

## 2024-04-11 DIAGNOSIS — I10 PRIMARY HYPERTENSION: ICD-10-CM

## 2024-04-11 DIAGNOSIS — G47.33 OSA ON CPAP: ICD-10-CM

## 2024-04-11 DIAGNOSIS — E66.01 CLASS 2 SEVERE OBESITY DUE TO EXCESS CALORIES WITH SERIOUS COMORBIDITY AND BODY MASS INDEX (BMI) OF 38.0 TO 38.9 IN ADULT: Primary | ICD-10-CM

## 2024-04-11 DIAGNOSIS — Z71.3 ENCOUNTER FOR WEIGHT LOSS COUNSELING: ICD-10-CM

## 2024-04-11 PROCEDURE — 99213 OFFICE O/P EST LOW 20 MIN: CPT | Mod: S$PBB,,, | Performed by: STUDENT IN AN ORGANIZED HEALTH CARE EDUCATION/TRAINING PROGRAM

## 2024-04-11 PROCEDURE — 99999 PR PBB SHADOW E&M-EST. PATIENT-LVL IV: CPT | Mod: PBBFAC,,, | Performed by: STUDENT IN AN ORGANIZED HEALTH CARE EDUCATION/TRAINING PROGRAM

## 2024-04-11 PROCEDURE — 99214 OFFICE O/P EST MOD 30 MIN: CPT | Mod: PBBFAC | Performed by: STUDENT IN AN ORGANIZED HEALTH CARE EDUCATION/TRAINING PROGRAM

## 2024-04-11 RX ORDER — CELECOXIB 200 MG/1
200 CAPSULE ORAL 3 TIMES DAILY
COMMUNITY
Start: 2023-11-27

## 2024-04-11 NOTE — PROGRESS NOTES
Subjective:       Patient ID: Dain Phelps is a 68 y.o. female.    Chief Complaint: Follow-up, Obesity, and Weight Check    Patient presents for treatment of obesity.     Co-morbidities  HTN  LORRAINE on CPAP  Hypothroidism   GERD    Negative for thyroid cancer  Negative for kidney stones  Negative for glaucoma    Current Physical Activity  Walking 2 miles 4x/week  [employer] walking videos    Current Eating Habits  Breakfast - coffee with cream and sugar  Lunch - salad, may have full meal (meat, rice or pasta, vegetable)  Dinner - full meal  Snacks - night time snacker  Beverages - water, soft drinks    Medical Weight Loss  3/23/2023: 235.2 lbs, BMI 38, BFP 51.1%, .1 lbs, SMM 63.1 lbs, BMR 1498 kcal  6/12/2023: 226.4 lbs, BMI 36.6, BFP 51%, .5 lbs, SMM 60.8 lbs, BMR 1456 kcal  9/12/2023: 232.3 lbs, BMI 37.5, BFP 52%,  lbs, SMM 61.5 lbs, BMR 1462 kcal  12/12/2023: 230.5 lbs, BMI 37.2, BFP 50.9%, .4 lbs, SMM 62.2 lbs, BMR 1478 kcal  4/11/2024: 236.3 lbs, BMI 38.2, BFP 53.1%, .6 lbs, SMM 60.4 lbs, BMR 1455 kcal    Has switched to brown rice, whole wheat pasta and bread  Drinking 2 bottles of water, avoiding soft drinks        Review of Systems   Constitutional:  Negative for chills and fever.   Respiratory:  Negative for shortness of breath.    Cardiovascular:  Negative for chest pain and palpitations.   Gastrointestinal:  Negative for abdominal pain, nausea and vomiting.   Neurological:  Negative for dizziness and light-headedness.   Psychiatric/Behavioral:  The patient is not nervous/anxious.          Objective:       Latest Reference Range & Units 02/28/24 11:37 04/08/24 11:32   WBC 3.90 - 12.70 K/uL 4.06    RBC 4.00 - 5.40 M/uL 4.70    Hemoglobin 12.0 - 16.0 g/dL 13.6    Hematocrit 37.0 - 48.5 % 42.2    MCV 82 - 98 fL 90    MCH 27.0 - 31.0 pg 28.9    MCHC 32.0 - 36.0 g/dL 32.2    RDW 11.5 - 14.5 % 13.7    Platelet Count 150 - 450 K/uL 221    MPV 9.2 - 12.9 fL 11.8    Gran % 38.0 -  73.0 % 45.6    Lymph % 18.0 - 48.0 % 43.3    Mono % 4.0 - 15.0 % 9.4    Eos % 0.0 - 8.0 % 1.5    Basophil % 0.0 - 1.9 % 0.2    Immature Granulocytes 0.0 - 0.5 % 0.0    Gran # (ANC) 1.8 - 7.7 K/uL 1.9    Lymph # 1.0 - 4.8 K/uL 1.8    Mono # 0.3 - 1.0 K/uL 0.4    Eos # 0.0 - 0.5 K/uL 0.1    Baso # 0.00 - 0.20 K/uL 0.01    Immature Grans (Abs) 0.00 - 0.04 K/uL 0.00    nRBC 0 /100 WBC 0    Differential Method  Automated    Sodium 136 - 145 mmol/L 139    Potassium 3.5 - 5.1 mmol/L 4.1    Chloride 95 - 110 mmol/L 105    CO2 23 - 29 mmol/L 22 (L)    Anion Gap 8 - 16 mmol/L 12    BUN 8 - 23 mg/dL 16    Creatinine 0.5 - 1.4 mg/dL 0.8    eGFR >60 mL/min/1.73 m^2 >60.0    Glucose 70 - 110 mg/dL 91    Calcium 8.7 - 10.5 mg/dL 9.4    ALP 55 - 135 U/L 94    PROTEIN TOTAL 6.0 - 8.4 g/dL 7.5    Albumin 3.5 - 5.2 g/dL 3.4 (L)    BILIRUBIN TOTAL 0.1 - 1.0 mg/dL 0.5    AST 10 - 40 U/L 17    ALT 10 - 44 U/L 18    Cholesterol Total 120 - 199 mg/dL 182    HDL 40 - 75 mg/dL 52    HDL/Cholesterol Ratio 20.0 - 50.0 % 28.6    Non-HDL Cholesterol mg/dL 130    Total Cholesterol/HDL Ratio 2.0 - 5.0  3.5    Triglycerides 30 - 150 mg/dL 67    LDL Cholesterol 63.0 - 159.0 mg/dL 116.6    Hemoglobin A1C External 4.0 - 5.6 % 5.6    Estimated Avg Glucose 68 - 131 mg/dL 114    TSH 0.400 - 4.000 uIU/mL 5.702 (H) <0.010 (L)   Free T4 0.71 - 1.51 ng/dL 1.03 1.55 (H)   (L): Data is abnormally low  (H): Data is abnormally high      Vitals:    04/11/24 1423   BP: 126/82   Pulse: 68           Physical Exam  Vitals reviewed.   Constitutional:       General: She is not in acute distress.     Appearance: Normal appearance. She is obese. She is not ill-appearing, toxic-appearing or diaphoretic.   HENT:      Head: Normocephalic and atraumatic.   Cardiovascular:      Rate and Rhythm: Normal rate.   Pulmonary:      Effort: Pulmonary effort is normal. No respiratory distress.   Skin:     General: Skin is warm and dry.   Neurological:      Mental Status: She is alert  and oriented to person, place, and time.         Assessment:       Problem List Items Addressed This Visit       HTN (hypertension)    Relevant Orders    Ambulatory referral/consult to [facility] ([facility])    LORRAINE on CPAP    Relevant Orders    Ambulatory referral/consult to [facility] ([facility])     Other Visit Diagnoses       Class 2 severe obesity due to excess calories with serious comorbidity and body mass index (BMI) of 38.0 to 38.9 in adult    -  Primary    Relevant Orders    Ambulatory referral/consult to [facility] ([facility])    Encounter for weight loss counseling                        Plan:   - Referral to [facility] (nutrition)    - Log all food and beverage intake with a daily calorie goal of 1200 calories per day    - Low intensity aerobic exercise for 15-30 minutes 3-5x/week

## 2024-05-24 ENCOUNTER — OFFICE VISIT (OUTPATIENT)
Dept: URGENT CARE | Facility: CLINIC | Age: 69
End: 2024-05-24
Payer: MEDICARE

## 2024-05-24 VITALS
WEIGHT: 234 LBS | HEART RATE: 75 BPM | DIASTOLIC BLOOD PRESSURE: 77 MMHG | OXYGEN SATURATION: 97 % | BODY MASS INDEX: 37.61 KG/M2 | HEIGHT: 66 IN | RESPIRATION RATE: 19 BRPM | SYSTOLIC BLOOD PRESSURE: 131 MMHG | TEMPERATURE: 99 F

## 2024-05-24 DIAGNOSIS — M54.50 ACUTE LEFT-SIDED LOW BACK PAIN, UNSPECIFIED WHETHER SCIATICA PRESENT: ICD-10-CM

## 2024-05-24 DIAGNOSIS — N30.01 ACUTE CYSTITIS WITH HEMATURIA: Primary | ICD-10-CM

## 2024-05-24 LAB
BILIRUB UR QL STRIP: NEGATIVE
GLUCOSE UR QL STRIP: NEGATIVE
KETONES UR QL STRIP: NEGATIVE
LEUKOCYTE ESTERASE UR QL STRIP: NEGATIVE
PH, POC UA: 5
POC BLOOD, URINE: POSITIVE
POC NITRATES, URINE: NEGATIVE
PROT UR QL STRIP: NEGATIVE
SP GR UR STRIP: 1.02 (ref 1–1.03)
UROBILINOGEN UR STRIP-ACNC: ABNORMAL (ref 0.1–1.1)

## 2024-05-24 PROCEDURE — 99214 OFFICE O/P EST MOD 30 MIN: CPT | Mod: S$GLB,,, | Performed by: NURSE PRACTITIONER

## 2024-05-24 PROCEDURE — 87086 URINE CULTURE/COLONY COUNT: CPT | Performed by: NURSE PRACTITIONER

## 2024-05-24 PROCEDURE — 81003 URINALYSIS AUTO W/O SCOPE: CPT | Mod: QW,S$GLB,, | Performed by: NURSE PRACTITIONER

## 2024-05-24 RX ORDER — PHENAZOPYRIDINE HYDROCHLORIDE 200 MG/1
200 TABLET, FILM COATED ORAL 3 TIMES DAILY PRN
Qty: 6 TABLET | Refills: 0 | Status: SHIPPED | OUTPATIENT
Start: 2024-05-24 | End: 2024-05-26

## 2024-05-24 RX ORDER — NITROFURANTOIN 25; 75 MG/1; MG/1
100 CAPSULE ORAL 2 TIMES DAILY
Qty: 10 CAPSULE | Refills: 0 | Status: SHIPPED | OUTPATIENT
Start: 2024-05-24 | End: 2024-06-06

## 2024-05-25 NOTE — PATIENT INSTRUCTIONS
Discharge instructions for UTI  Start Macrobid as prescribed  Urine culture submitted, results in 48 hours  Push Fluids    What care is needed at home?   Ask your doctor what you need to do when you go home. Make sure you ask questions if you do not understand what the doctor says.  For the first day or so, you may want to take an over-the-counter medicine, like phenazopyridine. This will help to numb your bladder. You will also not have the strong urge to urinate. This medicine causes your urine and tears to look orange. If you have kidney disease, talk to your doctor before taking this medicine.  To lower your chance of getting a UTI in the future, you can:  Drink extra fluids.  If you have sex, urinate right afterwards.  Apply a warm compress or warm water bottle to your lower belly to lessen pain.  Practice good hygiene. Wipe from front to back after going to the toilet.  Do not use scented tampons, soap, or toilet paper.  Keep your genital area clean. Wash daily with soap and water.  Take showers instead of tub baths.  Do not use douches or genital hygiene sprays.    1) See orders for this visit as documented in the electronic medical record.  2) Symptomatic therapy suggested: use acetaminophen/ibuprofen every 6-8 hours prn pain or fever, push fluids.   3) Call or return to clinic prn if these symptoms worsen or fail to improve as anticipated.    Discussed results/diagnosis/plan with patient in clinic.  We had shared decision making for patient's treatment. Patient verbalized understanding and in agreement with current treatment plan.     Patient was instructed to return for re-evaluation with urgent care or PCP for continued outpatient workup and management if symptoms do not improve/worsening symptoms. Strict ED versus clinic precautions given in depth.    Discharge and follow-up instructions given verbally/printed with the patient who expressed understanding. The instructions and results are also available  on MyChart.      - You must understand that you have received an Urgent Care treatment only and that you may be released before all of your medical problems are known or treated.   - You, the patient, will arrange for follow up care as instructed.   - Follow up with your PCP or specialty clinic as directed in the next 1-2 weeks if not improved or as needed.  You can call [phone] to schedule an appointment with the appropriate provider.   - If your condition worsens or fails to improve we recommend that you receive another evaluation at the ER immediately or contact your PCP to discuss your concerns or return here.        JOE Parker

## 2024-05-25 NOTE — PROGRESS NOTES
"Subjective:      Patient ID: Dain Phelps is a 68 y.o. female.    Vitals:  height is 5' 6" (1.676 m) and weight is 106.1 kg (234 lb). Her oral temperature is 98.6 °F (37 °C). Her blood pressure is 131/77 and her pulse is 75. Her respiration is 19 and oxygen saturation is 97%.     Chief Complaint: Back Pain    Pt is here for lower back left side pain. Pt says this started this morning. Pt treated with tylenol and heating pad but it hasn't gotten any better throughout the day.    Back Pain  This is a new problem. The problem occurs constantly. The problem is unchanged. The quality of the pain is described as aching. The pain does not radiate. The pain is at a severity of 10/10. The pain is severe. The pain is The same all the time. Associated symptoms include abdominal pain (Left lower abdomen intermittent discomfort). Pertinent negatives include no dysuria, fever or headaches. She has tried heat (tylenol) for the symptoms. The treatment provided no relief.     Constitution: Negative for chills and fever.   Gastrointestinal:  Positive for abdominal pain (Left lower abdomen intermittent discomfort).   Genitourinary:  Negative for dysuria, frequency, urine decreased and flank pain.   Musculoskeletal:  Positive for back pain.   Neurological:  Negative for dizziness and headaches.      Objective:     Physical Exam   Constitutional: She is oriented to person, place, and time. She appears well-developed. She is cooperative. awake  HENT:   Head: Normocephalic and atraumatic.   Ears:   Right Ear: Hearing and external ear normal.   Left Ear: Hearing and external ear normal.   Nose: Nose normal.   Mouth/Throat: Uvula is midline, oropharynx is clear and moist and mucous membranes are normal. Mucous membranes are moist. Oropharynx is clear.   Eyes: Conjunctivae, EOM and lids are normal. Pupils are equal, round, and reactive to light.   Neck: Trachea normal and phonation normal. Neck supple. No thyromegaly present. "   Cardiovascular: Normal rate, regular rhythm, S1 normal, S2 normal, normal heart sounds and normal pulses.   Pulmonary/Chest: Effort normal and breath sounds normal.   Abdominal: Bowel sounds are normal. Soft. flat abdomen There is abdominal tenderness in the left lower quadrant. There is left CVA tenderness.   Musculoskeletal: Normal range of motion.         General: Normal range of motion.      Right lower leg: No edema.      Left lower leg: No edema.   Lymphadenopathy:     She has no cervical adenopathy.   Neurological: no focal deficit. She is alert and oriented to person, place, and time.   Skin: Skin is warm, dry and intact. Capillary refill takes less than 2 seconds.   Psychiatric: Her speech is normal and behavior is normal. Mood, judgment and thought content normal.   Nursing note and vitals reviewed.      Results for orders placed or performed in visit on 05/24/24   CULTURE, URINE    Specimen: Urine, Clean Catch   Result Value Ref Range    Urine Culture, Routine No growth    POCT Urinalysis, Dipstick, Automated, W/O Scope   Result Value Ref Range    POC Blood, Urine Positive (A) Negative    POC Bilirubin, Urine Negative Negative    POC Urobilinogen, Urine norm 0.1 - 1.1    POC Ketones, Urine Negative Negative    POC Protein, Urine Negative Negative    POC Nitrates, Urine Negative Negative    POC Glucose, Urine Negative Negative    pH, UA 5.0     POC Specific Gravity, Urine 1.025 1.003 - 1.029    POC Leukocytes, Urine Negative Negative       Assessment:     1. Acute cystitis with hematuria    2. Acute left-sided low back pain, unspecified whether sciatica present        Plan:       Acute cystitis with hematuria  -     CULTURE, URINE    Acute left-sided low back pain, unspecified whether sciatica present  -     POCT Urinalysis, Dipstick, Automated, W/O Scope  -     CULTURE, URINE    Other orders  -     nitrofurantoin, macrocrystal-monohydrate, (MACROBID) 100 MG capsule; Take 1 capsule (100 mg total) by  mouth 2 (two) times daily. for 5 days  Dispense: 10 capsule; Refill: 0  -     phenazopyridine (PYRIDIUM) 200 MG tablet; Take 1 tablet (200 mg total) by mouth 3 (three) times daily as needed for Pain.  Dispense: 6 tablet; Refill: 0      Patient Instructions   Discharge instructions for UTI  Start Macrobid as prescribed  Urine culture submitted, results in 48 hours  Push Fluids    What care is needed at home?   Ask your doctor what you need to do when you go home. Make sure you ask questions if you do not understand what the doctor says.  For the first day or so, you may want to take an over-the-counter medicine, like phenazopyridine. This will help to numb your bladder. You will also not have the strong urge to urinate. This medicine causes your urine and tears to look orange. If you have kidney disease, talk to your doctor before taking this medicine.  To lower your chance of getting a UTI in the future, you can:  Drink extra fluids.  If you have sex, urinate right afterwards.  Apply a warm compress or warm water bottle to your lower belly to lessen pain.  Practice good hygiene. Wipe from front to back after going to the toilet.  Do not use scented tampons, soap, or toilet paper.  Keep your genital area clean. Wash daily with soap and water.  Take showers instead of tub baths.  Do not use douches or genital hygiene sprays.    1) See orders for this visit as documented in the electronic medical record.  2) Symptomatic therapy suggested: use acetaminophen/ibuprofen every 6-8 hours prn pain or fever, push fluids.   3) Call or return to clinic prn if these symptoms worsen or fail to improve as anticipated.    Discussed results/diagnosis/plan with patient in clinic.  We had shared decision making for patient's treatment. Patient verbalized understanding and in agreement with current treatment plan.     Patient was instructed to return for re-evaluation with urgent care or PCP for continued outpatient workup and  management if symptoms do not improve/worsening symptoms. Strict ED versus clinic precautions given in depth.    Discharge and follow-up instructions given verbally/printed with the patient who expressed understanding. The instructions and results are also available on eTelemetryt.      - You must understand that you have received an Urgent Care treatment only and that you may be released before all of your medical problems are known or treated.   - You, the patient, will arrange for follow up care as instructed.   - Follow up with your PCP or specialty clinic as directed in the next 1-2 weeks if not improved or as needed.  You can call [phone] to schedule an appointment with the appropriate provider.   - If your condition worsens or fails to improve we recommend that you receive another evaluation at the ER immediately or contact your PCP to discuss your concerns or return here.        JOE Parker

## 2024-05-26 LAB — BACTERIA UR CULT: NO GROWTH

## 2024-05-30 ENCOUNTER — OFFICE VISIT (OUTPATIENT)
Dept: ENDOCRINOLOGY | Facility: CLINIC | Age: 69
End: 2024-05-30
Payer: MEDICARE

## 2024-05-30 DIAGNOSIS — G47.33 OSA ON CPAP: ICD-10-CM

## 2024-05-30 DIAGNOSIS — E89.0 POSTOPERATIVE HYPOTHYROIDISM: Primary | ICD-10-CM

## 2024-05-30 DIAGNOSIS — E66.01 SEVERE OBESITY (BMI 35.0-39.9) WITH COMORBIDITY: ICD-10-CM

## 2024-05-30 PROCEDURE — 99214 OFFICE O/P EST MOD 30 MIN: CPT | Mod: 95,,, | Performed by: INTERNAL MEDICINE

## 2024-05-30 NOTE — ASSESSMENT & PLAN NOTE
Clinically having some symptoms that could be related to hyperthyroidism  Goal is a normal TSH  Repeat TSH will be done in one week  Continue levothyroxine 125 mcg once daily  Reviewed possibly changing to brand name Synthroid if levels continue to be labile on generic levothyroxine (would use [facility] mail order pharmacy)  Avoid exogenous hyperthyroidism as this can accelerate bone loss and increase risk of CV complications

## 2024-05-30 NOTE — PROGRESS NOTES
Subjective:      Patient ID: Dain Phelps is a 68 y.o. female.    Chief Complaint:  Hypothyroidism    The patient location is: Home  The chief complaint leading to consultation is: Hypothyroidism    Visit type: audiovisual    Face to Face time with patient: 10 min  20 minutes of total time spent on the encounter, which includes face to face time and non-face to face time preparing to see the patient (eg, review of tests), Obtaining and/or reviewing separately obtained history, Documenting clinical information in the electronic or other health record, Independently interpreting results (not separately reported) and communicating results to the patient/family/caregiver, or Care coordination (not separately reported).     Each patient to whom he or she provides medical services by telemedicine is:  (1) informed of the relationship between the physician and patient and the respective role of any other health care provider with respect to management of the patient; and (2) notified that he or she may decline to receive medical services by telemedicine and may withdraw from such care at any time.    History of Present Illness  Ms. Phelps presents for follow up of hypothyroidism. Last visit with Dr. Mcnally in 5/2023.     With regards to her postoperative hypothyroidism:  Initially seen her for MNG-- we did a FNA which was indeterminate and then she had a total thyroidectomy 2009 -- final path was benign         Current medication:  generic lt4 125 mcg daily (was taking an additional tablet one day per week prior to suppressed TSH in 4/2024)    Takes thyroid hormone appropriately on an empty stomach and hasn't missed any doses.     Was having significant palpitations when she was taking one extra tablet per week. She was also having BRO. This has improved with the does being lowered but still present.       Latest Reference Range & Units 02/28/24 11:37 04/08/24 11:32   TSH 0.400 - 4.000 uIU/mL 5.702 (H) <0.010 (L)      Has gained 10 lbs in the past few months.      With regards to the vitamin d deficiency:      Takes otc vitamin d3 2000 iu daily      BMD 5/13/19  Normal bone mineral density.     With regards to obesity, There is no height or weight on file to calculate BMI.,   Denies symptoms of hyperglycemia such as polyuria, polydipsia, nocturia, unexplained weight loss or blurred vision    She follows with bariatric medicine. Was taking Ozempic but d/c'd as it was ineffective.     +FH of T2DM      Does snore - has christine - did see the sleep physician in August of 2022- on CPAP    Review of Systems   Constitutional:  Negative for chills and fever.   Gastrointestinal:  Negative for nausea.       Objective:   Physical Exam  Constitutional:       General: She is not in acute distress.     Appearance: Normal appearance. She is not ill-appearing.   Neurological:      Mental Status: She is alert.       BP Readings from Last 3 Encounters:   05/24/24 131/77   04/11/24 126/82   02/27/24 (!) 144/80     Wt Readings from Last 1 Encounters:   05/24/24 1925 106.1 kg (234 lb)       There is no height or weight on file to calculate BMI.    Lab Review:   Lab Results   Component Value Date    HGBA1C 5.6 02/28/2024     Lab Results   Component Value Date    CHOL 182 02/28/2024    HDL 52 02/28/2024    LDLCALC 116.6 02/28/2024    TRIG 67 02/28/2024    CHOLHDL 28.6 02/28/2024     Lab Results   Component Value Date     02/28/2024    K 4.1 02/28/2024     02/28/2024    CO2 22 (L) 02/28/2024    GLU 91 02/28/2024    BUN 16 02/28/2024    CREATININE 0.8 02/28/2024    CALCIUM 9.4 02/28/2024    PROT 7.5 02/28/2024    ALBUMIN 3.4 (L) 02/28/2024    BILITOT 0.5 02/28/2024    ALKPHOS 94 02/28/2024    AST 17 02/28/2024    ALT 18 02/28/2024    ANIONGAP 12 02/28/2024    ESTGFRAFRICA >60 06/07/2022    EGFRNONAA >60 06/07/2022    TSH <0.010 (L) 04/08/2024         Assessment and Plan     Postoperative hypothyroidism  Clinically having some symptoms that  could be related to hyperthyroidism  Goal is a normal TSH  Repeat TSH will be done in one week  Continue levothyroxine 125 mcg once daily  Reviewed possibly changing to brand name Synthroid if levels continue to be labile on generic levothyroxine (would use [facility])  Avoid exogenous hyperthyroidism as this can accelerate bone loss and increase risk of CV complications      LORRAINE on CPAP  --Compliant with CPAP    Severe obesity (BMI 35.0-39.9) with comorbidity  --Follows with bariatric medicine  --Was on Ozempic but stopped as it was ineffective      TSH in 1 week, follow up in one year      [place] M.D. Staff Endocrinology

## 2024-06-05 ENCOUNTER — PATIENT OUTREACH (OUTPATIENT)
Dept: ADMINISTRATIVE | Facility: HOSPITAL | Age: 69
End: 2024-06-05
Payer: COMMERCIAL

## 2024-06-05 ENCOUNTER — LAB VISIT (OUTPATIENT)
Dept: LAB | Facility: HOSPITAL | Age: 69
End: 2024-06-05
Attending: INTERNAL MEDICINE
Payer: MEDICARE

## 2024-06-05 ENCOUNTER — PATIENT MESSAGE (OUTPATIENT)
Dept: ADMINISTRATIVE | Facility: HOSPITAL | Age: 69
End: 2024-06-05
Payer: COMMERCIAL

## 2024-06-05 ENCOUNTER — OFFICE VISIT (OUTPATIENT)
Dept: FAMILY MEDICINE | Facility: CLINIC | Age: 69
End: 2024-06-05
Payer: MEDICARE

## 2024-06-05 VITALS — SYSTOLIC BLOOD PRESSURE: 122 MMHG | DIASTOLIC BLOOD PRESSURE: 73 MMHG

## 2024-06-05 VITALS
DIASTOLIC BLOOD PRESSURE: 70 MMHG | HEART RATE: 77 BPM | OXYGEN SATURATION: 96 % | WEIGHT: 241.5 LBS | BODY MASS INDEX: 38.98 KG/M2 | TEMPERATURE: 98 F | SYSTOLIC BLOOD PRESSURE: 120 MMHG

## 2024-06-05 DIAGNOSIS — I10 PRIMARY HYPERTENSION: ICD-10-CM

## 2024-06-05 DIAGNOSIS — K21.9 GASTROESOPHAGEAL REFLUX DISEASE, UNSPECIFIED WHETHER ESOPHAGITIS PRESENT: ICD-10-CM

## 2024-06-05 DIAGNOSIS — R52 GENERALIZED BODY ACHES: Primary | ICD-10-CM

## 2024-06-05 DIAGNOSIS — E89.0 POSTOPERATIVE HYPOTHYROIDISM: ICD-10-CM

## 2024-06-05 DIAGNOSIS — Z86.19 HISTORY OF HELICOBACTER PYLORI INFECTION: ICD-10-CM

## 2024-06-05 DIAGNOSIS — Z87.440 HISTORY OF UTI: ICD-10-CM

## 2024-06-05 LAB — TSH SERPL DL<=0.005 MIU/L-ACNC: 1.15 UIU/ML (ref 0.4–4)

## 2024-06-05 PROCEDURE — 99215 OFFICE O/P EST HI 40 MIN: CPT | Mod: PBBFAC,PO

## 2024-06-05 PROCEDURE — 99999 PR PBB SHADOW E&M-EST. PATIENT-LVL V: CPT | Mod: PBBFAC,,,

## 2024-06-05 PROCEDURE — 84443 ASSAY THYROID STIM HORMONE: CPT | Performed by: INTERNAL MEDICINE

## 2024-06-05 PROCEDURE — 99214 OFFICE O/P EST MOD 30 MIN: CPT | Mod: S$PBB,,,

## 2024-06-05 PROCEDURE — 36415 COLL VENOUS BLD VENIPUNCTURE: CPT | Mod: PO | Performed by: INTERNAL MEDICINE

## 2024-06-05 RX ORDER — OMEPRAZOLE 20 MG/1
20 CAPSULE, DELAYED RELEASE ORAL DAILY
Qty: 90 CAPSULE | Refills: 3 | Status: SHIPPED | OUTPATIENT
Start: 2024-06-05 | End: 2025-06-05

## 2024-06-05 NOTE — PATIENT INSTRUCTIONS
Drop off stool sample prior to starting medication for reflux.    Lifestyle: Do not eat meal or drink carbonated beverages within 3 hours of bedtime. Decrease amount of friend, fatty, and spicy foods to decrease gastric acid production. Raise HOB 4 to 6 inches especially if nocturnal symptoms present. Lose weight. Avoid foods (chocolate, peppermint, high-fat foods, citrus fruits, spicy foods, tomatoes, coffee, caffeinated beverages). Decrease or eliminate NSAIDs, nicotine, and alcohol use. Try chewing gum to help increase salivation and neutralize acid.     Over-the-counter Antacids (Tums): 1 hour before meals, after meals, and at bedtime; rapid onset but short duration.     Pharm therapy:    Start omeprazole daily x 8 weeks then gradually decrease dose. Sudden withdrawal may cause rebound reflux.     Precautions: Long-term PPI use can lead to increased hip fracture risk in postmenopausal woman and increase risk for kidney damage/disease. H2 blockers safe with kidney damage.     Please complete labs at your convenience and schedule with rheumatology.

## 2024-06-05 NOTE — PROGRESS NOTES
"  HPI     Chief Complaint:  Chief Complaint   Patient presents with    Generalized Body Aches       Dain Phelps is a 68 y.o. female with multiple medical diagnoses as listed in the medical history and problem list that presents for   Chief Complaint   Patient presents with    Generalized Body Aches   .   Patient is not known to me with her last appointment in this department on 2/27/2024.      HPI    Stomach pain with nausea. Feels like food is staying in chest and not digesting well. Now having increased heart burn. Sleeps with CPAP machine and helps with heartburn. Heartburn is now regular and taking tums as needed. Having heartburn daily. Use to just be at night but now during the day. Denies difficulty swallowing.   Body aches/stiffness - intermittent for the past years but now constant and affecting quality of life. Daily body aches/stiffness mostly to back and lower legs. Warm showers help. No joint swelling. +lethargy (unsure if r/t to TSH)  TSH fluctuating - f/b endo, repeat TSH drawn today  Had UTI a few weeks ago, still having "kidney" pain to right side. Denies urinary s/s. Completed macrobid. Would like to recheck urine.     Other concerns below  Assessment & Plan       Problem List Items Addressed This Visit          Cardiac/Vascular    HTN (hypertension)    Current Assessment & Plan     The current medical regimen is effective;  continue present plan and medications.              Endocrine    Postoperative hypothyroidism    Current Assessment & Plan     F/b endo    Lab Results   Component Value Date    TSH 1.152 06/05/2024                 GI    GERD (gastroesophageal reflux disease)    Current Assessment & Plan     Will r/o H. Pylori prior to trial of PPI. Education provided. Diet/lifestyle modifications.     Omeprazole 20 mg x 8 weeks.          Relevant Medications    omeprazole (PRILOSEC) 20 MG capsule    Other Relevant Orders    H. pylori antigen, stool    BASIC METABOLIC PANEL (Completed) "     Other Visit Diagnoses       Generalized body aches    -  Primary  Labs today  F/u with rheumatology given abn labs previously and worsening s/s    Relevant Orders    C-REACTIVE PROTEIN (Completed)    Sedimentation rate (Completed)    Ambulatory referral/consult to Rheumatology    Rheumatoid Factor (Completed)    TRACE Screen w/Reflex    BASIC METABOLIC PANEL (Completed)    History of Helicobacter pylori infection      Stool sample prior to PPI trial  Consider GI referral  Last colonoscopy 2021    Relevant Medications    omeprazole (PRILOSEC) 20 MG capsule    Other Relevant Orders    H. pylori antigen, stool    History of UTI      Will check labs and UA     Relevant Orders    Urinalysis, Reflex to Urine Culture Urine, Clean Catch            --------------------------------------------      Health Maintenance:  Health Maintenance         Date Due Completion Date    Shingles Vaccine (1 of 2) Never done ---    RSV Vaccine (Age 60+ and Pregnant patients) (1 - 1-dose 60+ series) Never done ---    COVID-19 Vaccine (9 - 2023-24 season) 09/01/2023 8/19/2022    Mammogram 08/07/2024 (Originally 7/27/2024) 7/27/2023    DEXA Scan 06/30/2026 6/30/2022    Hemoglobin A1c (Diabetic Prevention Screening) 02/28/2027 2/28/2024    Colorectal Cancer Screening 02/26/2028 2/26/2021    Lipid Panel 02/28/2029 2/28/2024    TETANUS VACCINE 12/11/2029 12/11/2019    Override on 4/7/2017: Declined            Health maintenance reviewed    Follow Up:  Follow up if symptoms worsen or fail to improve.    Discussed DDx, condition, and treatment.   Education sent to patient portal/included in after visit summary.  ED precautions given.   Notify provider if symptoms do not resolve or increase in severity.   Patient verbalizes understanding and agrees with plan of care.      Exam     Review of Systems:  (as noted above)  Review of Systems    Physical Exam:   Physical Exam  Constitutional:       General: She is not in acute distress.     Appearance:  Normal appearance. She is obese. She is not toxic-appearing.   HENT:      Head: Normocephalic and atraumatic.   Cardiovascular:      Rate and Rhythm: Normal rate and regular rhythm.      Pulses: Normal pulses.      Heart sounds: No murmur heard.  Pulmonary:      Effort: Pulmonary effort is normal. No respiratory distress.      Breath sounds: Normal breath sounds.   Abdominal:      General: Bowel sounds are normal. There is no distension.      Palpations: Abdomen is soft. There is no mass.      Tenderness: There is no abdominal tenderness. There is right CVA tenderness. There is no left CVA tenderness.   Musculoskeletal:         General: Normal range of motion.      Cervical back: Normal range of motion. No rigidity.      Right lower leg: No edema.      Left lower leg: No edema.   Lymphadenopathy:      Cervical: No cervical adenopathy.   Skin:     General: Skin is warm.      Capillary Refill: Capillary refill takes less than 2 seconds.   Neurological:      General: No focal deficit present.      Mental Status: She is alert and oriented to person, place, and time.      Motor: No weakness.      Gait: Gait normal.   Psychiatric:         Mood and Affect: Mood normal.       Vitals:    24 1559   BP: 120/70   Pulse: 77   Temp: 98.1 °F (36.7 °C)   TempSrc: Oral   SpO2: 96%   Weight: 109.5 kg (241 lb 8.2 oz)      Body mass index is 38.98 kg/m².        History     Past Medical History:  Past Medical History:   Diagnosis Date    Chronic low back pain     Fatigue     GERD (gastroesophageal reflux disease)     History of colon polyps 2015    Hypertension     Hypothyroidism     Impaired glucose tolerance 1/15/2014    Leukopenia     Obesity (BMI 30-39.9) 1/15/2014    Primary hypothyroidism 2015    Snoring 2015    Vitamin D deficiency 1/15/2014       Past Surgical History:  Past Surgical History:   Procedure Laterality Date    BREAST BIOPSY Left     CATARACT EXTRACTION       SECTION, CLASSIC      x3     COLONOSCOPY N/A 2/26/2021    Procedure: COLONOSCOPY;  Surgeon: Parvez Mazariegos MD;  Location: [facility] ENDO;  Service: Endoscopy;  Laterality: N/A;  covid test 2/23 lapalco, instructions through [facility]    HYSTERECTOMY      total    OOPHORECTOMY      thyroid surgey  2009       Social History:  Social History     Socioeconomic History    Marital status:    Occupational History     Comment: retired   Tobacco Use    Smoking status: Never    Smokeless tobacco: Never   Substance and Sexual Activity    Alcohol use: No    Drug use: No    Sexual activity: Not Currently     Partners: Male     Social Determinants of Health     Financial Resource Strain: Low Risk  (11/27/2023)    Overall Financial Resource Strain (CARDIA)     Difficulty of Paying Living Expenses: Not hard at all   Food Insecurity: No Food Insecurity (11/27/2023)    Hunger Vital Sign     Worried About Running Out of Food in the Last Year: Never true     Ran Out of Food in the Last Year: Never true   Transportation Needs: No Transportation Needs (11/27/2023)    PRAPARE - Transportation     Lack of Transportation (Medical): No     Lack of Transportation (Non-Medical): No   Physical Activity: Sufficiently Active (11/27/2023)    Exercise Vital Sign     Days of Exercise per Week: 4 days     Minutes of Exercise per Session: 60 min   Stress: No Stress Concern Present (11/27/2023)    [employer] [place] [employer] - Occupational Stress Questionnaire     Feeling of Stress : Not at all   Housing Stability: Low Risk  (11/27/2023)    Housing Stability Vital Sign     Unable to Pay for Housing in the Last Year: No     Number of Places Lived in the Last Year: 1     Unstable Housing in the Last Year: No       Family History:  Family History   Problem Relation Name Age of Onset    Heart disease Mother      Kidney disease Mother      Hypertension Mother      Cancer Father          throat cancer    No Known Problems Daughter x1     Hypertension Son x1      Hypertension Maternal Aunt      Diabetes Maternal Aunt      Thyroid disease Maternal Aunt      Breast cancer Maternal Aunt  70    Diabetes Maternal Grandmother         Allergies and Medications: (updated and reviewed)  Review of patient's allergies indicates:   Allergen Reactions    Ace inhibitors Edema     Angioedema     Current Outpatient Medications   Medication Sig Dispense Refill    celecoxib (CELEBREX) 200 MG capsule Take 200 mg by mouth 3 (three) times daily.      ergocalciferol, vitamin D2, 2,000 unit Tab Take 2,000 Units by mouth 2 (two) times daily.      estradioL (IMVEXXY MAINTENANCE PACK) 4 mcg Inst Insert 4mcg tablet once nightly x 2 weeks, then 4mcg tablet twice a week 60 each 2    hydroCHLOROthiazide (HYDRODIURIL) 25 MG tablet Take 1 tablet (25 mg total) by mouth once daily. 30 tablet 11    levothyroxine (SYNTHROID) 125 MCG tablet Take 1 tablet (125 mcg total) by mouth before breakfast. 90 tablet 3    multivitamin capsule Take 1 capsule by mouth once daily.      valsartan (DIOVAN) 80 MG tablet Take 1 tablet (80 mg total) by mouth once daily. 30 tablet 3    verapamiL (VERELAN) 360 MG C24P TAKE 1 CAPSULE(360 MG) BY MOUTH EVERY DAY 90 capsule 1    omeprazole (PRILOSEC) 20 MG capsule Take 1 capsule (20 mg total) by mouth once daily. 90 capsule 3     No current facility-administered medications for this visit.       Patient Care Team:  Hilary Wells MD as PCP - General (Family Medicine)  Karlie Gottlieb (Inactive) as Digital Medicine Health   Bry Clarke PA-C as Hypertension Digital Medicine Clinician (Physician Assistant)  Yary Valadez MD as Hypertension Digital Medicine Responsible Provider (Family Medicine)  Program, Medicare Shared Savings as Hypertension Digital Medicine Contract  Lisy Driscoll MA as Care Coordinator         - The patient is given an After Visit Summary that lists all medications with directions, allergies, education, orders placed during this encounter and  follow-up instructions.      - I have reviewed the patient's medical information including past medical, family, and social history sections including the medications and allergies.      - We discussed the patient's current medications.     This note was created by combination of typed  and MModal dictation.  Transcription errors may be present.  If there are any questions, please contact me.

## 2024-06-06 ENCOUNTER — PATIENT MESSAGE (OUTPATIENT)
Dept: ENDOCRINOLOGY | Facility: CLINIC | Age: 69
End: 2024-06-06
Payer: COMMERCIAL

## 2024-06-06 ENCOUNTER — LAB VISIT (OUTPATIENT)
Dept: LAB | Facility: HOSPITAL | Age: 69
End: 2024-06-06
Attending: FAMILY MEDICINE
Payer: MEDICARE

## 2024-06-06 DIAGNOSIS — Z87.440 HISTORY OF UTI: ICD-10-CM

## 2024-06-06 DIAGNOSIS — E89.0 POSTOPERATIVE HYPOTHYROIDISM: Primary | ICD-10-CM

## 2024-06-06 LAB
AMORPH CRY UR QL COMP ASSIST: ABNORMAL
BACTERIA #/AREA URNS AUTO: ABNORMAL /HPF
BILIRUB UR QL STRIP: NEGATIVE
CLARITY UR REFRACT.AUTO: ABNORMAL
COLOR UR AUTO: ABNORMAL
GLUCOSE UR QL STRIP: NEGATIVE
HGB UR QL STRIP: NEGATIVE
KETONES UR QL STRIP: NEGATIVE
LEUKOCYTE ESTERASE UR QL STRIP: NEGATIVE
MICROSCOPIC COMMENT: ABNORMAL
NITRITE UR QL STRIP: NEGATIVE
PH UR STRIP: 6 [PH] (ref 5–8)
PROT UR QL STRIP: NEGATIVE
RBC #/AREA URNS AUTO: 1 /HPF (ref 0–4)
SP GR UR STRIP: 1.02 (ref 1–1.03)
URN SPEC COLLECT METH UR: ABNORMAL
WBC #/AREA URNS AUTO: 1 /HPF (ref 0–5)

## 2024-06-06 PROCEDURE — 81001 URINALYSIS AUTO W/SCOPE: CPT

## 2024-06-06 NOTE — ASSESSMENT & PLAN NOTE
Will r/o H. Pylori prior to trial of PPI. Education provided. Diet/lifestyle modifications.     Omeprazole 20 mg x 8 weeks.

## 2024-06-07 ENCOUNTER — PATIENT MESSAGE (OUTPATIENT)
Dept: FAMILY MEDICINE | Facility: CLINIC | Age: 69
End: 2024-06-07
Payer: COMMERCIAL

## 2024-06-20 ENCOUNTER — TELEPHONE (OUTPATIENT)
Dept: CARDIOLOGY | Facility: CLINIC | Age: 69
End: 2024-06-20
Payer: COMMERCIAL

## 2024-06-20 NOTE — TELEPHONE ENCOUNTER
----- Message from Fifi Salazar MA sent at 6/19/2024  1:07 PM CDT -----  Regarding: FW: self    ----- Message -----  From: Toña Rosales  Sent: 6/19/2024  12:03 PM CDT  To: [facility] Staff  Subject: self                                             Type:  Patient Returning Call     Who Called:self     Who Left Message for Patient:pt is stating Ms Tracy     Does the patient know what this is regarding?: no     Would the patient rather a call back or a response via [facility]?  Call back     Best Call Back Number: [phone]       Additional Information:     Thank you.

## 2024-06-25 ENCOUNTER — PATIENT MESSAGE (OUTPATIENT)
Dept: FAMILY MEDICINE | Facility: CLINIC | Age: 69
End: 2024-06-25
Payer: COMMERCIAL

## 2024-06-26 ENCOUNTER — OFFICE VISIT (OUTPATIENT)
Dept: CARDIOLOGY | Facility: CLINIC | Age: 69
End: 2024-06-26
Payer: MEDICARE

## 2024-06-26 VITALS
HEIGHT: 66 IN | BODY MASS INDEX: 38.65 KG/M2 | DIASTOLIC BLOOD PRESSURE: 72 MMHG | SYSTOLIC BLOOD PRESSURE: 128 MMHG | HEART RATE: 65 BPM | OXYGEN SATURATION: 95 % | WEIGHT: 240.5 LBS | RESPIRATION RATE: 18 BRPM

## 2024-06-26 DIAGNOSIS — R01.1 UNDIAGNOSED CARDIAC MURMURS: ICD-10-CM

## 2024-06-26 DIAGNOSIS — I10 ESSENTIAL HYPERTENSION: ICD-10-CM

## 2024-06-26 DIAGNOSIS — E66.9 NON MORBID OBESITY, UNSPECIFIED OBESITY TYPE: ICD-10-CM

## 2024-06-26 DIAGNOSIS — R94.31 ABNORMAL EKG: ICD-10-CM

## 2024-06-26 DIAGNOSIS — R06.02 SOB (SHORTNESS OF BREATH): ICD-10-CM

## 2024-06-26 DIAGNOSIS — R06.09 DOE (DYSPNEA ON EXERTION): Primary | ICD-10-CM

## 2024-06-26 LAB
OHS QRS DURATION: 76 MS
OHS QTC CALCULATION: 431 MS

## 2024-06-26 PROCEDURE — 93005 ELECTROCARDIOGRAM TRACING: CPT | Mod: PBBFAC,PO | Performed by: INTERNAL MEDICINE

## 2024-06-26 PROCEDURE — 93010 ELECTROCARDIOGRAM REPORT: CPT | Mod: S$PBB,,, | Performed by: INTERNAL MEDICINE

## 2024-06-26 PROCEDURE — 99999 PR PBB SHADOW E&M-EST. PATIENT-LVL IV: CPT | Mod: PBBFAC,,, | Performed by: INTERNAL MEDICINE

## 2024-06-26 PROCEDURE — 99214 OFFICE O/P EST MOD 30 MIN: CPT | Mod: PBBFAC,PO | Performed by: INTERNAL MEDICINE

## 2024-06-26 NOTE — PROGRESS NOTES
CARDIOVASCULAR PROGRESS NOTE    REASON FOR CONSULT:   Dain Phelps is a 68 y.o. female who presents for follow up of HTN, BRO.    PCP: Russell  HISTORY OF PRESENT ILLNESS:   The patient returns for follow up.  She was previously follow up with Dr. Castro, but is now wishing to switch to my service.  She tells me that she has been feeling okay, but over the past several weeks to months, she has been more short winded with exertion.  She denies any angina.  There has been no palpitations or syncope.  She denies PND, orthopnea, melena, hematuria, or claudication symptoms.  She tells me she has been using her CPAP.  I discussed possible bariatric evaluation.  The patient tells me she previously tried Ozempic without success.  She declines bariatric surgery evaluation.  Prior cardiac evaluation has included nuclear stress testing and echocardiogram, last in 2022.  I discussed repeat ischemia evaluation (stress echocardiogram versus cardiac CTA) and we decided to move forward with a stress echo.    CARDIOVASCULAR HISTORY:   none    PAST MEDICAL HISTORY:     Past Medical History:   Diagnosis Date    Chronic low back pain     Fatigue     GERD (gastroesophageal reflux disease)     History of colon polyps 2015    Hypertension     Hypothyroidism     Impaired glucose tolerance 1/15/2014    Leukopenia     Obesity (BMI 30-39.9) 1/15/2014    Primary hypothyroidism 2015    Snoring 2015    Vitamin D deficiency 1/15/2014       PAST SURGICAL HISTORY:     Past Surgical History:   Procedure Laterality Date    BREAST BIOPSY Left     CATARACT EXTRACTION       SECTION, CLASSIC      x3    COLONOSCOPY N/A 2021    Procedure: COLONOSCOPY;  Surgeon: Parvez Mazariegos MD;  Location: [facility];  Service: Endoscopy;  Laterality: N/A;  covid test  lapalco, instructions through [facility]    HYSTERECTOMY      total    OOPHORECTOMY      thyroid surgey         ALLERGIES AND MEDICATION:     Review of  patient's allergies indicates:   Allergen Reactions    Ace inhibitors Edema     Angioedema        Medication List            Accurate as of June 26, 2024 10:45 AM. If you have any questions, ask your nurse or doctor.                CONTINUE taking these medications      celecoxib 200 MG capsule  Commonly known as: CeleBREX     ergocalciferol (vitamin D2) 50 mcg (2,000 unit) Tab     hydroCHLOROthiazide 25 MG tablet  Commonly known as: HYDRODIURIL  Take 1 tablet (25 mg total) by mouth once daily.     IMVEXXY MAINTENANCE PACK 4 mcg Inst  Generic drug: estradioL  Insert 4mcg tablet once nightly x 2 weeks, then 4mcg tablet twice a week     levothyroxine 125 MCG tablet  Commonly known as: SYNTHROID  Take 1 tablet (125 mcg total) by mouth before breakfast.     multivitamin capsule     omeprazole 20 MG capsule  Commonly known as: PRILOSEC  Take 1 capsule (20 mg total) by mouth once daily.     valsartan 80 MG tablet  Commonly known as: DIOVAN  Take 1 tablet (80 mg total) by mouth once daily.     verapamiL 360 MG C24p  Commonly known as: VERELAN  TAKE 1 CAPSULE(360 MG) BY MOUTH EVERY DAY              SOCIAL HISTORY:     Social History     Socioeconomic History    Marital status:    Occupational History     Comment: retired   Tobacco Use    Smoking status: Never    Smokeless tobacco: Never   Substance and Sexual Activity    Alcohol use: No    Drug use: No    Sexual activity: Not Currently     Partners: Male     Social Determinants of Health     Financial Resource Strain: Low Risk  (11/27/2023)    Overall Financial Resource Strain (CARDIA)     Difficulty of Paying Living Expenses: Not hard at all   Food Insecurity: No Food Insecurity (11/27/2023)    Hunger Vital Sign     Worried About Running Out of Food in the Last Year: Never true     Ran Out of Food in the Last Year: Never true   Transportation Needs: No Transportation Needs (11/27/2023)    PRAPARE - Transportation     Lack of Transportation (Medical): No     Lack of  Transportation (Non-Medical): No   Physical Activity: Sufficiently Active (11/27/2023)    Exercise Vital Sign     Days of Exercise per Week: 4 days     Minutes of Exercise per Session: 60 min   Stress: No Stress Concern Present (11/27/2023)    [employer] - Occupational Stress Questionnaire     Feeling of Stress : Not at all   Housing Stability: Low Risk  (11/27/2023)    Housing Stability Vital Sign     Unable to Pay for Housing in the Last Year: No     Number of Places Lived in the Last Year: 1     Unstable Housing in the Last Year: No       FAMILY HISTORY:     Family History   Problem Relation Name Age of Onset    Heart disease Mother      Kidney disease Mother      Hypertension Mother      Cancer Father          throat cancer    No Known Problems Daughter x1     Hypertension Son x1     Hypertension Maternal Aunt      Diabetes Maternal Aunt      Thyroid disease Maternal Aunt      Breast cancer Maternal Aunt  70    Diabetes Maternal Grandmother         REVIEW OF SYSTEMS:   Review of Systems   Constitutional:  Negative for chills, diaphoresis and fever.   HENT:  Negative for nosebleeds.    Eyes:  Negative for blurred vision, double vision and photophobia.   Respiratory:  Positive for shortness of breath. Negative for hemoptysis and wheezing.    Cardiovascular:  Negative for chest pain, palpitations, orthopnea, claudication, leg swelling and PND.   Gastrointestinal:  Negative for abdominal pain, blood in stool, heartburn, melena, nausea and vomiting.   Genitourinary:  Negative for flank pain and hematuria.   Musculoskeletal:  Negative for falls, myalgias and neck pain.   Skin:  Negative for rash.   Neurological:  Negative for dizziness, seizures, loss of consciousness, weakness and headaches.   Endo/Heme/Allergies:  Negative for polydipsia. Does not bruise/bleed easily.   Psychiatric/Behavioral:  Negative for depression and memory loss. The patient is not nervous/anxious.        PHYSICAL  "EXAM:     Vitals:    06/26/24 1030   BP: 128/72   Pulse: 65   Resp: 18    Body mass index is 38.82 kg/m².  Weight: 109.1 kg (240 lb 8.4 oz)   Height: 5' 6" (167.6 cm)     Physical Exam  Vitals reviewed.   Constitutional:       General: She is not in acute distress.     Appearance: She is well-developed. She is obese. She is not ill-appearing, toxic-appearing or diaphoretic.   HENT:      Head: Normocephalic and atraumatic.   Eyes:      General: No scleral icterus.     Extraocular Movements: Extraocular movements intact.      Conjunctiva/sclera: Conjunctivae normal.      Pupils: Pupils are equal, round, and reactive to light.   Neck:      Thyroid: No thyromegaly.      Vascular: Normal carotid pulses. No carotid bruit or JVD.      Trachea: Trachea normal.   Cardiovascular:      Rate and Rhythm: Normal rate and regular rhythm.      Pulses:           Carotid pulses are 2+ on the right side and 2+ on the left side.     Heart sounds: S1 normal and S2 normal. Murmur heard.      Systolic murmur is present with a grade of 2/6.      No friction rub. No gallop.   Pulmonary:      Effort: Pulmonary effort is normal. No respiratory distress.      Breath sounds: Normal breath sounds. No stridor. No wheezing, rhonchi or rales.   Chest:      Chest wall: No tenderness.   Abdominal:      General: There is no distension.      Palpations: Abdomen is soft.   Musculoskeletal:         General: No swelling or tenderness. Normal range of motion.      Cervical back: Normal range of motion and neck supple. No edema or rigidity.      Right lower leg: No edema.      Left lower leg: No edema.   Feet:      Right foot:      Skin integrity: No ulcer.      Left foot:      Skin integrity: No ulcer.   Skin:     General: Skin is warm and dry.      Coloration: Skin is not jaundiced.   Neurological:      General: No focal deficit present.      Mental Status: She is alert and oriented to person, place, and time.      Cranial Nerves: No cranial nerve " deficit.   Psychiatric:         Mood and Affect: Mood normal.         Speech: Speech normal.         Behavior: Behavior normal. Behavior is cooperative.         DATA:   EKG: (personally reviewed tracing(s))  6/26/24 SR 59, low volt, PRWP    Laboratory:  CBC:  Recent Labs   Lab 06/07/22  1018 02/28/24  1137   WBC 4.92 4.06   Hemoglobin 12.7 13.6   Hematocrit 40.2 42.2   Platelets 224 221       CHEMISTRIES:  Recent Labs   Lab 06/07/22  1018 03/14/23  1115 02/28/24  1137 06/05/24  1649   Glucose 82 80 91 86   Sodium 141 140 139 138   Potassium 4.2 3.6 4.1 3.5   BUN 17 10 16 17   Creatinine 0.7 0.7 0.8 0.8   eGFR if non  >60  --   --   --    eGFR  --  >60.0 >60.0 >60.0   Calcium 8.8 9.6 9.4 9.6       CARDIAC BIOMARKERS:        COAGS:        LIPIDS/LFTS:  Recent Labs   Lab 06/07/22  1018 03/14/23  1115 02/28/24  1137   Cholesterol 173  --  182   Triglycerides 74  --  67   HDL 47  --  52   LDL Cholesterol 111.2  --  116.6   Non-HDL Cholesterol 126  --  130   AST 17 15 17   ALT 28 16 18     The 10-year ASCVD risk score (Hellen LE, et al., 2019) is: 9.9%    Values used to calculate the score:      Age: 68 years      Sex: Female      Is Non- : Yes      Diabetic: No      Tobacco smoker: No      Systolic Blood Pressure: 128 mmHg      Is BP treated: Yes      HDL Cholesterol: 52 mg/dL      Total Cholesterol: 182 mg/dL      Cardiovascular Testing:  Event monitor 10/06/2023:     Sinus rhythm with competing junctional beats/junctional rhythm during sinus bradycardia.      Holter 05/24/2023:  NSR. Heart rates varied between 42 and 95 BPM with an average of 63 BPM.  There were very rare PVCs totalling 2  There were rare PACs totalling 542     Nuclear stress 08/29/2022:    Normal myocardial perfusion scan. There is no evidence of myocardial ischemia or infarction.    The gated perfusion images showed an ejection fraction of 76% post stress.    There is normal wall motion post stress.    The EKG  portion of this study is negative for ischemia.    The patient reported no chest pain during the stress test.    There were no arrhythmias during stress.    The exercise capacity was average.    Hypertensive response to exercise 257/84.    ASSESSMENT:   # BRO, new onset  # HTN, controlled  # murmur of MR  # LORRAINE on CPAP  # BMI 39, up 2 unit(s) vs last OV    PLAN:   Cont med rx  Ex stress echo  Diet/exercise/weight loss, pt declines ella surg eval  RTC 6 months (Dec 2024), sooner is ESTEFANIA abnl.  Consider statin at follow up.      Domenic Collado MD, FACC

## 2024-06-26 NOTE — PROGRESS NOTES
CARDIOLOGY CLINIC VISIT        HISTORY OF PRESENT ILLNESS:     Dain Phelps presents for continued care. Seen 06/24/2022 for evaluation of shortness of breath on exertion.  Seen by Dr. Dorsey in 2018. Evaluation of chest discomfort.  Palpitations.  She has noted over the past 6 months progressive shortness of breath on exertion.  Now symptoms at rest.  No associated chest discomfort.  Weight has been stable.  Blood pressure controlled.  EKG showed sinus bradycardia.  Denies dizziness.  No syncope/presyncope.  On metoprolol and verapamil.    09/19/2022:  Nuclear stress was negative for ischemia.  Hypertensive response exercise 257/84.  Patient exercised for 5 minutes 45 seconds.  Functional capacity 7 Mets.  Echocardiogram showed normal left ventricular systolic function with estimated ejection fraction 65%.  Left ventricular hypertrophy.  Mildly elevated pulmonary pressure.  Has stopped metoprolol.  Now taking verapamil in the morning along with her hydrochlorothiazide.  Digital medicine logs reviewed.  Feels good.  No complaints.  Going on a trip to [place] this month.    05/19/2023:  Over the past few months has had episodes of dizziness.  Generally at rest.  Also notes palpitations.  She can feel her heart racing at times.  Episodes daily.  Last minutes.  Last TSH within normal limits.  EKG today shows sinus bradycardia, low-voltage.  Stress last year she had normal heart rate response to exercise.  She is on metoprolol and Cardizem.    06/15/2023: Holter showed average heart rate 63 beats per minute.  Very rare PVCs.  Rare PACs.  She did not have any symptoms while wearing the monitor.    11/30/2023:  Last seen by Dr. Castro June 2023.  The patient returns for follow up.  She denies intercurrent angina or dyspnea.  She does describe occasional palpitations and lightheadedness.  There has been no syncope.  She denies PND, orthopnea, melena, hematuria, or claudication symptoms.  Her event monitor noted occasional  junctional bradycardia.  She complained of palpitations but no tachyarrhythmia was noted.    CARDIOVASCULAR HISTORY:     Pulmonary hypertension    PAST MEDICAL HISTORY:     Past Medical History:   Diagnosis Date    Chronic low back pain     Fatigue     GERD (gastroesophageal reflux disease)     History of colon polyps 2015    Hypertension     Hypothyroidism     Impaired glucose tolerance 1/15/2014    Leukopenia     Obesity (BMI 30-39.9) 1/15/2014    Primary hypothyroidism 2015    Snoring 2015    Vitamin D deficiency 1/15/2014       PAST SURGICAL HISTORY:     Past Surgical History:   Procedure Laterality Date    BREAST BIOPSY Left     CATARACT EXTRACTION       SECTION, CLASSIC      x3    COLONOSCOPY N/A 2021    Procedure: COLONOSCOPY;  Surgeon: Parvez Mazariegos MD;  Location: [facility];  Service: Endoscopy;  Laterality: N/A;  covid test  lapalco, instructions through [facility]    HYSTERECTOMY      total    OOPHORECTOMY      thyroid surgey         ALLERGIES AND MEDICATION:     Review of patient's allergies indicates:   Allergen Reactions    Ace inhibitors Edema     Angioedema        Medication List            Accurate as of 2024 10:35 AM. If you have any questions, ask your nurse or doctor.                CONTINUE taking these medications      celecoxib 200 MG capsule  Commonly known as: CeleBREX     ergocalciferol (vitamin D2) 50 mcg (2,000 unit) Tab     hydroCHLOROthiazide 25 MG tablet  Commonly known as: HYDRODIURIL  Take 1 tablet (25 mg total) by mouth once daily.     IMVEXXY MAINTENANCE PACK 4 mcg Inst  Generic drug: estradioL  Insert 4mcg tablet once nightly x 2 weeks, then 4mcg tablet twice a week     levothyroxine 125 MCG tablet  Commonly known as: SYNTHROID  Take 1 tablet (125 mcg total) by mouth before breakfast.     multivitamin capsule     omeprazole 20 MG capsule  Commonly known as: PRILOSEC  Take 1 capsule (20 mg total) by mouth once daily.     valsartan  80 MG tablet  Commonly known as: DIOVAN  Take 1 tablet (80 mg total) by mouth once daily.     verapamiL 360 MG C24p  Commonly known as: VERELAN  TAKE 1 CAPSULE(360 MG) BY MOUTH EVERY DAY              SOCIAL HISTORY:     Social History     Socioeconomic History    Marital status:    Occupational History     Comment: retired   Tobacco Use    Smoking status: Never    Smokeless tobacco: Never   Substance and Sexual Activity    Alcohol use: No    Drug use: No    Sexual activity: Not Currently     Partners: Male     Social Determinants of Health     Financial Resource Strain: Low Risk  (11/27/2023)    Overall Financial Resource Strain (CARDIA)     Difficulty of Paying Living Expenses: Not hard at all   Food Insecurity: No Food Insecurity (11/27/2023)    Hunger Vital Sign     Worried About Running Out of Food in the Last Year: Never true     Ran Out of Food in the Last Year: Never true   Transportation Needs: No Transportation Needs (11/27/2023)    PRAPARE - Transportation     Lack of Transportation (Medical): No     Lack of Transportation (Non-Medical): No   Physical Activity: Sufficiently Active (11/27/2023)    Exercise Vital Sign     Days of Exercise per Week: 4 days     Minutes of Exercise per Session: 60 min   Stress: No Stress Concern Present (11/27/2023)    [employer] - Occupational Stress Questionnaire     Feeling of Stress : Not at all   Housing Stability: Low Risk  (11/27/2023)    Housing Stability Vital Sign     Unable to Pay for Housing in the Last Year: No     Number of Places Lived in the Last Year: 1     Unstable Housing in the Last Year: No       FAMILY HISTORY:     Family History   Problem Relation Name Age of Onset    Heart disease Mother      Kidney disease Mother      Hypertension Mother      Cancer Father          throat cancer    No Known Problems Daughter x1     Hypertension Son x1     Hypertension Maternal Aunt      Diabetes Maternal Aunt      Thyroid disease  "Maternal Aunt      Breast cancer Maternal Aunt  70    Diabetes Maternal Grandmother         REVIEW OF SYSTEMS:   Review of Systems   Constitutional:  Negative for chills, diaphoresis, fever, malaise/fatigue and weight loss.   HENT:  Negative for congestion, hearing loss, sinus pain, sore throat and tinnitus.    Eyes:  Negative for blurred vision, double vision, photophobia and pain.   Respiratory:  Negative for cough, hemoptysis, sputum production, shortness of breath, wheezing and stridor.    Cardiovascular:  Positive for palpitations. Negative for chest pain, orthopnea, claudication, leg swelling and PND.   Gastrointestinal:  Negative for abdominal pain, blood in stool, heartburn, melena, nausea and vomiting.   Musculoskeletal:  Negative for back pain, falls, joint pain, myalgias and neck pain.   Neurological:  Negative for dizziness, tingling, tremors, sensory change, speech change, focal weakness, seizures, loss of consciousness, weakness and headaches.   Endo/Heme/Allergies:  Does not bruise/bleed easily.   Psychiatric/Behavioral:  Negative for depression, memory loss and substance abuse. The patient is not nervous/anxious.        PHYSICAL EXAM:     Vitals:    06/26/24 1030   BP: 128/72   Pulse: 65   Resp: 18    Body mass index is 38.82 kg/m².  Weight: 109.1 kg (240 lb 8.4 oz)   Height: 5' 6" (167.6 cm)     Physical Exam  Vitals reviewed.   Constitutional:       General: She is not in acute distress.     Appearance: She is well-developed. She is obese. She is not ill-appearing, toxic-appearing or diaphoretic.   HENT:      Head: Normocephalic and atraumatic.   Eyes:      General: No scleral icterus.     Extraocular Movements: Extraocular movements intact.      Conjunctiva/sclera: Conjunctivae normal.      Pupils: Pupils are equal, round, and reactive to light.   Neck:      Thyroid: No thyromegaly.      Vascular: Normal carotid pulses. No carotid bruit or JVD.      Trachea: Trachea normal.   Cardiovascular:      " Rate and Rhythm: Normal rate and regular rhythm.      Heart sounds: S1 normal and S2 normal. Heart sounds are distant. Murmur heard.      Crescendo systolic murmur is present with a grade of 2/6.      No friction rub. No gallop.   Pulmonary:      Effort: Pulmonary effort is normal. No respiratory distress.      Breath sounds: Normal breath sounds. No stridor. No wheezing, rhonchi or rales.   Chest:      Chest wall: No tenderness.   Abdominal:      General: Bowel sounds are normal. There is no distension.      Palpations: Abdomen is soft.      Tenderness: There is no abdominal tenderness.   Musculoskeletal:         General: No swelling or tenderness. Normal range of motion.      Cervical back: Normal range of motion and neck supple. No edema or rigidity.      Right lower leg: No edema.      Left lower leg: No edema.   Feet:      Right foot:      Skin integrity: No ulcer.      Left foot:      Skin integrity: No ulcer.   Skin:     General: Skin is warm and dry.      Coloration: Skin is not jaundiced.   Neurological:      General: No focal deficit present.      Mental Status: She is alert and oriented to person, place, and time.      Cranial Nerves: No cranial nerve deficit.   Psychiatric:         Mood and Affect: Mood normal.         Speech: Speech normal.         Behavior: Behavior normal. Behavior is cooperative.         Thought Content: Thought content normal.         DATA:   EKG: (personally reviewed tracing)  11/30/23 SR 54, PRWP    Laboratory:  CBC:  Recent Labs   Lab 06/07/22  1018 02/28/24  1137   WBC 4.92 4.06   Hemoglobin 12.7 13.6   Hematocrit 40.2 42.2   Platelets 224 221       CHEMISTRIES:  Recent Labs   Lab 06/07/22  1018 03/14/23  1115 02/28/24  1137 06/05/24  1649   Glucose 82 80 91 86   Sodium 141 140 139 138   Potassium 4.2 3.6 4.1 3.5   BUN 17 10 16 17   Creatinine 0.7 0.7 0.8 0.8   eGFR if  >60  --   --   --    eGFR if non  >60  --   --   --    Calcium 8.8 9.6 9.4 9.6        CARDIAC BIOMARKERS:        COAGS:        LIPIDS/LFTS:  Recent Labs   Lab 06/07/22  1018 03/14/23  1115 02/28/24  1137   Cholesterol 173  --  182   Triglycerides 74  --  67   HDL 47  --  52   LDL Cholesterol 111.2  --  116.6   Non-HDL Cholesterol 126  --  130   AST 17 15 17   ALT 28 16 18       Cardiovascular Testing:    Event monitor 10/06/2023:     Sinus rhythm with competing junctional beats/junctional rhythm during sinus bradycardia.     Holter 05/24/2023:    NSR. Heart rates varied between 42 and 95 BPM with an average of 63 BPM.  There were very rare PVCs totalling 2  There were rare PACs totalling 542    Nuclear stress 08/29/2022:      Normal myocardial perfusion scan. There is no evidence of myocardial ischemia or infarction.    The gated perfusion images showed an ejection fraction of 76% post stress.    There is normal wall motion post stress.    The EKG portion of this study is negative for ischemia.    The patient reported no chest pain during the stress test.    There were no arrhythmias during stress.    The exercise capacity was average.    Hypertensive response to exercise 257/84.    Echocardiogram 08/29/2022:    The left ventricle is normal in size with concentric hypertrophy and normal systolic function.  The estimated ejection fraction is 65%.  Mild left atrial enlargement.  Normal left ventricular diastolic function.  Normal right ventricular size with normal right ventricular systolic function.  Mild tricuspid regurgitation.  Normal central venous pressure (3 mmHg).  The estimated PA systolic pressure is 33 mmHg.  There is mild pulmonary hypertension.    NST:  6-18  Impression: PROBABLY NORMAL MYOCARDIAL PERFUSION  1. There is a mild to moderate mostly reversible anterior wall defect of uncertain significance.   2. The perfusion scan is free of evidence for myocardial ischemia.   3. Resting wall motion is physiologic.   4. Resting LV function is normal.   5. The ventricular volumes are  normal at rest and stress.   6. The extracardiac distribution of radioactivity is normal.   7. When compared to the previous study from 05/19/2015, no significant change     Echo:  CONCLUSIONS     1 - Normal left ventricular systolic function (EF 55-60%).      Holter 06/28/2018:     TEST DESCRIPTION   PREDOMINANT RHYTHM   1. Sinus rhythm with heart rates varying between 42 and 90 bpm with an average of 64 bpm.     VENTRICULAR ARRHYTHMIAS   1. There was a single PVC recorded.     2. There were no episodes of ventricular tachycardia.     SUPRA VENTRICULAR ARRHYTHMIAS   1. There were very rare PACs recorded totalling 1 and averaging less than 1 per hour.     2. There were no episodes of sustained supraventricular tachycardia.     SINUS NODE FUNCTION   1. There was no evidence of high grade SA merari block.     AV CONDUCTION   1. There was no evidence of high grade AV block.     DIARY   1. The diary was returned, but not completed     MISCELLANEOUS   1. There were occasional hookup related artifacts. Overall, the study was of good quality.     2. This was a tape of adequate length (24 hrs).     ASSESSMENT:     Palpitations, junct tyron on EVM (but no tachyarrhythmia) 10/2023.  Dizziness  Hypertension, controlled  Hypothyroid  Obstructive sleep apnea  Pulmonary hypertension  Obesity, BMI 39, up 2 unit(s) vs last OV    PLAN:   Cont med rx  Stop toprol  Cont BP monitoring in digital med program  RTC 6 months with Dr. Castro (May 2024).  If sxs persist, consider EPS eval.        Domenic Collado MD, [facility]

## 2024-07-01 ENCOUNTER — HOSPITAL ENCOUNTER (OUTPATIENT)
Dept: CARDIOLOGY | Facility: HOSPITAL | Age: 69
Discharge: HOME | End: 2024-07-01
Attending: INTERNAL MEDICINE
Payer: MEDICARE

## 2024-07-01 DIAGNOSIS — R94.31 ABNORMAL EKG: ICD-10-CM

## 2024-07-01 DIAGNOSIS — I10 ESSENTIAL HYPERTENSION: ICD-10-CM

## 2024-07-01 DIAGNOSIS — R06.09 DOE (DYSPNEA ON EXERTION): ICD-10-CM

## 2024-07-01 DIAGNOSIS — R01.1 UNDIAGNOSED CARDIAC MURMURS: ICD-10-CM

## 2024-07-01 LAB
ASCENDING AORTA: 3.06 CM
AV INDEX (PROSTH): 0.91
AV MEAN GRADIENT: 4 MMHG
AV PEAK GRADIENT: 8 MMHG
AV VALVE AREA BY VELOCITY RATIO: 2.27 CM²
AV VALVE AREA: 2.84 CM²
AV VELOCITY RATIO: 0.73
CV ECHO LV RWT: 0.5 CM
CV STRESS BASE HR: 57 BPM
DIASTOLIC BLOOD PRESSURE: 66 MMHG
DOP CALC AO PEAK VEL: 1.41 M/S
DOP CALC AO VTI: 27.5 CM
DOP CALC LVOT AREA: 3.1 CM2
DOP CALC LVOT DIAMETER: 1.99 CM
DOP CALC LVOT PEAK VEL: 1.03 M/S
DOP CALC LVOT STROKE VOLUME: 78.03 CM3
DOP CALC MV VTI: 30.7 CM
DOP CALCLVOT PEAK VEL VTI: 25.1 CM
E WAVE DECELERATION TIME: 240.06 MSEC
E/A RATIO: 0.82
E/E' RATIO: 13.33 M/S
ECHO LV POSTERIOR WALL: 0.92 CM (ref 0.6–1.1)
FRACTIONAL SHORTENING: 39 % (ref 28–44)
INTERVENTRICULAR SEPTUM: 1.28 CM (ref 0.6–1.1)
IVC DIAMETER: 1.61 CM
IVRT: 98.95 MSEC
LA MAJOR: 5.1 CM
LA MINOR: 3.22 CM
LA WIDTH: 3.2 CM
LEFT ATRIUM SIZE: 3.55 CM
LEFT ATRIUM VOLUME: 38.12 CM3
LEFT INTERNAL DIMENSION IN SYSTOLE: 2.22 CM (ref 2.1–4)
LEFT VENTRICLE DIASTOLIC VOLUME: 56.67 ML
LEFT VENTRICLE SYSTOLIC VOLUME: 16.51 ML
LEFT VENTRICULAR INTERNAL DIMENSION IN DIASTOLE: 3.66 CM (ref 3.5–6)
LEFT VENTRICULAR MASS: 127.23 G
LV LATERAL E/E' RATIO: 11.43 M/S
LV SEPTAL E/E' RATIO: 16 M/S
LVED V (TEICH): 56.67 ML
LVES V (TEICH): 16.51 ML
LVOT MG: 2.29 MMHG
LVOT MV: 0.69 CM/S
MV MEAN GRADIENT: 1 MMHG
MV PEAK A VEL: 0.97 M/S
MV PEAK E VEL: 0.8 M/S
MV PEAK GRADIENT: 4 MMHG
MV STENOSIS PRESSURE HALF TIME: 69.62 MS
MV VALVE AREA BY CONTINUITY EQUATION: 2.54 CM2
MV VALVE AREA P 1/2 METHOD: 3.16 CM2
OHS CV CPX 1 MINUTE RECOVERY HEART RATE: 116 BPM
OHS CV CPX 85 PERCENT MAX PREDICTED HEART RATE MALE: 129
OHS CV CPX ESTIMATED METS: 6
OHS CV CPX MAX PREDICTED HEART RATE: 152
OHS CV CPX PATIENT IS FEMALE: 1
OHS CV CPX PATIENT IS MALE: 0
OHS CV CPX PEAK DIASTOLIC BLOOD PRESSURE: 62 MMHG
OHS CV CPX PEAK HEAR RATE: 137 BPM
OHS CV CPX PEAK RATE PRESSURE PRODUCT: ABNORMAL
OHS CV CPX PEAK SYSTOLIC BLOOD PRESSURE: 158 MMHG
OHS CV CPX PERCENT MAX PREDICTED HEART RATE ACHIEVED: 94
OHS CV CPX RATE PRESSURE PRODUCT PRESENTING: 6042
OHS CV RV/LV RATIO: 0.94 CM
PISA TR MAX VEL: 2.6 M/S
PV PEAK GRADIENT: 4 MMHG
PV PEAK VELOCITY: 0.95 M/S
RA MAJOR: 4.59 CM
RA PRESSURE ESTIMATED: 3 MMHG
RA WIDTH: 3.7 CM
RIGHT VENTRICLE DIASTOLIC BASEL DIMENSION: 3.4 CM
RIGHT VENTRICULAR END-DIASTOLIC DIMENSION: 3.43 CM
RV TB RVSP: 6 MMHG
SINUS: 2.83 CM
STJ: 2.18 CM
STRESS ECHO POST EXERCISE DUR MIN: 4 MINUTES
STRESS ECHO POST EXERCISE DUR SEC: 22 SECONDS
SYSTOLIC BLOOD PRESSURE: 106 MMHG
TDI LATERAL: 0.07 M/S
TDI SEPTAL: 0.05 M/S
TDI: 0.06 M/S
TR MAX PG: 27 MMHG
TRICUSPID ANNULAR PLANE SYSTOLIC EXCURSION: 2.02 CM
TV REST PULMONARY ARTERY PRESSURE: 30 MMHG

## 2024-07-01 PROCEDURE — 93320 DOPPLER ECHO COMPLETE: CPT | Mod: 26,,, | Performed by: INTERNAL MEDICINE

## 2024-07-01 PROCEDURE — 93351 STRESS TTE COMPLETE: CPT | Mod: 26,,, | Performed by: INTERNAL MEDICINE

## 2024-07-01 PROCEDURE — 93325 DOPPLER ECHO COLOR FLOW MAPG: CPT | Mod: 26,,, | Performed by: INTERNAL MEDICINE

## 2024-07-01 PROCEDURE — 93351 STRESS TTE COMPLETE: CPT

## 2024-07-09 RX ORDER — VALSARTAN 80 MG/1
TABLET ORAL
Qty: 90 TABLET | Refills: 2 | Status: SHIPPED | OUTPATIENT
Start: 2024-07-09

## 2024-07-09 NOTE — TELEPHONE ENCOUNTER
Refill Decision Note   Dain Phelps  is requesting a refill authorization.  Brief Assessment and Rationale for Refill:  Approve     Medication Therapy Plan:         Comments:     Note composed:9:13 AM 07/09/2024

## 2024-07-09 NOTE — TELEPHONE ENCOUNTER
No care due was identified.  [facility] Embedded Care Due Messages. Reference number: [id number].   7/09/2024 9:12:37 AM CDT

## 2024-07-29 ENCOUNTER — HOSPITAL ENCOUNTER (OUTPATIENT)
Dept: RADIOLOGY | Facility: HOSPITAL | Age: 69
Discharge: HOME | End: 2024-07-29
Attending: INTERNAL MEDICINE
Payer: MEDICARE

## 2024-07-29 DIAGNOSIS — Z12.31 ENCOUNTER FOR SCREENING MAMMOGRAM FOR BREAST CANCER: ICD-10-CM

## 2024-07-29 PROCEDURE — 77063 BREAST TOMOSYNTHESIS BI: CPT | Mod: 26,,, | Performed by: RADIOLOGY

## 2024-07-29 PROCEDURE — 77067 SCR MAMMO BI INCL CAD: CPT | Mod: 26,,, | Performed by: RADIOLOGY

## 2024-07-29 PROCEDURE — 77067 SCR MAMMO BI INCL CAD: CPT | Mod: TC,PO

## 2024-07-29 PROCEDURE — 77063 BREAST TOMOSYNTHESIS BI: CPT | Mod: TC,PO

## 2024-08-09 ENCOUNTER — OFFICE VISIT (OUTPATIENT)
Dept: PULMONOLOGY | Facility: CLINIC | Age: 69
End: 2024-08-09
Payer: MEDICARE

## 2024-08-09 VITALS
HEIGHT: 66 IN | OXYGEN SATURATION: 95 % | SYSTOLIC BLOOD PRESSURE: 147 MMHG | HEART RATE: 76 BPM | WEIGHT: 242.19 LBS | BODY MASS INDEX: 38.92 KG/M2 | DIASTOLIC BLOOD PRESSURE: 77 MMHG

## 2024-08-09 DIAGNOSIS — E66.01 SEVERE OBESITY (BMI 35.0-39.9) WITH COMORBIDITY: ICD-10-CM

## 2024-08-09 DIAGNOSIS — G47.33 OSA ON CPAP: Primary | ICD-10-CM

## 2024-08-09 PROCEDURE — 99999 PR PBB SHADOW E&M-EST. PATIENT-LVL III: CPT | Mod: PBBFAC,,, | Performed by: INTERNAL MEDICINE

## 2024-08-09 PROCEDURE — 99213 OFFICE O/P EST LOW 20 MIN: CPT | Mod: PBBFAC | Performed by: INTERNAL MEDICINE

## 2024-08-12 DIAGNOSIS — N95.2 VAGINAL ATROPHY: ICD-10-CM

## 2024-08-13 RX ORDER — ESTRADIOL 4 UG/1
INSERT VAGINAL
Qty: 24 EACH | Refills: 0 | Status: SHIPPED | OUTPATIENT
Start: 2024-08-13

## 2024-08-13 NOTE — TELEPHONE ENCOUNTER
Refill Decision Note   Dain Phelps  is requesting a refill authorization.  Brief Assessment and Rationale for Refill:  Approve     Medication Therapy Plan:  Meets criteria for 1 90-day refill, due appt and noted to patient in the SIG. Qty adjusted for current usage      Comments:     Note composed:7:47 AM 08/13/2024

## 2024-08-20 ENCOUNTER — OFFICE VISIT (OUTPATIENT)
Dept: RHEUMATOLOGY | Facility: CLINIC | Age: 69
End: 2024-08-20
Payer: MEDICARE

## 2024-08-20 VITALS
DIASTOLIC BLOOD PRESSURE: 76 MMHG | HEIGHT: 66 IN | WEIGHT: 244.06 LBS | SYSTOLIC BLOOD PRESSURE: 160 MMHG | BODY MASS INDEX: 39.22 KG/M2 | HEART RATE: 65 BPM

## 2024-08-20 DIAGNOSIS — M79.10 MYALGIA: ICD-10-CM

## 2024-08-20 DIAGNOSIS — G89.29 CHRONIC BILATERAL LOW BACK PAIN WITHOUT SCIATICA: ICD-10-CM

## 2024-08-20 DIAGNOSIS — R76.8 POSITIVE ANA (ANTINUCLEAR ANTIBODY): ICD-10-CM

## 2024-08-20 DIAGNOSIS — M25.59 PAIN IN OTHER JOINT: ICD-10-CM

## 2024-08-20 DIAGNOSIS — M54.50 CHRONIC BILATERAL LOW BACK PAIN WITHOUT SCIATICA: ICD-10-CM

## 2024-08-20 DIAGNOSIS — E66.01 MORBID OBESITY: Primary | ICD-10-CM

## 2024-08-20 PROCEDURE — 99999 PR PBB SHADOW E&M-EST. PATIENT-LVL IV: CPT | Mod: PBBFAC,,, | Performed by: STUDENT IN AN ORGANIZED HEALTH CARE EDUCATION/TRAINING PROGRAM

## 2024-08-20 PROCEDURE — 99214 OFFICE O/P EST MOD 30 MIN: CPT | Mod: PBBFAC,PN | Performed by: STUDENT IN AN ORGANIZED HEALTH CARE EDUCATION/TRAINING PROGRAM

## 2024-08-20 RX ORDER — CYCLOBENZAPRINE HCL 5 MG
5 TABLET ORAL 3 TIMES DAILY PRN
Qty: 30 TABLET | Refills: 0 | Status: SHIPPED | OUTPATIENT
Start: 2024-08-20 | End: 2024-08-30

## 2024-08-20 RX ORDER — CELECOXIB 200 MG/1
200 CAPSULE ORAL DAILY
Qty: 30 CAPSULE | Refills: 1 | Status: SHIPPED | OUTPATIENT
Start: 2024-08-20

## 2024-08-20 NOTE — PROGRESS NOTES
8/17/2024     2:27 PM   Rapid3 Question Responses and Scores   MDHAQ Score 0.2   Psychologic Score 1.1   Pain Score 7.5   When you awakened in the morning OVER THE LAST WEEK, did you feel stiff? Yes   If Yes, please indicate the number of hours until you are as limber as you will be for the day 1   Fatigue Score 3   Global Health Score 3   RAPID3 Score 3.72

## 2024-08-20 NOTE — PROGRESS NOTES
RHEUMATOLOGY OUTPATIENT CLINIC NOTE    8/20/2024    Attending Rheumatologist: Yajaira Henderson  Primary Care Provider: Hilary Wells MD   Physician Requesting Consultation: Mae Hollingsworth, CLARITA  [address] [facility] [place] [address]  Chief Complaint/Reason For Consultation:  Positive TRACE      Subjective:       HPI  Dain Phelps is a 68 y.o. Black or  female with hypothyroidism and sleep apnea on CPAP who comes for evaluation of joint pain and positive TRACE.    1 year she started noticing that her whole legs are stiff, particularly knees and ankles, in the morning. Improve after a hot shower, MS less than 1 hour.   Has been having pain in bilateral knees, sharp pains at random times. Pain is worse with walking or prolonged sitting. No swelling. She did PT about a year ago which it helped but pain has changed. Reports lumbar paraspinal pain for about 3 months ago. Improves with heating pad, no sciatica.    She denies any pain in upper extremities, hands, wrists. No swelling.  Denies any Raynaud's, dry eyes, dry mouth, swollen glands, muscle weakness, skin rashes.    Retired. Used to work in [employer].  Does not smoke, drink.    Mother with SLE    Labs  6/2024  TRACE 1:80 speckled - also TRACE positive 1:160 in 2021  Negative DSDNA, SSA, SSB, SM, SM/RNP  ESR 83  CRP 14.2  RF negative  H. Pylori Ag (stool) not detected     Imaging  -XR left knee: moderate OA  -XR right knee 2019: moderate OA     Review of Systems   Constitutional:  Negative for fever and unexpected weight change.   HENT:  Negative for mouth sores and trouble swallowing.    Eyes:  Negative for redness.   Respiratory:  Positive for shortness of breath. Negative for cough.    Cardiovascular:  Negative for chest pain.   Gastrointestinal:  Negative for constipation and diarrhea.   Genitourinary:  Negative for dysuria and genital sores.   Integumentary:  Negative for rash.   Neurological:  Negative for  headaches.   Hematological:  Does not bruise/bleed easily.        Chronic comorbid conditions affecting medical decision making today:  Past Medical History:   Diagnosis Date    Allergy     Chronic low back pain     Fatigue     GERD (gastroesophageal reflux disease)     History of colon polyps 2015    Hypertension     Hypothyroidism     Impaired glucose tolerance 01/15/2014    Leukopenia     Obesity (BMI 30-39.9) 01/15/2014    Primary hypothyroidism 2015    Snoring 2015    Vitamin D deficiency 01/15/2014     Past Surgical History:   Procedure Laterality Date    BREAST BIOPSY Left     CATARACT EXTRACTION       SECTION, CLASSIC      x3    COLONOSCOPY N/A 2021    Procedure: COLONOSCOPY;  Surgeon: Parvez Mazariegos MD;  Location: [facility];  Service: Endoscopy;  Laterality: N/A;  covid test  lapalco, instructions through [facility]    HYSTERECTOMY      total    OOPHORECTOMY      thyroid surgey       Family History   Problem Relation Name Age of Onset    Heart disease Mother      Kidney disease Mother      Hypertension Mother      Cancer Father          throat cancer    No Known Problems Daughter x1     Hypertension Son x1     Hypertension Maternal Aunt      Diabetes Maternal Aunt      Thyroid disease Maternal Aunt      Breast cancer Maternal Aunt  70    Diabetes Maternal Grandmother       Social History     Substance and Sexual Activity   Alcohol Use No    Comment: seldomly     Social History     Tobacco Use   Smoking Status Never   Smokeless Tobacco Never     Social History     Substance and Sexual Activity   Drug Use No       Current Outpatient Medications:     ergocalciferol, vitamin D2, 2,000 unit Tab, Take 2,000 Units by mouth 2 (two) times daily., Disp: , Rfl:     estradioL (IMVEXXY MAINTENANCE PACK) 4 mcg Inst, Vaginally insert (1) 4mcg tablet twice a week. * DUE for APPT*, Disp: 24 each, Rfl: 0    hydroCHLOROthiazide (HYDRODIURIL) 25 MG tablet, Take 1 tablet (25 mg  total) by mouth once daily., Disp: 30 tablet, Rfl: 11    levothyroxine (SYNTHROID) 125 MCG tablet, Take 1 tablet (125 mcg total) by mouth before breakfast., Disp: 90 tablet, Rfl: 3    multivitamin capsule, Take 1 capsule by mouth once daily., Disp: , Rfl:     valsartan (DIOVAN) 80 MG tablet, TAKE 1 TABLET(80 MG) BY MOUTH DAILY, Disp: 90 tablet, Rfl: 2    verapamiL (VERELAN) 360 MG C24P, TAKE 1 CAPSULE(360 MG) BY MOUTH EVERY DAY, Disp: 90 capsule, Rfl: 1    celecoxib (CELEBREX) 200 MG capsule, Take 1 capsule (200 mg total) by mouth once daily., Disp: 30 capsule, Rfl: 1    cyclobenzaprine (FLEXERIL) 5 MG tablet, Take 1 tablet (5 mg total) by mouth 3 (three) times daily as needed for Muscle spasms., Disp: 30 tablet, Rfl: 0     Objective:         Vitals:    08/20/24 0813   BP: (!) 160/76   Pulse: 65     Physical Exam   Constitutional: She is oriented to person, place, and time. She appears well-developed.   HENT:   Head: Normocephalic.   Cardiovascular: Normal rate and normal heart sounds.   Pulmonary/Chest: Effort normal and breath sounds normal.   Abdominal: Soft.   Musculoskeletal:      Cervical back: Neck supple.      Comments: Cspine FROM no tenderness  Tspine FROM no tenderness  Lspine FROM paraspinal tenderness  Shoulders: FROM; no synovitis;  Elbows: FROM; no synovitis; no tophi or nodules  Wrists: FROM; no synovitis;    MCPs: FROM; no synovitis;   PIPs:FROM; no synovitis;   DIPs: FROM; no synovitis;   HIPS: FROM  Knees: FROM; no synovitis; no instability  Ankles: FROM: no synovitis   Toes: no tenderness on palpation.     Lymphadenopathy:     She has no cervical adenopathy.   Neurological: She is alert and oriented to person, place, and time.   Skin: No rash noted.   No Heliotrope rash  No Gottron papules  No sclerodactyly   No Raynaud's  No Petechiae  No discoid lesions  No alopecia  Normal Capillaroscopy   Vitals reviewed.      Reviewed old and all outside pertinent medical records available.    All lab  "results personally reviewed and interpreted by me.  Lab Results   Component Value Date    WBC 4.06 02/28/2024    HGB 13.6 02/28/2024    HCT 42.2 02/28/2024    MCV 90 02/28/2024    MCH 28.9 02/28/2024    MCHC 32.2 02/28/2024    RDW 13.7 02/28/2024     02/28/2024    MPV 11.8 02/28/2024    PLTEST Adequate 09/13/2011       Lab Results   Component Value Date     06/05/2024    K 3.5 06/05/2024     06/05/2024    CO2 25 06/05/2024    GLU 86 06/05/2024    BUN 17 06/05/2024    CALCIUM 9.6 06/05/2024    PROT 7.5 02/28/2024    ALBUMIN 3.4 (L) 02/28/2024    BILITOT 0.5 02/28/2024    AST 17 02/28/2024    ALKPHOS 94 02/28/2024    ALT 18 02/28/2024       Lab Results   Component Value Date    COLORU Orange (A) 06/06/2024    APPEARANCEUA Cloudy (A) 06/06/2024    SPECGRAV 1.025 06/06/2024    PHUR 6.0 06/06/2024    PROTEINUA Negative 06/06/2024    KETONESU Negative 06/06/2024    LEUKOCYTESUR Negative 06/06/2024    NITRITE Negative 06/06/2024    UROBILINOGEN 0.2 07/23/2022       Lab Results   Component Value Date    CRP 14.2 (H) 06/05/2024       Lab Results   Component Value Date    TRACE Pos, Titer to follow (A) 09/13/2011    RF <13.0 06/05/2024    SEDRATE 83 (H) 06/05/2024    CCPANTIBODIE <0.5 05/25/2021       No components found for: "25OHVITDTOT", "45FOVHNP7", "78OPZUVH7", "METHODNOTE"    No results found for: "URICACID"    Lab Results   Component Value Date    HEPCAB Negative 10/07/2016       Imaging:  All imaging reviewed and independently interpreted by me.         ASSESSMENT / PLAN:     Dain Phelps is a 68 y.o. Black or  female with:    1. Positive TRACE (antinuclear antibody)  -TRACE 1:80 speckled with negative subserologies. Prior TRACE has been positive as well. In 2011 she had elevated Sm/RNP and then was negative.   -TRACE could be positive due to hypothyroidism or family history.  -elevated markers of inflammation for 10+ years.  -No clinical signs or symptoms of lupus, scleroderma, Sjogren's, " myositis.  -obtain autoimmune workup now.     2. Morbid obesity  -would benefit from decreasing at least 10% of body weight.  - recommended goal of losing 1 lb per week.  - consider nutritionist evaluation.  - would consider screening for LORRAINE per PMD.    3. Pain in other joint  -she has been having pain in bilateral knees, likely due to OA as evident on prior XR.   -repeat XR now  -start celebrex 200 mg QD as needed   -use voltaren gel     4. Chronic bilateral low back pain without sciatica  -no red flags. Likely mechanical   -obtain XR lumbar spine   -start flexeril 5 mg QHS PRN for muscle spasms.   -consider PT referral     Follow up in about 4 weeks (around 9/17/2024) for Virtual Visit.    Method of contact with patient concerns: Arnaldo guy Rheumatology    Disclaimer:  This note is prepared using voice recognition software and as such is likely to have errors and has not been proof read. Please contact me for questions.     Time spent: 41 minutes in face to face discussion concerning diagnosis, prognosis, review of lab and test results, benefits of treatment as well as management of disease, counseling of patient and coordination of care between various health care providers.  Greater than half the time spent was used for coordination of care and counseling of patient.    Yajaira Henderson M.D.  Rheumatology  [facility]

## 2024-08-24 DIAGNOSIS — I10 ESSENTIAL HYPERTENSION: ICD-10-CM

## 2024-08-25 NOTE — TELEPHONE ENCOUNTER
No care due was identified.  [facility] Embedded Care Due Messages. Reference number: [id number].   8/24/2024 8:18:53 PM CDT

## 2024-08-25 NOTE — TELEPHONE ENCOUNTER
Refill Routing Note   Medication(s) are not appropriate for processing by [facility] for the following reason(s):        No active prescription written by provider    [facility] action(s):  Defer               Appointments  past 12m or future 3m with PCP    Date Provider   Last Visit   2/27/2024 Hilary Wells MD   Next Visit   Visit date not found Hilary Wells MD   ED visits in past 90 days: 0        Note composed:4:02 AM 08/25/2024

## 2024-08-26 ENCOUNTER — PATIENT MESSAGE (OUTPATIENT)
Dept: RHEUMATOLOGY | Facility: CLINIC | Age: 69
End: 2024-08-26
Payer: COMMERCIAL

## 2024-08-26 RX ORDER — VERAPAMIL HYDROCHLORIDE 360 MG/1
CAPSULE, DELAYED RELEASE PELLETS ORAL
Qty: 90 CAPSULE | Refills: 1 | Status: SHIPPED | OUTPATIENT
Start: 2024-08-26

## 2024-08-29 ENCOUNTER — PATIENT MESSAGE (OUTPATIENT)
Dept: RHEUMATOLOGY | Facility: CLINIC | Age: 69
End: 2024-08-29
Payer: COMMERCIAL

## 2024-09-18 ENCOUNTER — OFFICE VISIT (OUTPATIENT)
Dept: OBSTETRICS AND GYNECOLOGY | Facility: CLINIC | Age: 69
End: 2024-09-18
Payer: MEDICARE

## 2024-09-18 ENCOUNTER — OFFICE VISIT (OUTPATIENT)
Dept: RHEUMATOLOGY | Facility: CLINIC | Age: 69
End: 2024-09-18
Payer: MEDICARE

## 2024-09-18 ENCOUNTER — E-CONSULT (OUTPATIENT)
Dept: HEMATOLOGY/ONCOLOGY | Facility: CLINIC | Age: 69
End: 2024-09-18
Payer: COMMERCIAL

## 2024-09-18 VITALS
DIASTOLIC BLOOD PRESSURE: 80 MMHG | WEIGHT: 244.94 LBS | SYSTOLIC BLOOD PRESSURE: 130 MMHG | BODY MASS INDEX: 39.53 KG/M2

## 2024-09-18 DIAGNOSIS — R76.8 POSITIVE ANA (ANTINUCLEAR ANTIBODY): Primary | ICD-10-CM

## 2024-09-18 DIAGNOSIS — R76.8 ELEVATED SERUM IMMUNOGLOBULIN FREE LIGHT CHAIN LEVEL: Primary | ICD-10-CM

## 2024-09-18 DIAGNOSIS — M54.50 CHRONIC BILATERAL LOW BACK PAIN WITHOUT SCIATICA: ICD-10-CM

## 2024-09-18 DIAGNOSIS — I10 PRIMARY HYPERTENSION: ICD-10-CM

## 2024-09-18 DIAGNOSIS — G89.29 CHRONIC BILATERAL LOW BACK PAIN WITHOUT SCIATICA: ICD-10-CM

## 2024-09-18 DIAGNOSIS — N89.8 VAGINAL DISCHARGE: ICD-10-CM

## 2024-09-18 DIAGNOSIS — N95.2 VAGINAL ATROPHY: Primary | ICD-10-CM

## 2024-09-18 DIAGNOSIS — M79.10 MYALGIA: ICD-10-CM

## 2024-09-18 DIAGNOSIS — M17.0 PRIMARY OSTEOARTHRITIS OF BOTH KNEES: ICD-10-CM

## 2024-09-18 DIAGNOSIS — R76.8 ELEVATED SERUM IMMUNOGLOBULIN FREE LIGHT CHAINS: ICD-10-CM

## 2024-09-18 PROCEDURE — 99999 PR PBB SHADOW E&M-EST. PATIENT-LVL II: CPT | Mod: PBBFAC,,, | Performed by: OBSTETRICS & GYNECOLOGY

## 2024-09-18 PROCEDURE — 99213 OFFICE O/P EST LOW 20 MIN: CPT | Mod: S$PBB,,, | Performed by: OBSTETRICS & GYNECOLOGY

## 2024-09-18 PROCEDURE — 99214 OFFICE O/P EST MOD 30 MIN: CPT | Mod: 95,,, | Performed by: STUDENT IN AN ORGANIZED HEALTH CARE EDUCATION/TRAINING PROGRAM

## 2024-09-18 PROCEDURE — 81514 NFCT DS BV&VAGINITIS DNA ALG: CPT | Performed by: OBSTETRICS & GYNECOLOGY

## 2024-09-18 PROCEDURE — 99451 NTRPROF PH1/NTRNET/EHR 5/>: CPT | Mod: S$GLB,,, | Performed by: INTERNAL MEDICINE

## 2024-09-18 PROCEDURE — 99212 OFFICE O/P EST SF 10 MIN: CPT | Mod: PBBFAC | Performed by: OBSTETRICS & GYNECOLOGY

## 2024-09-18 RX ORDER — ESTRADIOL 0.1 MG/G
CREAM VAGINAL
Qty: 42.5 G | Refills: 5 | Status: SHIPPED | OUTPATIENT
Start: 2024-09-18

## 2024-09-18 RX ORDER — CELECOXIB 200 MG/1
200 CAPSULE ORAL DAILY
Qty: 30 CAPSULE | Refills: 1 | Status: SHIPPED | OUTPATIENT
Start: 2024-09-18

## 2024-09-18 NOTE — PROGRESS NOTES
SUBJECTIVE:   Dain Phelps is a 68 y.o. female   for check up,  No LMP recorded. Patient has had a hysterectomy..      S/p hayden/bso in  for aub/fibroids, was on vaginal estrace but reported not helping with vaginal atrophy but the cream caused irritation  Tried imvexxy but insurance does not cover  Denies vasomotor symptoms.  + vaginal dryness and pain with sex  Also noted vaginal discharge with odor x 1 month    Past Medical History:   Diagnosis Date    Allergy     Chronic low back pain     Fatigue     GERD (gastroesophageal reflux disease)     History of colon polyps 2015    Hypertension     Hypothyroidism     Impaired glucose tolerance 01/15/2014    Leukopenia     Obesity (BMI 30-39.9) 01/15/2014    Primary hypothyroidism 2015    Snoring 2015    Vitamin D deficiency 01/15/2014     Past Surgical History:   Procedure Laterality Date    BREAST BIOPSY Left     CATARACT EXTRACTION       SECTION, CLASSIC      x3    COLONOSCOPY N/A 2021    Procedure: COLONOSCOPY;  Surgeon: Parvez Mazariegos MD;  Location: [facility];  Service: Endoscopy;  Laterality: N/A;  covid test  lapalco, instructions through [facility]    HYSTERECTOMY      total    OOPHORECTOMY      thyroid surgey       Social History     Socioeconomic History    Marital status:    Occupational History     Comment: retired   Tobacco Use    Smoking status: Never    Smokeless tobacco: Never   Substance and Sexual Activity    Alcohol use: No     Comment: seldomly    Drug use: No    Sexual activity: Not Currently     Partners: Male     Social Determinants of Health     Financial Resource Strain: Low Risk  (2023)    Overall Financial Resource Strain (CARDIA)     Difficulty of Paying Living Expenses: Not hard at all   Food Insecurity: No Food Insecurity (2023)    Hunger Vital Sign     Worried About Running Out of Food in the Last Year: Never true     Ran Out of Food in the Last Year: Never true    Transportation Needs: No Transportation Needs (2023)    PRAPARE - Transportation     Lack of Transportation (Medical): No     Lack of Transportation (Non-Medical): No   Physical Activity: Sufficiently Active (2023)    Exercise Vital Sign     Days of Exercise per Week: 4 days     Minutes of Exercise per Session: 60 min   Stress: No Stress Concern Present (2023)    [employer] - Occupational Stress Questionnaire     Feeling of Stress : Not at all   Housing Stability: Low Risk  (2023)    Housing Stability Vital Sign     Unable to Pay for Housing in the Last Year: No     Number of Places Lived in the Last Year: 1     Unstable Housing in the Last Year: No     Family History   Problem Relation Name Age of Onset    Heart disease Mother      Kidney disease Mother      Hypertension Mother      Cancer Father          throat cancer    No Known Problems Daughter x1     Hypertension Son x1     Hypertension Maternal Aunt      Diabetes Maternal Aunt      Thyroid disease Maternal Aunt      Breast cancer Maternal Aunt  70    Diabetes Maternal Grandmother       OB History    Para Term  AB Living   6 3 3     3   SAB IAB Ectopic Multiple Live Births           3      # Outcome Date GA Lbr Silvestre/2nd Weight Sex Type Anes PTL Lv   6 Term            5 Term            4 Term            3      M CS-Classical  N PING   2      F CS-Classical  N PING   1      M CS-Classical  N PING      Obstetric Comments   S/p GENOVEVA/BSO in  for AUB/Fibroids   Denies abnl pap   MMG 2019 neg   cscope , repeat in 5 years   dexa 2019 NEG         Current Outpatient Medications   Medication Sig Dispense Refill    celecoxib (CELEBREX) 200 MG capsule Take 1 capsule (200 mg total) by mouth once daily. 30 capsule 1    ergocalciferol, vitamin D2, 2,000 unit Tab Take 2,000 Units by mouth 2 (two) times daily.      estradioL (IMVEXXY MAINTENANCE PACK) 4 mcg Inst Vaginally insert (1)  4mcg tablet twice a week. * DUE for APPT* 24 each 0    hydroCHLOROthiazide (HYDRODIURIL) 25 MG tablet Take 1 tablet (25 mg total) by mouth once daily. 30 tablet 11    levothyroxine (SYNTHROID) 125 MCG tablet Take 1 tablet (125 mcg total) by mouth before breakfast. 90 tablet 3    multivitamin capsule Take 1 capsule by mouth once daily.      valsartan (DIOVAN) 80 MG tablet TAKE 1 TABLET(80 MG) BY MOUTH DAILY 90 tablet 2    verapamiL (VERELAN) 360 MG C24P TAKE 1 CAPSULE(360 MG) BY MOUTH EVERY DAY 90 capsule 1     No current facility-administered medications for this visit.     Allergies: Ace inhibitors       ROS:  GENERAL: Denies weight gain or weight loss. Feeling well overall.   SKIN: Denies rash or lesions.   HEAD: Denies head injury or headache.   NODES: Denies enlarged lymph nodes.   CHEST: Denies chest pain or shortness of breath.   CARDIOVASCULAR: Denies palpitations or left sided chest pain.   ABDOMEN: No abdominal pain, constipation, diarrhea, nausea, vomiting or rectal bleeding.   URINARY: No frequency, dysuria, hematuria, or burning on urination.  REPRODUCTIVE: Denies vaginal discharge, abnormal vaginal bleeding, lesions, pelvic pain  BREASTS: The patient performs breast self-examination and denies pain, lumps, or nipple discharge.   HEMATOLOGIC: No easy bruisability or excessive bleeding.  MUSCULOSKELETAL: Denies joint pain or swelling.   NEUROLOGIC: Denies syncope or weakness.   PSYCHIATRIC: Denies depression, anxiety or mood swings.      OBJECTIVE:   /80   Wt 111.1 kg (244 lb 14.9 oz)   BMI 39.53 kg/m²   The patient appears well, alert, oriented x 3, in no distress.  PSYCH:  Normal, full range of affect  NECK: negative, no thyromegaly, trachea midline  SKIN: normal, good color, good turgor and no acne, striae, hirsutism  BREAST EXAM: breasts appear normal, no suspicious masses, no skin or nipple changes or axillary nodes  ABDOMEN: soft, non-tender; bowel sounds normal; no masses,  no organomegaly  and no hernias, masses, or hepatosplenomegaly  GENITALIA: normal external genitalia, no erythema, no discharge  BLADDER: soft  URETHRA: normal appearing urethra with no masses, tenderness or lesions and normal urethra, normal urethral meatus  VAGINA: mucosal atrophy, slight scant discharge  CERVIX: absent  UTERUS: uterus absent  ADNEXA: no mass, fullness, tenderness      ASSESSMENT:   Katty was seen today for well woman and vaginal discharge.    Diagnoses and all orders for this visit:    Vaginal atrophy    Vaginal discharge  -     Vaginosis Screen by DNA Probe    Other orders  -     estradioL (ESTRACE) 0.01 % (0.1 mg/gram) vaginal cream; Insert 2 g daily intravaginally for 2 weeks, then 1 g twice weekly        Vaginal atrophy:  Rx for vaginal estrace  Affirm done

## 2024-09-18 NOTE — CONSULTS
[place] - Hematology Oncology  Response for E-Consult     Patient Name: Dain Phelps  MRN: [id number]  Primary Care Provider: Hilary Wells MD   Requesting Provider: Yajaira Henderson*  [facility]  Consult performed by: Gunner Mckoy MD  Consult ordered by: Yajaira Henderson MD  Reason for consult: High kappa FLC with normal ratio. patient would like to be evaluated by hematology  Assessment/Recommendations: Immunoglobulin Free LT Chains Blood  Order: [id number]  Status: Final result       Visible to patient: Yes (seen)       Next appt: None       Dx: Positive TRACE (antinuclear antibody)    0 Result Notes      Component Ref Range & Units 4 wk ago  [place] Free Light Chains 0.33 - 1.94 mg/dL 3.97 High   Lambda Free Light Chains 0.57 - 2.63 mg/dL 2.60  Kappa/Lambda FLC Ratio 0.26 - 1.65 1.53    SPEP/DALE without M-spike              Recommendation: Elevation in kappa/lambda with a normal ratio may be seen in conditions such as inflammatory conditions and connective tissue disease; renal dysfunction may also give a similar result though I see thi patient has had normal renal function testing. Given that the kappa is only mildly elevated and the ratio is normal with an SPEP that did not demonstrate any M-spike, the overall picture is benign. No further testing for abnormal kappa light chains is indicated.     Contingency that warrants a repeat eConsult or referral: new questions or concerns    Total time of Consultation: 5 minute    I did not speak to the requesting provider verbally about this.     *This eConsult is based on the clinical data available to me and is furnished without benefit of a physical examination. The eConsult will need to be interpreted in light of any clinical issues or changes in patient status not available to me at the time of filing this eConsults. Significant changes in patient condition or level of acuity should result in immediate formal consultation and reevaluation.  Please alert me if you have further questions.    Thank you for this eConsult referral.     Gunner Mckoy MD  [place] - Hematology Oncology

## 2024-09-18 NOTE — PROGRESS NOTES
RHEUMATOLOGY OUTPATIENT CLINIC NOTE    9/18/2024    Attending Rheumatologist: Yajaira Henderson  Primary Care Provider: Hilary Wells MD   Physician Requesting Consultation: No referring provider defined for this encounter.  Chief Complaint/Reason For Consultation:  No chief complaint on file.    The patient location is:  Home  The chief complaint leading to consultation is:  Joint pain  Visit type: Virtual visit with synchronous audio and video  Total time spent with patient: 12 minutes    Each patient to whom he or she provides medical services by telemedicine is:  (1) informed of the relationship between the physician and patient and the respective role of any other health care provider with respect to management of the patient; and (2) notified that he or she may decline to receive medical services by telemedicine and may withdraw from such care at any time.   Subjective:       CARRI Phelps is a 68 y.o. Black or  female with hypothyroidism and sleep apnea on CPAP who comes for evaluation of joint pain and positive TRACE.    Interim history  -initial consult on 8/2024  -labs showed negative hepatitis-B and C serologies.  Negative CCP. UPCR negative.  Complements normal.  CRP elevated, ESR elevated but improved compared to prior. SPEP with electrophoresis normal, negative for monoclonal peaks. FLC showed elevated kappa free light chain, with normal ratio  -x-ray of the lumbar spine showed degenerative changes throughout the spine  -she feels that celebrex has been helping.   -has not taken flexeril because she has not have much muscle spasms.      Disease history    1 year she started noticing that her whole legs are stiff, particularly knees and ankles, in the morning. Improve after a hot shower, MS less than 1 hour.   Has been having pain in bilateral knees, sharp pains at random times. Pain is worse with walking or prolonged sitting. No swelling. She did PT about a year ago which  it helped but pain has changed. Reports lumbar paraspinal pain for about 3 months ago. Improves with heating pad, no sciatica.    She denies any pain in upper extremities, hands, wrists. No swelling.  Denies any Raynaud's, dry eyes, dry mouth, swollen glands, muscle weakness, skin rashes.    Retired. Used to work in [employer].  Does not smoke, drink.    Mother with SLE    Labs  2024  TRACE 1:80 speckled - also TRACE positive 1:160 in   Negative DSDNA, SSA, SSB, SM, SM/RNP  ESR 83  CRP 14.2  RF negative  H. Pylori Ag (stool) not detected     Imaging  -XR left knee: moderate OA  -XR right knee 2019: moderate OA     Review of Systems   Constitutional:  Negative for fever and unexpected weight change.   HENT:  Negative for mouth sores and trouble swallowing.    Eyes:  Negative for redness.   Respiratory:  Negative for cough and shortness of breath.    Cardiovascular:  Negative for chest pain.   Gastrointestinal:  Negative for constipation and diarrhea.   Genitourinary:  Negative for dysuria and genital sores.   Integumentary:  Negative for rash.   Neurological:  Negative for headaches.   Hematological:  Does not bruise/bleed easily.        Chronic comorbid conditions affecting medical decision making today:  Past Medical History:   Diagnosis Date    Allergy     Chronic low back pain     Fatigue     GERD (gastroesophageal reflux disease)     History of colon polyps 2015    Hypertension     Hypothyroidism     Impaired glucose tolerance 01/15/2014    Leukopenia     Obesity (BMI 30-39.9) 01/15/2014    Primary hypothyroidism 2015    Snoring 2015    Vitamin D deficiency 01/15/2014     Past Surgical History:   Procedure Laterality Date    BREAST BIOPSY Left     CATARACT EXTRACTION       SECTION, CLASSIC      x3    COLONOSCOPY N/A 2021    Procedure: COLONOSCOPY;  Surgeon: Parvez Mazariegos MD;  [facility];  Service: Endoscopy;  Laterality: N/A;  covid test  lapalco, instructions through  [facility]    HYSTERECTOMY      total    OOPHORECTOMY      thyroid surgey  2009     Family History   Problem Relation Name Age of Onset    Heart disease Mother      Kidney disease Mother      Hypertension Mother      Cancer Father          throat cancer    No Known Problems Daughter x1     Hypertension Son x1     Hypertension Maternal Aunt      Diabetes Maternal Aunt      Thyroid disease Maternal Aunt      Breast cancer Maternal Aunt  70    Diabetes Maternal Grandmother       Social History     Substance and Sexual Activity   Alcohol Use No    Comment: seldomly     Social History     Tobacco Use   Smoking Status Never   Smokeless Tobacco Never     Social History     Substance and Sexual Activity   Drug Use No       Current Outpatient Medications:     celecoxib (CELEBREX) 200 MG capsule, Take 1 capsule (200 mg total) by mouth once daily., Disp: 30 capsule, Rfl: 1    ergocalciferol, vitamin D2, 2,000 unit Tab, Take 2,000 Units by mouth 2 (two) times daily., Disp: , Rfl:     estradioL (ESTRACE) 0.01 % (0.1 mg/gram) vaginal cream, Insert 2 g daily intravaginally for 2 weeks, then 1 g twice weekly, Disp: 42.5 g, Rfl: 5    hydroCHLOROthiazide (HYDRODIURIL) 25 MG tablet, Take 1 tablet (25 mg total) by mouth once daily., Disp: 30 tablet, Rfl: 11    levothyroxine (SYNTHROID) 125 MCG tablet, Take 1 tablet (125 mcg total) by mouth before breakfast., Disp: 90 tablet, Rfl: 3    multivitamin capsule, Take 1 capsule by mouth once daily., Disp: , Rfl:     valsartan (DIOVAN) 80 MG tablet, TAKE 1 TABLET(80 MG) BY MOUTH DAILY, Disp: 90 tablet, Rfl: 2    verapamiL (VERELAN) 360 MG C24P, TAKE 1 CAPSULE(360 MG) BY MOUTH EVERY DAY, Disp: 90 capsule, Rfl: 1     Objective:         There were no vitals filed for this visit.    Physical Exam   Constitutional: She is oriented to person, place, and time. She appears well-developed.   HENT:   Head: Normocephalic.   Cardiovascular: Normal rate and normal heart sounds.   Pulmonary/Chest: Effort  normal and breath sounds normal.   Abdominal: Soft.   Musculoskeletal:      Cervical back: Neck supple.      Comments: Cspine FROM no tenderness  Tspine FROM no tenderness  Lspine FROM paraspinal tenderness  Shoulders: FROM; no synovitis;  Elbows: FROM; no synovitis; no tophi or nodules  Wrists: FROM; no synovitis;    MCPs: FROM; no synovitis;   PIPs:FROM; no synovitis;   DIPs: FROM; no synovitis;   HIPS: FROM  Knees: FROM; no synovitis; no instability  Ankles: FROM: no synovitis   Toes: no tenderness on palpation.     Lymphadenopathy:     She has no cervical adenopathy.   Neurological: She is alert and oriented to person, place, and time.   Skin: No rash noted.   No Heliotrope rash  No Gottron papules  No sclerodactyly   No Raynaud's  No Petechiae  No discoid lesions  No alopecia  Normal Capillaroscopy   Vitals reviewed.    Virtual visit 9/18  No rashes in face    Reviewed old and all outside pertinent medical records available.    All lab results personally reviewed and interpreted by me.  Lab Results   Component Value Date    WBC 4.06 02/28/2024    HGB 13.6 02/28/2024    HCT 42.2 02/28/2024    MCV 90 02/28/2024    MCH 28.9 02/28/2024    MCHC 32.2 02/28/2024    RDW 13.7 02/28/2024     02/28/2024    MPV 11.8 02/28/2024    PLTEST Adequate 09/13/2011       Lab Results   Component Value Date     06/05/2024    K 3.5 06/05/2024     06/05/2024    CO2 25 06/05/2024    GLU 86 06/05/2024    BUN 17 06/05/2024    CALCIUM 9.6 06/05/2024    PROT 7.5 02/28/2024    ALBUMIN 3.4 (L) 02/28/2024    BILITOT 0.5 02/28/2024    AST 17 02/28/2024    ALKPHOS 94 02/28/2024    ALT 18 02/28/2024       Lab Results   Component Value Date    COLORU Orange (A) 06/06/2024    APPEARANCEUA Cloudy (A) 06/06/2024    SPECGRAV 1.025 06/06/2024    PHUR 6.0 06/06/2024    PROTEINUA Negative 06/06/2024    KETONESU Negative 06/06/2024    LEUKOCYTESUR Negative 06/06/2024    NITRITE Negative 06/06/2024    UROBILINOGEN 0.2 07/23/2022  "      Lab Results   Component Value Date    CRP 25.5 (H) 08/20/2024       Lab Results   Component Value Date    TRACE Pos, Titer to follow (A) 09/13/2011    RF <13.0 06/05/2024    SEDRATE 49 (H) 08/20/2024    CCPANTIBODIE <0.5 08/20/2024       No components found for: "25OHVITDTOT", "92UIRWKR6", "56BDDZUO7", "METHODNOTE"    No results found for: "URICACID"    Lab Results   Component Value Date    HEPBSAB <3.00 08/20/2024    HEPBSAB Non-reactive 08/20/2024    HEPBSAG Non-reactive 08/20/2024    HEPCAB Non-reactive 08/20/2024    HEPCAB Negative 10/07/2016       Imaging:  All imaging reviewed and independently interpreted by me.         ASSESSMENT / PLAN:     Dain Phelps is a 68 y.o. Black or  female with:    1. Positive TRACE (antinuclear antibody)  -TRACE 1:80 speckled with negative subserologies. Prior TRACE has been positive as well. In 2011 she had elevated Sm/RNP and then was negative. TRACE could be positive due to hypothyroidism or family history.  -elevated markers of inflammation for 10+ years.  Normal complements, normal SPEP, free light chain ratio normal.  -No clinical signs or symptoms of lupus, scleroderma, Sjogren's, myositis.    2. Morbid obesity  -would benefit from decreasing at least 10% of body weight.  - recommended goal of losing 1 lb per week.  - consider nutritionist evaluation.  - would consider screening for LORRAINE per PMD.    3. Osteoarthritis of the knee  -she has been having pain in bilateral knees, likely due to OA as evident on prior XR.  She also may have a component of patellofemoral syndrome.  -continue celebrex 200 mg QD PRN.   -use voltaren gel   -Referral to physical therapy    4. Chronic bilateral low back pain without sciatica  -no red flags. Likely mechanical   -XR lumbar spine showed DJD.   -continue flexeril 5 mg QHS PRN for muscle spasms.   -PT referral     5. Elevated kappa FLC  -normal FLC ratio  -will place e-consult to hem/onc as pt would like to be evaluated for " this.   -normal SPEP with electrophoresis    Follow up in about 4 months (around 1/18/2025).    Method of contact with patient concerns: Arnaldo guy Rheumatology    Disclaimer:  This note is prepared using voice recognition software and as such is likely to have errors and has not been proof read. Please contact me for questions.     Time spent: 15 minutes in face to face discussion concerning diagnosis, prognosis, review of lab and test results, benefits of treatment as well as management of disease, counseling of patient and coordination of care between various health care providers.  Greater than half the time spent was used for coordination of care and counseling of patient.      Yajaira Henderson M.D.  Rheumatology  [facility]

## 2024-09-19 ENCOUNTER — PATIENT MESSAGE (OUTPATIENT)
Dept: RHEUMATOLOGY | Facility: CLINIC | Age: 69
End: 2024-09-19
Payer: COMMERCIAL

## 2024-09-19 RX ORDER — HYDROCHLOROTHIAZIDE 25 MG/1
25 TABLET ORAL
Qty: 90 TABLET | Refills: 1 | Status: SHIPPED | OUTPATIENT
Start: 2024-09-19

## 2024-09-19 NOTE — TELEPHONE ENCOUNTER
No care due was identified.  [facility] Embedded Care Due Messages. Reference number: [id number].   9/18/2024 7:53:47 PM CDT

## 2024-09-19 NOTE — TELEPHONE ENCOUNTER
Refill Routing Note   Medication(s) are not appropriate for processing by [facility] for the following reason(s):        No active prescription written by provider    [facility] action(s):  Defer        Medication Therapy Plan: 2/27/24 LOV; Patient updated PCP's after last med order      Appointments  past 12m or future 3m with PCP    Date Provider   Last Visit   2/27/2024 Hilary Wells MD   Next Visit   Visit date not found Hilary Wells MD   ED visits in past 90 days: 0        Note composed:12:54 PM 09/19/2024

## 2024-10-10 ENCOUNTER — PATIENT MESSAGE (OUTPATIENT)
Dept: OTHER | Facility: OTHER | Age: 69
End: 2024-10-10
Payer: COMMERCIAL

## 2024-10-15 ENCOUNTER — CLINICAL SUPPORT (OUTPATIENT)
Dept: REHABILITATION | Facility: HOSPITAL | Age: 69
End: 2024-10-15
Attending: STUDENT IN AN ORGANIZED HEALTH CARE EDUCATION/TRAINING PROGRAM
Payer: MEDICARE

## 2024-10-15 DIAGNOSIS — M54.50 CHRONIC BILATERAL LOW BACK PAIN WITHOUT SCIATICA: ICD-10-CM

## 2024-10-15 DIAGNOSIS — M25.561 CHRONIC PAIN OF BOTH KNEES: Primary | ICD-10-CM

## 2024-10-15 DIAGNOSIS — M25.562 CHRONIC PAIN OF BOTH KNEES: Primary | ICD-10-CM

## 2024-10-15 DIAGNOSIS — G89.29 CHRONIC PAIN OF BOTH KNEES: Primary | ICD-10-CM

## 2024-10-15 DIAGNOSIS — M17.0 PRIMARY OSTEOARTHRITIS OF BOTH KNEES: ICD-10-CM

## 2024-10-15 DIAGNOSIS — Z74.09 DECREASED FUNCTIONAL MOBILITY AND ENDURANCE: ICD-10-CM

## 2024-10-15 DIAGNOSIS — G89.29 CHRONIC BILATERAL LOW BACK PAIN WITHOUT SCIATICA: ICD-10-CM

## 2024-10-15 PROBLEM — R68.89 DECREASED STRENGTH, ENDURANCE, AND MOBILITY: Status: RESOLVED | Noted: 2023-02-13 | Resolved: 2024-10-15

## 2024-10-15 PROBLEM — R53.1 DECREASED STRENGTH, ENDURANCE, AND MOBILITY: Status: RESOLVED | Noted: 2023-02-13 | Resolved: 2024-10-15

## 2024-10-15 PROBLEM — R26.89 ANTALGIC GAIT: Status: RESOLVED | Noted: 2023-02-13 | Resolved: 2024-10-15

## 2024-10-15 PROCEDURE — 97161 PT EVAL LOW COMPLEX 20 MIN: CPT | Mod: PN

## 2024-10-15 PROCEDURE — 97112 NEUROMUSCULAR REEDUCATION: CPT | Mod: PN

## 2024-10-15 NOTE — PLAN OF CARE
[facility]  Physical Therapy Initial Evaluation  Date: 10/15/2024   Name: Dain Phelps  Clinic Number: [id number]    Therapy Diagnosis:   Encounter Diagnoses   Name Primary?    Chronic bilateral low back pain without sciatica     Primary osteoarthritis of both knees     Chronic pain of both knees Yes    Decreased functional mobility and endurance      Physician: Yajaira Henderson*    Physician Orders: PT Eval and Treat   Medical Diagnosis from Referral:   M54.50,G89.29 (ICD-10-CM) - Chronic bilateral low back pain without sciatica   M17.0 (ICD-10-CM) - Primary osteoarthritis of both knees     Evaluation Date: 10/15/2024  Authorization Period Expiration: 12-31-24  Plan of Care Expiration: 1-15-25  Visit # / Visits authorized: 1/ 20   Progress Note Due: 11-15-24  FOTO: 1/ 1    Precautions: Standard, HTN, Primary hypothyroidism,     Time In: 11:00 am  Time Out: 11:45 am  Total Appointment Time (timed & untimed codes): 45  minutes    Subjective   Date of onset: 2 months ago  History of current condition - Dain reports: that she has B knee pain. She states she feels sharp pain in B knee. She feels pain int he side of the knee. She states pain is localized. She states she has stiffness in the knee     NIKKI: No traumatic event   Any Bowel and Bladder movement issues:  no  Any falls:  no  Any dizziness: no  Any Headache:  no  Any injection in lower back: steroid or epidural: no   Pain radiates:  no   Pain constant or intermitting:intermittent     Pain:  Current 0/10, worst 10/10, best 0/10   Location: bilateral knee    Description: Sharp  Aggravating Factors: Sitting, Standing, Bending, Walking, Morning, Extension, Flexing, Lifting, and Getting out of bed/chair  Easing Factors: nothing    Prior Therapy: yes   Social History:  lives with their family  Occupation: retired   Prior Level of Function: independent   Current Level of Function: independent     Pts goals: decrease pain      Imaging, bone scan films:   Impression:     No evidence for acute fracture, bone destruction, or dislocation.     Degenerative changes with moderate narrowing of the medial compartment of the left femorotibial joint.       Medical History:   Past Medical History:   Diagnosis Date    Allergy     Chronic low back pain     Fatigue     GERD (gastroesophageal reflux disease)     History of colon polyps 2015    Hypertension     Hypothyroidism     Impaired glucose tolerance 01/15/2014    Leukopenia     Obesity (BMI 30-39.9) 01/15/2014    Primary hypothyroidism 2015    Snoring 2015    Vitamin D deficiency 01/15/2014       Surgical History:   Dain Phelps  has a past surgical history that includes  section, classic; thyroid surgey (); Hysterectomy; Cataract extraction; Oophorectomy; Breast biopsy (Left); and Colonoscopy (N/A, 2021).    Medications:   Dain has a current medication list which includes the following prescription(s): celecoxib, ergocalciferol (vitamin d2), estradiol, hydrochlorothiazide, levothyroxine, multivitamin, valsartan, and verapamil.    Allergies:   Review of patient's allergies indicates:   Allergen Reactions    Ace inhibitors Edema     Angioedema          Objective     Observation:          Sensation: intact to light touch    DTR: intact    GAIT DEVIATIONS: [unfilled] displays antalgic gait;    Range of Motion:   Knee Left active   Flexion 100 deg no pain   Extension 0 deg      Knee Right active   Flexion 107 deg   Extension 0 deg        Lower Extremity Strength   Right LE  Left LE    Knee extension: 4-/5 Knee extension: 4-/5   Knee flexion: 4-/5 Knee flexion: 4-/5   Hip flexion: 4-/5 Hip flexion: 4-/5   Hip extension:  4-/5 Hip extension: 4-/5   Hip abduction: 4-/5 Hip abduction: 4-/5   Hip adduction: 4-/5 Hip adduction 4-/5   Ankle dorsiflexion: 4+/5 Ankle dorsiflexion: 4+/5   Ankle plantarflexion: 4+/5 Ankle plantarflexion: 4+/5       Special Tests:  5  times sit to stand: 20 sec   TUG test: 18 sec     Joint Mobility:      Patellar sup./inf: decrease    Patellar med/lat: decrease     Palpation:       Flexibility:    Ely's test: R = negative  L = negative    Popliteal Angle: R = negative  ; L = negative                       TREATMENT   Treatment Time In: 11:22 am   Treatment Time Out: 11:45am  Total Treatment time separate from Evaluation: 23 minutes      Dain participated in neuromuscular re-education activities to improve: Proprioception, Posture, and muscles motor control for 23 minutes. The following activities were included:  Quad sets  SAQ  SLR  Bridges  HL hip abd  HL hip add        Home Exercises and Patient Education Provided    Education provided:   - Perform HEP 2 times per day    Written Home Exercises Provided: Patient instructed to cont prior HEP.  Exercises were reviewed and Dain was able to demonstrate them prior to the end of the session.  Dain demonstrated good  understanding of the education provided.     See EMR under Patient Instructions for exercises provided 10/15/2024.    Assessment   Dain is a 69 y.o. female referred to outpatient Physical Therapy with a medical diagnosis of  Primary osteoarthritis of both knees. Pt presents to therapy with B medial knee pain. PT ambulated with antalgic gait pattern. Pt demonstrated decrease B knee AROM and decrease muscles strength. Pt has sign and symptoms of B medial knee OA with patellofemoral pain. During palpation, pt demonstrated increase B medial knee pain at joint line. Pt will benefit from skilled PT services to return to PLOF.     Pt prognosis is Good.   Pt will benefit from skilled outpatient Physical Therapy to address the deficits stated above and in the chart below, provide pt/family education, and to maximize pt's level of independence.     Plan of care discussed with patient: Yes  Pt's spiritual, cultural and educational needs considered and patient is agreeable to the plan of  care and goals as stated below:     Anticipated Barriers for therapy: Scheduling issues     Medical Necessity is demonstrated by the following  History  Co-morbidities and personal factors that may impact the plan of care [x] LOW: no personal factors / co-morbidities  [] MODERATE: 1-2 personal factors / co-morbidities  [] HIGH: 3+ personal factors / co-morbidities    Moderate / High Support Documentation:   Co-morbidities affecting plan of care:   Allergy   Chronic low back pain   Fatigue   GERD (gastroesophageal reflux disease)   History of colon polyps   Hypertension   Hypothyroidism   Impaired glucose tolerance   Leukopenia   Obesity (BMI 30-39.9)   Primary hypothyroidism   Snoring   Vitamin D deficiency       Personal Factors:   no deficits     Examination  Body Structures and Functions, activity limitations and participation restrictions that may impact the plan of care [x] LOW: addressing 1-2 elements  [] MODERATE: 3+ elements  [] HIGH: 4+ elements (please support below)    Moderate / High Support Documentation: strength, flexibility, and AROM     Clinical Presentation [x] LOW: stable  [] MODERATE: Evolving  [] HIGH: Unstable     Decision Making/ Complexity Score: low       GOALS: Short Term Goals:  4 weeks  1.Report decreased in pain at worse less than  <   / =  5  /10  to increase tolerance for functional mobility.On going  2. Pt to improve B knee range of motion by 25% to allow for improved functional mobility to allow for improvement in IADLs. .On going  3. Increased B LE  MMT 1/2 grade to increase tolerance for ADL and work activities.On going  4. Pt to tolerate HEP to improve ROM and independence with ADL's.On going    Long Term Goals: 8 weeks  1.Report decreased in pain at worse less than  <   / =  2  /10  to increase tolerance for functional mobility. On going  2.Pt to improve B knee range of motion by 75% to allow for improved functional mobility to allow for improvement in IADLs. On  going  3.Increased B knee  MMT 1 grade to increase tolerance for ADL and work activities.On going  4. Pt will report 10% or less limitation  on FOTO knee survey  to demonstrate increase in LE function with every day tasks. On going  5. Pt to be Independent with HEP to improve ROM and independence with ADL's. On going  6. PT will be able to kneeling and  object under bed to improve functional mobility.     Plan   Plan of care Certification: 10/15/2024 to 1-15-25.    Outpatient Physical Therapy 2 times weekly for 12 weeks to include the following interventions: Cervical/Lumbar Traction, Gait Training, Manual Therapy, Moist Heat/ Ice, Neuromuscular Re-ed, Patient Education, Self Care, Therapeutic Activities, and Therapeutic Exercise. Scottie Jones, PT      I CERTIFY THE NEED FOR THESE SERVICES FURNISHED UNDER THIS PLAN OF TREATMENT AND WHILE UNDER MY CARE   Physician's comments:     Physician's Signature: ___________________________________________________

## 2024-10-21 ENCOUNTER — PATIENT MESSAGE (OUTPATIENT)
Dept: ADMINISTRATIVE | Facility: CLINIC | Age: 69
End: 2024-10-21
Payer: COMMERCIAL

## 2024-10-21 ENCOUNTER — PATIENT MESSAGE (OUTPATIENT)
Dept: INTERNAL MEDICINE | Facility: CLINIC | Age: 69
End: 2024-10-21
Payer: COMMERCIAL

## 2024-10-22 ENCOUNTER — CLINICAL SUPPORT (OUTPATIENT)
Dept: REHABILITATION | Facility: HOSPITAL | Age: 69
End: 2024-10-22
Payer: MEDICARE

## 2024-10-22 DIAGNOSIS — M25.562 CHRONIC PAIN OF BOTH KNEES: Primary | ICD-10-CM

## 2024-10-22 DIAGNOSIS — Z74.09 DECREASED FUNCTIONAL MOBILITY AND ENDURANCE: ICD-10-CM

## 2024-10-22 DIAGNOSIS — G89.29 CHRONIC PAIN OF BOTH KNEES: Primary | ICD-10-CM

## 2024-10-22 DIAGNOSIS — M25.561 CHRONIC PAIN OF BOTH KNEES: Primary | ICD-10-CM

## 2024-10-22 PROCEDURE — 97530 THERAPEUTIC ACTIVITIES: CPT | Mod: PN

## 2024-10-22 PROCEDURE — 97112 NEUROMUSCULAR REEDUCATION: CPT | Mod: PN

## 2024-10-22 NOTE — PROGRESS NOTES
[facility]   Physical Therapy Treatment Note     Name: Dain NO [facility] Number: [id number]    Therapy Diagnosis:   Encounter Diagnoses   Name Primary?    Chronic pain of both knees Yes    Decreased functional mobility and endurance      Physician: Yajaira Henderson*    Visit Date: 10/22/2024    Physician: Yajaira Henderson*     Physician Orders: PT Eval and Treat   Medical Diagnosis from Referral:   M54.50,G89.29 (ICD-10-CM) - Chronic bilateral low back pain without sciatica   M17.0 (ICD-10-CM) - Primary osteoarthritis of both knees      Evaluation Date: 10/15/2024  Authorization Period Expiration: 12-31-24  Plan of Care Expiration: 1-15-25  Visit # / Visits authorized: 1/ 20   Progress Note Due: 11-15-24  FOTO: 1/ 1     Precautions: Standard, HTN, Primary hypothyroidism,        Time In: 10:00 am  Time Out: 10:50 am  Total Billable Time: 25 minutes      SUBJECTIVE     Pt reports: that she still have B knee pain.  She was compliant with home exercise program.  Response to previous treatment: No change   Functional change: None    Pain: 3/10  Location: bilateral knee      OBJECTIVE     Objective Measures updated at progress report unless specified.       TREATMENT     Dain received the treatments listed below:        therapeutic activities to improve functional performance and functional mobility for 15  minutes, including:  Nustep 10 min  Sit to stand 20 in box 2x10    received therapeutic exercises to develop strength and ROM for 00  minutes including:      neuromuscular re-education activities to improve: Balance, Proprioception, Posture, and muscles motor control for 35  minutes. The following activities were included:    Quad sets 3x10 hold 5 sec  SAQ 3x10 hold 5 sec  SLR 2x10  Bridges 3x10  HL hip abd YTB 2x10  HL hip add 2x10   Shuttle DL squat 1 red cord 2x10      received the following manual therapy techniques: Soft tissue Mobilization were applied to the: B  knee for 00 minutes, including:      PATIENT EDUCATION AND HOME EXERCISES     Home Exercises and Patient Education Provided     Education provided:   - Perform HEP 2 times per day     Written Home Exercises Provided: Patient instructed to cont prior HEP.  Exercises were reviewed and Dain was able to demonstrate them prior to the end of the session.  Dain demonstrated good  understanding of the education provided.      See EMR under Patient Instructions for exercises provided 10/15/2024.    ASSESSMENT     Pt presented to first f/u. Pt cont with B knee pain. We went over all HEP. PT required mod v/c's to perform exercises with proper form and proper muscles activation. Pt demonstrated decrease quad muscles motor control. No provocation of B knee pain. Plan to cont POC.     Dain Is progressing well towards her goals.   Pt prognosis is Good.     Pt will continue to benefit from skilled outpatient physical therapy to address the deficits listed in the problem list box on initial evaluation, provide pt/family education and to maximize pt's level of independence in the home and community environment.     Pt's spiritual, cultural and educational needs considered and pt agreeable to plan of care and goals.     Anticipated barriers to physical therapy: Scheduling issues     GOALS: Short Term Goals:  4 weeks  1.Report decreased in pain at worse less than  <   / =  5  /10  to increase tolerance for functional mobility.On going  2. Pt to improve B knee range of motion by 25% to allow for improved functional mobility to allow for improvement in IADLs. .On going  3. Increased B LE  MMT 1/2 grade to increase tolerance for ADL and work activities.On going  4. Pt to tolerate HEP to improve ROM and independence with ADL's.On going     Long Term Goals: 8 weeks  1.Report decreased in pain at worse less than  <   / =  2  /10  to increase tolerance for functional mobility. On going  2.Pt to improve B knee range of motion by 75%  to allow for improved functional mobility to allow for improvement in IADLs. On going  3.Increased B knee  MMT 1 grade to increase tolerance for ADL and work activities.On going  4. Pt will report 10% or less limitation  on FOTO knee survey  to demonstrate increase in LE function with every day tasks. On going  5. Pt to be Independent with HEP to improve ROM and independence with ADL's. On going  6. PT will be able to kneeling and  object under bed to improve functional mobility.     PLAN     Cont POC    Yemi Jones, PT

## 2024-10-29 ENCOUNTER — CLINICAL SUPPORT (OUTPATIENT)
Dept: REHABILITATION | Facility: HOSPITAL | Age: 69
End: 2024-10-29
Payer: MEDICARE

## 2024-10-29 DIAGNOSIS — G89.29 CHRONIC PAIN OF BOTH KNEES: Primary | ICD-10-CM

## 2024-10-29 DIAGNOSIS — M25.561 CHRONIC PAIN OF BOTH KNEES: Primary | ICD-10-CM

## 2024-10-29 DIAGNOSIS — Z74.09 DECREASED FUNCTIONAL MOBILITY AND ENDURANCE: ICD-10-CM

## 2024-10-29 DIAGNOSIS — M25.562 CHRONIC PAIN OF BOTH KNEES: Primary | ICD-10-CM

## 2024-10-29 PROCEDURE — 97530 THERAPEUTIC ACTIVITIES: CPT | Mod: PN

## 2024-10-29 PROCEDURE — 97112 NEUROMUSCULAR REEDUCATION: CPT | Mod: PN

## 2024-11-08 ENCOUNTER — PATIENT MESSAGE (OUTPATIENT)
Dept: RHEUMATOLOGY | Facility: CLINIC | Age: 69
End: 2024-11-08
Payer: COMMERCIAL

## 2024-11-12 ENCOUNTER — CLINICAL SUPPORT (OUTPATIENT)
Dept: REHABILITATION | Facility: HOSPITAL | Age: 69
End: 2024-11-12
Payer: MEDICARE

## 2024-11-12 ENCOUNTER — TELEPHONE (OUTPATIENT)
Dept: RHEUMATOLOGY | Facility: CLINIC | Age: 69
End: 2024-11-12
Payer: COMMERCIAL

## 2024-11-12 DIAGNOSIS — M25.562 CHRONIC PAIN OF BOTH KNEES: Primary | ICD-10-CM

## 2024-11-12 DIAGNOSIS — Z74.09 DECREASED FUNCTIONAL MOBILITY AND ENDURANCE: ICD-10-CM

## 2024-11-12 DIAGNOSIS — M25.561 CHRONIC PAIN OF BOTH KNEES: Primary | ICD-10-CM

## 2024-11-12 DIAGNOSIS — G89.29 CHRONIC PAIN OF BOTH KNEES: Primary | ICD-10-CM

## 2024-11-12 PROCEDURE — 97530 THERAPEUTIC ACTIVITIES: CPT | Mod: KX,PN,CQ

## 2024-11-12 PROCEDURE — 97112 NEUROMUSCULAR REEDUCATION: CPT | Mod: KX,PN,CQ

## 2024-11-12 NOTE — TELEPHONE ENCOUNTER
Called to see if patient can come in for 3:40 PM for office visit. 3rd call no answer LVM x 3 and sent portal message

## 2024-11-12 NOTE — PROGRESS NOTES
"[facility]   Physical Therapy Treatment Note     Name: Dain Phelps  Clinic Number: [id number]    Therapy Diagnosis:   Encounter Diagnoses   Name Primary?    Chronic pain of both knees Yes    Decreased functional mobility and endurance        Physician: Yajaira Henderson*    Visit Date: 11/12/2024    Physician: Yajaira Henderson*     Physician Orders: PT Eval and Treat   Medical Diagnosis from Referral:   M54.50,G89.29 (ICD-10-CM) - Chronic bilateral low back pain without sciatica   M17.0 (ICD-10-CM) - Primary osteoarthritis of both knees      Evaluation Date: 10/15/2024  Authorization Period Expiration: 12-31-24  Plan of Care Expiration: 1-15-25  Visit # / Visits authorized: 2/ 20   Progress Note Due: 11-15-24  FOTO: 1/ 1     Precautions: Standard, HTN, Primary hypothyroidism,        Time In: 9:00 am  Time Out: 9:55 am  Total Billable Time: 30 minutes with PTA  Total Time: 55 minutes      SUBJECTIVE     Pt reports: she took pain pill so she feels ok now.  She was compliant with home exercise program.  Response to previous treatment: No change   Functional change: None    Pain: 5/10  Location: bilateral knee      OBJECTIVE     Objective Measures updated at progress report unless specified.       TREATMENT     Dain received the treatments listed below:        therapeutic activities to improve functional performance and functional mobility for 15  minutes, including:  Nustep 10 min  Sit to stand 20 in box 2x10    received therapeutic exercises to develop strength and ROM for 10  minutes including:    +Long Sitting Calf stretch 3x30"  +Long Sitting Hamstring stretch 3x30"      neuromuscular re-education activities to improve: Balance, Proprioception, Posture, and muscles motor control for 20  minutes. The following activities were included:    Quad sets with towel roll under heel 3x10 hold 5 sec  SAQ with 3# ankle weight 3x10 hold 5 sec  SLR with 1# ankle weight 2x10  Bridges " 3x10  HL hip abd YTB 2x10  HL hip add 2x10   Shuttle DL squat 1 red cord 2x10      received the following manual therapy techniques: Soft tissue Mobilization were applied to the: B knee for 10 minutes, including:  IASTM medial L knee / Traction    PATIENT EDUCATION AND HOME EXERCISES     Home Exercises and Patient Education Provided     Education provided:   - Perform HEP 2 times per day     Written Home Exercises Provided: Patient instructed to cont prior HEP.  Exercises were reviewed and Dain was able to demonstrate them prior to the end of the session.  Dain demonstrated good  understanding of the education provided.      See EMR under Patient Instructions for exercises provided 10/15/2024.    ASSESSMENT     Patient presents to clinic with reporting less pain to her knees, she took pain pills before she came. We cont with same exercises as last visit, added Hamstring and calf stretch this visit, she tolerated well. Pt required mod v/c's to perform exercises with proper form and proper muscles activation. Pt demonstrated decrease quad muscles motor control. Pain subsided in the end of therapy/ Plan to cont POC.     aDin Is progressing well towards her goals.   Pt prognosis is Good.     Pt will continue to benefit from skilled outpatient physical therapy to address the deficits listed in the problem list box on initial evaluation, provide pt/family education and to maximize pt's level of independence in the home and community environment.     Pt's spiritual, cultural and educational needs considered and pt agreeable to plan of care and goals.     Anticipated barriers to physical therapy: Scheduling issues     GOALS: Short Term Goals:  4 weeks  1.Report decreased in pain at worse less than  <   / =  5  /10  to increase tolerance for functional mobility.On going  2. Pt to improve B knee range of motion by 25% to allow for improved functional mobility to allow for improvement in IADLs. .On going  3.  Increased B LE  MMT 1/2 grade to increase tolerance for ADL and work activities.On going  4. Pt to tolerate HEP to improve ROM and independence with ADL's.On going     Long Term Goals: 8 weeks  1.Report decreased in pain at worse less than  <   / =  2  /10  to increase tolerance for functional mobility. On going  2.Pt to improve B knee range of motion by 75% to allow for improved functional mobility to allow for improvement in IADLs. On going  3.Increased B knee  MMT 1 grade to increase tolerance for ADL and work activities.On going  4. Pt will report 10% or less limitation  on FOTO knee survey  to demonstrate increase in LE function with every day tasks. On going  5. Pt to be Independent with HEP to improve ROM and independence with ADL's. On going  6. PT will be able to kneeling and  object under bed to improve functional mobility.     PLAN     Cont POC    Neville To, PTA   11/12/2024

## 2024-11-26 ENCOUNTER — CLINICAL SUPPORT (OUTPATIENT)
Dept: REHABILITATION | Facility: HOSPITAL | Age: 69
End: 2024-11-26
Payer: MEDICARE

## 2024-11-26 DIAGNOSIS — M25.561 CHRONIC PAIN OF BOTH KNEES: Primary | ICD-10-CM

## 2024-11-26 DIAGNOSIS — G89.29 CHRONIC PAIN OF BOTH KNEES: Primary | ICD-10-CM

## 2024-11-26 DIAGNOSIS — M25.562 CHRONIC PAIN OF BOTH KNEES: Primary | ICD-10-CM

## 2024-11-26 DIAGNOSIS — Z74.09 DECREASED FUNCTIONAL MOBILITY AND ENDURANCE: ICD-10-CM

## 2024-11-26 PROCEDURE — 97530 THERAPEUTIC ACTIVITIES: CPT | Mod: PN

## 2024-11-26 PROCEDURE — 97112 NEUROMUSCULAR REEDUCATION: CPT | Mod: PN

## 2024-11-26 NOTE — PROGRESS NOTES
"[facility]   Physical Therapy Treatment Note     Name: Dain Phelps  Clinic Number: [id number]    Therapy Diagnosis:   Encounter Diagnoses   Name Primary?    Chronic pain of both knees Yes    Decreased functional mobility and endurance          Physician: Yajaira Henderson*    Visit Date: 11/26/2024    Physician: Yajaira Henderson*     Physician Orders: PT Eval and Treat   Medical Diagnosis from Referral:   M54.50,G89.29 (ICD-10-CM) - Chronic bilateral low back pain without sciatica   M17.0 (ICD-10-CM) - Primary osteoarthritis of both knees      Evaluation Date: 10/15/2024  Authorization Period Expiration: 12-31-24  Plan of Care Expiration: 1-15-25  Visit # / Visits authorized: 4/ 20   Progress Note Due: 11-15-24  FOTO: 1/ 1     Precautions: Standard, HTN, Primary hypothyroidism,        Time In: 9:00 am  Time Out: 9:55 am  Total Billable Time: 30 minutes with PTA  Total Time: 55 minutes      SUBJECTIVE     Pt reports: that she has medial L knee pain today. She states pain switches from R to L knee.   She was compliant with home exercise program.  Response to previous treatment: No change   Functional change: None    Pain: 5/10  Location: bilateral knee      OBJECTIVE     Objective Measures updated at progress report unless specified.       TREATMENT     Dain received the treatments listed below:        therapeutic activities to improve functional performance and functional mobility for 15  minutes, including:  Nustep 10 min  Sit to stand 20 in box 2x10    received therapeutic exercises to develop strength and ROM for 10  minutes including:    +Long Sitting Calf stretch 3x30"  +Long Sitting Hamstring stretch 3x30"      neuromuscular re-education activities to improve: Balance, Proprioception, Posture, and muscles motor control for 20  minutes. The following activities were included:    Quad sets with towel roll under heel 3x10 hold 5 sec  SAQ with 3# ankle weight 3x10 hold " 5 sec  SLR with 1# ankle weight 2x10  Bridges 3x10  HL hip abd YTB 2x10  HL hip add 2x10   Shuttle DL squat 1 red cord 2x10      received the following manual therapy techniques: Soft tissue Mobilization were applied to the: B knee for 10 minutes, including:  Vacuum/cupping STM with manual therapy techniques was performed to L medial knee to decrease muscle tightness, increase circulation and promote healing process. The pt's skin was monitored for redness adjusting pressure as needed. The pt was instructed in possible side effects of bruising and/or soreness.     PATIENT EDUCATION AND HOME EXERCISES     Home Exercises and Patient Education Provided     Education provided:   - Perform HEP 2 times per day     Written Home Exercises Provided: Patient instructed to cont prior HEP.  Exercises were reviewed and Dain was able to demonstrate them prior to the end of the session.  Dain demonstrated good  understanding of the education provided.      See EMR under Patient Instructions for exercises provided 10/15/2024.    ASSESSMENT     Patient presents to clinic with reporting increase L medial knee pain. We cont with same exercises as last visit, added Hamstring and calf stretch this visit, she tolerated well. Pt required mod v/c's to perform exercises with proper form and proper muscles activation. Pt demonstrated decrease quad muscles motor control. Addition of cupping technique to decrease soft tissue restriction and pain. Pt did not have any side effects. Pain subsided in the end of therapy/ Plan to cont POC.     Dain Is progressing well towards her goals.   Pt prognosis is Good.     Pt will continue to benefit from skilled outpatient physical therapy to address the deficits listed in the problem list box on initial evaluation, provide pt/family education and to maximize pt's level of independence in the home and community environment.     Pt's spiritual, cultural and educational needs considered and pt  agreeable to plan of care and goals.     Anticipated barriers to physical therapy: Scheduling issues     GOALS: Short Term Goals:  4 weeks  1.Report decreased in pain at worse less than  <   / =  5  /10  to increase tolerance for functional mobility.On going  2. Pt to improve B knee range of motion by 25% to allow for improved functional mobility to allow for improvement in IADLs. .On going  3. Increased B LE  MMT 1/2 grade to increase tolerance for ADL and work activities.On going  4. Pt to tolerate HEP to improve ROM and independence with ADL's.On going     Long Term Goals: 8 weeks  1.Report decreased in pain at worse less than  <   / =  2  /10  to increase tolerance for functional mobility. On going  2.Pt to improve B knee range of motion by 75% to allow for improved functional mobility to allow for improvement in IADLs. On going  3.Increased B knee  MMT 1 grade to increase tolerance for ADL and work activities.On going  4. Pt will report 10% or less limitation  on FOTO knee survey  to demonstrate increase in LE function with every day tasks. On going  5. Pt to be Independent with HEP to improve ROM and independence with ADL's. On going  6. PT will be able to kneeling and  object under bed to improve functional mobility.     PLAN     Cont POC    Yemi Jones, PT   11/12/2024

## 2024-12-13 ENCOUNTER — IMMUNIZATION (OUTPATIENT)
Dept: FAMILY MEDICINE | Facility: CLINIC | Age: 69
End: 2024-12-13
Payer: MEDICARE

## 2024-12-13 DIAGNOSIS — Z23 NEED FOR VACCINATION: Primary | ICD-10-CM

## 2025-01-22 ENCOUNTER — OFFICE VISIT (OUTPATIENT)
Dept: RHEUMATOLOGY | Facility: CLINIC | Age: 70
End: 2025-01-22
Payer: MEDICARE

## 2025-01-22 DIAGNOSIS — M54.50 CHRONIC BILATERAL LOW BACK PAIN WITHOUT SCIATICA: ICD-10-CM

## 2025-01-22 DIAGNOSIS — M17.0 PRIMARY OSTEOARTHRITIS OF BOTH KNEES: Primary | ICD-10-CM

## 2025-01-22 DIAGNOSIS — E66.01 MORBID OBESITY: ICD-10-CM

## 2025-01-22 DIAGNOSIS — G89.29 CHRONIC BILATERAL LOW BACK PAIN WITHOUT SCIATICA: ICD-10-CM

## 2025-01-22 DIAGNOSIS — R76.8 POSITIVE ANA (ANTINUCLEAR ANTIBODY): ICD-10-CM

## 2025-01-22 RX ORDER — CELECOXIB 200 MG/1
200 CAPSULE ORAL DAILY
Qty: 30 CAPSULE | Refills: 2 | Status: SHIPPED | OUTPATIENT
Start: 2025-01-22

## 2025-01-22 RX ORDER — CYCLOBENZAPRINE HCL 5 MG
5 TABLET ORAL NIGHTLY
Qty: 30 TABLET | Refills: 2 | Status: ACTIVE | OUTPATIENT
Start: 2025-01-22 | End: 2025-04-22

## 2025-01-22 NOTE — PROGRESS NOTES
RHEUMATOLOGY OUTPATIENT CLINIC NOTE    1/22/2025    Attending Rheumatologist: Yajaira Henderson  Primary Care Provider: Hilary Wells MD   Physician Requesting Consultation: No referring provider defined for this encounter.  Chief Complaint/Reason For Consultation:  No chief complaint on file.    The patient location is:  Home  The chief complaint leading to consultation is:  Joint pain  Visit type: Virtual visit with synchronous audio and video  Total time spent with patient: 12 minutes    Each patient to whom he or she provides medical services by telemedicine is:  (1) informed of the relationship between the physician and patient and the respective role of any other health care provider with respect to management of the patient; and (2) notified that he or she may decline to receive medical services by telemedicine and may withdraw from such care at any time.   Subjective:       CARRI Phelps is a 69 y.o. Black or  female with hypothyroidism and sleep apnea on CPAP who comes for evaluation of joint pain and positive TRACE.    Interim history  -initial consult on 9/2024  -she was supposed to come for in person visit in mid November due to worsening pain in the knees but she did not come as pain resolved. She completed PT in late November   -she reports that pain in knees and legs comes and goes, has not noted any swelling. This is been going for a while. Pain suddenly resolves.   -she takes celebrex 200 mg daily.      Disease history    1 year she started noticing that her whole legs are stiff, particularly knees and ankles, in the morning. Improve after a hot shower, MS less than 1 hour.   Has been having pain in bilateral knees, sharp pains at random times. Pain is worse with walking or prolonged sitting. No swelling. She did PT about a year ago which it helped but pain has changed. Reports lumbar paraspinal pain for about 3 months ago. Improves with heating pad, no  sciatica.    She denies any pain in upper extremities, hands, wrists. No swelling.  Denies any Raynaud's, dry eyes, dry mouth, swollen glands, muscle weakness, skin rashes.    Retired. Used to work in [employer].  Does not smoke, drink.    Mother with SLE    Labs  2024  TRACE 1:80 speckled - also TRACE positive 1:160 in   Negative DSDNA, SSA, SSB, SM, SM/RNP  ESR 83  CRP 14.2  RF negative  H. Pylori Ag (stool) not detected     Imaging  -XR left knee: moderate OA  -XR right knee 2019: moderate OA     Review of Systems   Constitutional:  Negative for fever and unexpected weight change.   HENT:  Negative for mouth sores and trouble swallowing.    Eyes:  Negative for redness.   Respiratory:  Negative for cough and shortness of breath.    Cardiovascular:  Negative for chest pain.   Gastrointestinal:  Negative for constipation and diarrhea.   Genitourinary:  Negative for dysuria and genital sores.   Integumentary:  Negative for rash.   Neurological:  Negative for headaches.   Hematological:  Does not bruise/bleed easily.        Chronic comorbid conditions affecting medical decision making today:  Past Medical History:   Diagnosis Date    Allergy     Chronic low back pain     Fatigue     GERD (gastroesophageal reflux disease)     History of colon polyps 2015    Hypertension     Hypothyroidism     Impaired glucose tolerance 01/15/2014    Leukopenia     Obesity (BMI 30-39.9) 01/15/2014    Primary hypothyroidism 2015    Snoring 2015    Vitamin D deficiency 01/15/2014     Past Surgical History:   Procedure Laterality Date    BREAST BIOPSY Left     CATARACT EXTRACTION       SECTION, CLASSIC      x3    COLONOSCOPY N/A 2021    Procedure: COLONOSCOPY;  Surgeon: Parvez Mazariegos MD;  Location: [facility];  Service: Endoscopy;  Laterality: N/A;  covid test  lapalco, instructions through [facility]    HYSTERECTOMY      total    OOPHORECTOMY      thyroid surgey  2009     Family History   Problem  Relation Name Age of Onset    Heart disease Mother      Kidney disease Mother      Hypertension Mother      Cancer Father          throat cancer    No Known Problems Daughter x1     Hypertension Son x1     Hypertension Maternal Aunt      Diabetes Maternal Aunt      Thyroid disease Maternal Aunt      Breast cancer Maternal Aunt  70    Diabetes Maternal Grandmother       Social History     Substance and Sexual Activity   Alcohol Use No    Comment: seldomly     Social History     Tobacco Use   Smoking Status Never   Smokeless Tobacco Never     Social History     Substance and Sexual Activity   Drug Use No       Current Outpatient Medications:     celecoxib (CELEBREX) 200 MG capsule, Take 1 capsule (200 mg total) by mouth once daily., Disp: 30 capsule, Rfl: 2    cyclobenzaprine (FLEXERIL) 5 MG tablet, Take 1 tablet (5 mg total) by mouth nightly., Disp: 30 tablet, Rfl: 2    ergocalciferol, vitamin D2, 2,000 unit Tab, Take 2,000 Units by mouth 2 (two) times daily., Disp: , Rfl:     estradioL (ESTRACE) 0.01 % (0.1 mg/gram) vaginal cream, Insert 2 g daily intravaginally for 2 weeks, then 1 g twice weekly, Disp: 42.5 g, Rfl: 5    hydroCHLOROthiazide (HYDRODIURIL) 25 MG tablet, TAKE 1 TABLET(25 MG) BY MOUTH EVERY DAY, Disp: 90 tablet, Rfl: 1    levothyroxine (SYNTHROID) 125 MCG tablet, Take 1 tablet (125 mcg total) by mouth before breakfast., Disp: 90 tablet, Rfl: 3    multivitamin capsule, Take 1 capsule by mouth once daily., Disp: , Rfl:     valsartan (DIOVAN) 80 MG tablet, TAKE 1 TABLET(80 MG) BY MOUTH DAILY, Disp: 90 tablet, Rfl: 2    verapamiL (VERELAN) 360 MG C24P, TAKE 1 CAPSULE(360 MG) BY MOUTH EVERY DAY, Disp: 90 capsule, Rfl: 1     Objective:         There were no vitals filed for this visit.    Physical Exam   Constitutional: She is oriented to person, place, and time. She appears well-developed.   HENT:   Head: Normocephalic.   Cardiovascular: Normal rate and normal heart sounds.   Pulmonary/Chest: Effort normal  and breath sounds normal.   Abdominal: Soft.   Musculoskeletal:      Cervical back: Neck supple.      Comments: Cspine FROM no tenderness  Tspine FROM no tenderness  Lspine FROM paraspinal tenderness  Shoulders: FROM; no synovitis;  Elbows: FROM; no synovitis; no tophi or nodules  Wrists: FROM; no synovitis;    MCPs: FROM; no synovitis;   PIPs:FROM; no synovitis;   DIPs: FROM; no synovitis;   HIPS: FROM  Knees: FROM; no synovitis; no instability  Ankles: FROM: no synovitis   Toes: no tenderness on palpation.     Lymphadenopathy:     She has no cervical adenopathy.   Neurological: She is alert and oriented to person, place, and time.   Skin: No rash noted.   No Heliotrope rash  No Gottron papules  No sclerodactyly   No Raynaud's  No Petechiae  No discoid lesions  No alopecia  Normal Capillaroscopy   Vitals reviewed.    Virtual visit 1/22:   No rashes in face    Reviewed old and all outside pertinent medical records available.    All lab results personally reviewed and interpreted by me.  Lab Results   Component Value Date    WBC 4.06 02/28/2024    HGB 13.6 02/28/2024    HCT 42.2 02/28/2024    MCV 90 02/28/2024    MCH 28.9 02/28/2024    MCHC 32.2 02/28/2024    RDW 13.7 02/28/2024     02/28/2024    MPV 11.8 02/28/2024    PLTEST Adequate 09/13/2011       Lab Results   Component Value Date     06/05/2024    K 3.5 06/05/2024     06/05/2024    CO2 25 06/05/2024    GLU 86 06/05/2024    BUN 17 06/05/2024    CALCIUM 9.6 06/05/2024    PROT 7.5 02/28/2024    ALBUMIN 3.4 (L) 02/28/2024    BILITOT 0.5 02/28/2024    AST 17 02/28/2024    ALKPHOS 94 02/28/2024    ALT 18 02/28/2024       Lab Results   Component Value Date    COLORU Orange (A) 06/06/2024    APPEARANCEUA Cloudy (A) 06/06/2024    SPECGRAV 1.025 06/06/2024    PHUR 6.0 06/06/2024    PROTEINUA Negative 06/06/2024    KETONESU Negative 06/06/2024    LEUKOCYTESUR Negative 06/06/2024    NITRITE Negative 06/06/2024    UROBILINOGEN 0.2 07/23/2022       Lab  "Results   Component Value Date    CRP 25.5 (H) 08/20/2024       Lab Results   Component Value Date    TRACE Pos, Titer to follow (A) 09/13/2011    RF <13.0 06/05/2024    SEDRATE 49 (H) 08/20/2024    CCPANTIBODIE <0.5 08/20/2024       No components found for: "25OHVITDTOT", "69MREBMG3", "94ZNGLAD0", "METHODNOTE"    No results found for: "URICACID"    Lab Results   Component Value Date    HEPBSAB <3.00 08/20/2024    HEPBSAB Non-reactive 08/20/2024    HEPBSAG Non-reactive 08/20/2024    HEPCAB Non-reactive 08/20/2024    HEPCAB Negative 10/07/2016       Imaging:  All imaging reviewed and independently interpreted by me.         ASSESSMENT / PLAN:     Dain Phelps is a 69 y.o. Black or  female with:    1. Osteoarthritis of the knee  -she has been having pain in bilateral knees, likely due to OA as evident on prior XR.  She also may have a component of patellofemoral syndrome.  -continue celebrex 200 mg QD PRN.   -use voltaren gel   -will set up appointment for steroid injection as pain has worsened.     2. Chronic bilateral low back pain without sciatica  -no red flags. Likely mechanical   -XR lumbar spine showed DJD.   -continue flexeril 5 mg QHS PRN for muscle spasms.   -PT referral     3. Positive TRACE (antinuclear antibody)  -TRACE 1:80 speckled with negative subserologies. Prior TARCE has been positive as well. In 2011 she had elevated Sm/RNP and then was negative. TRACE could be positive due to hypothyroidism or family history.  -elevated markers of inflammation for 10+ years.  Normal complements, normal SPEP, free light chain ratio normal.  -No clinical signs or symptoms of lupus, scleroderma, Sjogren's, myositis.  -reassurance    4. Morbid obesity  -would benefit from decreasing at least 10% of body weight.  - recommended goal of losing 1 lb per week.  - consider nutritionist evaluation.  - would consider screening for LORRAINE per PMD.    5. Elevated kappa FLC  -normal FLC ratio  -e-consult with hem/onc: " benign. No further tests indicated   -normal SPEP with electrophoresis    Follow up in about 2 weeks (around 2/5/2025) for steroid injection.    Method of contact with patient concerns: [facility] Rheumatology    Disclaimer:  This note is prepared using voice recognition software and as such is likely to have errors and has not been proof read. Please contact me for questions.       Yajaira Henderson M.D.  Rheumatology  [facility]

## 2025-01-29 ENCOUNTER — CLINICAL SUPPORT (OUTPATIENT)
Dept: REHABILITATION | Facility: HOSPITAL | Age: 70
End: 2025-01-29
Attending: STUDENT IN AN ORGANIZED HEALTH CARE EDUCATION/TRAINING PROGRAM
Payer: COMMERCIAL

## 2025-01-29 DIAGNOSIS — M54.50 CHRONIC BILATERAL LOW BACK PAIN WITHOUT SCIATICA: ICD-10-CM

## 2025-01-29 DIAGNOSIS — R53.1 WEAKNESS: Primary | ICD-10-CM

## 2025-01-29 DIAGNOSIS — R68.89 DECREASED FUNCTIONAL ACTIVITY TOLERANCE: ICD-10-CM

## 2025-01-29 DIAGNOSIS — G89.29 CHRONIC BILATERAL LOW BACK PAIN WITHOUT SCIATICA: ICD-10-CM

## 2025-01-29 PROCEDURE — 97110 THERAPEUTIC EXERCISES: CPT | Mod: PN

## 2025-01-29 PROCEDURE — 97161 PT EVAL LOW COMPLEX 20 MIN: CPT | Mod: PN

## 2025-01-29 NOTE — PROGRESS NOTES
[facility]  Physical Therapy Initial Evaluation  Date: 1/29/2025   Name: Dain Phelps  Clinic Number: [id number]    Therapy Diagnosis:   Encounter Diagnoses   Name Primary?    Chronic bilateral low back pain without sciatica     Weakness Yes    Decreased functional activity tolerance      Physician: Yajaira Henderson*    Physician Orders: PT Eval and Treat   Medical Diagnosis from Referral: M54.50,G89.29 (ICD-10-CM) - Chronic bilateral low back pain without sciatica   Evaluation Date: 1/29/2025  Authorization Period Expiration: 1/22/2026  Plan of Care Expiration: 4/29/2025  Visit # / Visits authorized: 1/ 1   Progress Note Due: 2/29/2025  FOTO: 1/ 1    Precautions: Standard    Time In: 2:00 pm   Time Out: 3:00 pm  Total Appointment Time (timed & untimed codes): 60 minutes    Subjective   Date of onset: 6 months   History of current condition - Dain reports: lower back has been bothering her off and on for 6 months. Denies NIKKI. There is no radiating pain but says it alternates from side to side. Pt has limited exercise and aerobic walking but more due to knees. Says that she noticed her balance is more off lately and stairs are difficult.     NIKKI: none   Any Bowel and Bladder movement issues: none   Any falls: none  Any dizziness: none  Any Headache: none   Any injection in lower back: steroid or epidural: none   Pain radiates: none  Pain constant or intermitting: intermitting     Pain:  Current 0/10, worst 7/10, best 0/10   Location: low back   Description: achy, throbbing   Aggravating Factors: laying down to getting up or standing up from a seated position , stooping down   Easing Factors: heat pad,     Prior Therapy: none   Social History:  lives with their spouse  Occupation: retired   Prior Level of Function: independent   Current Level of Function: independent with some difficulty     Pts goals: stop the pain, resume normal activities, improve balance and walking      Imaging, bone scan films: FINDINGS:  There is degenerative change throughout the spine.  The vertebral body heights are satisfactorily preserved.  There is no acute fracture, dislocation, or bony erosion.       Medical History:   Past Medical History:   Diagnosis Date    Allergy     Chronic low back pain     Fatigue     GERD (gastroesophageal reflux disease)     History of colon polyps 2015    Hypertension     Hypothyroidism     Impaired glucose tolerance 01/15/2014    Leukopenia     Obesity (BMI 30-39.9) 01/15/2014    Primary hypothyroidism 2015    Snoring 2015    Vitamin D deficiency 01/15/2014       Surgical History:   Dain Phelps  has a past surgical history that includes  section, classic; thyroid surgey (); Hysterectomy; Cataract extraction; Oophorectomy; Breast biopsy (Left); and Colonoscopy (N/A, 2021).    Medications:   Dain has a current medication list which includes the following prescription(s): celecoxib, cyclobenzaprine, ergocalciferol (vitamin d2), estradiol, hydrochlorothiazide, levothyroxine, multivitamin, valsartan, and verapamil.    Allergies:   Review of patient's allergies indicates:   Allergen Reactions    Ace inhibitors Edema     Angioedema          Objective     30 sec STS: 6      GAIT DEVIATIONS: Dain displays mild antalgic gait, decreased stance time on R LE     Lumbar Range of Motion:    %   Flexion 90     Extension 100     Left Side Bending 90    Right Side Bending 75 P!   Left rotation   75 P!   Right Rotation   75    *= pain    Passive hip ROM in degrees:     Left Right   IR Min tierney Min tierney   ER Min tierney Min tierney     Lower Extremity Strength    Right LE  Left LE    Hip flexion: 4/5 Hip flexion: 4/5   Knee extension: 4/5 Knee extension: 4/5   Knee flexion: 4/5 Knee flexion: 4/5   Hip extension:  3+/5 Hip extension: 3+/5   Hip abduction: 3+/5 Hip abduction: 3+/5   Hip adduction: 4/5 Hip adduction 4/5   Ankle dorsiflexion: 5/5 Ankle  dorsiflexion: 5/5   Ankle plantarflexion: 5/5 Ankle plantarflexion: 5/5     Special Tests:  -Quadrant testing: positive R side   -SYED: positive; tight but no pain   -Scour: negative  -Bridge Test: positive  -Heel walking: negative  -Toe walking: negative      SI provocation test:  Distraction test: negative  Compression test: negative  Thigh thrust test: negative    3 out of all 6 provocation have sensitivity of .94 and specificity .78 for SIJ pathology      Joint Mobility: hypomobile thoracolumbar segments     Palpation: tender lower thoracic/ lumbar segments     Flexibility:    Ely's test: R = B mod restriction    PT Evaluation Completed? Yes  Discussed Plan of Care with patient: Yes        CMS Impairment/Limitation/Restriction for FOTO Modified Oswestry LBP Survey    Therapist reviewed FOTO scores for Dain Phelps on 1/29/2025.   FOTO documents entered into [employer] - see Media section.    Limitation Score: 18% Higher Score= Greater disability            TREATMENT   Treatment Time In: 2:50pm  Treatment Time Out: 3:00pm  Total Treatment time separate from Evaluation: 10 minutes    Dain received therapeutic exercises to develop strength and flexibility for 10 minutes including:  Supine hip ABD  LTR  TA activation   STS     Dain received the following manual therapy techniques:  were applied to the:  for 00 minutes, including:  Consider  , STM if necessary     Dain participated in neuromuscular re-education activities to improve:  for 00 minutes. The following activities were included:      Dain participated in dynamic functional therapeutic activities to improve functional performance for 00  minutes, including:      Dain participated in gait training to improve functional mobility and safety for 00  minutes, including:        Dain received  hot or cold pack for 00 minutes.      Home Exercises and Patient Education Provided    Education provided:   - PT role, intervention rationale      Written Home Exercises Provided: yes.  Exercises were reviewed and Dain was able to demonstrate them prior to the end of the session.  Dain demonstrated good  understanding of the education provided.     See EMR under Patient Instructions for exercises provided 1/29/2025.    Assessment   Dain is a 69 y.o. female referred to outpatient Physical Therapy with a medical diagnosis of low back pain. Upon evaluation, pt demonstrated pain, stiffness, weakness, and mild gait dysfunction which impacts her ADLs, limits her recreational activities due to decreased activity tolerance and functional capacity. PT will emphasize impairments in order to restore pt to PLOF.   Pt prognosis is Good.   Pt will benefit from skilled outpatient Physical Therapy to address the deficits stated above and in the chart below, provide pt/family education, and to maximize pt's level of independence.     Plan of care discussed with patient: Yes  Pt's spiritual, cultural and educational needs considered and patient is agreeable to the plan of care and goals as stated below:     Anticipated Barriers for therapy: none    Medical Necessity is demonstrated by the following  History  Co-morbidities and personal factors that may impact the plan of care [x] LOW: no personal factors / co-morbidities  [] MODERATE: 1-2 personal factors / co-morbidities  [] HIGH: 3+ personal factors / co-morbidities    Moderate / High Support Documentation:   Co-morbidities affecting plan of care:   Allergy   Chronic low back pain   Fatigue   GERD (gastroesophageal reflux disease)   History of colon polyps   Hypertension   Hypothyroidism   Impaired glucose tolerance   Leukopenia   Obesity (BMI 30-39.9)   Primary hypothyroidism   Snoring   Vitamin D deficiency       Personal Factors:   no deficits     Examination  Body Structures and Functions, activity limitations and participation restrictions that may impact the plan of care [x] LOW: addressing 1-2 elements  []  MODERATE: 3+ elements  [] HIGH: 4+ elements (please support below)    Moderate / High Support Documentation:      Clinical Presentation [x] LOW: stable  [] MODERATE: Evolving  [] HIGH: Unstable     Decision Making/ Complexity Score: low       GOALS: Short Term Goals:  4 weeks  1. Report decreased in pain at worse less than  <   / =  4  /10  to increase tolerance for functional activities.On going  3. Increased B LE MMT 1/2  to increase tolerance for ADL and work activities.On going  4. Pt to increase B Ely's Test by greater than  > or = 5degrees in order to improve flexibility and posture. On going  5. Pt to tolerate HEP to improve ROM and independence with ADL's.On going  6. Pt to improve range of motion by 25% to allow for improved functional mobility to allow for improvement in IADLs. On going    Long Term Goals: 8 weeks  1. Report decreased in pain at worse less than  <   / =  2  /10  to increase tolerance for functional mobility.  On going  3. Increased B LE MMT 1 grade to increase tolerance for ADL and work activities.On going  4. Pt will report at 5% or less limitation on FOTO Lumbar spine survey  to demonstrate decrease in disability and improvement in back pain.On going  5. Pt to be Independent with HEP to improve ROM and independence with ADL's. On going  6. Pt to increase B Ely's Test by greater than > or = 10 degrees in order to improve flexibility and posture. On going  7. Pt to demonstrate negative Bridge Test in order to show improved core strength for lumbar stabilization. On going  8. Pt to improve range of motion by 75% to allow for improved functional mobility to allow for improvement in IADLs. On going  9. Pt will be able to walk 30 min without difficulty to show improved tolerance for community ambulation. Ongoing  10. Pt will improve from 6 to 10 STS to show improved B LE functional strength for ADLs. Ongoing       Plan   Plan of care Certification: 1/29/2025 to 4/29/2025.    Outpatient  Physical Therapy 2 times weekly for 8 weeks to include the following interventions: Gait Training, Manual Therapy, Moist Heat/ Ice, Neuromuscular Re-ed, Patient Education, Self Care, Therapeutic Activities, and Therapeutic Exercise. Scottie Hou Jr, PT

## 2025-02-06 ENCOUNTER — CLINICAL SUPPORT (OUTPATIENT)
Dept: REHABILITATION | Facility: HOSPITAL | Age: 70
End: 2025-02-06
Payer: MEDICARE

## 2025-02-06 DIAGNOSIS — R68.89 DECREASED FUNCTIONAL ACTIVITY TOLERANCE: ICD-10-CM

## 2025-02-06 DIAGNOSIS — R53.1 WEAKNESS: Primary | ICD-10-CM

## 2025-02-06 PROCEDURE — 97530 THERAPEUTIC ACTIVITIES: CPT | Mod: PN,CQ

## 2025-02-06 PROCEDURE — 97112 NEUROMUSCULAR REEDUCATION: CPT | Mod: PN,CQ

## 2025-02-06 NOTE — PROGRESS NOTES
"[facility]   Physical Therapy Treatment Note     Name: Dain Phelps  Clinic Number: [id number]    Therapy Diagnosis:   Encounter Diagnoses   Name Primary?    Weakness Yes    Decreased functional activity tolerance      Physician: Yajaira Henderson*    Visit Date: 2/6/2025    Physician Orders: PT Eval and Treat   Medical Diagnosis from Referral: M54.50,G89.29 (ICD-10-CM) - Chronic bilateral low back pain without sciatica   Evaluation Date: 1/29/2025  Authorization Period Expiration: 1/22/2026  Plan of Care Expiration: 4/29/2025  Visit # / Visits authorized: 1/ 20   Progress Note Due: 2/29/2025  FOTO: 1/ 1     Precautions: Standard    Visit #: 1 of 20  PTA Visit #: 1/5  Time In: 7:04 am  Time Out: 8:00 am  Total Billable Time: 56 minutes    Subjective     Pt reports: pain to lower back about 2/10 today.  She  n/a  compliant with home exercise program.  Response to previous treatment: first treatment day  Functional change: ongoing    Pain: 2/10  Location: bilateral back      Objective     Dain participated in dynamic functional therapeutic activities to improve functional performance for 10  minutes, including:  Nu-step 10 min level 1    Consider to add:  Open Book  STS   Shuttle DL    Dain participated in neuromuscular re-education activities to improve: Coordination, Proprioception, Posture, and muscle control for 45 minutes. The following activities were included:  LTR +Postural control 2 min x 3 sec hold  PPT +Postural control 2 min x 3 sec hold  Hip Fall out stretch 5x10"  Piriformis stretch 3x30"  Hamstring stretch 3x30"  Hip Flexors + Quad stretch  Ball Squeeze + TrA activation 2x10x3"  Supine hip ABD RTB + TrA activation 2x10x3"  TA activation 10x3"  TrA activation + Marching 10x  DKTC 2x10x3"    Consider to add:  SLR  SL Hip Abd    Dain received therapeutic exercises to develop strength, endurance, ROM, flexibility, posture, and core stabilization for 00 minutes " including:      Dain received the following manual therapy techniques: Soft tissue Mobilization were applied to the: lower back for 00 minutes, including:        Home Exercises Provided and Patient Education Provided     Written Home Exercises Provided: Patient instructed to cont prior HEP.  Exercises were reviewed and Dain was able to demonstrate them prior to the end of the session.  Dain demonstrated good  understanding of the education provided.     Education provided:    HEPs    Assessment     Patient came for her first therapy treatment after the initial evaluation, reporting back pain about 2/10 today. Focus on improving flexibility/ROM/muscles strength for functional mobility and pain relief. Verbal and tactile instructions to ensure patient perform all exercises with good form, proper technique, and muscle activation. Printed HEP with instructions and demonstrations provided to patient this visit, instructed her to cont to work on her HEP, she verbalized understanding. Will continue to progress per patient tolerance.      Dain Is progressing well towards her goals.   Pt prognosis is Good.     Pt will continue to benefit from skilled outpatient physical therapy to address the deficits listed in the problem list box on initial evaluation, provide pt/family education and to maximize pt's level of independence in the home and community environment.     Pt's spiritual, cultural and educational needs considered and pt agreeable to plan of care and goals.     Anticipated barriers to physical therapy: none    Goals:   Short Term Goals:  4 weeks  1. Report decreased in pain at worse less than  <   / =  4  /10  to increase tolerance for functional activities.On going  3. Increased B LE MMT 1/2  to increase tolerance for ADL and work activities.On going  4. Pt to increase B Ely's Test by greater than  > or = 5degrees in order to improve flexibility and posture. On going  5. Pt to tolerate HEP to improve  ROM and independence with ADL's.On going  6. Pt to improve range of motion by 25% to allow for improved functional mobility to allow for improvement in IADLs. On going     Long Term Goals: 8 weeks  1. Report decreased in pain at worse less than  <   / =  2  /10  to increase tolerance for functional mobility.  On going  3. Increased B LE MMT 1 grade to increase tolerance for ADL and work activities.On going  4. Pt will report at 5% or less limitation on FOTO Lumbar spine survey  to demonstrate decrease in disability and improvement in back pain.On going  5. Pt to be Independent with HEP to improve ROM and independence with ADL's. On going  6. Pt to increase B Ely's Test by greater than > or = 10 degrees in order to improve flexibility and posture. On going  7. Pt to demonstrate negative Bridge Test in order to show improved core strength for lumbar stabilization. On going  8. Pt to improve range of motion by 75% to allow for improved functional mobility to allow for improvement in IADLs. On going  9. Pt will be able to walk 30 min without difficulty to show improved tolerance for community ambulation. Ongoing  10. Pt will improve from 6 to 10 STS to show improved B LE functional strength for ADLs. Ongoing     Plan     Plan of care Certification: 1/29/2025 to 4/29/2025.     Outpatient Physical Therapy 2 times weekly for 8 weeks to include the following interventions: Gait Training, Manual Therapy, Moist Heat/ Ice, Neuromuscular Re-ed, Patient Education, Self Care, Therapeutic Activities, and Therapeutic Exercise. Dry needling    Neville To, PTA   2/6/2025

## 2025-02-12 ENCOUNTER — OFFICE VISIT (OUTPATIENT)
Dept: RHEUMATOLOGY | Facility: CLINIC | Age: 70
End: 2025-02-12
Payer: MEDICARE

## 2025-02-12 VITALS
DIASTOLIC BLOOD PRESSURE: 85 MMHG | WEIGHT: 245.13 LBS | SYSTOLIC BLOOD PRESSURE: 144 MMHG | HEART RATE: 76 BPM | HEIGHT: 66 IN | BODY MASS INDEX: 39.4 KG/M2

## 2025-02-12 DIAGNOSIS — M17.0 PRIMARY OSTEOARTHRITIS OF BOTH KNEES: Primary | ICD-10-CM

## 2025-02-12 PROCEDURE — 99999 PR PBB SHADOW E&M-EST. PATIENT-LVL III: CPT | Mod: PBBFAC,,, | Performed by: STUDENT IN AN ORGANIZED HEALTH CARE EDUCATION/TRAINING PROGRAM

## 2025-02-12 PROCEDURE — 99213 OFFICE O/P EST LOW 20 MIN: CPT | Mod: PBBFAC,PN | Performed by: STUDENT IN AN ORGANIZED HEALTH CARE EDUCATION/TRAINING PROGRAM

## 2025-02-12 PROCEDURE — 20610 DRAIN/INJ JOINT/BURSA W/O US: CPT | Mod: 50,PBBFAC,PN | Performed by: STUDENT IN AN ORGANIZED HEALTH CARE EDUCATION/TRAINING PROGRAM

## 2025-02-12 PROCEDURE — 99999PBSHW PR PBB SHADOW TECHNICAL ONLY FILED TO HB: Mod: PBBFAC,,,

## 2025-02-12 RX ORDER — TRIAMCINOLONE ACETONIDE 40 MG/ML
40 INJECTION, SUSPENSION INTRA-ARTICULAR; INTRAMUSCULAR
Status: DISCONTINUED | OUTPATIENT
Start: 2025-02-12 | End: 2025-02-12 | Stop reason: HOSPADM

## 2025-02-12 RX ORDER — LIDOCAINE HYDROCHLORIDE 10 MG/ML
2 INJECTION, SOLUTION EPIDURAL; INFILTRATION; INTRACAUDAL; PERINEURAL
Status: DISCONTINUED | OUTPATIENT
Start: 2025-02-12 | End: 2025-02-12 | Stop reason: HOSPADM

## 2025-02-12 RX ADMIN — LIDOCAINE HYDROCHLORIDE 2 ML: 10 INJECTION, SOLUTION EPIDURAL; INFILTRATION; INTRACAUDAL; PERINEURAL at 08:02

## 2025-02-12 RX ADMIN — TRIAMCINOLONE ACETONIDE 40 MG: 40 INJECTION, SUSPENSION INTRA-ARTICULAR; INTRAMUSCULAR at 08:02

## 2025-02-12 NOTE — PROCEDURES
Large Joint Aspiration/Injection: bilateral knee    Date/Time: 2/12/2025 8:40 AM    Performed by: Yajaira Henderson MD  Authorized by: Yajaira Henderson MD    Consent Done?:  Yes (Written)  Indications:  Arthritis    Local anesthesia used?: Yes    Local anesthetic:  Co-phenylcaine spray    Details:  Needle Size:  25 G  Location:  Knee  Laterality:  Bilateral  Site:  Bilateral knee  Medications (Right):  2 mL LIDOcaine (PF) 10 mg/ml (1%) 10 mg/mL (1 %); 40 mg triamcinolone acetonide 40 mg/mL  Medications (Left):  2 mL LIDOcaine (PF) 10 mg/ml (1%) 10 mg/mL (1 %); 40 mg triamcinolone acetonide 40 mg/mL  Patient tolerance:  Patient tolerated the procedure well with no immediate complications

## 2025-02-12 NOTE — PROGRESS NOTES
Patient comes today for bilateral knee injection. See procedure note. Discussed post injection care

## 2025-02-12 NOTE — PROGRESS NOTES
2/5/2025     1:18 PM   Rapid3 Question Responses and Scores   MDHAQ Score 0.2   Psychologic Score 0   Pain Score 7   When you awakened in the morning OVER THE LAST WEEK, did you feel stiff? Yes   If Yes, please indicate the number of hours until you are as limber as you will be for the day 2   Fatigue Score 0   Global Health Score 0   RAPID3 Score 2.56          Sent Atlas Powered

## 2025-02-20 ENCOUNTER — CLINICAL SUPPORT (OUTPATIENT)
Dept: REHABILITATION | Facility: HOSPITAL | Age: 70
End: 2025-02-20
Payer: MEDICARE

## 2025-02-20 DIAGNOSIS — R68.89 DECREASED FUNCTIONAL ACTIVITY TOLERANCE: ICD-10-CM

## 2025-02-20 DIAGNOSIS — M54.50 CHRONIC BILATERAL LOW BACK PAIN WITHOUT SCIATICA: ICD-10-CM

## 2025-02-20 DIAGNOSIS — R53.1 WEAKNESS: Primary | ICD-10-CM

## 2025-02-20 DIAGNOSIS — G89.29 CHRONIC BILATERAL LOW BACK PAIN WITHOUT SCIATICA: ICD-10-CM

## 2025-02-20 PROCEDURE — 97112 NEUROMUSCULAR REEDUCATION: CPT | Mod: PN,CQ

## 2025-02-20 NOTE — PROGRESS NOTES
"[facility]   Physical Therapy Treatment Note     Name: Dain Phelps  Clinic Number: [id number]    Therapy Diagnosis:   Encounter Diagnoses   Name Primary?    Weakness Yes    Decreased functional activity tolerance     Chronic bilateral low back pain without sciatica      Physician: Yajaira Henderson*    Visit Date: 2/20/2025    Physician Orders: PT Eval and Treat   Medical Diagnosis from Referral: M54.50,G89.29 (ICD-10-CM) - Chronic bilateral low back pain without sciatica   Evaluation Date: 1/29/2025  Authorization Period Expiration: 1/22/2026  Plan of Care Expiration: 4/29/2025  Visit # / Visits authorized: 2/ 20   Progress Note Due: 2/29/2025  FOTO: 1/ 1     Precautions: Standard    Visit #: 2 of 20  PTA Visit #: 2/5  Time In: 10:05am   Time Out: 11:00am   Total Billable Time: 30 minutes with PTA    Subjective     Pt reports: low back pain seems to be getting better. No concerns from last visit.   She  n/a  compliant with home exercise program.  Response to previous treatment: a little sore after last session   Functional change: ongoing    Pain: 2/10  Location: bilateral back      Objective     Bold exercises were performed:     Dain participated in dynamic functional therapeutic activities to improve functional performance for 18  minutes, including:  Nu-step 10 min level 1     Consider to add:  Open Book  STS   Shuttle DL 1 black band + 1 red band, 3x10     Dain participated in neuromuscular re-education activities to improve: Coordination, Proprioception, Posture, and muscle control for 37 minutes. The following activities were included:  LTR +Postural control 2 min x 3 sec hold  PPT +Postural control 2 min x 3 sec hold  Hip Fall out stretch 5x10"  Piriformis stretch 3x30" bilateral   Hamstring stretch 3x30" bilateral   Hip Flexors + Quad stretch  Ball Squeeze + TrA activation 2x10x3"  Supine hip ABD RTB + TrA activation 2x10x3"  TA activation 10x3"  TrA activation + " "Marching 10x  DKTC 2x10x3"    Consider to add:  SLR  SL Hip Abd    Dain received therapeutic exercises to develop strength, endurance, ROM, flexibility, posture, and core stabilization for 00 minutes including:      Dain received the following manual therapy techniques: Soft tissue Mobilization were applied to the: lower back for 00 minutes, including:        Home Exercises Provided and Patient Education Provided     Written Home Exercises Provided: Patient instructed to cont prior HEP.  Exercises were reviewed and Dain was able to demonstrate them prior to the end of the session.  Dain demonstrated good  understanding of the education provided.     Education provided:    HEPs    Assessment   It appears her low back pain is at low pain tolerance to none today. Patient tolerates all exercises fairly well for lower extremity strengthening, core strength, and to improve lumbar spinal mobility. Provided verbal cues for all exercises for proper techniques and muscles activation. Able to add shuttle machine today for lower extremity strengthening which patient responded well. No complaints of increase pain post therapy. At this time, patient is slowly progressing towards her goals.     Dain Is progressing well towards her goals.   Pt prognosis is Good.     Pt will continue to benefit from skilled outpatient physical therapy to address the deficits listed in the problem list box on initial evaluation, provide pt/family education and to maximize pt's level of independence in the home and community environment.     Pt's spiritual, cultural and educational needs considered and pt agreeable to plan of care and goals.     Anticipated barriers to physical therapy: none    Goals:   Short Term Goals:  4 weeks  1. Report decreased in pain at worse less than  <   / =  4  /10  to increase tolerance for functional activities.On going  3. Increased B LE MMT 1/2  to increase tolerance for ADL and work activities.On " going  4. Pt to increase B Ely's Test by greater than  > or = 5degrees in order to improve flexibility and posture. On going  5. Pt to tolerate HEP to improve ROM and independence with ADL's.On going  6. Pt to improve range of motion by 25% to allow for improved functional mobility to allow for improvement in IADLs. On going     Long Term Goals: 8 weeks  1. Report decreased in pain at worse less than  <   / =  2  /10  to increase tolerance for functional mobility.  On going  3. Increased B LE MMT 1 grade to increase tolerance for ADL and work activities.On going  4. Pt will report at 5% or less limitation on FOTO Lumbar spine survey  to demonstrate decrease in disability and improvement in back pain.On going  5. Pt to be Independent with HEP to improve ROM and independence with ADL's. On going  6. Pt to increase B Ely's Test by greater than > or = 10 degrees in order to improve flexibility and posture. On going  7. Pt to demonstrate negative Bridge Test in order to show improved core strength for lumbar stabilization. On going  8. Pt to improve range of motion by 75% to allow for improved functional mobility to allow for improvement in IADLs. On going  9. Pt will be able to walk 30 min without difficulty to show improved tolerance for community ambulation. Ongoing  10. Pt will improve from 6 to 10 STS to show improved B LE functional strength for ADLs. Ongoing     Plan     Plan of care Certification: 1/29/2025 to 4/29/2025.     Outpatient Physical Therapy 2 times weekly for 8 weeks to include the following interventions: Gait Training, Manual Therapy, Moist Heat/ Ice, Neuromuscular Re-ed, Patient Education, Self Care, Therapeutic Activities, and Therapeutic Exercise. Scottie Chavez Mai, PTA   2/20/2025

## 2025-02-21 DIAGNOSIS — Z00.00 ENCOUNTER FOR MEDICARE ANNUAL WELLNESS EXAM: ICD-10-CM

## 2025-02-25 ENCOUNTER — CLINICAL SUPPORT (OUTPATIENT)
Dept: REHABILITATION | Facility: HOSPITAL | Age: 70
End: 2025-02-25
Payer: MEDICARE

## 2025-02-25 DIAGNOSIS — R53.1 WEAKNESS: Primary | ICD-10-CM

## 2025-02-25 DIAGNOSIS — R68.89 DECREASED FUNCTIONAL ACTIVITY TOLERANCE: ICD-10-CM

## 2025-02-25 PROCEDURE — 97530 THERAPEUTIC ACTIVITIES: CPT | Mod: PN

## 2025-02-25 NOTE — PROGRESS NOTES
"[facility]   Physical Therapy Treatment Note     Name: Dain Phelps  Clinic Number: [id number]    Therapy Diagnosis:   Encounter Diagnoses   Name Primary?    Weakness Yes    Decreased functional activity tolerance      Physician: Yajaira Henderson*    Visit Date: 2/25/2025    Physician Orders: PT Eval and Treat   Medical Diagnosis from Referral: M54.50,G89.29 (ICD-10-CM) - Chronic bilateral low back pain without sciatica   Evaluation Date: 1/29/2025  Authorization Period Expiration: 1/22/2026  Plan of Care Expiration: 4/29/2025  Visit # / Visits authorized: 2/ 20   Progress Note Due: 2/29/2025  FOTO: 1/ 1     Precautions: Standard    Visit #: 2 of 20  PTA Visit #: 2/5  Time In: 11:00am   Time Out: 12:00pm   Total Billable Time: 30 minutes     Subjective     Pt reports: no setbacks since last visit and is doing well .  She  n/a  compliant with home exercise program.  Response to previous treatment: a little sore after last session   Functional change: ongoing    Pain: 2/10  Location: bilateral back      Objective     Bold exercises were performed:     Dain participated in dynamic functional therapeutic activities to improve functional performance for 30  minutes, including:  Nu-step 10 min level 1   STS 20 inch 2 x10   Matrix abduction 2 x10  Matrix ADD 2 x10  Matrix Lumbar ext x 20 free weight     Consider to add:  Open Book  STS   Shuttle DL 1 black band + 1 red band, 3x10     Dain participated in neuromuscular re-education activities to improve: Coordination, Proprioception, Posture, and muscle control for 23 minutes. The following activities were included:  LTR +Postural control 2 min x 3 sec hold  PPT +Postural control 2 min x 3 sec hold  Hip Fall out stretch 5x10"  Piriformis stretch 3x30" bilateral   Hamstring stretch 3x30" bilateral   Hip Flexors + Quad stretch  Ball Squeeze + TrA activation 2x10x3"  Supine hip ABD RTB + TrA activation 2x10x3"  TA activation 10x3"  TrA " "activation + Marching 10x  DKTC 2x10x3"    Consider to add:  SLR  SL Hip Abd    Dain received therapeutic exercises to develop strength, endurance, ROM, flexibility, posture, and core stabilization for 00 minutes including:      Dain received the following manual therapy techniques: Soft tissue Mobilization were applied to the: lower back for 00 minutes, including:        Home Exercises Provided and Patient Education Provided     Written Home Exercises Provided: Patient instructed to cont prior HEP.  Exercises were reviewed and Dain was able to demonstrate them prior to the end of the session.  Dain demonstrated good  understanding of the education provided.     Education provided:    HEPs    Assessment   Low back pain is low in irritability so pt was progressed with more exercises involving matrix machines for hip and back as well as STS. Pt demonstrated fatigue but no significant increase in pain. Will monitor pt's recovery and progress as tolerated towards her goals.     Dain Is progressing well towards her goals.   Pt prognosis is Good.     Pt will continue to benefit from skilled outpatient physical therapy to address the deficits listed in the problem list box on initial evaluation, provide pt/family education and to maximize pt's level of independence in the home and community environment.     Pt's spiritual, cultural and educational needs considered and pt agreeable to plan of care and goals.     Anticipated barriers to physical therapy: none    Goals:   Short Term Goals:  4 weeks  1. Report decreased in pain at worse less than  <   / =  4  /10  to increase tolerance for functional activities.On going  3. Increased B LE MMT 1/2  to increase tolerance for ADL and work activities.On going  4. Pt to increase B Ely's Test by greater than  > or = 5degrees in order to improve flexibility and posture. On going  5. Pt to tolerate HEP to improve ROM and independence with ADL's.On going  6. Pt to " improve range of motion by 25% to allow for improved functional mobility to allow for improvement in IADLs. On going     Long Term Goals: 8 weeks  1. Report decreased in pain at worse less than  <   / =  2  /10  to increase tolerance for functional mobility.  On going  3. Increased B LE MMT 1 grade to increase tolerance for ADL and work activities.On going  4. Pt will report at 5% or less limitation on FOTO Lumbar spine survey  to demonstrate decrease in disability and improvement in back pain.On going  5. Pt to be Independent with HEP to improve ROM and independence with ADL's. On going  6. Pt to increase B Ely's Test by greater than > or = 10 degrees in order to improve flexibility and posture. On going  7. Pt to demonstrate negative Bridge Test in order to show improved core strength for lumbar stabilization. On going  8. Pt to improve range of motion by 75% to allow for improved functional mobility to allow for improvement in IADLs. On going  9. Pt will be able to walk 30 min without difficulty to show improved tolerance for community ambulation. Ongoing  10. Pt will improve from 6 to 10 STS to show improved B LE functional strength for ADLs. Ongoing     Plan     Plan of care Certification: 1/29/2025 to 4/29/2025.     Outpatient Physical Therapy 2 times weekly for 8 weeks to include the following interventions: Gait Training, Manual Therapy, Moist Heat/ Ice, Neuromuscular Re-ed, Patient Education, Self Care, Therapeutic Activities, and Therapeutic Exercise. Scottie Hou Jr, PT   2/25/2025

## 2025-03-13 ENCOUNTER — CLINICAL SUPPORT (OUTPATIENT)
Dept: REHABILITATION | Facility: HOSPITAL | Age: 70
End: 2025-03-13
Payer: MEDICARE

## 2025-03-13 DIAGNOSIS — M25.561 CHRONIC PAIN OF BOTH KNEES: Primary | ICD-10-CM

## 2025-03-13 DIAGNOSIS — M25.562 CHRONIC PAIN OF BOTH KNEES: Primary | ICD-10-CM

## 2025-03-13 DIAGNOSIS — G89.29 CHRONIC PAIN OF BOTH KNEES: Primary | ICD-10-CM

## 2025-03-13 DIAGNOSIS — Z74.09 DECREASED FUNCTIONAL MOBILITY AND ENDURANCE: ICD-10-CM

## 2025-03-13 PROCEDURE — 97112 NEUROMUSCULAR REEDUCATION: CPT | Mod: PN

## 2025-03-13 NOTE — PROGRESS NOTES
[facility]   Physical Therapy Treatment Note     Name: Dain Phelps  Clinic Number: [id number]    Therapy Diagnosis:   Encounter Diagnoses   Name Primary?    Chronic pain of both knees Yes    Decreased functional mobility and endurance      Physician: Yajaira Henderson*    Visit Date: 3/13/2025    Physician Orders: PT Eval and Treat   Medical Diagnosis from Referral: M54.50,G89.29 (ICD-10-CM) - Chronic bilateral low back pain without sciatica   Evaluation Date: 1/29/2025  Authorization Period Expiration: 1/22/2026  Plan of Care Expiration: 4/29/2025  Visit # / Visits authorized: 4/ 20   Progress Note Due: 2/29/2025  FOTO: 1/ 1     Precautions: Standard    Visit #: 4 of 20  PTA Visit #: 2/5  Time In: 11:00am   Time Out: 12:00pm   Total Billable Time: 30 minutes     Subjective     Pt reports: no setbacks since last visit. Limitations are stooping really low and getting something out of the cabinet and that causes a strain. 60% normal.   She  n/a  compliant with home exercise program. Pt currently feels   Response to previous treatment: a little sore after last session   Functional change: ongoing    Pain: 2/10  Location: bilateral back      Objective     Bold exercises were performed:     Dain participated in dynamic functional therapeutic activities to improve functional performance for 30  minutes, including:  Nu-step 10 min level 1   STS 20 inch 3 x 5 (controlled descent to soft tap)   Matrix abduction 30# 2 x10   Matrix ADD 2 x10  Matrix Lumbar ext x 20 free weight     Consider to add:  Open Book  STS   Shuttle DL 1 black band + 1 red band, 3x10     Dain participated in neuromuscular re-education activities to improve: Coordination, Proprioception, Posture, and muscle control for 23 minutes. The following activities were included:  Attempted a set of dead lifts from 12 inch box but dizzines   LTR +Postural control 2 min x 3 sec hold  PPT +Postural control 2 min x 3 sec  "hold  Hip Fall out stretch 5x10"  Piriformis stretch 3x30" bilateral   Hamstring stretch 6 x10 sec @ stairs" bilateral   Hip Flexors + Quad stretch  Ball Squeeze + TrA activation 2x10x3"  Supine hip ABD RTB + TrA activation 2x10x3"  TA activation 10x3"  TrA activation + Marching 10x  DKTC 2x10x3"    Consider to add:  SLR  SL Hip Abd    Dain received therapeutic exercises to develop strength, endurance, ROM, flexibility, posture, and core stabilization for 00 minutes including:      Dain received the following manual therapy techniques: Soft tissue Mobilization were applied to the: lower back for 00 minutes, including:        Home Exercises Provided and Patient Education Provided     Written Home Exercises Provided: Patient instructed to cont prior HEP.  Exercises were reviewed and Dain was able to demonstrate them prior to the end of the session.  Dain demonstrated good  understanding of the education provided.     Education provided:    HEPs    Assessment   Progressed STS to address pt's difficulties with rising from a sitting position. Will monitor pt's recovery and progress as tolerated towards her goals.     Dain Is progressing well towards her goals.   Pt prognosis is Good.     Pt will continue to benefit from skilled outpatient physical therapy to address the deficits listed in the problem list box on initial evaluation, provide pt/family education and to maximize pt's level of independence in the home and community environment.     Pt's spiritual, cultural and educational needs considered and pt agreeable to plan of care and goals.     Anticipated barriers to physical therapy: none    Goals:   Short Term Goals:  4 weeks  1. Report decreased in pain at worse less than  <   / =  4  /10  to increase tolerance for functional activities.On going  3. Increased B LE MMT 1/2  to increase tolerance for ADL and work activities.On going  4. Pt to increase B Ely's Test by greater than  > or = 5degrees in " order to improve flexibility and posture. On going  5. Pt to tolerate HEP to improve ROM and independence with ADL's.On going  6. Pt to improve range of motion by 25% to allow for improved functional mobility to allow for improvement in IADLs. On going     Long Term Goals: 8 weeks  1. Report decreased in pain at worse less than  <   / =  2  /10  to increase tolerance for functional mobility.  On going  3. Increased B LE MMT 1 grade to increase tolerance for ADL and work activities.On going  4. Pt will report at 5% or less limitation on FOTO Lumbar spine survey  to demonstrate decrease in disability and improvement in back pain.On going  5. Pt to be Independent with HEP to improve ROM and independence with ADL's. On going  6. Pt to increase B Ely's Test by greater than > or = 10 degrees in order to improve flexibility and posture. On going  7. Pt to demonstrate negative Bridge Test in order to show improved core strength for lumbar stabilization. On going  8. Pt to improve range of motion by 75% to allow for improved functional mobility to allow for improvement in IADLs. On going  9. Pt will be able to walk 30 min without difficulty to show improved tolerance for community ambulation. Ongoing  10. Pt will improve from 6 to 10 STS to show improved B LE functional strength for ADLs. Ongoing     Plan     Plan of care Certification: 1/29/2025 to 4/29/2025.     Outpatient Physical Therapy 2 times weekly for 8 weeks to include the following interventions: Gait Training, Manual Therapy, Moist Heat/ Ice, Neuromuscular Re-ed, Patient Education, Self Care, Therapeutic Activities, and Therapeutic Exercise. Scottie Hou Jr, PT   3/13/2025

## 2025-04-06 DIAGNOSIS — I10 ESSENTIAL HYPERTENSION: ICD-10-CM

## 2025-04-06 NOTE — TELEPHONE ENCOUNTER
Care Due:                  Date            Visit Type   Department     Provider  --------------------------------------------------------------------------------                                [facility]                              PRIMARY      / INTERNAL MED  Last Visit: 02-      CARE ([facility])   / SINA Wells  Next Visit: None Scheduled  None         None Found                                                            Last  Test          Frequency    Reason                     Performed    Due Date  --------------------------------------------------------------------------------    Office Visit  15 months..  hydroCHLOROthiazide,       02- 05-                             valsartan................    CMP.........  12 months..  hydroCHLOROthiazide,       02- 06-                             valsartan................    [employer] Embedded Care Due Messages. Reference number: [id number].   4/06/2025 12:31:38 PM CDT

## 2025-04-07 RX ORDER — VERAPAMIL HYDROCHLORIDE 360 MG/1
CAPSULE, DELAYED RELEASE ORAL
Qty: 90 CAPSULE | Refills: 1 | Status: SHIPPED | OUTPATIENT
Start: 2025-04-07

## 2025-04-07 NOTE — TELEPHONE ENCOUNTER
Refill Routing Note   Medication(s) are not appropriate for processing by [facility] for the following reason(s):        Required vitals abnormal    [facility] action(s):  Defer   Requires appointment : Yes     Requires labs : Yes             Appointments  past 12m or future 3m with PCP    Date Provider   Last Visit   2/27/2024 Hilary Wells MD   Next Visit   Visit date not found Hilary Wells MD   ED visits in past 90 days: 0        Note composed:8:53 PM 04/06/2025

## 2025-04-19 DIAGNOSIS — I10 PRIMARY HYPERTENSION: ICD-10-CM

## 2025-04-19 NOTE — TELEPHONE ENCOUNTER
No care due was identified.  [facility] Embedded Care Due Messages. Reference number: [id number].   4/19/2025 6:59:11 AM CDT

## 2025-04-21 RX ORDER — HYDROCHLOROTHIAZIDE 25 MG/1
25 TABLET ORAL
Qty: 90 TABLET | Refills: 1 | Status: SHIPPED | OUTPATIENT
Start: 2025-04-21

## 2025-04-21 NOTE — TELEPHONE ENCOUNTER
Refill Routing Note   Medication(s) are not appropriate for processing by [facility] for the following reason(s):        Required vitals abnormal    [facility] action(s):  Defer               Appointments  past 12m or future 3m with PCP    Date Provider   Last Visit   2/27/2024 Hilary Wells MD   Next Visit   Visit date not found Hilary Wells MD   ED visits in past 90 days: 0        Note composed:7:29 PM 04/20/2025

## 2025-04-30 DIAGNOSIS — E89.0 POSTOPERATIVE HYPOTHYROIDISM: ICD-10-CM

## 2025-05-01 RX ORDER — LEVOTHYROXINE SODIUM 125 UG/1
125 TABLET ORAL
Qty: 90 TABLET | Refills: 3 | Status: SHIPPED | OUTPATIENT
Start: 2025-05-01

## 2025-05-02 ENCOUNTER — OFFICE VISIT (OUTPATIENT)
Dept: URGENT CARE | Facility: CLINIC | Age: 70
End: 2025-05-02
Payer: MEDICARE

## 2025-05-02 VITALS
TEMPERATURE: 99 F | HEIGHT: 66 IN | WEIGHT: 232 LBS | RESPIRATION RATE: 16 BRPM | OXYGEN SATURATION: 99 % | DIASTOLIC BLOOD PRESSURE: 85 MMHG | SYSTOLIC BLOOD PRESSURE: 141 MMHG | HEART RATE: 80 BPM | BODY MASS INDEX: 37.28 KG/M2

## 2025-05-02 DIAGNOSIS — N30.01 ACUTE CYSTITIS WITH HEMATURIA: Primary | ICD-10-CM

## 2025-05-02 DIAGNOSIS — R30.0 DYSURIA: ICD-10-CM

## 2025-05-02 LAB
BILIRUBIN, UA POC OHS: NEGATIVE
BLOOD, UA POC OHS: ABNORMAL
CLARITY, UA POC OHS: CLEAR
COLOR, UA POC OHS: YELLOW
GLUCOSE, UA POC OHS: NEGATIVE
KETONES, UA POC OHS: NEGATIVE
LEUKOCYTES, UA POC OHS: ABNORMAL
NITRITE, UA POC OHS: POSITIVE
PH, UA POC OHS: 5.5
PROTEIN, UA POC OHS: 30
SPECIFIC GRAVITY, UA POC OHS: 1.02
UROBILINOGEN, UA POC OHS: 0.2

## 2025-05-02 PROCEDURE — 87086 URINE CULTURE/COLONY COUNT: CPT | Performed by: NURSE PRACTITIONER

## 2025-05-02 RX ORDER — NITROFURANTOIN 25; 75 MG/1; MG/1
100 CAPSULE ORAL 2 TIMES DAILY
Qty: 10 CAPSULE | Refills: 0 | Status: SHIPPED | OUTPATIENT
Start: 2025-05-02 | End: 2025-05-07

## 2025-05-02 RX ORDER — VALSARTAN 80 MG/1
TABLET ORAL
Qty: 90 TABLET | Refills: 2 | Status: SHIPPED | OUTPATIENT
Start: 2025-05-02

## 2025-05-02 NOTE — TELEPHONE ENCOUNTER
Refill Routing Note   Medication(s) are not appropriate for processing by [facility] for the following reason(s):        Required vitals abnormal    [facility] action(s):  Defer             Appointments  past 12m or future 3m with PCP    Date Provider   Last Visit   2/27/2024 Hilary Wells MD   Next Visit   Visit date not found Hilary Wells MD   ED visits in past 90 days: 0        Note composed:4:38 PM 05/02/2025

## 2025-05-02 NOTE — TELEPHONE ENCOUNTER
No care due was identified.  [facility] Embedded Care Due Messages. Reference number: [id number].   5/02/2025 3:11:42 PM CDT

## 2025-05-02 NOTE — PROGRESS NOTES
"Subjective:      Patient ID: Dain Phelps is a 69 y.o. female.    Vitals:  height is 5' 6" (1.676 m) and weight is 105.2 kg (232 lb). Her oral temperature is 98.6 °F (37 °C). Her blood pressure is 141/85 (abnormal) and her pulse is 80. Her respiration is 16 and oxygen saturation is 99%.     Chief Complaint: Dysuria    70 y/o female complains of frequent and painful urination that started yesterday.     Dysuria   This is a new problem. The current episode started yesterday. The problem occurs every urination. The problem has been unchanged. The quality of the pain is described as aching and burning. The pain is at a severity of 5/10. The pain is moderate. There has been no fever. She is Not sexually active. There is No history of pyelonephritis. Associated symptoms include frequency and urgency. She has tried nothing for the symptoms. The treatment provided no relief. Her past medical history is significant for hypertension.       Genitourinary:  Positive for dysuria, frequency and urgency.      Objective:     Physical Exam   Constitutional: She is oriented to person, place, and time. She appears well-developed. She is cooperative. awake  HENT:   Head: Normocephalic and atraumatic.   Ears:   Right Ear: Hearing and external ear normal.   Left Ear: Hearing and external ear normal.   Nose: Nose normal. No nasal deformity. No epistaxis.   Mouth/Throat: Uvula is midline, oropharynx is clear and moist and mucous membranes are normal. Mucous membranes are moist. Oropharynx is clear.   Eyes: Lids are normal. Pupils are equal, round, and reactive to light. Extraocular movement intact   Neck: Trachea normal and phonation normal. Neck supple. No thyromegaly present.   Cardiovascular: Normal rate, regular rhythm, S1 normal, S2 normal, normal heart sounds and normal pulses.   Pulmonary/Chest: Effort normal and breath sounds normal. She has no decreased breath sounds. She has no wheezes. She has no rales.   Abdominal: Normal " appearance and bowel sounds are normal. She exhibits no distension. Soft. protuberant There is no abdominal tenderness.   Lymphadenopathy:     She has no cervical adenopathy.   Neurological: no focal deficit. She is alert and oriented to person, place, and time.   Skin: Skin is warm, dry and intact. Capillary refill takes less than 2 seconds.   Psychiatric: Her speech is normal and behavior is normal. Mood normal.   Nursing note and vitals reviewed.      Results for orders placed or performed in visit on 05/02/25   POCT Urinalysis(Instrument)    Collection Time: 05/02/25  7:04 PM   Result Value Ref Range    Color, POC UA Yellow Yellow, Straw, Colorless    Clarity, POC UA Clear Clear    Glucose, POC UA Negative Negative    Bilirubin, POC UA Negative Negative    Ketones, POC UA Negative Negative    Spec Grav POC UA 1.025 1.005 - 1.030    Blood, POC UA Moderate (A) Negative    pH, POC UA 5.5 5.0 - 8.0    Protein, POC UA 30 (A) Negative    Urobilinogen, POC UA 0.2 <=1.0    Nitrite, POC UA Positive (A) Negative    WBC, POC UA Small (A) Negative       Assessment:     1. Acute cystitis with hematuria    2. Dysuria      Reviewed with patient urinalysis findings acute cystitis with hematuria.  Urine culture submitted.  Discussed management of symptoms at home.  Reviewed discharge instruction  Plan:       Acute cystitis with hematuria  -     nitrofurantoin, macrocrystal-monohydrate, (MACROBID) 100 MG capsule; Take 1 capsule (100 mg total) by mouth 2 (two) times daily. for 5 days  Dispense: 10 capsule; Refill: 0  -     Urine Culture High Risk    Dysuria  -     POCT Urinalysis(Instrument)      Patient Instructions    Discharge instructions for Acute Cystitis with Hematuria   Push fluids maintain hydration  Avoid all scented products  Start Macrobid 100 mg twice a day for 5 days  Urine culture submitted, look for results on [facility] portal, results in 48 hours we will notify you if abnormal or communicate through your  portal.  When do I need to call the doctor?   You have very bad pain in your back, shoulder, or belly.  You have a fever of 100.4°F (38°C) or higher; shaking chills or sweats even though you are taking antibiotics.  You notice more blood in your urine.  Your signs get worse or do not improve within 24 hours of starting treatment.  You are not able to urinate for more than 8 hours.  Your signs come back after treatment has stopped.    1) See orders for this visit as documented in the electronic medical record.  2) Symptomatic therapy suggested: use acetaminophen/ibuprofen every 6-8 hours prn pain or fever, push fluids.   3) Call or return to clinic prn if these symptoms worsen or fail to improve as anticipated.    Discussed results/diagnosis/plan with patient in clinic.  We had shared decision making for patient's treatment. Patient verbalized understanding and in agreement with current treatment plan.     Patient was instructed to return for re-evaluation with urgent care or PCP for continued outpatient workup and management if symptoms do not improve/worsening symptoms. Strict ED versus clinic precautions given in depth.    Discharge and follow-up instructions given verbally/printed with the patient who expressed understanding. The instructions and results are also available on [employer].      - You must understand that you have received an Urgent Care treatment only and that you may be released before all of your medical problems are known or treated.   - You, the patient, will arrange for follow up care as instructed.   - Follow up with your PCP or specialty clinic as directed in the next 1-2 weeks if not improved or as needed.  You can call [phone] to schedule an appointment with the appropriate provider.   - If your condition worsens or fails to improve we recommend that you receive another evaluation at the ER immediately or contact your PCP to discuss your concerns or return here.        Darshana Watt,  FNP

## 2025-05-03 NOTE — PATIENT INSTRUCTIONS
Discharge instructions for Acute Cystitis with Hematuria   Push fluids maintain hydration  Avoid all scented products  Start Macrobid 100 mg twice a day for 5 days  Urine culture submitted, look for results on [facility] portal, results in 48 hours we will notify you if abnormal or communicate through your portal.  When do I need to call the doctor?   You have very bad pain in your back, shoulder, or belly.  You have a fever of 100.4°F (38°C) or higher; shaking chills or sweats even though you are taking antibiotics.  You notice more blood in your urine.  Your signs get worse or do not improve within 24 hours of starting treatment.  You are not able to urinate for more than 8 hours.  Your signs come back after treatment has stopped.    1) See orders for this visit as documented in the electronic medical record.  2) Symptomatic therapy suggested: use acetaminophen/ibuprofen every 6-8 hours prn pain or fever, push fluids.   3) Call or return to clinic prn if these symptoms worsen or fail to improve as anticipated.    Discussed results/diagnosis/plan with patient in clinic.  We had shared decision making for patient's treatment. Patient verbalized understanding and in agreement with current treatment plan.     Patient was instructed to return for re-evaluation with urgent care or PCP for continued outpatient workup and management if symptoms do not improve/worsening symptoms. Strict ED versus clinic precautions given in depth.    Discharge and follow-up instructions given verbally/printed with the patient who expressed understanding. The instructions and results are also available on [employer].      - You must understand that you have received an Urgent Care treatment only and that you may be released before all of your medical problems are known or treated.   - You, the patient, will arrange for follow up care as instructed.   - Follow up with your PCP or specialty clinic as directed in the next 1-2 weeks if not  improved or as needed.  You can call [phone] to schedule an appointment with the appropriate provider.   - If your condition worsens or fails to improve we recommend that you receive another evaluation at the ER immediately or contact your PCP to discuss your concerns or return here.        JOE Parker

## 2025-05-05 ENCOUNTER — RESULTS FOLLOW-UP (OUTPATIENT)
Dept: URGENT CARE | Facility: CLINIC | Age: 70
End: 2025-05-05

## 2025-05-22 ENCOUNTER — PATIENT MESSAGE (OUTPATIENT)
Dept: RHEUMATOLOGY | Facility: CLINIC | Age: 70
End: 2025-05-22
Payer: COMMERCIAL

## 2025-06-16 ENCOUNTER — TELEPHONE (OUTPATIENT)
Dept: ENDOCRINOLOGY | Facility: CLINIC | Age: 70
End: 2025-06-16
Payer: COMMERCIAL

## 2025-06-25 ENCOUNTER — OFFICE VISIT (OUTPATIENT)
Dept: RHEUMATOLOGY | Facility: CLINIC | Age: 70
End: 2025-06-25
Payer: MEDICARE

## 2025-06-25 VITALS
DIASTOLIC BLOOD PRESSURE: 78 MMHG | OXYGEN SATURATION: 97 % | WEIGHT: 244.5 LBS | SYSTOLIC BLOOD PRESSURE: 122 MMHG | HEART RATE: 59 BPM | BODY MASS INDEX: 39.29 KG/M2 | HEIGHT: 66 IN

## 2025-06-25 DIAGNOSIS — G89.29 CHRONIC BILATERAL LOW BACK PAIN WITHOUT SCIATICA: ICD-10-CM

## 2025-06-25 DIAGNOSIS — M17.0 PRIMARY OSTEOARTHRITIS OF BOTH KNEES: Primary | ICD-10-CM

## 2025-06-25 DIAGNOSIS — M54.50 CHRONIC BILATERAL LOW BACK PAIN WITHOUT SCIATICA: ICD-10-CM

## 2025-06-25 DIAGNOSIS — E66.01 MORBID OBESITY: ICD-10-CM

## 2025-06-25 DIAGNOSIS — R76.8 POSITIVE ANA (ANTINUCLEAR ANTIBODY): ICD-10-CM

## 2025-06-25 DIAGNOSIS — R76.8 ELEVATED SERUM IMMUNOGLOBULIN FREE LIGHT CHAINS: ICD-10-CM

## 2025-06-25 PROCEDURE — 99999 PR PBB SHADOW E&M-EST. PATIENT-LVL IV: CPT | Mod: PBBFAC,,, | Performed by: STUDENT IN AN ORGANIZED HEALTH CARE EDUCATION/TRAINING PROGRAM

## 2025-06-25 PROCEDURE — 20610 DRAIN/INJ JOINT/BURSA W/O US: CPT | Mod: 50,PBBFAC,PN | Performed by: STUDENT IN AN ORGANIZED HEALTH CARE EDUCATION/TRAINING PROGRAM

## 2025-06-25 PROCEDURE — 99214 OFFICE O/P EST MOD 30 MIN: CPT | Mod: S$PBB,25,, | Performed by: STUDENT IN AN ORGANIZED HEALTH CARE EDUCATION/TRAINING PROGRAM

## 2025-06-25 PROCEDURE — 99999PBSHW PR PBB SHADOW TECHNICAL ONLY FILED TO HB: Mod: PBBFAC,,,

## 2025-06-25 PROCEDURE — 99214 OFFICE O/P EST MOD 30 MIN: CPT | Mod: PBBFAC,PN | Performed by: STUDENT IN AN ORGANIZED HEALTH CARE EDUCATION/TRAINING PROGRAM

## 2025-06-25 RX ORDER — LIDOCAINE HYDROCHLORIDE 10 MG/ML
2 INJECTION, SOLUTION EPIDURAL; INFILTRATION; INTRACAUDAL; PERINEURAL
Status: DISCONTINUED | OUTPATIENT
Start: 2025-06-25 | End: 2025-06-25 | Stop reason: HOSPADM

## 2025-06-25 RX ORDER — TRIAMCINOLONE ACETONIDE 40 MG/ML
40 INJECTION, SUSPENSION INTRA-ARTICULAR; INTRAMUSCULAR
Status: DISCONTINUED | OUTPATIENT
Start: 2025-06-25 | End: 2025-06-25 | Stop reason: HOSPADM

## 2025-06-25 RX ORDER — CYCLOBENZAPRINE HCL 5 MG
5 TABLET ORAL NIGHTLY PRN
Qty: 30 TABLET | Refills: 5 | Status: SHIPPED | OUTPATIENT
Start: 2025-06-25

## 2025-06-25 RX ORDER — CYCLOBENZAPRINE HCL 5 MG
5 TABLET ORAL NIGHTLY PRN
COMMUNITY
Start: 2025-04-30 | End: 2025-06-25 | Stop reason: SDUPTHER

## 2025-06-25 RX ORDER — CELECOXIB 200 MG/1
200 CAPSULE ORAL DAILY
Qty: 30 CAPSULE | Refills: 2 | Status: SHIPPED | OUTPATIENT
Start: 2025-06-25

## 2025-06-25 RX ADMIN — LIDOCAINE HYDROCHLORIDE 2 ML: 10 INJECTION, SOLUTION EPIDURAL; INFILTRATION; INTRACAUDAL; PERINEURAL at 08:06

## 2025-06-25 RX ADMIN — TRIAMCINOLONE ACETONIDE 40 MG: 40 INJECTION, SUSPENSION INTRA-ARTICULAR; INTRAMUSCULAR at 08:06

## 2025-06-25 NOTE — PROCEDURES
Large Joint Aspiration/Injection: bilateral knee    Date/Time: 6/25/2025 8:00 AM    Performed by: Yajaira Henderson MD  Authorized by: Yajaira Henderson MD    Consent Done?:  Yes (Written)  Indications:  Arthritis and pain    Local anesthesia used?: Yes    Local anesthetic:  Co-phenylcaine spray    Details:  Needle Size:  25 G  Ultrasonic Guidance for needle placement?: No    Location:  Knee  Laterality:  Bilateral  Site:  Bilateral knee  Medications (Right):  2 mL LIDOcaine (PF) 10 mg/ml (1%) 10 mg/mL (1 %); 40 mg triamcinolone acetonide 40 mg/mL  Medications (Left):  2 mL LIDOcaine (PF) 10 mg/ml (1%) 10 mg/mL (1 %); 40 mg triamcinolone acetonide 40 mg/mL  Patient tolerance:  Patient tolerated the procedure well with no immediate complications     3 failed attempts on left knee. 4th one successful

## 2025-06-25 NOTE — PROGRESS NOTES
RHEUMATOLOGY OUTPATIENT CLINIC NOTE    6/25/2025    Attending Rheumatologist: Yajaira Henderson  Primary Care Provider: Hilary Wells MD   Physician Requesting Consultation: No referring provider defined for this encounter.  Chief Complaint/Reason For Consultation:  No chief complaint on file.    The patient location is:  Home  The chief complaint leading to consultation is:  Joint pain  Visit type: Virtual visit with synchronous audio and video  Total time spent with patient: 12 minutes    Each patient to whom he or she provides medical services by telemedicine is:  (1) informed of the relationship between the physician and patient and the respective role of any other health care provider with respect to management of the patient; and (2) notified that he or she may decline to receive medical services by telemedicine and may withdraw from such care at any time.   Subjective:       CARRI Phelps is a 69 y.o. Black or  female with hypothyroidism and sleep apnea on CPAP who comes for evaluation of joint pain and positive TRACE.    Interim history  -initial consult on 9/2024  -she was supposed to come for in person visit in mid November due to worsening pain in the knees but she did not come as pain resolved. She completed PT in late November   -she reports that pain in knees and legs comes and goes, has not noted any swelling. This is been going for a while. Pain suddenly resolves.   -she takes celebrex 200 mg daily.      Disease history    1 year she started noticing that her whole legs are stiff, particularly knees and ankles, in the morning. Improve after a hot shower, MS less than 1 hour.   Has been having pain in bilateral knees, sharp pains at random times. Pain is worse with walking or prolonged sitting. No swelling. She did PT about a year ago which it helped but pain has changed. Reports lumbar paraspinal pain for about 3 months ago. Improves with heating pad, no  sciatica.    She denies any pain in upper extremities, hands, wrists. No swelling.  Denies any Raynaud's, dry eyes, dry mouth, swollen glands, muscle weakness, skin rashes.    Retired. Used to work in [employer].  Does not smoke, drink.    Mother with SLE    Labs  2024  TRACE 1:80 speckled - also TRACE positive 1:160 in   Negative DSDNA, SSA, SSB, SM, SM/RNP  ESR 83  CRP 14.2  RF negative  H. Pylori Ag (stool) not detected     Imaging  -XR left knee: moderate OA  -XR right knee 2019: moderate OA     Review of Systems   Constitutional:  Negative for fever and unexpected weight change.   HENT:  Negative for mouth sores and trouble swallowing.    Eyes:  Negative for redness.   Respiratory:  Negative for cough and shortness of breath.    Cardiovascular:  Negative for chest pain.   Gastrointestinal:  Negative for constipation and diarrhea.   Genitourinary:  Negative for dysuria and genital sores.   Integumentary:  Negative for rash.   Neurological:  Negative for headaches.   Hematological:  Does not bruise/bleed easily.        Chronic comorbid conditions affecting medical decision making today:  Past Medical History:   Diagnosis Date    Allergy     Chronic low back pain     Fatigue     GERD (gastroesophageal reflux disease)     History of colon polyps 2015    Hypertension     Hypothyroidism     Impaired glucose tolerance 01/15/2014    Leukopenia     Obesity (BMI 30-39.9) 01/15/2014    Primary hypothyroidism 2015    Snoring 2015    Vitamin D deficiency 01/15/2014     Past Surgical History:   Procedure Laterality Date    BREAST BIOPSY Left     CATARACT EXTRACTION       SECTION, CLASSIC      x3    COLONOSCOPY N/A 2021    Procedure: COLONOSCOPY;  Surgeon: Parvez Mazariegos MD;  Location: [facility];  Service: Endoscopy;  Laterality: N/A;  covid test  lapalco, instructions through [facility]    HYSTERECTOMY      total    OOPHORECTOMY      thyroid surgey  2009     Family History   Problem  Relation Name Age of Onset    Heart disease Mother      Kidney disease Mother      Hypertension Mother      Cancer Father          throat cancer    No Known Problems Daughter x1     Hypertension Son x1     Hypertension Maternal Aunt      Diabetes Maternal Aunt      Thyroid disease Maternal Aunt      Breast cancer Maternal Aunt  70    Diabetes Maternal Grandmother       Social History     Substance and Sexual Activity   Alcohol Use No    Comment: seldomly     Social History     Tobacco Use   Smoking Status Never    Passive exposure: Never   Smokeless Tobacco Never     Social History     Substance and Sexual Activity   Drug Use No       Current Outpatient Medications:     ergocalciferol, vitamin D2, 2,000 unit Tab, Take 2,000 Units by mouth 2 (two) times daily., Disp: , Rfl:     estradioL (ESTRACE) 0.01 % (0.1 mg/gram) vaginal cream, Insert 2 g daily intravaginally for 2 weeks, then 1 g twice weekly, Disp: 42.5 g, Rfl: 5    hydroCHLOROthiazide (HYDRODIURIL) 25 MG tablet, TAKE 1 TABLET(25 MG) BY MOUTH EVERY DAY, Disp: 90 tablet, Rfl: 1    levothyroxine (SYNTHROID) 125 MCG tablet, TAKE 1 TABLET(125 MCG) BY MOUTH BEFORE BREAKFAST, Disp: 90 tablet, Rfl: 3    multivitamin capsule, Take 1 capsule by mouth once daily., Disp: , Rfl:     valsartan (DIOVAN) 80 MG tablet, TAKE 1 TABLET(80 MG) BY MOUTH DAILY, Disp: 90 tablet, Rfl: 2    verapamiL (VERELAN) 360 MG C24P, TAKE 1 CAPSULE(360 MG) BY MOUTH EVERY DAY, Disp: 90 capsule, Rfl: 1    celecoxib (CELEBREX) 200 MG capsule, Take 1 capsule (200 mg total) by mouth once daily., Disp: 30 capsule, Rfl: 2    cyclobenzaprine (FLEXERIL) 5 MG tablet, Take 1 tablet (5 mg total) by mouth nightly as needed for Muscle spasms., Disp: 30 tablet, Rfl: 5     Objective:         Vitals:    06/25/25 0813   BP: 122/78   Pulse: (!) 59       Physical Exam   Constitutional: She is oriented to person, place, and time. She appears well-developed.   HENT:   Head: Normocephalic.   Cardiovascular: Normal  rate and normal heart sounds.   Pulmonary/Chest: Effort normal and breath sounds normal.   Abdominal: Soft.   Musculoskeletal:      Cervical back: Neck supple.      Comments: Cspine FROM no tenderness  Tspine FROM no tenderness  Lspine FROM paraspinal tenderness  Shoulders: FROM; no synovitis;  Elbows: FROM; no synovitis; no tophi or nodules  Wrists: FROM; no synovitis;    MCPs: FROM; no synovitis;   PIPs:FROM; no synovitis;   DIPs: FROM; no synovitis;   HIPS: FROM  Knees: FROM; no synovitis; no instability  Ankles: FROM: no synovitis   Toes: no tenderness on palpation.     Lymphadenopathy:     She has no cervical adenopathy.   Neurological: She is alert and oriented to person, place, and time.   Skin: No rash noted.   No Heliotrope rash  No Gottron papules  No sclerodactyly   No Raynaud's  No Petechiae  No discoid lesions  No alopecia  Normal Capillaroscopy   Vitals reviewed.    Virtual visit 1/22:   No rashes in face    Reviewed old and all outside pertinent medical records available.    All lab results personally reviewed and interpreted by me.  Lab Results   Component Value Date    WBC 4.06 02/28/2024    HGB 13.6 02/28/2024    HCT 42.2 02/28/2024    MCV 90 02/28/2024    MCH 28.9 02/28/2024    MCHC 32.2 02/28/2024    RDW 13.7 02/28/2024     02/28/2024    MPV 11.8 02/28/2024    PLTEST Adequate 09/13/2011       Lab Results   Component Value Date     06/09/2025    K 3.5 (L) 06/09/2025     06/05/2024    CO2 29 06/09/2025    GLU 86 06/05/2024    BUN 18.0 06/09/2025    CALCIUM 9.3 06/09/2025    PROT 7.5 02/28/2024    ALBUMIN 3.8 06/09/2025    BILITOT 0.4 06/09/2025    AST 15 06/09/2025    ALKPHOS 94 02/28/2024    ALT 14 06/09/2025       Lab Results   Component Value Date    COLORU Yellow 05/02/2025    APPEARANCEUA Cloudy (A) 06/06/2024    SPECGRAV 1.025 05/02/2025    PHUR 5.5 05/02/2025    PROTEINUA Negative 06/06/2024    KETONESU Negative 05/02/2025    LEUKOCYTESUR Negative 06/06/2024    NITRITE  "Positive (A) 05/02/2025    UROBILINOGEN 0.2 05/02/2025       Lab Results   Component Value Date    CRP 3.5 (H) 06/09/2025       Lab Results   Component Value Date    TRACE Pos, Titer to follow (A) 09/13/2011    RF <13.0 06/05/2024    SEDRATE 49 (H) 08/20/2024    CCPANTIBODIE <0.5 08/20/2024       No components found for: "25OHVITDTOT", "37IBXBIS9", "54JYIKKS9", "METHODNOTE"    No results found for: "URICACID"    Lab Results   Component Value Date    HEPBSAB <3.00 08/20/2024    HEPBSAB Non-reactive 08/20/2024    HEPBSAG Non-reactive 08/20/2024    HEPCAB Non-reactive 08/20/2024    HEPCAB Negative 10/07/2016       Imaging:  All imaging reviewed and independently interpreted by me.         ASSESSMENT / PLAN:     Dain Phelps is a 69 y.o. Black or  female with:    1. Osteoarthritis of the knee  -she has been having pain in bilateral knees, likely due to OA as evident on prior XR.  She also may have a component of patellofemoral syndrome.  -continue celebrex 200 mg QD PRN.   -use voltaren gel   -proceed with bilateral CSI today. Left knee required 4 attempts (3 failed), right knee 1 attempt. Post injection care discussed.   -obtain XR of both knees today. Pt would like to be evaluated by orthopedics to see if she needs knee replacement.     2. Chronic bilateral low back pain without sciatica  -no red flags. Likely mechanical   -XR lumbar spine showed DJD.   -continue flexeril 5 mg QHS PRN for muscle spasms.     3. Positive TRACE (antinuclear antibody)  -TRACE 1:80 speckled with negative subserologies. Prior TRACE has been positive as well. In 2011 she had elevated Sm/RNP and then was negative. TRACE could be positive due to hypothyroidism or family history.  -elevated markers of inflammation for 10+ years.  Normal complements, normal SPEP, free light chain ratio normal.  -No clinical signs or symptoms of lupus, scleroderma, Sjogren's, myositis.  -reassurance    4. Morbid obesity  -would benefit from decreasing " at least 10% of body weight.  - recommended goal of losing 1 lb per week.  - consider nutritionist evaluation.  - would consider screening for LORRAINE per PMD.    5. Elevated kappa FLC  -normal FLC ratio  -e-consult with hem/onc: benign. No further tests indicated   -normal SPEP with electrophoresis    Follow up in about 3 months (around 9/25/2025). Or sooner PRN for disease management     Method of contact with patient concerns: Aliyat attn Rheumatology    Disclaimer:  This note is prepared using voice recognition software and as such is likely to have errors and has not been proof read. Please contact me for questions.       Yajaira Henderson M.D.  Rheumatology  [facility]

## 2025-06-30 ENCOUNTER — OFFICE VISIT (OUTPATIENT)
Dept: ENDOCRINOLOGY | Facility: CLINIC | Age: 70
End: 2025-06-30
Payer: MEDICARE

## 2025-06-30 DIAGNOSIS — E55.9 VITAMIN D DEFICIENCY: ICD-10-CM

## 2025-06-30 DIAGNOSIS — R73.02 IGT (IMPAIRED GLUCOSE TOLERANCE): ICD-10-CM

## 2025-06-30 DIAGNOSIS — E89.0 POSTOPERATIVE HYPOTHYROIDISM: Primary | ICD-10-CM

## 2025-06-30 DIAGNOSIS — G47.33 OSA ON CPAP: ICD-10-CM

## 2025-06-30 PROCEDURE — 98006 SYNCH AUDIO-VIDEO EST MOD 30: CPT | Mod: 95,,, | Performed by: INTERNAL MEDICINE

## 2025-06-30 PROCEDURE — 99212 OFFICE O/P EST SF 10 MIN: CPT | Performed by: INTERNAL MEDICINE

## 2025-06-30 PROCEDURE — G2211 COMPLEX E/M VISIT ADD ON: HCPCS | Mod: 95,,, | Performed by: INTERNAL MEDICINE

## 2025-06-30 NOTE — PATIENT INSTRUCTIONS
Thank you for completing a virtual visit with me!     Per our conversation, please decrease your levothyroxine to 112 mcg a day.  I will send in a new prescription to the pharmacy on file.  We will recheck labs in 8 weeks.  If everything looks okay we will see you back in 1 year    Please let me know if you have any other questions.    Thank you,  Erika Mcnally MD

## 2025-06-30 NOTE — PROGRESS NOTES
Subjective:      Patient ID: Dain Phelps is a 69 y.o. female.    Chief Complaint:  postoperative hypothyroidism    History of Present Illness  With regards to her postoperative hypothyroidism:  initially seen her for MNG-- we did a FNA which was indeterminate and then she had a total thyroidectomy 2009 -- final path was benign         Current medication:  generic lt4 125 mcg daily           With regards to the vitamin d deficiency:       takes otc vitamin d3 2000 iu daily      BMD 6/30/22   Normal bone mineral density.    With regards to obesity, There is no height or weight on file to calculate BMI., IGT 6/29/25  Denies symptoms of hyperglycemia such as polyuria, polydipsia, nocturia, unexplained weight loss or blurred vision       Tried Ozempic  - did not have significant weight loss so she stopped after 8 months   PCP is working with insurance to try to get Zepbound covered      +FH of T2DM     Does snore - has christine - did see the sleep physician in August of 2022- using cpap!     ROS:   As above    Objective:     There were no vitals taken for this visit.  BP Readings from Last 3 Encounters:   06/25/25 122/78   05/02/25 (!) 141/85   02/12/25 (!) 144/85     Wt Readings from Last 1 Encounters:   06/25/25 0813 110.9 kg (244 lb 7.8 oz)     There is no height or weight on file to calculate BMI.      Physical Exam  Constitutional:       Appearance: Normal appearance.   Psychiatric:         Attention and Perception: Attention normal.         Mood and Affect: Mood normal.         Speech: Speech normal.         Behavior: Behavior normal.         Thought Content: Thought content normal.         Judgment: Judgment normal.                Lab Review:   Lab Results   Component Value Date    HGBA1C 5.7 (H) 06/09/2025     Lab Results   Component Value Date    CHOL 168 06/09/2025    HDL 52 06/09/2025    LDLCALC 105 06/09/2025    TRIG 54 06/09/2025    CHOLHDL 3.23 06/09/2025     Lab Results   Component Value Date      06/09/2025    K 3.5 (L) 06/09/2025     06/05/2024    CO2 29 06/09/2025    GLU 86 06/05/2024    BUN 18.0 06/09/2025    CREATININE 0.63 06/09/2025    CALCIUM 9.3 06/09/2025    PROT 7.5 02/28/2024    ALBUMIN 3.8 06/09/2025    BILITOT 0.4 06/09/2025    ALKPHOS 94 02/28/2024    AST 15 06/09/2025    ALT 14 06/09/2025    ANIONGAP 6 (L) 06/09/2025    ESTGFRAFRICA >60 06/07/2022    EGFRNONAA >60 06/07/2022    TSH 0.14 (L) 06/09/2025     Vit D, 25-Hydroxy   Date Value Ref Range Status   07/07/2020 33 30 - 96 ng/mL Final     Comment:     Vitamin D deficiency.........<10 ng/mL                              Vitamin D insufficiency......10-29 ng/mL       Vitamin D sufficiency........> or equal to 30 ng/mL  Vitamin D toxicity............>100 ng/mL         Assessment and Plan     Postoperative hypothyroidism   Based on low TSH decrease to 112 mcg daily.  Repeat labs in 2 months  Goal is a normal TSH  Avoid exogenous hyperthyroidism as this can accelerate bone loss and increase risk of CV complications.     Follow-up with me yearly     Vitamin D deficiency   over the counter vitamin D 2000 iu a day      LORRAINE on CPAP  she is on treatment    IGT (impaired glucose tolerance)   She is working with her PCP to get zepbound covered.       The patient location is: [place]  The chief complaint leading to consultation is: above     Visit type: audiovisual    Level of service is based on medical decision-making and not time      Each patient to whom he or she provides medical services by telemedicine is:  (1) informed of the relationship between the physician and patient and the respective role of any other health care provider with respect to management of the patient; and (2) notified that he or she may decline to receive medical services by telemedicine and may withdraw from such care at any time.

## 2025-07-03 PROBLEM — R73.02 IGT (IMPAIRED GLUCOSE TOLERANCE): Status: ACTIVE | Noted: 2025-07-03

## 2025-07-03 RX ORDER — LEVOTHYROXINE SODIUM 112 UG/1
112 TABLET ORAL
Qty: 90 TABLET | Refills: 3 | Status: SHIPPED | OUTPATIENT
Start: 2025-07-03

## 2025-07-03 NOTE — ASSESSMENT & PLAN NOTE
Based on low TSH decrease to 112 mcg daily.  Repeat labs in 2 months  Goal is a normal TSH  Avoid exogenous hyperthyroidism as this can accelerate bone loss and increase risk of CV complications.     Follow-up with me yearly

## 2025-07-31 ENCOUNTER — OFFICE VISIT (OUTPATIENT)
Dept: ORTHOPEDICS | Facility: CLINIC | Age: 70
End: 2025-07-31
Payer: MEDICARE

## 2025-07-31 VITALS — HEIGHT: 66 IN | WEIGHT: 244.5 LBS | BODY MASS INDEX: 39.29 KG/M2

## 2025-07-31 DIAGNOSIS — M17.0 PRIMARY OSTEOARTHRITIS OF BOTH KNEES: ICD-10-CM

## 2025-07-31 PROCEDURE — 99999 PR PBB SHADOW E&M-EST. PATIENT-LVL III: CPT | Mod: PBBFAC,,, | Performed by: ORTHOPAEDIC SURGERY

## 2025-07-31 PROCEDURE — 99213 OFFICE O/P EST LOW 20 MIN: CPT | Mod: S$PBB,,, | Performed by: ORTHOPAEDIC SURGERY

## 2025-07-31 PROCEDURE — 99213 OFFICE O/P EST LOW 20 MIN: CPT | Mod: PBBFAC,PN | Performed by: ORTHOPAEDIC SURGERY

## 2025-07-31 NOTE — PROGRESS NOTES
EST PATIENT ORTHOPAEDIC: Knee    PRIMARY CARE PHYSICIAN: Hilary Wells MD   REFERRING PROVIDER: Yajaira Henderson MD  [address] [place] [address]     ASSESSMENT & PLAN:    Impression:  Bilateral Knee Severe Osteoarthritis, Primary  (left worse than right)    Follow Up Plan: JOVANININO Phelps has radiograph and physical exam evidence of the aforementioned impression and wishes to pursue surgery. This patient appears to have sufficient symptoms to warrant surgical intervention and is an appropriate candidate for Left Primary Total Knee Arthroplasty as evidenced by months of unsuccessful non-operative treatment as outlined in the HPI below and progressive symptoms.    To review:  Patient comes in with greater than 2 year history of having left greater than right knee pain.  She localizes this both the anterior, medial and lateral aspects of her knee.  This pain was intermittent but now more consistent. She has 2 different kinds of pain the 1 that is mediolateral is based on turning or twisting in a certain way that reproduces her pain.  She has anterior knee pain when getting up from low stool positions.  She is also reporting some associated weakness.  She does not take anything over-the-counter and she carries a diagnosis of the ulcer in her history.  She was most concerned about this weakness in her inability to get off low seats easily, she went to PT which was not helping.  Has had injections into her knee which are providing diminishing returns.  She would like to consider definitive fixation today.  We had a discussion about surgery.  She is going to discuss with her family and call back for surgical considerations and timing.    The patient has been ordered:  None    CONSULTS:   None    ACTIVE PROBLEM LIST  Patient Active Problem List   Diagnosis    Postoperative hypothyroidism    HTN (hypertension)    GERD (gastroesophageal reflux disease)    Proteinuria    Vitamin D deficiency     LORRAINE on CPAP    Vaginal atrophy    Severe obesity (BMI 35.0-39.9) with comorbidity    Left shoulder pain    Pulmonary hypertension    Pulmonary heart disease    Chronic pain of both knees    Decreased functional mobility and endurance    Weakness    Decreased functional activity tolerance    IGT (impaired glucose tolerance)           SUBJECTIVE    CHIEF COMPLAINT: Knee Pain    HPI:   Dain Phelps is a 69 y.o. female here for evaluation and management of left knee pain. There is not a specific incident that brought about this pain. she has had progressive problems with the knee(s) starting 6 months ago but is now progressing to interfere with activities which include: walking, exercise, and rising from a sitting position    Currently the pain in the joint is rated at moderate with activity. The pain is intermittent and is located in the knee, at level of joint line, located medially, located laterally, and located anterior. The pain is described as aching, severe, and sharp. Relieving factors include rest and repositioning.     There is not associated Catching, Clicking, and Popping.     Dain Phelps has no additional complaints.     7/31/25:  Patient comes in today with ongoing left knee pain, also with less severe right knee pain.  Her pain is progressing and becoming more debilitating to her.    PROGRESSIVE SYMPTOMS:  Pain worsened by weight bearing  Pain effecting living situation    FUNCTIONAL STATUS:   Climb a flight of stairs or walk up a hill     PREVIOUS TREATMENTS:  Medical: Intolerant of NSAIDS  Physical Therapy: Activities Modified   Previous Orthopaedic Surgery: None    REVIEW OF SYSTEMS:  PAIN ASSESSMENT:  See HPI.  MUSCULOSKELETAL: See HPI.  OTHER 10 point review of systems is negative except as stated in HPI above    PAST MEDICAL HISTORY   has a past medical history of Allergy, Chronic low back pain, Fatigue, GERD (gastroesophageal reflux disease), History of colon polyps (08/04/2015),  Hypertension, Hypothyroidism, Impaired glucose tolerance (01/15/2014), Leukopenia, Obesity (BMI 30-39.9) (01/15/2014), Primary hypothyroidism (2015), Snoring (2015), and Vitamin D deficiency (01/15/2014).     PAST SURGICAL HISTORY   has a past surgical history that includes  section, classic; thyroid surgey (); Hysterectomy; Cataract extraction; Oophorectomy; Breast biopsy (Left); and Colonoscopy (N/A, 2021).     FAMILY HISTORY  family history includes Breast cancer (age of onset: 70) in her maternal aunt; Cancer in her father; Diabetes in her maternal aunt and maternal grandmother; Heart disease in her mother; Hypertension in her maternal aunt, mother, and son; Kidney disease in her mother; No Known Problems in her daughter; Thyroid disease in her maternal aunt.     SOCIAL HISTORY   reports that she has never smoked. She has never been exposed to tobacco smoke. She has never used smokeless tobacco. She reports that she does not drink alcohol and does not use drugs.     ALLERGIES   Review of patient's allergies indicates:   Allergen Reactions    Ace inhibitors Edema     Angioedema        MEDICATIONS  Current Outpatient Medications on File Prior to Visit   Medication Sig Dispense Refill    celecoxib (CELEBREX) 200 MG capsule Take 1 capsule (200 mg total) by mouth once daily. 30 capsule 2    cyclobenzaprine (FLEXERIL) 5 MG tablet Take 1 tablet (5 mg total) by mouth nightly as needed for Muscle spasms. 30 tablet 5    ergocalciferol, vitamin D2, 2,000 unit Tab Take 2,000 Units by mouth 2 (two) times daily.      estradioL (ESTRACE) 0.01 % (0.1 mg/gram) vaginal cream Insert 2 g daily intravaginally for 2 weeks, then 1 g twice weekly 42.5 g 5    hydroCHLOROthiazide (HYDRODIURIL) 25 MG tablet TAKE 1 TABLET(25 MG) BY MOUTH EVERY DAY 90 tablet 1    levothyroxine (SYNTHROID) 112 MCG tablet Take 1 tablet (112 mcg total) by mouth before breakfast. 90 tablet 3    multivitamin capsule Take 1 capsule by  "mouth once daily.      valsartan (DIOVAN) 80 MG tablet TAKE 1 TABLET(80 MG) BY MOUTH DAILY 90 tablet 2    verapamiL (VERELAN) 360 MG C24P TAKE 1 CAPSULE(360 MG) BY MOUTH EVERY DAY 90 capsule 1     No current facility-administered medications on file prior to visit.          PHYSICAL EXAM   height is 5' 6" (1.676 m) and weight is 110.9 kg (244 lb 7.8 oz).   Body mass index is 39.46 kg/m².      All other systems deferred.  GENERAL:  No acute distress  HABITUS: Obese  GAIT: Non-antalgic  SKIN: Normal     KNEE EXAM:    bilateral   Effusion: No joint effusion  TTP: yes over Medial Joint Line and Lateral Joint Line and patellar tendon  no crepitus with passive knee ROM  Passive ROM: Extension 0, Flexion 130  No pain with manipulation of patella  Stable to varus/valgus stress. No increased laxity to anterior/posterior drawer testing  negative Vivienne's test  No pain with IR/ER rotation of the hip  5/5 strength in knee flexion and extension, ankle plantarflexion and dorsiflexion  Neurovascular Status: Sensation intact to light touch in Sural, Saphenous, SPN, DPN, Tibial nerve distribution  2+ pulse DP/PT, normal capillary refill, foot has normal warmth    DATA:  Diagnostic tests reviewed for today's visit:     4v of the bilateral knee reveal severe degenerative changes of the Medial compartment. There is evidence of advanced osteoarthritis changes with Osteophyte formation and Joint space narrowing. The limb is in netural alignment. The patella is tracking midline.        "

## 2025-08-04 ENCOUNTER — PATIENT MESSAGE (OUTPATIENT)
Dept: ORTHOPEDICS | Facility: CLINIC | Age: 70
End: 2025-08-04
Payer: COMMERCIAL

## 2025-08-12 ENCOUNTER — PATIENT MESSAGE (OUTPATIENT)
Dept: ORTHOPEDICS | Facility: CLINIC | Age: 70
End: 2025-08-12
Payer: MEDICARE

## 2025-08-15 ENCOUNTER — PATIENT MESSAGE (OUTPATIENT)
Dept: ORTHOPEDICS | Facility: CLINIC | Age: 70
End: 2025-08-15
Payer: MEDICARE

## 2025-08-20 DIAGNOSIS — M17.12 PRIMARY OSTEOARTHRITIS OF LEFT KNEE: Primary | ICD-10-CM

## 2025-08-21 ENCOUNTER — OFFICE VISIT (OUTPATIENT)
Dept: OBSTETRICS AND GYNECOLOGY | Facility: CLINIC | Age: 70
End: 2025-08-21
Payer: MEDICARE

## 2025-08-21 ENCOUNTER — OFFICE VISIT (OUTPATIENT)
Dept: ORTHOPEDICS | Facility: CLINIC | Age: 70
End: 2025-08-21
Payer: MEDICARE

## 2025-08-21 VITALS — BODY MASS INDEX: 39.29 KG/M2 | WEIGHT: 244.5 LBS | HEIGHT: 66 IN

## 2025-08-21 VITALS
WEIGHT: 244.94 LBS | BODY MASS INDEX: 39.53 KG/M2 | SYSTOLIC BLOOD PRESSURE: 124 MMHG | DIASTOLIC BLOOD PRESSURE: 80 MMHG

## 2025-08-21 DIAGNOSIS — N89.8 VAGINAL DISCHARGE: ICD-10-CM

## 2025-08-21 DIAGNOSIS — Z01.419 WELL WOMAN EXAM WITH ROUTINE GYNECOLOGICAL EXAM: Primary | ICD-10-CM

## 2025-08-21 DIAGNOSIS — N95.2 VAGINAL ATROPHY: ICD-10-CM

## 2025-08-21 DIAGNOSIS — Z12.31 BREAST CANCER SCREENING BY MAMMOGRAM: ICD-10-CM

## 2025-08-21 DIAGNOSIS — M17.12 PRIMARY OSTEOARTHRITIS OF LEFT KNEE: Primary | ICD-10-CM

## 2025-08-21 PROCEDURE — G0101 CA SCREEN;PELVIC/BREAST EXAM: HCPCS | Mod: PBBFAC | Performed by: OBSTETRICS & GYNECOLOGY

## 2025-08-21 PROCEDURE — 99999 PR PBB SHADOW E&M-EST. PATIENT-LVL III: CPT | Mod: PBBFAC,,, | Performed by: OBSTETRICS & GYNECOLOGY

## 2025-08-21 PROCEDURE — 99213 OFFICE O/P EST LOW 20 MIN: CPT | Mod: S$PBB,,, | Performed by: ORTHOPAEDIC SURGERY

## 2025-08-21 PROCEDURE — 99213 OFFICE O/P EST LOW 20 MIN: CPT | Mod: PBBFAC,27,25 | Performed by: OBSTETRICS & GYNECOLOGY

## 2025-08-21 PROCEDURE — 99999 PR PBB SHADOW E&M-EST. PATIENT-LVL IV: CPT | Mod: PBBFAC,,, | Performed by: ORTHOPAEDIC SURGERY

## 2025-08-21 PROCEDURE — 99214 OFFICE O/P EST MOD 30 MIN: CPT | Mod: PBBFAC,PN | Performed by: ORTHOPAEDIC SURGERY

## 2025-08-21 RX ORDER — ESTRADIOL 10 UG/1
INSERT VAGINAL
Qty: 8 EACH | Refills: 0 | Status: SHIPPED | OUTPATIENT
Start: 2025-08-21

## 2025-08-21 RX ORDER — ESTRADIOL 10 UG/1
INSERT VAGINAL
Qty: 18 EACH | Refills: 0 | Status: SHIPPED | OUTPATIENT
Start: 2025-08-21

## 2025-08-26 ENCOUNTER — PATIENT MESSAGE (OUTPATIENT)
Dept: ORTHOPEDICS | Facility: CLINIC | Age: 70
End: 2025-08-26
Payer: MEDICARE

## 2025-08-27 ENCOUNTER — PATIENT OUTREACH (OUTPATIENT)
Dept: ADMINISTRATIVE | Facility: HOSPITAL | Age: 70
End: 2025-08-27
Payer: MEDICARE

## 2025-08-27 ENCOUNTER — LAB VISIT (OUTPATIENT)
Dept: LAB | Facility: HOSPITAL | Age: 70
End: 2025-08-27
Attending: INTERNAL MEDICINE
Payer: MEDICARE

## 2025-08-27 DIAGNOSIS — E89.0 POSTOPERATIVE HYPOTHYROIDISM: ICD-10-CM

## 2025-08-27 LAB — TSH SERPL-ACNC: 0.73 UIU/ML (ref 0.4–4)

## 2025-08-27 PROCEDURE — 84443 ASSAY THYROID STIM HORMONE: CPT

## 2025-08-27 PROCEDURE — 36415 COLL VENOUS BLD VENIPUNCTURE: CPT | Mod: PO

## 2025-08-28 ENCOUNTER — PATIENT MESSAGE (OUTPATIENT)
Dept: ORTHOPEDICS | Facility: CLINIC | Age: 70
End: 2025-08-28
Payer: MEDICARE

## 2025-08-29 ENCOUNTER — HOSPITAL ENCOUNTER (OUTPATIENT)
Dept: RADIOLOGY | Facility: HOSPITAL | Age: 70
Discharge: HOME | End: 2025-08-29
Attending: ORTHOPAEDIC SURGERY
Payer: MEDICARE

## 2025-09-04 ENCOUNTER — HOSPITAL ENCOUNTER (OUTPATIENT)
Dept: RADIOLOGY | Facility: HOSPITAL | Age: 70
Discharge: HOME | End: 2025-09-04
Attending: OBSTETRICS & GYNECOLOGY
Payer: MEDICARE

## 2025-09-04 ENCOUNTER — OFFICE VISIT (OUTPATIENT)
Dept: CARDIOLOGY | Facility: CLINIC | Age: 70
End: 2025-09-04
Payer: MEDICARE

## 2025-09-04 VITALS
BODY MASS INDEX: 39.99 KG/M2 | SYSTOLIC BLOOD PRESSURE: 138 MMHG | WEIGHT: 248.81 LBS | DIASTOLIC BLOOD PRESSURE: 84 MMHG | OXYGEN SATURATION: 95 % | RESPIRATION RATE: 17 BRPM | HEART RATE: 87 BPM | HEIGHT: 66 IN

## 2025-09-04 DIAGNOSIS — R06.09 DOE (DYSPNEA ON EXERTION): ICD-10-CM

## 2025-09-04 DIAGNOSIS — R94.31 ABNORMAL EKG: ICD-10-CM

## 2025-09-04 DIAGNOSIS — G47.33 OSA ON CPAP: ICD-10-CM

## 2025-09-04 DIAGNOSIS — Z01.810 PREOP CARDIOVASCULAR EXAM: Primary | ICD-10-CM

## 2025-09-04 DIAGNOSIS — I10 ESSENTIAL HYPERTENSION: ICD-10-CM

## 2025-09-04 DIAGNOSIS — E66.01 MORBID OBESITY, UNSPECIFIED OBESITY TYPE: ICD-10-CM

## 2025-09-04 DIAGNOSIS — Z12.31 BREAST CANCER SCREENING BY MAMMOGRAM: ICD-10-CM

## 2025-09-04 DIAGNOSIS — I10 PRIMARY HYPERTENSION: ICD-10-CM

## 2025-09-04 PROCEDURE — 99214 OFFICE O/P EST MOD 30 MIN: CPT | Mod: PBBFAC | Performed by: INTERNAL MEDICINE

## 2025-09-04 PROCEDURE — 99214 OFFICE O/P EST MOD 30 MIN: CPT | Mod: S$PBB,,, | Performed by: INTERNAL MEDICINE

## 2025-09-04 PROCEDURE — 77067 SCR MAMMO BI INCL CAD: CPT | Mod: TC,PO

## 2025-09-04 PROCEDURE — 99999 PR PBB SHADOW E&M-EST. PATIENT-LVL IV: CPT | Mod: PBBFAC,,, | Performed by: INTERNAL MEDICINE
